# Patient Record
Sex: FEMALE | Race: WHITE | NOT HISPANIC OR LATINO
[De-identification: names, ages, dates, MRNs, and addresses within clinical notes are randomized per-mention and may not be internally consistent; named-entity substitution may affect disease eponyms.]

---

## 2020-04-21 PROBLEM — Z00.00 ENCOUNTER FOR PREVENTIVE HEALTH EXAMINATION: Status: ACTIVE | Noted: 2020-04-21

## 2020-09-02 ENCOUNTER — APPOINTMENT (OUTPATIENT)
Dept: GASTROENTEROLOGY | Facility: CLINIC | Age: 75
End: 2020-09-02
Payer: COMMERCIAL

## 2020-09-02 VITALS
WEIGHT: 197 LBS | HEART RATE: 79 BPM | HEIGHT: 62 IN | OXYGEN SATURATION: 97 % | BODY MASS INDEX: 36.25 KG/M2 | SYSTOLIC BLOOD PRESSURE: 136 MMHG | DIASTOLIC BLOOD PRESSURE: 80 MMHG

## 2020-09-02 DIAGNOSIS — Z86.69 PERSONAL HISTORY OF OTHER DISEASES OF THE NERVOUS SYSTEM AND SENSE ORGANS: ICD-10-CM

## 2020-09-02 DIAGNOSIS — Z80.0 FAMILY HISTORY OF MALIGNANT NEOPLASM OF DIGESTIVE ORGANS: ICD-10-CM

## 2020-09-02 DIAGNOSIS — Z12.11 ENCOUNTER FOR SCREENING FOR MALIGNANT NEOPLASM OF COLON: ICD-10-CM

## 2020-09-02 DIAGNOSIS — Z86.010 PERSONAL HISTORY OF COLONIC POLYPS: ICD-10-CM

## 2020-09-02 DIAGNOSIS — Z87.891 PERSONAL HISTORY OF NICOTINE DEPENDENCE: ICD-10-CM

## 2020-09-02 PROCEDURE — 99203 OFFICE O/P NEW LOW 30 MIN: CPT

## 2020-09-02 NOTE — REASON FOR VISIT
[Initial Evaluation] : an initial evaluation [FreeTextEntry1] : Family history of colon cancer and personal history of polyps

## 2020-09-02 NOTE — HISTORY OF PRESENT ILLNESS
[de-identified] : Sep 02, 2020 \par \par Dr. العراقي takes care of this pleasant 75-year-old female, colonoscopies by me in the past, who has family history, mother with colon cancer and personal history of colon polyps\par \par She is doing very well with no chronic medical illnesses\par \par She is enjoying her half-way with her twin 16-year-old granddaughters and her grandson age 13\par \par Ms. DESIRAE POLLOCK 75 year is referred for colon cancer screening.  The patient denies any change in bowel movements, blood per rectum, abdominal, pelvic or rectal discomfort.   \par \par There is no family history of colon  other gastrointestinal cancers.\par \par The patient denies any unexplained weight loss, fever chills or night sweats.\par \par There is no significant cardiac or pulmonary history.\par \par No complaints of chest pain, shortness of breath, palpitations, cough.\par \par The patient is feeling quite well.\par \par The patient is on no significant anticoagulant therapy or anti platelet therapy. \par \par No adverse reaction to anesthesia in the past.\par \par

## 2020-09-02 NOTE — PHYSICAL EXAM
[General Appearance - Alert] : alert [General Appearance - In No Acute Distress] : in no acute distress [General Appearance - Well Nourished] : well nourished [General Appearance - Well Developed] : well developed [General Appearance - Well-Appearing] : healthy appearing [Sclera] : the sclera and conjunctiva were normal [Neck Appearance] : the appearance of the neck was normal [Neck Cervical Mass (___cm)] : no neck mass was observed [Jugular Venous Distention Increased] : there was no jugular-venous distention [Auscultation Breath Sounds / Voice Sounds] : lungs were clear to auscultation bilaterally [Apical Impulse] : the apical impulse was normal [Full Pulse] : the pedal pulses are present [Edema] : there was no peripheral edema [Bowel Sounds] : normal bowel sounds [Abdomen Soft] : soft [Abdomen Tenderness] : non-tender [Abdomen Mass (___ Cm)] : no abdominal mass palpated [Cervical Lymph Nodes Enlarged Posterior Bilaterally] : posterior cervical [Cervical Lymph Nodes Enlarged Anterior Bilaterally] : anterior cervical [Supraclavicular Lymph Nodes Enlarged Bilaterally] : supraclavicular [Femoral Lymph Nodes Enlarged Bilaterally] : femoral [Axillary Lymph Nodes Enlarged Bilaterally] : axillary [Inguinal Lymph Nodes Enlarged Bilaterally] : inguinal [No CVA Tenderness] : no ~M costovertebral angle tenderness [No Spinal Tenderness] : no spinal tenderness [Abnormal Walk] : normal gait [Nail Clubbing] : no clubbing  or cyanosis of the fingernails [Musculoskeletal - Swelling] : no joint swelling seen [Motor Tone] : muscle strength and tone were normal [Skin Color & Pigmentation] : normal skin color and pigmentation [Skin Turgor] : normal skin turgor [] : no rash [No Focal Deficits] : no focal deficits [Oriented To Time, Place, And Person] : oriented to person, place, and time [Impaired Insight] : insight and judgment were intact [Affect] : the affect was normal

## 2020-09-02 NOTE — CONSULT LETTER
[Dear  ___] : Dear  [unfilled], [Consult Letter:] : I had the pleasure of evaluating your patient, [unfilled]. [Consult Closing:] : Thank you very much for allowing me to participate in the care of this patient.  If you have any questions, please do not hesitate to contact me. [Please see my note below.] : Please see my note below. [Sincerely,] : Sincerely, [FreeTextEntry2] : Dr. العراقي\par [address] [facility]\[place] [address]\par \par  [FreeTextEntry3] : Savage Rojas MD\par

## 2020-09-22 DIAGNOSIS — Z11.59 ENCOUNTER FOR SCREENING FOR OTHER VIRAL DISEASES: ICD-10-CM

## 2020-09-28 ENCOUNTER — APPOINTMENT (OUTPATIENT)
Dept: DISASTER EMERGENCY | Facility: CLINIC | Age: 75
End: 2020-09-28

## 2020-09-29 LAB — SARS-COV-2 N GENE NPH QL NAA+PROBE: NOT DETECTED

## 2020-10-01 ENCOUNTER — APPOINTMENT (OUTPATIENT)
Dept: GASTROENTEROLOGY | Facility: AMBULATORY MEDICAL SERVICES | Age: 75
End: 2020-10-01
Payer: COMMERCIAL

## 2020-10-01 PROCEDURE — 45380 COLONOSCOPY AND BIOPSY: CPT

## 2020-12-23 PROBLEM — Z12.11 ENCOUNTER FOR SCREENING COLONOSCOPY: Status: RESOLVED | Noted: 2020-09-02 | Resolved: 2020-12-23

## 2022-01-01 ENCOUNTER — TRANSCRIPTION ENCOUNTER (OUTPATIENT)
Age: 77
End: 2022-01-01

## 2022-01-01 ENCOUNTER — APPOINTMENT (OUTPATIENT)
Dept: INFUSION THERAPY | Facility: HOSPITAL | Age: 77
End: 2022-01-01

## 2022-01-01 ENCOUNTER — RESULT REVIEW (OUTPATIENT)
Age: 77
End: 2022-01-01

## 2022-01-01 ENCOUNTER — APPOINTMENT (OUTPATIENT)
Dept: HEMATOLOGY ONCOLOGY | Facility: CLINIC | Age: 77
End: 2022-01-01

## 2022-01-01 ENCOUNTER — APPOINTMENT (OUTPATIENT)
Dept: PULMONOLOGY | Facility: CLINIC | Age: 77
End: 2022-01-01
Payer: MEDICARE

## 2022-01-01 ENCOUNTER — NON-APPOINTMENT (OUTPATIENT)
Age: 77
End: 2022-01-01

## 2022-01-01 ENCOUNTER — OUTPATIENT (OUTPATIENT)
Dept: OUTPATIENT SERVICES | Facility: HOSPITAL | Age: 77
LOS: 1 days | Discharge: HOME | End: 2022-01-01

## 2022-01-01 ENCOUNTER — APPOINTMENT (OUTPATIENT)
Dept: HEMATOLOGY ONCOLOGY | Facility: CLINIC | Age: 77
End: 2022-01-01
Payer: COMMERCIAL

## 2022-01-01 ENCOUNTER — OUTPATIENT (OUTPATIENT)
Dept: OUTPATIENT SERVICES | Facility: HOSPITAL | Age: 77
LOS: 1 days | End: 2022-01-01
Payer: COMMERCIAL

## 2022-01-01 ENCOUNTER — APPOINTMENT (OUTPATIENT)
Dept: HEMATOLOGY ONCOLOGY | Facility: CLINIC | Age: 77
End: 2022-01-01
Payer: MEDICARE

## 2022-01-01 ENCOUNTER — OUTPATIENT (OUTPATIENT)
Dept: OUTPATIENT SERVICES | Facility: HOSPITAL | Age: 77
LOS: 1 days | Discharge: HOME | End: 2022-01-01
Payer: MEDICARE

## 2022-01-01 ENCOUNTER — APPOINTMENT (OUTPATIENT)
Dept: RADIATION ONCOLOGY | Facility: CLINIC | Age: 77
End: 2022-01-01
Payer: MEDICARE

## 2022-01-01 ENCOUNTER — APPOINTMENT (OUTPATIENT)
Dept: PULMONOLOGY | Facility: CLINIC | Age: 77
End: 2022-01-01
Payer: COMMERCIAL

## 2022-01-01 ENCOUNTER — INPATIENT (INPATIENT)
Facility: HOSPITAL | Age: 77
LOS: 10 days | Discharge: HOME HEALTH | End: 2022-02-08
Attending: STUDENT IN AN ORGANIZED HEALTH CARE EDUCATION/TRAINING PROGRAM | Admitting: STUDENT IN AN ORGANIZED HEALTH CARE EDUCATION/TRAINING PROGRAM
Payer: MEDICARE

## 2022-01-01 ENCOUNTER — APPOINTMENT (OUTPATIENT)
Dept: PULMONOLOGY | Facility: HOSPITAL | Age: 77
End: 2022-01-01

## 2022-01-01 ENCOUNTER — INPATIENT (INPATIENT)
Facility: HOSPITAL | Age: 77
LOS: 12 days | Discharge: HOME HEALTH | End: 2022-03-23
Attending: STUDENT IN AN ORGANIZED HEALTH CARE EDUCATION/TRAINING PROGRAM | Admitting: STUDENT IN AN ORGANIZED HEALTH CARE EDUCATION/TRAINING PROGRAM
Payer: MEDICARE

## 2022-01-01 ENCOUNTER — APPOINTMENT (OUTPATIENT)
Dept: ELECTROPHYSIOLOGY | Facility: CLINIC | Age: 77
End: 2022-01-01
Payer: MEDICARE

## 2022-01-01 ENCOUNTER — OUTPATIENT (OUTPATIENT)
Dept: OUTPATIENT SERVICES | Facility: HOSPITAL | Age: 77
LOS: 1 days | End: 2022-01-01

## 2022-01-01 ENCOUNTER — APPOINTMENT (OUTPATIENT)
Dept: RADIATION ONCOLOGY | Facility: CLINIC | Age: 77
End: 2022-01-01
Payer: COMMERCIAL

## 2022-01-01 ENCOUNTER — APPOINTMENT (OUTPATIENT)
Dept: NUCLEAR MEDICINE | Facility: IMAGING CENTER | Age: 77
End: 2022-01-01
Payer: COMMERCIAL

## 2022-01-01 ENCOUNTER — INPATIENT (INPATIENT)
Facility: HOSPITAL | Age: 77
LOS: 10 days | Discharge: EXPIRED | End: 2022-06-16
Attending: HOSPITALIST | Admitting: HOSPITALIST
Payer: MEDICARE

## 2022-01-01 ENCOUNTER — APPOINTMENT (OUTPATIENT)
Dept: CT IMAGING | Facility: IMAGING CENTER | Age: 77
End: 2022-01-01
Payer: MEDICARE

## 2022-01-01 ENCOUNTER — APPOINTMENT (OUTPATIENT)
Dept: GASTROENTEROLOGY | Facility: CLINIC | Age: 77
End: 2022-01-01

## 2022-01-01 VITALS
RESPIRATION RATE: 18 BRPM | TEMPERATURE: 98.2 F | WEIGHT: 154.32 LBS | DIASTOLIC BLOOD PRESSURE: 76 MMHG | OXYGEN SATURATION: 90 % | SYSTOLIC BLOOD PRESSURE: 122 MMHG | HEART RATE: 87 BPM | BODY MASS INDEX: 28.4 KG/M2

## 2022-01-01 VITALS
HEART RATE: 90 BPM | RESPIRATION RATE: 90 BRPM | HEIGHT: 61 IN | TEMPERATURE: 97.88 F | SYSTOLIC BLOOD PRESSURE: 132 MMHG | WEIGHT: 157 LBS | OXYGEN SATURATION: 95 % | DIASTOLIC BLOOD PRESSURE: 77 MMHG | BODY MASS INDEX: 29.64 KG/M2

## 2022-01-01 VITALS
RESPIRATION RATE: 20 BRPM | OXYGEN SATURATION: 98 % | HEIGHT: 61 IN | SYSTOLIC BLOOD PRESSURE: 127 MMHG | DIASTOLIC BLOOD PRESSURE: 68 MMHG | HEART RATE: 101 BPM | TEMPERATURE: 97 F

## 2022-01-01 VITALS
SYSTOLIC BLOOD PRESSURE: 141 MMHG | RESPIRATION RATE: 19 BRPM | WEIGHT: 145.95 LBS | DIASTOLIC BLOOD PRESSURE: 60 MMHG | HEART RATE: 99 BPM | TEMPERATURE: 98 F | OXYGEN SATURATION: 89 % | HEIGHT: 61 IN

## 2022-01-01 VITALS
HEIGHT: 61.81 IN | SYSTOLIC BLOOD PRESSURE: 122 MMHG | HEART RATE: 95 BPM | RESPIRATION RATE: 18 BRPM | OXYGEN SATURATION: 89 % | TEMPERATURE: 97.9 F | WEIGHT: 153 LBS | BODY MASS INDEX: 28.16 KG/M2 | DIASTOLIC BLOOD PRESSURE: 75 MMHG

## 2022-01-01 VITALS
OXYGEN SATURATION: 93 % | WEIGHT: 161 LBS | TEMPERATURE: 97.5 F | SYSTOLIC BLOOD PRESSURE: 144 MMHG | DIASTOLIC BLOOD PRESSURE: 84 MMHG | HEART RATE: 100 BPM | BODY MASS INDEX: 29.63 KG/M2 | RESPIRATION RATE: 17 BRPM | HEIGHT: 62 IN

## 2022-01-01 VITALS
HEART RATE: 97 BPM | OXYGEN SATURATION: 88 % | TEMPERATURE: 97.7 F | BODY MASS INDEX: 28.4 KG/M2 | DIASTOLIC BLOOD PRESSURE: 79 MMHG | SYSTOLIC BLOOD PRESSURE: 130 MMHG | WEIGHT: 154.32 LBS | RESPIRATION RATE: 20 BRPM

## 2022-01-01 VITALS
HEART RATE: 85 BPM | SYSTOLIC BLOOD PRESSURE: 109 MMHG | DIASTOLIC BLOOD PRESSURE: 54 MMHG | OXYGEN SATURATION: 98 % | TEMPERATURE: 98 F | RESPIRATION RATE: 18 BRPM

## 2022-01-01 VITALS
BODY MASS INDEX: 28.4 KG/M2 | WEIGHT: 154.32 LBS | OXYGEN SATURATION: 95 % | OXYGEN SATURATION: 97 % | DIASTOLIC BLOOD PRESSURE: 75 MMHG | HEART RATE: 99 BPM | TEMPERATURE: 97.4 F | RESPIRATION RATE: 18 BRPM | HEIGHT: 61.81 IN | SYSTOLIC BLOOD PRESSURE: 112 MMHG

## 2022-01-01 VITALS
OXYGEN SATURATION: 98 % | WEIGHT: 148.59 LBS | RESPIRATION RATE: 20 BRPM | TEMPERATURE: 98.1 F | BODY MASS INDEX: 28.05 KG/M2 | HEART RATE: 86 BPM | DIASTOLIC BLOOD PRESSURE: 71 MMHG | HEIGHT: 61.02 IN | SYSTOLIC BLOOD PRESSURE: 118 MMHG

## 2022-01-01 VITALS
BODY MASS INDEX: 28.71 KG/M2 | SYSTOLIC BLOOD PRESSURE: 113 MMHG | HEIGHT: 61.81 IN | WEIGHT: 156 LBS | DIASTOLIC BLOOD PRESSURE: 67 MMHG | HEART RATE: 92 BPM | TEMPERATURE: 97.5 F | OXYGEN SATURATION: 90 % | RESPIRATION RATE: 19 BRPM

## 2022-01-01 VITALS
WEIGHT: 152.12 LBS | TEMPERATURE: 98 F | DIASTOLIC BLOOD PRESSURE: 68 MMHG | HEIGHT: 61.02 IN | HEART RATE: 92 BPM | SYSTOLIC BLOOD PRESSURE: 131 MMHG | OXYGEN SATURATION: 89 % | BODY MASS INDEX: 28.72 KG/M2 | RESPIRATION RATE: 18 BRPM

## 2022-01-01 VITALS
HEIGHT: 62 IN | HEART RATE: 102 BPM | OXYGEN SATURATION: 94 % | WEIGHT: 172 LBS | SYSTOLIC BLOOD PRESSURE: 145 MMHG | BODY MASS INDEX: 31.65 KG/M2 | TEMPERATURE: 97.3 F | DIASTOLIC BLOOD PRESSURE: 84 MMHG

## 2022-01-01 VITALS
SYSTOLIC BLOOD PRESSURE: 120 MMHG | HEART RATE: 89 BPM | TEMPERATURE: 98 F | OXYGEN SATURATION: 98 % | HEIGHT: 61 IN | DIASTOLIC BLOOD PRESSURE: 70 MMHG | WEIGHT: 145.95 LBS | RESPIRATION RATE: 16 BRPM

## 2022-01-01 VITALS
TEMPERATURE: 97.5 F | HEART RATE: 111 BPM | HEIGHT: 61.81 IN | RESPIRATION RATE: 20 BRPM | OXYGEN SATURATION: 90 % | WEIGHT: 159.17 LBS | DIASTOLIC BLOOD PRESSURE: 82 MMHG | SYSTOLIC BLOOD PRESSURE: 142 MMHG | BODY MASS INDEX: 29.29 KG/M2

## 2022-01-01 VITALS
TEMPERATURE: 98 F | OXYGEN SATURATION: 95 % | HEART RATE: 90 BPM | SYSTOLIC BLOOD PRESSURE: 111 MMHG | WEIGHT: 154.98 LBS | HEIGHT: 62 IN | DIASTOLIC BLOOD PRESSURE: 63 MMHG

## 2022-01-01 VITALS — SYSTOLIC BLOOD PRESSURE: 143 MMHG | OXYGEN SATURATION: 91 % | HEART RATE: 93 BPM | DIASTOLIC BLOOD PRESSURE: 79 MMHG

## 2022-01-01 VITALS
OXYGEN SATURATION: 96 % | SYSTOLIC BLOOD PRESSURE: 105 MMHG | DIASTOLIC BLOOD PRESSURE: 62 MMHG | HEART RATE: 87 BPM | TEMPERATURE: 98 F | RESPIRATION RATE: 18 BRPM

## 2022-01-01 VITALS
BODY MASS INDEX: 28.71 KG/M2 | HEART RATE: 104 BPM | DIASTOLIC BLOOD PRESSURE: 78 MMHG | WEIGHT: 156 LBS | OXYGEN SATURATION: 95 % | TEMPERATURE: 98 F | SYSTOLIC BLOOD PRESSURE: 117 MMHG | RESPIRATION RATE: 14 BRPM

## 2022-01-01 VITALS
HEIGHT: 62 IN | OXYGEN SATURATION: 94 % | TEMPERATURE: 98 F | RESPIRATION RATE: 20 BRPM | SYSTOLIC BLOOD PRESSURE: 123 MMHG | DIASTOLIC BLOOD PRESSURE: 74 MMHG | HEART RATE: 107 BPM

## 2022-01-01 VITALS
DIASTOLIC BLOOD PRESSURE: 50 MMHG | SYSTOLIC BLOOD PRESSURE: 116 MMHG | HEART RATE: 84 BPM | RESPIRATION RATE: 18 BRPM | OXYGEN SATURATION: 96 %

## 2022-01-01 VITALS
RESPIRATION RATE: 18 BRPM | TEMPERATURE: 98 F | SYSTOLIC BLOOD PRESSURE: 151 MMHG | HEART RATE: 109 BPM | OXYGEN SATURATION: 99 % | DIASTOLIC BLOOD PRESSURE: 69 MMHG

## 2022-01-01 VITALS
TEMPERATURE: 97.8 F | HEART RATE: 93 BPM | SYSTOLIC BLOOD PRESSURE: 121 MMHG | WEIGHT: 156.53 LBS | OXYGEN SATURATION: 93 % | HEIGHT: 61.81 IN | DIASTOLIC BLOOD PRESSURE: 73 MMHG | RESPIRATION RATE: 18 BRPM | BODY MASS INDEX: 28.8 KG/M2

## 2022-01-01 VITALS
BODY MASS INDEX: 28.16 KG/M2 | HEIGHT: 61.81 IN | WEIGHT: 153 LBS | DIASTOLIC BLOOD PRESSURE: 70 MMHG | HEART RATE: 91 BPM | SYSTOLIC BLOOD PRESSURE: 128 MMHG | RESPIRATION RATE: 17 BRPM | TEMPERATURE: 97.5 F | OXYGEN SATURATION: 91 %

## 2022-01-01 VITALS
TEMPERATURE: 98 F | DIASTOLIC BLOOD PRESSURE: 28 MMHG | RESPIRATION RATE: 8 BRPM | HEART RATE: 65 BPM | SYSTOLIC BLOOD PRESSURE: 68 MMHG

## 2022-01-01 DIAGNOSIS — F43.20 ADJUSTMENT DISORDER, UNSPECIFIED: ICD-10-CM

## 2022-01-01 DIAGNOSIS — Z90.49 ACQUIRED ABSENCE OF OTHER SPECIFIED PARTS OF DIGESTIVE TRACT: Chronic | ICD-10-CM

## 2022-01-01 DIAGNOSIS — I48.91 UNSPECIFIED ATRIAL FIBRILLATION: ICD-10-CM

## 2022-01-01 DIAGNOSIS — Z29.9 ENCOUNTER FOR PROPHYLACTIC MEASURES, UNSPECIFIED: ICD-10-CM

## 2022-01-01 DIAGNOSIS — J98.09 OTHER DISEASES OF BRONCHUS, NOT ELSEWHERE CLASSIFIED: ICD-10-CM

## 2022-01-01 DIAGNOSIS — Z51.11 ENCOUNTER FOR ANTINEOPLASTIC CHEMOTHERAPY: ICD-10-CM

## 2022-01-01 DIAGNOSIS — I10 ESSENTIAL (PRIMARY) HYPERTENSION: ICD-10-CM

## 2022-01-01 DIAGNOSIS — R06.2 WHEEZING: ICD-10-CM

## 2022-01-01 DIAGNOSIS — R11.2 NAUSEA WITH VOMITING, UNSPECIFIED: ICD-10-CM

## 2022-01-01 DIAGNOSIS — C34.90 MALIGNANT NEOPLASM OF UNSPECIFIED PART OF UNSPECIFIED BRONCHUS OR LUNG: ICD-10-CM

## 2022-01-01 DIAGNOSIS — Z93.1 GASTROSTOMY STATUS: Chronic | ICD-10-CM

## 2022-01-01 DIAGNOSIS — R13.10 DYSPHAGIA, UNSPECIFIED: ICD-10-CM

## 2022-01-01 DIAGNOSIS — I48.19 OTHER PERSISTENT ATRIAL FIBRILLATION: ICD-10-CM

## 2022-01-01 DIAGNOSIS — Z79.899 OTHER LONG TERM (CURRENT) DRUG THERAPY: ICD-10-CM

## 2022-01-01 DIAGNOSIS — Z98.890 OTHER SPECIFIED POSTPROCEDURAL STATES: Chronic | ICD-10-CM

## 2022-01-01 DIAGNOSIS — Z45.2 ENCOUNTER FOR ADJUSTMENT AND MANAGEMENT OF VASCULAR ACCESS DEVICE: ICD-10-CM

## 2022-01-01 DIAGNOSIS — J18.9 PNEUMONIA, UNSPECIFIED ORGANISM: ICD-10-CM

## 2022-01-01 DIAGNOSIS — I48.0 PAROXYSMAL ATRIAL FIBRILLATION: ICD-10-CM

## 2022-01-01 DIAGNOSIS — Z11.52 ENCOUNTER FOR SCREENING FOR COVID-19: ICD-10-CM

## 2022-01-01 DIAGNOSIS — R06.02 SHORTNESS OF BREATH: ICD-10-CM

## 2022-01-01 DIAGNOSIS — R05.9 COUGH, UNSPECIFIED: ICD-10-CM

## 2022-01-01 DIAGNOSIS — Z95.828 PRESENCE OF OTHER VASCULAR IMPLANTS AND GRAFTS: Chronic | ICD-10-CM

## 2022-01-01 DIAGNOSIS — R06.00 DYSPNEA, UNSPECIFIED: ICD-10-CM

## 2022-01-01 DIAGNOSIS — Z00.8 ENCOUNTER FOR OTHER GENERAL EXAMINATION: ICD-10-CM

## 2022-01-01 DIAGNOSIS — Z43.1 ENCOUNTER FOR ATTENTION TO GASTROSTOMY: ICD-10-CM

## 2022-01-01 DIAGNOSIS — C34.91 MALIGNANT NEOPLASM OF UNSPECIFIED PART OF RIGHT BRONCHUS OR LUNG: ICD-10-CM

## 2022-01-01 DIAGNOSIS — Z01.818 ENCOUNTER FOR OTHER PREPROCEDURAL EXAMINATION: ICD-10-CM

## 2022-01-01 DIAGNOSIS — K59.00 CONSTIPATION, UNSPECIFIED: ICD-10-CM

## 2022-01-01 DIAGNOSIS — Z79.01 LONG TERM (CURRENT) USE OF ANTICOAGULANTS: ICD-10-CM

## 2022-01-01 DIAGNOSIS — L03.90 CELLULITIS, UNSPECIFIED: ICD-10-CM

## 2022-01-01 DIAGNOSIS — Z91.89 OTHER SPECIFIED PERSONAL RISK FACTORS, NOT ELSEWHERE CLASSIFIED: ICD-10-CM

## 2022-01-01 DIAGNOSIS — J39.8 OTHER SPECIFIED DISEASES OF UPPER RESPIRATORY TRACT: ICD-10-CM

## 2022-01-01 DIAGNOSIS — R91.8 OTHER NONSPECIFIC ABNORMAL FINDING OF LUNG FIELD: ICD-10-CM

## 2022-01-01 DIAGNOSIS — R19.7 DIARRHEA, UNSPECIFIED: ICD-10-CM

## 2022-01-01 LAB
-  AMIKACIN: SIGNIFICANT CHANGE UP
-  AMOXICILLIN/CLAVULANIC ACID: SIGNIFICANT CHANGE UP
-  AMPICILLIN/SULBACTAM: SIGNIFICANT CHANGE UP
-  AMPICILLIN: SIGNIFICANT CHANGE UP
-  AZTREONAM: SIGNIFICANT CHANGE UP
-  CEFAZOLIN: SIGNIFICANT CHANGE UP
-  CEFEPIME: SIGNIFICANT CHANGE UP
-  CEFOXITIN: SIGNIFICANT CHANGE UP
-  CEFTAZIDIME: SIGNIFICANT CHANGE UP
-  CEFTRIAXONE: SIGNIFICANT CHANGE UP
-  CIPROFLOXACIN: SIGNIFICANT CHANGE UP
-  ERTAPENEM: SIGNIFICANT CHANGE UP
-  GENTAMICIN: SIGNIFICANT CHANGE UP
-  IMIPENEM: SIGNIFICANT CHANGE UP
-  LEVOFLOXACIN: SIGNIFICANT CHANGE UP
-  MEROPENEM: SIGNIFICANT CHANGE UP
-  PIPERACILLIN/TAZOBACTAM: SIGNIFICANT CHANGE UP
-  TOBRAMYCIN: SIGNIFICANT CHANGE UP
-  TRIMETHOPRIM/SULFAMETHOXAZOLE: SIGNIFICANT CHANGE UP
A1C WITH ESTIMATED AVERAGE GLUCOSE RESULT: 5.7 % — HIGH (ref 4–5.6)
ALBUMIN SERPL ELPH-MCNC: 1.9 G/DL — LOW (ref 3.3–5)
ALBUMIN SERPL ELPH-MCNC: 2.2 G/DL — LOW (ref 3.3–5)
ALBUMIN SERPL ELPH-MCNC: 2.3 G/DL — LOW (ref 3.3–5)
ALBUMIN SERPL ELPH-MCNC: 2.3 G/DL — LOW (ref 3.3–5)
ALBUMIN SERPL ELPH-MCNC: 2.4 G/DL — LOW (ref 3.3–5)
ALBUMIN SERPL ELPH-MCNC: 2.5 G/DL — LOW (ref 3.3–5)
ALBUMIN SERPL ELPH-MCNC: 2.6 G/DL — LOW (ref 3.3–5)
ALBUMIN SERPL ELPH-MCNC: 2.7 G/DL — LOW (ref 3.3–5)
ALBUMIN SERPL ELPH-MCNC: 2.9 G/DL — LOW (ref 3.3–5)
ALBUMIN SERPL ELPH-MCNC: 3.1 G/DL — LOW (ref 3.3–5)
ALBUMIN SERPL ELPH-MCNC: 3.2 G/DL — LOW (ref 3.3–5)
ALBUMIN SERPL ELPH-MCNC: 3.3 G/DL — SIGNIFICANT CHANGE UP (ref 3.3–5)
ALBUMIN SERPL ELPH-MCNC: 3.4 G/DL — SIGNIFICANT CHANGE UP (ref 3.3–5)
ALBUMIN SERPL ELPH-MCNC: 3.6 G/DL
ALBUMIN SERPL ELPH-MCNC: 3.6 G/DL
ALBUMIN SERPL ELPH-MCNC: 3.8 G/DL
ALBUMIN SERPL ELPH-MCNC: 3.8 G/DL — SIGNIFICANT CHANGE UP (ref 3.3–5)
ALBUMIN SERPL ELPH-MCNC: 3.9 G/DL — SIGNIFICANT CHANGE UP (ref 3.3–5)
ALP BLD-CCNC: 63 U/L
ALP BLD-CCNC: 66 U/L
ALP BLD-CCNC: 86 U/L
ALP SERPL-CCNC: 37 U/L — LOW (ref 40–120)
ALP SERPL-CCNC: 54 U/L — SIGNIFICANT CHANGE UP (ref 40–120)
ALP SERPL-CCNC: 56 U/L — SIGNIFICANT CHANGE UP (ref 40–120)
ALP SERPL-CCNC: 56 U/L — SIGNIFICANT CHANGE UP (ref 40–120)
ALP SERPL-CCNC: 57 U/L — SIGNIFICANT CHANGE UP (ref 40–120)
ALP SERPL-CCNC: 57 U/L — SIGNIFICANT CHANGE UP (ref 40–120)
ALP SERPL-CCNC: 58 U/L — SIGNIFICANT CHANGE UP (ref 40–120)
ALP SERPL-CCNC: 59 U/L — SIGNIFICANT CHANGE UP (ref 40–120)
ALP SERPL-CCNC: 60 U/L — SIGNIFICANT CHANGE UP (ref 40–120)
ALP SERPL-CCNC: 63 U/L — SIGNIFICANT CHANGE UP (ref 40–120)
ALP SERPL-CCNC: 64 U/L — SIGNIFICANT CHANGE UP (ref 40–120)
ALP SERPL-CCNC: 64 U/L — SIGNIFICANT CHANGE UP (ref 40–120)
ALP SERPL-CCNC: 65 U/L — SIGNIFICANT CHANGE UP (ref 40–120)
ALP SERPL-CCNC: 65 U/L — SIGNIFICANT CHANGE UP (ref 40–120)
ALP SERPL-CCNC: 66 U/L — SIGNIFICANT CHANGE UP (ref 40–120)
ALP SERPL-CCNC: 68 U/L — SIGNIFICANT CHANGE UP (ref 40–120)
ALP SERPL-CCNC: 70 U/L — SIGNIFICANT CHANGE UP (ref 40–120)
ALP SERPL-CCNC: 71 U/L — SIGNIFICANT CHANGE UP (ref 40–120)
ALP SERPL-CCNC: 71 U/L — SIGNIFICANT CHANGE UP (ref 40–120)
ALP SERPL-CCNC: 72 U/L — SIGNIFICANT CHANGE UP (ref 40–120)
ALP SERPL-CCNC: 74 U/L — SIGNIFICANT CHANGE UP (ref 40–120)
ALP SERPL-CCNC: 76 U/L — SIGNIFICANT CHANGE UP (ref 40–120)
ALP SERPL-CCNC: 76 U/L — SIGNIFICANT CHANGE UP (ref 40–120)
ALP SERPL-CCNC: 80 U/L — SIGNIFICANT CHANGE UP (ref 40–120)
ALP SERPL-CCNC: 80 U/L — SIGNIFICANT CHANGE UP (ref 40–120)
ALT FLD-CCNC: 10 U/L — SIGNIFICANT CHANGE UP (ref 10–45)
ALT FLD-CCNC: 10 U/L — SIGNIFICANT CHANGE UP (ref 4–33)
ALT FLD-CCNC: 11 U/L — SIGNIFICANT CHANGE UP (ref 4–33)
ALT FLD-CCNC: 12 U/L — SIGNIFICANT CHANGE UP (ref 10–45)
ALT FLD-CCNC: 12 U/L — SIGNIFICANT CHANGE UP (ref 4–33)
ALT FLD-CCNC: 13 U/L — SIGNIFICANT CHANGE UP (ref 10–45)
ALT FLD-CCNC: 13 U/L — SIGNIFICANT CHANGE UP (ref 4–33)
ALT FLD-CCNC: 14 U/L — SIGNIFICANT CHANGE UP (ref 10–45)
ALT FLD-CCNC: 14 U/L — SIGNIFICANT CHANGE UP (ref 10–45)
ALT FLD-CCNC: 14 U/L — SIGNIFICANT CHANGE UP (ref 4–33)
ALT FLD-CCNC: 15 U/L — SIGNIFICANT CHANGE UP (ref 10–45)
ALT FLD-CCNC: 16 U/L — SIGNIFICANT CHANGE UP (ref 4–33)
ALT FLD-CCNC: 18 U/L — SIGNIFICANT CHANGE UP (ref 10–45)
ALT FLD-CCNC: 19 U/L — SIGNIFICANT CHANGE UP (ref 10–45)
ALT FLD-CCNC: 19 U/L — SIGNIFICANT CHANGE UP (ref 4–33)
ALT FLD-CCNC: 21 U/L — SIGNIFICANT CHANGE UP (ref 4–33)
ALT FLD-CCNC: 8 U/L — SIGNIFICANT CHANGE UP (ref 4–33)
ALT FLD-CCNC: 9 U/L — SIGNIFICANT CHANGE UP (ref 4–33)
ALT FLD-CCNC: 9 U/L — SIGNIFICANT CHANGE UP (ref 4–33)
ALT SERPL-CCNC: 10 U/L
ALT SERPL-CCNC: 12 U/L
ALT SERPL-CCNC: 16 U/L
ANION GAP SERPL CALC-SCNC: 10 MMOL/L
ANION GAP SERPL CALC-SCNC: 10 MMOL/L — SIGNIFICANT CHANGE UP (ref 7–14)
ANION GAP SERPL CALC-SCNC: 11 MMOL/L
ANION GAP SERPL CALC-SCNC: 11 MMOL/L — SIGNIFICANT CHANGE UP (ref 5–17)
ANION GAP SERPL CALC-SCNC: 11 MMOL/L — SIGNIFICANT CHANGE UP (ref 7–14)
ANION GAP SERPL CALC-SCNC: 12 MMOL/L — SIGNIFICANT CHANGE UP (ref 7–14)
ANION GAP SERPL CALC-SCNC: 13 MMOL/L — SIGNIFICANT CHANGE UP (ref 5–17)
ANION GAP SERPL CALC-SCNC: 13 MMOL/L — SIGNIFICANT CHANGE UP (ref 7–14)
ANION GAP SERPL CALC-SCNC: 14 MMOL/L
ANION GAP SERPL CALC-SCNC: 16 MMOL/L — HIGH (ref 7–14)
ANION GAP SERPL CALC-SCNC: 18 MMOL/L — HIGH (ref 5–17)
ANION GAP SERPL CALC-SCNC: 6 MMOL/L — LOW (ref 7–14)
ANION GAP SERPL CALC-SCNC: 7 MMOL/L — SIGNIFICANT CHANGE UP (ref 5–17)
ANION GAP SERPL CALC-SCNC: 7 MMOL/L — SIGNIFICANT CHANGE UP (ref 7–14)
ANION GAP SERPL CALC-SCNC: 8 MMOL/L — SIGNIFICANT CHANGE UP (ref 7–14)
ANION GAP SERPL CALC-SCNC: 9 MMOL/L — SIGNIFICANT CHANGE UP (ref 5–17)
ANION GAP SERPL CALC-SCNC: 9 MMOL/L — SIGNIFICANT CHANGE UP (ref 5–17)
ANION GAP SERPL CALC-SCNC: 9 MMOL/L — SIGNIFICANT CHANGE UP (ref 7–14)
ANION GAP SERPL CALC-SCNC: 9 MMOL/L — SIGNIFICANT CHANGE UP (ref 7–14)
ANISOCYTOSIS BLD QL: SLIGHT — SIGNIFICANT CHANGE UP
APPEARANCE UR: CLEAR — SIGNIFICANT CHANGE UP
APPEARANCE UR: CLEAR — SIGNIFICANT CHANGE UP
APTT BLD: 24.1 SEC — LOW (ref 27–36.3)
APTT BLD: 26.5 SEC — LOW (ref 27–36.3)
APTT BLD: 29.3 SEC — SIGNIFICANT CHANGE UP (ref 27–36.3)
APTT BLD: 29.5 SEC — SIGNIFICANT CHANGE UP (ref 27–36.3)
APTT BLD: 31.4 SEC — SIGNIFICANT CHANGE UP (ref 27.5–35.5)
APTT BLD: 32.2 SEC — SIGNIFICANT CHANGE UP (ref 27–36.3)
APTT BLD: 33 SEC — SIGNIFICANT CHANGE UP (ref 27–36.3)
AST SERPL-CCNC: 10 U/L — SIGNIFICANT CHANGE UP (ref 10–40)
AST SERPL-CCNC: 12 U/L — SIGNIFICANT CHANGE UP (ref 10–40)
AST SERPL-CCNC: 12 U/L — SIGNIFICANT CHANGE UP (ref 4–32)
AST SERPL-CCNC: 13 U/L — SIGNIFICANT CHANGE UP (ref 4–32)
AST SERPL-CCNC: 13 U/L — SIGNIFICANT CHANGE UP (ref 4–32)
AST SERPL-CCNC: 14 U/L — SIGNIFICANT CHANGE UP (ref 4–32)
AST SERPL-CCNC: 15 U/L — SIGNIFICANT CHANGE UP (ref 10–40)
AST SERPL-CCNC: 15 U/L — SIGNIFICANT CHANGE UP (ref 4–32)
AST SERPL-CCNC: 15 U/L — SIGNIFICANT CHANGE UP (ref 4–32)
AST SERPL-CCNC: 16 U/L — SIGNIFICANT CHANGE UP (ref 10–40)
AST SERPL-CCNC: 16 U/L — SIGNIFICANT CHANGE UP (ref 10–40)
AST SERPL-CCNC: 16 U/L — SIGNIFICANT CHANGE UP (ref 4–32)
AST SERPL-CCNC: 17 U/L — SIGNIFICANT CHANGE UP (ref 10–40)
AST SERPL-CCNC: 17 U/L — SIGNIFICANT CHANGE UP (ref 4–32)
AST SERPL-CCNC: 18 U/L
AST SERPL-CCNC: 18 U/L
AST SERPL-CCNC: 18 U/L — SIGNIFICANT CHANGE UP (ref 4–32)
AST SERPL-CCNC: 19 U/L
AST SERPL-CCNC: 19 U/L — SIGNIFICANT CHANGE UP (ref 4–32)
AST SERPL-CCNC: 21 U/L — SIGNIFICANT CHANGE UP (ref 4–32)
AST SERPL-CCNC: 22 U/L — SIGNIFICANT CHANGE UP (ref 4–32)
AST SERPL-CCNC: 22 U/L — SIGNIFICANT CHANGE UP (ref 4–32)
AST SERPL-CCNC: 24 U/L — SIGNIFICANT CHANGE UP (ref 4–32)
AST SERPL-CCNC: 25 U/L — SIGNIFICANT CHANGE UP (ref 4–32)
AST SERPL-CCNC: 38 U/L — HIGH (ref 4–32)
B PERT DNA SPEC QL NAA+PROBE: SIGNIFICANT CHANGE UP
B PERT+PARAPERT DNA PNL SPEC NAA+PROBE: SIGNIFICANT CHANGE UP
BACTERIA # UR AUTO: NEGATIVE — SIGNIFICANT CHANGE UP
BASE EXCESS BLDV CALC-SCNC: 5.8 MMOL/L — HIGH (ref -2–3)
BASE EXCESS BLDV CALC-SCNC: 7.6 MMOL/L — HIGH (ref -2–3)
BASE EXCESS BLDV CALC-SCNC: 8.7 MMOL/L — HIGH (ref -2–3)
BASOPHILS # BLD AUTO: 0 K/UL — SIGNIFICANT CHANGE UP (ref 0–0.2)
BASOPHILS # BLD AUTO: 0.01 K/UL — SIGNIFICANT CHANGE UP (ref 0–0.2)
BASOPHILS # BLD AUTO: 0.02 K/UL — SIGNIFICANT CHANGE UP (ref 0–0.2)
BASOPHILS # BLD AUTO: 0.03 K/UL — SIGNIFICANT CHANGE UP (ref 0–0.2)
BASOPHILS # BLD AUTO: 0.04 K/UL — SIGNIFICANT CHANGE UP (ref 0–0.2)
BASOPHILS # BLD AUTO: 0.05 K/UL — SIGNIFICANT CHANGE UP (ref 0–0.2)
BASOPHILS # BLD AUTO: 0.06 K/UL — SIGNIFICANT CHANGE UP (ref 0–0.2)
BASOPHILS NFR BLD AUTO: 0 % — SIGNIFICANT CHANGE UP (ref 0–2)
BASOPHILS NFR BLD AUTO: 0.1 % — SIGNIFICANT CHANGE UP (ref 0–2)
BASOPHILS NFR BLD AUTO: 0.2 % — SIGNIFICANT CHANGE UP (ref 0–2)
BASOPHILS NFR BLD AUTO: 0.3 % — SIGNIFICANT CHANGE UP (ref 0–2)
BASOPHILS NFR BLD AUTO: 0.4 % — SIGNIFICANT CHANGE UP (ref 0–2)
BASOPHILS NFR BLD AUTO: 0.4 % — SIGNIFICANT CHANGE UP (ref 0–2)
BASOPHILS NFR BLD AUTO: 0.5 % — SIGNIFICANT CHANGE UP (ref 0–2)
BASOPHILS NFR BLD AUTO: 0.5 % — SIGNIFICANT CHANGE UP (ref 0–2)
BASOPHILS NFR BLD AUTO: 0.6 % — SIGNIFICANT CHANGE UP (ref 0–2)
BASOPHILS NFR BLD AUTO: 0.7 % — SIGNIFICANT CHANGE UP (ref 0–2)
BASOPHILS NFR BLD AUTO: 0.7 % — SIGNIFICANT CHANGE UP (ref 0–2)
BASOPHILS NFR BLD AUTO: 0.8 % — SIGNIFICANT CHANGE UP (ref 0–2)
BASOPHILS NFR BLD AUTO: 0.9 % — SIGNIFICANT CHANGE UP (ref 0–2)
BASOPHILS NFR BLD AUTO: 0.9 % — SIGNIFICANT CHANGE UP (ref 0–2)
BASOPHILS NFR BLD AUTO: 1 % — SIGNIFICANT CHANGE UP (ref 0–2)
BASOPHILS NFR BLD AUTO: 1 % — SIGNIFICANT CHANGE UP (ref 0–2)
BASOPHILS NFR BLD AUTO: 1.1 % — SIGNIFICANT CHANGE UP (ref 0–2)
BASOPHILS NFR BLD AUTO: 1.1 % — SIGNIFICANT CHANGE UP (ref 0–2)
BILIRUB SERPL-MCNC: 0.2 MG/DL — SIGNIFICANT CHANGE UP (ref 0.2–1.2)
BILIRUB SERPL-MCNC: 0.3 MG/DL — SIGNIFICANT CHANGE UP (ref 0.2–1.2)
BILIRUB SERPL-MCNC: 0.3 MG/DL — SIGNIFICANT CHANGE UP (ref 0.2–1.2)
BILIRUB SERPL-MCNC: 0.4 MG/DL
BILIRUB SERPL-MCNC: 0.4 MG/DL — SIGNIFICANT CHANGE UP (ref 0.2–1.2)
BILIRUB SERPL-MCNC: 0.5 MG/DL
BILIRUB SERPL-MCNC: 0.5 MG/DL — SIGNIFICANT CHANGE UP (ref 0.2–1.2)
BILIRUB SERPL-MCNC: 0.6 MG/DL — SIGNIFICANT CHANGE UP (ref 0.2–1.2)
BILIRUB SERPL-MCNC: 0.7 MG/DL — SIGNIFICANT CHANGE UP (ref 0.2–1.2)
BILIRUB SERPL-MCNC: 0.7 MG/DL — SIGNIFICANT CHANGE UP (ref 0.2–1.2)
BILIRUB SERPL-MCNC: 0.8 MG/DL — SIGNIFICANT CHANGE UP (ref 0.2–1.2)
BILIRUB SERPL-MCNC: 0.8 MG/DL — SIGNIFICANT CHANGE UP (ref 0.2–1.2)
BILIRUB SERPL-MCNC: 1.1 MG/DL
BILIRUB SERPL-MCNC: <0.2 MG/DL — SIGNIFICANT CHANGE UP (ref 0.2–1.2)
BILIRUB SERPL-MCNC: <0.2 MG/DL — SIGNIFICANT CHANGE UP (ref 0.2–1.2)
BILIRUB UR-MCNC: NEGATIVE — SIGNIFICANT CHANGE UP
BILIRUB UR-MCNC: NEGATIVE — SIGNIFICANT CHANGE UP
BLD GP AB SCN SERPL QL: NEGATIVE — SIGNIFICANT CHANGE UP
BLOOD GAS ARTERIAL - LYTES,HGB,ICA,LACT RESULT: SIGNIFICANT CHANGE UP
BLOOD GAS VENOUS COMPREHENSIVE RESULT: SIGNIFICANT CHANGE UP
BORDETELLA PARAPERTUSSIS (RAPRVP): SIGNIFICANT CHANGE UP
BUN SERPL-MCNC: 10 MG/DL — SIGNIFICANT CHANGE UP (ref 7–23)
BUN SERPL-MCNC: 11 MG/DL — SIGNIFICANT CHANGE UP (ref 7–23)
BUN SERPL-MCNC: 11 MG/DL — SIGNIFICANT CHANGE UP (ref 7–23)
BUN SERPL-MCNC: 12 MG/DL — SIGNIFICANT CHANGE UP (ref 7–23)
BUN SERPL-MCNC: 13 MG/DL
BUN SERPL-MCNC: 13 MG/DL — SIGNIFICANT CHANGE UP (ref 7–23)
BUN SERPL-MCNC: 14 MG/DL
BUN SERPL-MCNC: 14 MG/DL — SIGNIFICANT CHANGE UP (ref 7–23)
BUN SERPL-MCNC: 15 MG/DL — SIGNIFICANT CHANGE UP (ref 7–23)
BUN SERPL-MCNC: 15 MG/DL — SIGNIFICANT CHANGE UP (ref 7–23)
BUN SERPL-MCNC: 17 MG/DL
BUN SERPL-MCNC: 17 MG/DL — SIGNIFICANT CHANGE UP (ref 7–23)
BUN SERPL-MCNC: 19 MG/DL — SIGNIFICANT CHANGE UP (ref 7–23)
BUN SERPL-MCNC: 20 MG/DL — SIGNIFICANT CHANGE UP (ref 7–23)
BUN SERPL-MCNC: 28 MG/DL — HIGH (ref 7–23)
BUN SERPL-MCNC: 29 MG/DL — HIGH (ref 7–23)
BUN SERPL-MCNC: 30 MG/DL — HIGH (ref 7–23)
BUN SERPL-MCNC: 30 MG/DL — HIGH (ref 7–23)
BUN SERPL-MCNC: 31 MG/DL — HIGH (ref 7–23)
BUN SERPL-MCNC: 33 MG/DL — HIGH (ref 7–23)
BUN SERPL-MCNC: 4 MG/DL — LOW (ref 7–23)
BUN SERPL-MCNC: 5 MG/DL — LOW (ref 7–23)
BUN SERPL-MCNC: 6 MG/DL — LOW (ref 7–23)
BUN SERPL-MCNC: 7 MG/DL — SIGNIFICANT CHANGE UP (ref 7–23)
BUN SERPL-MCNC: 7 MG/DL — SIGNIFICANT CHANGE UP (ref 7–23)
BUN SERPL-MCNC: 8 MG/DL — SIGNIFICANT CHANGE UP (ref 7–23)
BUN SERPL-MCNC: 8 MG/DL — SIGNIFICANT CHANGE UP (ref 7–23)
BUN SERPL-MCNC: 9 MG/DL — SIGNIFICANT CHANGE UP (ref 7–23)
C PNEUM DNA SPEC QL NAA+PROBE: SIGNIFICANT CHANGE UP
CALCIUM SERPL-MCNC: 10.2 MG/DL — SIGNIFICANT CHANGE UP (ref 8.4–10.5)
CALCIUM SERPL-MCNC: 10.2 MG/DL — SIGNIFICANT CHANGE UP (ref 8.4–10.5)
CALCIUM SERPL-MCNC: 10.6 MG/DL — HIGH (ref 8.4–10.5)
CALCIUM SERPL-MCNC: 10.8 MG/DL
CALCIUM SERPL-MCNC: 5.1 MG/DL — CRITICAL LOW (ref 8.4–10.5)
CALCIUM SERPL-MCNC: 8.3 MG/DL — LOW (ref 8.4–10.5)
CALCIUM SERPL-MCNC: 8.3 MG/DL — LOW (ref 8.4–10.5)
CALCIUM SERPL-MCNC: 8.5 MG/DL — SIGNIFICANT CHANGE UP (ref 8.4–10.5)
CALCIUM SERPL-MCNC: 8.6 MG/DL — SIGNIFICANT CHANGE UP (ref 8.4–10.5)
CALCIUM SERPL-MCNC: 8.7 MG/DL — SIGNIFICANT CHANGE UP (ref 8.4–10.5)
CALCIUM SERPL-MCNC: 8.8 MG/DL — SIGNIFICANT CHANGE UP (ref 8.4–10.5)
CALCIUM SERPL-MCNC: 8.9 MG/DL — SIGNIFICANT CHANGE UP (ref 8.4–10.5)
CALCIUM SERPL-MCNC: 8.9 MG/DL — SIGNIFICANT CHANGE UP (ref 8.4–10.5)
CALCIUM SERPL-MCNC: 9 MG/DL — SIGNIFICANT CHANGE UP (ref 8.4–10.5)
CALCIUM SERPL-MCNC: 9.1 MG/DL
CALCIUM SERPL-MCNC: 9.1 MG/DL
CALCIUM SERPL-MCNC: 9.1 MG/DL — SIGNIFICANT CHANGE UP (ref 8.4–10.5)
CALCIUM SERPL-MCNC: 9.2 MG/DL — SIGNIFICANT CHANGE UP (ref 8.4–10.5)
CALCIUM SERPL-MCNC: 9.4 MG/DL — SIGNIFICANT CHANGE UP (ref 8.4–10.5)
CALCIUM SERPL-MCNC: 9.4 MG/DL — SIGNIFICANT CHANGE UP (ref 8.4–10.5)
CALCIUM SERPL-MCNC: 9.5 MG/DL — SIGNIFICANT CHANGE UP (ref 8.4–10.5)
CALCIUM SERPL-MCNC: 9.5 MG/DL — SIGNIFICANT CHANGE UP (ref 8.4–10.5)
CALCIUM SERPL-MCNC: 9.6 MG/DL — SIGNIFICANT CHANGE UP (ref 8.4–10.5)
CALCIUM SERPL-MCNC: 9.6 MG/DL — SIGNIFICANT CHANGE UP (ref 8.4–10.5)
CALCIUM SERPL-MCNC: 9.8 MG/DL — SIGNIFICANT CHANGE UP (ref 8.4–10.5)
CALCIUM SERPL-MCNC: 9.9 MG/DL — SIGNIFICANT CHANGE UP (ref 8.4–10.5)
CALCIUM SERPL-MCNC: 9.9 MG/DL — SIGNIFICANT CHANGE UP (ref 8.4–10.5)
CHLORIDE BLDV-SCNC: 101 MMOL/L — SIGNIFICANT CHANGE UP (ref 96–108)
CHLORIDE BLDV-SCNC: 95 MMOL/L — LOW (ref 96–108)
CHLORIDE SERPL-SCNC: 100 MMOL/L — SIGNIFICANT CHANGE UP (ref 96–108)
CHLORIDE SERPL-SCNC: 100 MMOL/L — SIGNIFICANT CHANGE UP (ref 96–108)
CHLORIDE SERPL-SCNC: 100 MMOL/L — SIGNIFICANT CHANGE UP (ref 98–107)
CHLORIDE SERPL-SCNC: 101 MMOL/L — SIGNIFICANT CHANGE UP (ref 96–108)
CHLORIDE SERPL-SCNC: 101 MMOL/L — SIGNIFICANT CHANGE UP (ref 98–107)
CHLORIDE SERPL-SCNC: 102 MMOL/L — SIGNIFICANT CHANGE UP (ref 96–108)
CHLORIDE SERPL-SCNC: 103 MMOL/L — SIGNIFICANT CHANGE UP (ref 98–107)
CHLORIDE SERPL-SCNC: 104 MMOL/L — SIGNIFICANT CHANGE UP (ref 98–107)
CHLORIDE SERPL-SCNC: 106 MMOL/L — SIGNIFICANT CHANGE UP (ref 98–107)
CHLORIDE SERPL-SCNC: 118 MMOL/L — HIGH (ref 96–108)
CHLORIDE SERPL-SCNC: 93 MMOL/L
CHLORIDE SERPL-SCNC: 93 MMOL/L — LOW (ref 98–107)
CHLORIDE SERPL-SCNC: 94 MMOL/L — LOW (ref 98–107)
CHLORIDE SERPL-SCNC: 95 MMOL/L — LOW (ref 98–107)
CHLORIDE SERPL-SCNC: 96 MMOL/L — LOW (ref 98–107)
CHLORIDE SERPL-SCNC: 97 MMOL/L — LOW (ref 98–107)
CHLORIDE SERPL-SCNC: 97 MMOL/L — LOW (ref 98–107)
CHLORIDE SERPL-SCNC: 97 MMOL/L — SIGNIFICANT CHANGE UP (ref 96–108)
CHLORIDE SERPL-SCNC: 98 MMOL/L — SIGNIFICANT CHANGE UP (ref 96–108)
CHLORIDE SERPL-SCNC: 98 MMOL/L — SIGNIFICANT CHANGE UP (ref 98–107)
CHLORIDE SERPL-SCNC: 99 MMOL/L
CHLORIDE SERPL-SCNC: 99 MMOL/L
CHLORIDE SERPL-SCNC: 99 MMOL/L — SIGNIFICANT CHANGE UP (ref 96–108)
CHLORIDE SERPL-SCNC: 99 MMOL/L — SIGNIFICANT CHANGE UP (ref 98–107)
CHOLEST SERPL-MCNC: 170 MG/DL — SIGNIFICANT CHANGE UP
CK MB BLD-MCNC: <6.7 % — HIGH (ref 0–2.5)
CK MB CFR SERPL CALC: <1 NG/ML — SIGNIFICANT CHANGE UP
CK SERPL-CCNC: 15 U/L — LOW (ref 25–170)
CO2 BLDV-SCNC: 33.2 MMOL/L — HIGH (ref 22–26)
CO2 BLDV-SCNC: 35.3 MMOL/L — HIGH (ref 22–26)
CO2 BLDV-SCNC: 36.7 MMOL/L — HIGH (ref 22–26)
CO2 SERPL-SCNC: 14 MMOL/L — LOW (ref 22–31)
CO2 SERPL-SCNC: 20 MMOL/L — LOW (ref 22–31)
CO2 SERPL-SCNC: 24 MMOL/L — SIGNIFICANT CHANGE UP (ref 22–31)
CO2 SERPL-SCNC: 25 MMOL/L
CO2 SERPL-SCNC: 25 MMOL/L — SIGNIFICANT CHANGE UP (ref 22–31)
CO2 SERPL-SCNC: 26 MMOL/L — SIGNIFICANT CHANGE UP (ref 22–31)
CO2 SERPL-SCNC: 27 MMOL/L — SIGNIFICANT CHANGE UP (ref 22–31)
CO2 SERPL-SCNC: 28 MMOL/L
CO2 SERPL-SCNC: 28 MMOL/L
CO2 SERPL-SCNC: 28 MMOL/L — SIGNIFICANT CHANGE UP (ref 22–31)
CO2 SERPL-SCNC: 29 MMOL/L — SIGNIFICANT CHANGE UP (ref 22–31)
CO2 SERPL-SCNC: 30 MMOL/L — SIGNIFICANT CHANGE UP (ref 22–31)
CO2 SERPL-SCNC: 31 MMOL/L — SIGNIFICANT CHANGE UP (ref 22–31)
CO2 SERPL-SCNC: 32 MMOL/L — HIGH (ref 22–31)
COLOR SPEC: SIGNIFICANT CHANGE UP
COLOR SPEC: YELLOW — SIGNIFICANT CHANGE UP
CREAT SERPL-MCNC: 0.29 MG/DL — LOW (ref 0.5–1.3)
CREAT SERPL-MCNC: 0.32 MG/DL — LOW (ref 0.5–1.3)
CREAT SERPL-MCNC: 0.34 MG/DL — LOW (ref 0.5–1.3)
CREAT SERPL-MCNC: 0.36 MG/DL — LOW (ref 0.5–1.3)
CREAT SERPL-MCNC: 0.38 MG/DL — LOW (ref 0.5–1.3)
CREAT SERPL-MCNC: 0.41 MG/DL — LOW (ref 0.5–1.3)
CREAT SERPL-MCNC: 0.42 MG/DL — LOW (ref 0.5–1.3)
CREAT SERPL-MCNC: 0.43 MG/DL — LOW (ref 0.5–1.3)
CREAT SERPL-MCNC: 0.43 MG/DL — LOW (ref 0.5–1.3)
CREAT SERPL-MCNC: 0.46 MG/DL — LOW (ref 0.5–1.3)
CREAT SERPL-MCNC: 0.47 MG/DL — LOW (ref 0.5–1.3)
CREAT SERPL-MCNC: 0.48 MG/DL — LOW (ref 0.5–1.3)
CREAT SERPL-MCNC: 0.49 MG/DL — LOW (ref 0.5–1.3)
CREAT SERPL-MCNC: 0.5 MG/DL — SIGNIFICANT CHANGE UP (ref 0.5–1.3)
CREAT SERPL-MCNC: 0.51 MG/DL — SIGNIFICANT CHANGE UP (ref 0.5–1.3)
CREAT SERPL-MCNC: 0.51 MG/DL — SIGNIFICANT CHANGE UP (ref 0.5–1.3)
CREAT SERPL-MCNC: 0.52 MG/DL
CREAT SERPL-MCNC: 0.52 MG/DL — SIGNIFICANT CHANGE UP (ref 0.5–1.3)
CREAT SERPL-MCNC: 0.53 MG/DL
CREAT SERPL-MCNC: 0.55 MG/DL — SIGNIFICANT CHANGE UP (ref 0.5–1.3)
CREAT SERPL-MCNC: 0.56 MG/DL
CREAT SERPL-MCNC: 0.56 MG/DL — SIGNIFICANT CHANGE UP (ref 0.5–1.3)
CREAT SERPL-MCNC: 0.58 MG/DL — SIGNIFICANT CHANGE UP (ref 0.5–1.3)
CREAT SERPL-MCNC: 0.59 MG/DL — SIGNIFICANT CHANGE UP (ref 0.5–1.3)
CREAT SERPL-MCNC: 0.6 MG/DL — SIGNIFICANT CHANGE UP (ref 0.5–1.3)
CREAT SERPL-MCNC: 0.61 MG/DL — SIGNIFICANT CHANGE UP (ref 0.5–1.3)
CREAT SERPL-MCNC: 0.62 MG/DL — SIGNIFICANT CHANGE UP (ref 0.5–1.3)
CREAT SERPL-MCNC: 0.62 MG/DL — SIGNIFICANT CHANGE UP (ref 0.5–1.3)
CREAT SERPL-MCNC: 0.63 MG/DL — SIGNIFICANT CHANGE UP (ref 0.5–1.3)
CREAT SERPL-MCNC: 0.66 MG/DL — SIGNIFICANT CHANGE UP (ref 0.5–1.3)
CREAT SERPL-MCNC: 0.67 MG/DL — SIGNIFICANT CHANGE UP (ref 0.5–1.3)
CREAT SERPL-MCNC: 0.68 MG/DL — SIGNIFICANT CHANGE UP (ref 0.5–1.3)
CREAT SERPL-MCNC: 0.69 MG/DL — SIGNIFICANT CHANGE UP (ref 0.5–1.3)
CULTURE RESULTS: SIGNIFICANT CHANGE UP
DIFF PNL FLD: ABNORMAL
DIFF PNL FLD: NEGATIVE — SIGNIFICANT CHANGE UP
EGFR: 100 ML/MIN/1.73M2 — SIGNIFICANT CHANGE UP
EGFR: 100 ML/MIN/1.73M2 — SIGNIFICANT CHANGE UP
EGFR: 101 ML/MIN/1.73M2 — SIGNIFICANT CHANGE UP
EGFR: 103 ML/MIN/1.73M2 — SIGNIFICANT CHANGE UP
EGFR: 104 ML/MIN/1.73M2 — SIGNIFICANT CHANGE UP
EGFR: 106 ML/MIN/1.73M2 — SIGNIFICANT CHANGE UP
EGFR: 108 ML/MIN/1.73M2 — SIGNIFICANT CHANGE UP
EGFR: 110 ML/MIN/1.73M2 — SIGNIFICANT CHANGE UP
EGFR: 92 ML/MIN/1.73M2 — SIGNIFICANT CHANGE UP
EGFR: 93 ML/MIN/1.73M2 — SIGNIFICANT CHANGE UP
EGFR: 94 ML/MIN/1.73M2
EGFR: 94 ML/MIN/1.73M2 — SIGNIFICANT CHANGE UP
EGFR: 94 ML/MIN/1.73M2 — SIGNIFICANT CHANGE UP
EGFR: 95 ML/MIN/1.73M2
EGFR: 96 ML/MIN/1.73M2
EGFR: 96 ML/MIN/1.73M2 — SIGNIFICANT CHANGE UP
EGFR: 97 ML/MIN/1.73M2 — SIGNIFICANT CHANGE UP
EGFR: 98 ML/MIN/1.73M2 — SIGNIFICANT CHANGE UP
EOSINOPHIL # BLD AUTO: 0 K/UL — SIGNIFICANT CHANGE UP (ref 0–0.5)
EOSINOPHIL # BLD AUTO: 0.01 K/UL — SIGNIFICANT CHANGE UP (ref 0–0.5)
EOSINOPHIL # BLD AUTO: 0.02 K/UL — SIGNIFICANT CHANGE UP (ref 0–0.5)
EOSINOPHIL # BLD AUTO: 0.03 K/UL — SIGNIFICANT CHANGE UP (ref 0–0.5)
EOSINOPHIL # BLD AUTO: 0.04 K/UL — SIGNIFICANT CHANGE UP (ref 0–0.5)
EOSINOPHIL # BLD AUTO: 0.04 K/UL — SIGNIFICANT CHANGE UP (ref 0–0.5)
EOSINOPHIL # BLD AUTO: 0.05 K/UL — SIGNIFICANT CHANGE UP (ref 0–0.5)
EOSINOPHIL # BLD AUTO: 0.05 K/UL — SIGNIFICANT CHANGE UP (ref 0–0.5)
EOSINOPHIL # BLD AUTO: 0.06 K/UL — SIGNIFICANT CHANGE UP (ref 0–0.5)
EOSINOPHIL # BLD AUTO: 0.07 K/UL — SIGNIFICANT CHANGE UP (ref 0–0.5)
EOSINOPHIL # BLD AUTO: 0.08 K/UL — SIGNIFICANT CHANGE UP (ref 0–0.5)
EOSINOPHIL # BLD AUTO: 0.09 K/UL — SIGNIFICANT CHANGE UP (ref 0–0.5)
EOSINOPHIL # BLD AUTO: 0.14 K/UL — SIGNIFICANT CHANGE UP (ref 0–0.5)
EOSINOPHIL # BLD AUTO: 0.14 K/UL — SIGNIFICANT CHANGE UP (ref 0–0.5)
EOSINOPHIL # BLD AUTO: 0.2 K/UL — SIGNIFICANT CHANGE UP (ref 0–0.5)
EOSINOPHIL # BLD AUTO: 0.2 K/UL — SIGNIFICANT CHANGE UP (ref 0–0.5)
EOSINOPHIL # BLD AUTO: 0.23 K/UL — SIGNIFICANT CHANGE UP (ref 0–0.5)
EOSINOPHIL # BLD AUTO: 0.27 K/UL — SIGNIFICANT CHANGE UP (ref 0–0.5)
EOSINOPHIL # BLD AUTO: 0.29 K/UL — SIGNIFICANT CHANGE UP (ref 0–0.5)
EOSINOPHIL NFR BLD AUTO: 0 % — SIGNIFICANT CHANGE UP (ref 0–6)
EOSINOPHIL NFR BLD AUTO: 0.1 % — SIGNIFICANT CHANGE UP (ref 0–6)
EOSINOPHIL NFR BLD AUTO: 0.3 % — SIGNIFICANT CHANGE UP (ref 0–6)
EOSINOPHIL NFR BLD AUTO: 0.4 % — SIGNIFICANT CHANGE UP (ref 0–6)
EOSINOPHIL NFR BLD AUTO: 0.5 % — SIGNIFICANT CHANGE UP (ref 0–6)
EOSINOPHIL NFR BLD AUTO: 0.6 % — SIGNIFICANT CHANGE UP (ref 0–6)
EOSINOPHIL NFR BLD AUTO: 0.6 % — SIGNIFICANT CHANGE UP (ref 0–6)
EOSINOPHIL NFR BLD AUTO: 1 % — SIGNIFICANT CHANGE UP (ref 0–6)
EOSINOPHIL NFR BLD AUTO: 1.3 % — SIGNIFICANT CHANGE UP (ref 0–6)
EOSINOPHIL NFR BLD AUTO: 1.5 % — SIGNIFICANT CHANGE UP (ref 0–6)
EOSINOPHIL NFR BLD AUTO: 2.2 % — SIGNIFICANT CHANGE UP (ref 0–6)
EOSINOPHIL NFR BLD AUTO: 2.6 % — SIGNIFICANT CHANGE UP (ref 0–6)
EOSINOPHIL NFR BLD AUTO: 2.6 % — SIGNIFICANT CHANGE UP (ref 0–6)
EOSINOPHIL NFR BLD AUTO: 2.7 % — SIGNIFICANT CHANGE UP (ref 0–6)
EOSINOPHIL NFR BLD AUTO: 3.6 % — SIGNIFICANT CHANGE UP (ref 0–6)
EOSINOPHIL NFR BLD AUTO: 3.9 % — SIGNIFICANT CHANGE UP (ref 0–6)
EPI CELLS # UR: 1 /HPF — SIGNIFICANT CHANGE UP (ref 0–5)
ESTIMATED AVERAGE GLUCOSE: 117 — SIGNIFICANT CHANGE UP
FERRITIN SERPL-MCNC: 547 NG/ML — HIGH (ref 15–150)
FLUAV AG NPH QL: SIGNIFICANT CHANGE UP
FLUAV SUBTYP SPEC NAA+PROBE: SIGNIFICANT CHANGE UP
FLUBV AG NPH QL: SIGNIFICANT CHANGE UP
FLUBV RNA SPEC QL NAA+PROBE: SIGNIFICANT CHANGE UP
FOLATE SERPL-MCNC: 6.7 NG/ML — SIGNIFICANT CHANGE UP (ref 3.1–17.5)
FUNGITELL B-D-GLUCAN,  BRONCHIAL LAVAGE: SIGNIFICANT CHANGE UP
FUNGITELL: <31 PG/ML — SIGNIFICANT CHANGE UP
GALACTOMANNAN AG SERPL-ACNC: 0.11 INDEX — SIGNIFICANT CHANGE UP (ref 0–0.49)
GAS PNL BLDA: SIGNIFICANT CHANGE UP
GAS PNL BLDV: 133 MMOL/L — LOW (ref 136–145)
GAS PNL BLDV: 136 MMOL/L — SIGNIFICANT CHANGE UP (ref 136–145)
GLUCOSE BLDC GLUCOMTR-MCNC: 130 MG/DL — HIGH (ref 70–99)
GLUCOSE BLDC GLUCOMTR-MCNC: 132 MG/DL — HIGH (ref 70–99)
GLUCOSE BLDC GLUCOMTR-MCNC: 132 MG/DL — HIGH (ref 70–99)
GLUCOSE BLDC GLUCOMTR-MCNC: 144 MG/DL — HIGH (ref 70–99)
GLUCOSE BLDC GLUCOMTR-MCNC: 149 MG/DL — HIGH (ref 70–99)
GLUCOSE BLDC GLUCOMTR-MCNC: 151 MG/DL — HIGH (ref 70–99)
GLUCOSE BLDC GLUCOMTR-MCNC: 168 MG/DL — HIGH (ref 70–99)
GLUCOSE BLDC GLUCOMTR-MCNC: 169 MG/DL — HIGH (ref 70–99)
GLUCOSE BLDC GLUCOMTR-MCNC: 169 MG/DL — HIGH (ref 70–99)
GLUCOSE BLDC GLUCOMTR-MCNC: 171 MG/DL — HIGH (ref 70–99)
GLUCOSE BLDC GLUCOMTR-MCNC: 181 MG/DL — HIGH (ref 70–99)
GLUCOSE BLDC GLUCOMTR-MCNC: 183 MG/DL — HIGH (ref 70–99)
GLUCOSE BLDC GLUCOMTR-MCNC: 192 MG/DL — HIGH (ref 70–99)
GLUCOSE BLDC GLUCOMTR-MCNC: 198 MG/DL — HIGH (ref 70–99)
GLUCOSE BLDC GLUCOMTR-MCNC: 216 MG/DL — HIGH (ref 70–99)
GLUCOSE BLDC GLUCOMTR-MCNC: 229 MG/DL — HIGH (ref 70–99)
GLUCOSE BLDC GLUCOMTR-MCNC: 280 MG/DL — HIGH (ref 70–99)
GLUCOSE BLDC GLUCOMTR-MCNC: 305 MG/DL — HIGH (ref 70–99)
GLUCOSE BLDV-MCNC: 108 MG/DL — HIGH (ref 70–99)
GLUCOSE BLDV-MCNC: 115 MG/DL — HIGH (ref 70–99)
GLUCOSE SERPL-MCNC: 102 MG/DL — HIGH (ref 70–99)
GLUCOSE SERPL-MCNC: 103 MG/DL — HIGH (ref 70–99)
GLUCOSE SERPL-MCNC: 105 MG/DL — HIGH (ref 70–99)
GLUCOSE SERPL-MCNC: 106 MG/DL — HIGH (ref 70–99)
GLUCOSE SERPL-MCNC: 108 MG/DL — HIGH (ref 70–99)
GLUCOSE SERPL-MCNC: 109 MG/DL — HIGH (ref 70–99)
GLUCOSE SERPL-MCNC: 111 MG/DL — HIGH (ref 70–99)
GLUCOSE SERPL-MCNC: 112 MG/DL — HIGH (ref 70–99)
GLUCOSE SERPL-MCNC: 114 MG/DL — HIGH (ref 70–99)
GLUCOSE SERPL-MCNC: 115 MG/DL
GLUCOSE SERPL-MCNC: 115 MG/DL — HIGH (ref 70–99)
GLUCOSE SERPL-MCNC: 116 MG/DL — HIGH (ref 70–99)
GLUCOSE SERPL-MCNC: 119 MG/DL — HIGH (ref 70–99)
GLUCOSE SERPL-MCNC: 121 MG/DL — HIGH (ref 70–99)
GLUCOSE SERPL-MCNC: 122 MG/DL — HIGH (ref 70–99)
GLUCOSE SERPL-MCNC: 129 MG/DL — HIGH (ref 70–99)
GLUCOSE SERPL-MCNC: 132 MG/DL — HIGH (ref 70–99)
GLUCOSE SERPL-MCNC: 136 MG/DL — HIGH (ref 70–99)
GLUCOSE SERPL-MCNC: 139 MG/DL — HIGH (ref 70–99)
GLUCOSE SERPL-MCNC: 149 MG/DL — HIGH (ref 70–99)
GLUCOSE SERPL-MCNC: 153 MG/DL — HIGH (ref 70–99)
GLUCOSE SERPL-MCNC: 154 MG/DL — HIGH (ref 70–99)
GLUCOSE SERPL-MCNC: 154 MG/DL — HIGH (ref 70–99)
GLUCOSE SERPL-MCNC: 158 MG/DL — HIGH (ref 70–99)
GLUCOSE SERPL-MCNC: 169 MG/DL — HIGH (ref 70–99)
GLUCOSE SERPL-MCNC: 170 MG/DL
GLUCOSE SERPL-MCNC: 178 MG/DL — HIGH (ref 70–99)
GLUCOSE SERPL-MCNC: 181 MG/DL — HIGH (ref 70–99)
GLUCOSE SERPL-MCNC: 186 MG/DL — HIGH (ref 70–99)
GLUCOSE SERPL-MCNC: 188 MG/DL — HIGH (ref 70–99)
GLUCOSE SERPL-MCNC: 194 MG/DL — HIGH (ref 70–99)
GLUCOSE SERPL-MCNC: 195 MG/DL — HIGH (ref 70–99)
GLUCOSE SERPL-MCNC: 451 MG/DL — CRITICAL HIGH (ref 70–99)
GLUCOSE SERPL-MCNC: 77 MG/DL — SIGNIFICANT CHANGE UP (ref 70–99)
GLUCOSE SERPL-MCNC: 88 MG/DL — SIGNIFICANT CHANGE UP (ref 70–99)
GLUCOSE SERPL-MCNC: 90 MG/DL — SIGNIFICANT CHANGE UP (ref 70–99)
GLUCOSE SERPL-MCNC: 91 MG/DL — SIGNIFICANT CHANGE UP (ref 70–99)
GLUCOSE SERPL-MCNC: 93 MG/DL — SIGNIFICANT CHANGE UP (ref 70–99)
GLUCOSE SERPL-MCNC: 95 MG/DL
GLUCOSE SERPL-MCNC: 97 MG/DL — SIGNIFICANT CHANGE UP (ref 70–99)
GLUCOSE UR QL: NEGATIVE — SIGNIFICANT CHANGE UP
GLUCOSE UR QL: NEGATIVE — SIGNIFICANT CHANGE UP
GRAM STN FLD: SIGNIFICANT CHANGE UP
HADV DNA SPEC QL NAA+PROBE: SIGNIFICANT CHANGE UP
HCO3 BLDV-SCNC: 32 MMOL/L — HIGH (ref 22–29)
HCO3 BLDV-SCNC: 34 MMOL/L — HIGH (ref 22–29)
HCO3 BLDV-SCNC: 35 MMOL/L — HIGH (ref 22–29)
HCOV 229E RNA SPEC QL NAA+PROBE: SIGNIFICANT CHANGE UP
HCOV HKU1 RNA SPEC QL NAA+PROBE: SIGNIFICANT CHANGE UP
HCOV NL63 RNA SPEC QL NAA+PROBE: SIGNIFICANT CHANGE UP
HCOV OC43 RNA SPEC QL NAA+PROBE: SIGNIFICANT CHANGE UP
HCT VFR BLD CALC: 26.9 % — LOW (ref 34.5–45)
HCT VFR BLD CALC: 28.3 % — LOW (ref 34.5–45)
HCT VFR BLD CALC: 28.3 % — LOW (ref 34.5–45)
HCT VFR BLD CALC: 28.8 % — LOW (ref 34.5–45)
HCT VFR BLD CALC: 28.9 % — LOW (ref 34.5–45)
HCT VFR BLD CALC: 29.1 % — LOW (ref 34.5–45)
HCT VFR BLD CALC: 29.3 % — LOW (ref 34.5–45)
HCT VFR BLD CALC: 29.5 % — LOW (ref 34.5–45)
HCT VFR BLD CALC: 29.9 % — LOW (ref 34.5–45)
HCT VFR BLD CALC: 30.2 % — LOW (ref 34.5–45)
HCT VFR BLD CALC: 30.8 % — LOW (ref 34.5–45)
HCT VFR BLD CALC: 30.9 % — LOW (ref 34.5–45)
HCT VFR BLD CALC: 31.3 % — LOW (ref 34.5–45)
HCT VFR BLD CALC: 31.3 % — LOW (ref 34.5–45)
HCT VFR BLD CALC: 31.6 % — LOW (ref 34.5–45)
HCT VFR BLD CALC: 31.8 % — LOW (ref 34.5–45)
HCT VFR BLD CALC: 31.9 % — LOW (ref 34.5–45)
HCT VFR BLD CALC: 32.6 % — LOW (ref 34.5–45)
HCT VFR BLD CALC: 32.7 % — LOW (ref 34.5–45)
HCT VFR BLD CALC: 32.9 % — LOW (ref 34.5–45)
HCT VFR BLD CALC: 33 % — LOW (ref 34.5–45)
HCT VFR BLD CALC: 33 % — LOW (ref 34.5–45)
HCT VFR BLD CALC: 33.1 % — LOW (ref 34.5–45)
HCT VFR BLD CALC: 33.3 % — LOW (ref 34.5–45)
HCT VFR BLD CALC: 33.3 % — LOW (ref 34.5–45)
HCT VFR BLD CALC: 33.4 % — LOW (ref 34.5–45)
HCT VFR BLD CALC: 34.2 % — LOW (ref 34.5–45)
HCT VFR BLD CALC: 34.7 % — SIGNIFICANT CHANGE UP (ref 34.5–45)
HCT VFR BLD CALC: 34.9 % — SIGNIFICANT CHANGE UP (ref 34.5–45)
HCT VFR BLD CALC: 35.2 % — SIGNIFICANT CHANGE UP (ref 34.5–45)
HCT VFR BLD CALC: 35.2 % — SIGNIFICANT CHANGE UP (ref 34.5–45)
HCT VFR BLD CALC: 35.6 % — SIGNIFICANT CHANGE UP (ref 34.5–45)
HCT VFR BLD CALC: 35.8 % — SIGNIFICANT CHANGE UP (ref 34.5–45)
HCT VFR BLD CALC: 36.3 % — SIGNIFICANT CHANGE UP (ref 34.5–45)
HCT VFR BLD CALC: 36.4 % — SIGNIFICANT CHANGE UP (ref 34.5–45)
HCT VFR BLD CALC: 36.6 % — SIGNIFICANT CHANGE UP (ref 34.5–45)
HCT VFR BLD CALC: 37 % — SIGNIFICANT CHANGE UP (ref 34.5–45)
HCT VFR BLD CALC: 37.6 % — SIGNIFICANT CHANGE UP (ref 34.5–45)
HCT VFR BLD CALC: 38 % — SIGNIFICANT CHANGE UP (ref 34.5–45)
HCT VFR BLD CALC: 38.6 % — SIGNIFICANT CHANGE UP (ref 34.5–45)
HCT VFR BLD CALC: 39.2 % — SIGNIFICANT CHANGE UP (ref 34.5–45)
HCT VFR BLD CALC: 40.9 % — SIGNIFICANT CHANGE UP (ref 34.5–45)
HCT VFR BLD CALC: 41.5 % — SIGNIFICANT CHANGE UP (ref 34.5–45)
HCT VFR BLD CALC: 42.3 % — SIGNIFICANT CHANGE UP (ref 34.5–45)
HCT VFR BLD CALC: 42.3 % — SIGNIFICANT CHANGE UP (ref 34.5–45)
HCT VFR BLDA CALC: 33 % — LOW (ref 34.5–46.5)
HCT VFR BLDA CALC: 42 % — SIGNIFICANT CHANGE UP (ref 34.5–46.5)
HCV AB S/CO SERPL IA: 0.16 S/CO — SIGNIFICANT CHANGE UP (ref 0–0.99)
HCV AB SERPL-IMP: SIGNIFICANT CHANGE UP
HDLC SERPL-MCNC: 41 MG/DL — LOW
HGB BLD CALC-MCNC: 10.9 G/DL — LOW (ref 11.5–15.5)
HGB BLD CALC-MCNC: 14 G/DL — SIGNIFICANT CHANGE UP (ref 11.5–15.5)
HGB BLD-MCNC: 10 G/DL — LOW (ref 11.5–15.5)
HGB BLD-MCNC: 10 G/DL — LOW (ref 11.5–15.5)
HGB BLD-MCNC: 10.1 G/DL — LOW (ref 11.5–15.5)
HGB BLD-MCNC: 10.2 G/DL — LOW (ref 11.5–15.5)
HGB BLD-MCNC: 10.4 G/DL — LOW (ref 11.5–15.5)
HGB BLD-MCNC: 10.5 G/DL — LOW (ref 11.5–15.5)
HGB BLD-MCNC: 10.7 G/DL — LOW (ref 11.5–15.5)
HGB BLD-MCNC: 10.9 G/DL — LOW (ref 11.5–15.5)
HGB BLD-MCNC: 11 G/DL — LOW (ref 11.5–15.5)
HGB BLD-MCNC: 11 G/DL — LOW (ref 11.5–15.5)
HGB BLD-MCNC: 11.1 G/DL — LOW (ref 11.5–15.5)
HGB BLD-MCNC: 11.3 G/DL — LOW (ref 11.5–15.5)
HGB BLD-MCNC: 11.5 G/DL — SIGNIFICANT CHANGE UP (ref 11.5–15.5)
HGB BLD-MCNC: 11.7 G/DL — SIGNIFICANT CHANGE UP (ref 11.5–15.5)
HGB BLD-MCNC: 11.7 G/DL — SIGNIFICANT CHANGE UP (ref 11.5–15.5)
HGB BLD-MCNC: 11.9 G/DL — SIGNIFICANT CHANGE UP (ref 11.5–15.5)
HGB BLD-MCNC: 12.2 G/DL — SIGNIFICANT CHANGE UP (ref 11.5–15.5)
HGB BLD-MCNC: 12.2 G/DL — SIGNIFICANT CHANGE UP (ref 11.5–15.5)
HGB BLD-MCNC: 12.9 G/DL — SIGNIFICANT CHANGE UP (ref 11.5–15.5)
HGB BLD-MCNC: 13 G/DL — SIGNIFICANT CHANGE UP (ref 11.5–15.5)
HGB BLD-MCNC: 13.4 G/DL — SIGNIFICANT CHANGE UP (ref 11.5–15.5)
HGB BLD-MCNC: 13.5 G/DL — SIGNIFICANT CHANGE UP (ref 11.5–15.5)
HGB BLD-MCNC: 8.9 G/DL — LOW (ref 11.5–15.5)
HGB BLD-MCNC: 9 G/DL — LOW (ref 11.5–15.5)
HGB BLD-MCNC: 9.3 G/DL — LOW (ref 11.5–15.5)
HGB BLD-MCNC: 9.3 G/DL — LOW (ref 11.5–15.5)
HGB BLD-MCNC: 9.4 G/DL — LOW (ref 11.5–15.5)
HGB BLD-MCNC: 9.4 G/DL — LOW (ref 11.5–15.5)
HGB BLD-MCNC: 9.5 G/DL — LOW (ref 11.5–15.5)
HGB BLD-MCNC: 9.5 G/DL — LOW (ref 11.5–15.5)
HGB BLD-MCNC: 9.6 G/DL — LOW (ref 11.5–15.5)
HGB BLD-MCNC: 9.6 G/DL — LOW (ref 11.5–15.5)
HGB BLD-MCNC: 9.7 G/DL — LOW (ref 11.5–15.5)
HGB BLD-MCNC: 9.7 G/DL — LOW (ref 11.5–15.5)
HGB BLD-MCNC: 9.8 G/DL — LOW (ref 11.5–15.5)
HGB BLD-MCNC: 9.9 G/DL — LOW (ref 11.5–15.5)
HGB BLD-MCNC: 9.9 G/DL — LOW (ref 11.5–15.5)
HMPV RNA SPEC QL NAA+PROBE: SIGNIFICANT CHANGE UP
HPIV1 RNA SPEC QL NAA+PROBE: SIGNIFICANT CHANGE UP
HPIV2 RNA SPEC QL NAA+PROBE: SIGNIFICANT CHANGE UP
HPIV3 RNA SPEC QL NAA+PROBE: SIGNIFICANT CHANGE UP
HPIV4 RNA SPEC QL NAA+PROBE: SIGNIFICANT CHANGE UP
HYALINE CASTS # UR AUTO: 2 /LPF — SIGNIFICANT CHANGE UP (ref 0–7)
IANC: 10.22 K/UL — HIGH (ref 1.8–7.4)
IANC: 10.43 K/UL — HIGH (ref 1.8–7.4)
IANC: 11 K/UL — HIGH (ref 1.8–7.4)
IANC: 11.29 K/UL — HIGH (ref 1.8–7.4)
IANC: 11.43 K/UL — HIGH (ref 1.8–7.4)
IANC: 12.39 K/UL — HIGH (ref 1.8–7.4)
IANC: 4.52 K/UL — SIGNIFICANT CHANGE UP (ref 1.5–8.5)
IANC: 4.53 K/UL — SIGNIFICANT CHANGE UP (ref 1.5–8.5)
IANC: 4.61 K/UL — SIGNIFICANT CHANGE UP (ref 1.5–8.5)
IANC: 4.87 K/UL — SIGNIFICANT CHANGE UP (ref 1.8–7.4)
IANC: 5.65 K/UL — SIGNIFICANT CHANGE UP (ref 1.5–8.5)
IANC: 5.99 K/UL — SIGNIFICANT CHANGE UP (ref 1.8–7.4)
IANC: 6.06 K/UL — SIGNIFICANT CHANGE UP (ref 1.8–7.4)
IANC: 6.46 K/UL — SIGNIFICANT CHANGE UP (ref 1.5–8.5)
IANC: 6.62 K/UL — SIGNIFICANT CHANGE UP (ref 1.5–8.5)
IANC: 6.88 K/UL — SIGNIFICANT CHANGE UP (ref 1.5–8.5)
IANC: 6.95 K/UL — SIGNIFICANT CHANGE UP (ref 1.5–8.5)
IANC: 7.18 K/UL — SIGNIFICANT CHANGE UP (ref 1.5–8.5)
IANC: 8 K/UL — SIGNIFICANT CHANGE UP (ref 1.5–8.5)
IANC: 8.08 K/UL — SIGNIFICANT CHANGE UP (ref 1.5–8.5)
IANC: 9.74 K/UL — HIGH (ref 1.8–7.4)
IMM GRANULOCYTES NFR BLD AUTO: 0.2 % — SIGNIFICANT CHANGE UP (ref 0–1.5)
IMM GRANULOCYTES NFR BLD AUTO: 0.2 % — SIGNIFICANT CHANGE UP (ref 0–1.5)
IMM GRANULOCYTES NFR BLD AUTO: 0.3 % — SIGNIFICANT CHANGE UP (ref 0–1.5)
IMM GRANULOCYTES NFR BLD AUTO: 0.4 % — SIGNIFICANT CHANGE UP (ref 0–1.5)
IMM GRANULOCYTES NFR BLD AUTO: 0.5 % — SIGNIFICANT CHANGE UP (ref 0–1.5)
IMM GRANULOCYTES NFR BLD AUTO: 0.6 % — SIGNIFICANT CHANGE UP (ref 0–1.5)
IMM GRANULOCYTES NFR BLD AUTO: 0.7 % — SIGNIFICANT CHANGE UP (ref 0–1.5)
IMM GRANULOCYTES NFR BLD AUTO: 0.8 % — SIGNIFICANT CHANGE UP (ref 0–1.5)
IMM GRANULOCYTES NFR BLD AUTO: 1.1 % — SIGNIFICANT CHANGE UP (ref 0–1.5)
IMM GRANULOCYTES NFR BLD AUTO: 1.3 % — SIGNIFICANT CHANGE UP (ref 0–1.5)
IMM GRANULOCYTES NFR BLD AUTO: 2.4 % — HIGH (ref 0–1.5)
IMM GRANULOCYTES NFR BLD AUTO: 2.5 % — HIGH (ref 0–1.5)
IMM GRANULOCYTES NFR BLD AUTO: 2.8 % — HIGH (ref 0–1.5)
IMM GRANULOCYTES NFR BLD AUTO: 3.5 % — HIGH (ref 0–1.5)
INR BLD: 1.11 RATIO — SIGNIFICANT CHANGE UP (ref 0.88–1.16)
INR BLD: 1.18 RATIO — HIGH (ref 0.88–1.16)
INR BLD: 1.19 RATIO — HIGH (ref 0.88–1.16)
INR BLD: 1.2 RATIO — HIGH (ref 0.88–1.16)
INR BLD: 1.26 RATIO — HIGH (ref 0.88–1.16)
INR BLD: 1.32 RATIO — HIGH (ref 0.88–1.16)
INR BLD: 1.32 RATIO — HIGH (ref 0.88–1.16)
IRON SATN MFR SERPL: 47 % — SIGNIFICANT CHANGE UP (ref 14–50)
IRON SATN MFR SERPL: 73 UG/DL — SIGNIFICANT CHANGE UP (ref 30–160)
KETONES UR-MCNC: NEGATIVE — SIGNIFICANT CHANGE UP
KETONES UR-MCNC: NEGATIVE — SIGNIFICANT CHANGE UP
LACTATE BLDV-MCNC: 1.6 MMOL/L — SIGNIFICANT CHANGE UP (ref 0.5–2)
LACTATE BLDV-MCNC: 1.6 MMOL/L — SIGNIFICANT CHANGE UP (ref 0.5–2)
LDH SERPL L TO P-CCNC: 184 U/L — SIGNIFICANT CHANGE UP (ref 135–225)
LEGIONELLA AG UR QL: NEGATIVE — SIGNIFICANT CHANGE UP
LEUKOCYTE ESTERASE UR-ACNC: NEGATIVE — SIGNIFICANT CHANGE UP
LEUKOCYTE ESTERASE UR-ACNC: NEGATIVE — SIGNIFICANT CHANGE UP
LIDOCAIN IGE QN: 7 U/L — SIGNIFICANT CHANGE UP (ref 7–60)
LIDOCAIN IGE QN: 8 U/L — SIGNIFICANT CHANGE UP (ref 7–60)
LIPID PNL WITH DIRECT LDL SERPL: 111 MG/DL — HIGH
LYMPHOCYTES # BLD AUTO: 0.26 K/UL — LOW (ref 1–3.3)
LYMPHOCYTES # BLD AUTO: 0.41 K/UL — LOW (ref 1–3.3)
LYMPHOCYTES # BLD AUTO: 0.54 K/UL — LOW (ref 1–3.3)
LYMPHOCYTES # BLD AUTO: 0.66 K/UL — LOW (ref 1–3.3)
LYMPHOCYTES # BLD AUTO: 0.79 K/UL — LOW (ref 1–3.3)
LYMPHOCYTES # BLD AUTO: 0.81 K/UL — LOW (ref 1–3.3)
LYMPHOCYTES # BLD AUTO: 0.91 K/UL — LOW (ref 1–3.3)
LYMPHOCYTES # BLD AUTO: 1.01 K/UL — SIGNIFICANT CHANGE UP (ref 1–3.3)
LYMPHOCYTES # BLD AUTO: 1.01 K/UL — SIGNIFICANT CHANGE UP (ref 1–3.3)
LYMPHOCYTES # BLD AUTO: 1.03 K/UL — SIGNIFICANT CHANGE UP (ref 1–3.3)
LYMPHOCYTES # BLD AUTO: 1.04 K/UL — SIGNIFICANT CHANGE UP (ref 1–3.3)
LYMPHOCYTES # BLD AUTO: 1.09 K/UL — SIGNIFICANT CHANGE UP (ref 1–3.3)
LYMPHOCYTES # BLD AUTO: 1.11 K/UL — SIGNIFICANT CHANGE UP (ref 1–3.3)
LYMPHOCYTES # BLD AUTO: 1.14 K/UL — SIGNIFICANT CHANGE UP (ref 1–3.3)
LYMPHOCYTES # BLD AUTO: 1.14 K/UL — SIGNIFICANT CHANGE UP (ref 1–3.3)
LYMPHOCYTES # BLD AUTO: 1.21 K/UL — SIGNIFICANT CHANGE UP (ref 1–3.3)
LYMPHOCYTES # BLD AUTO: 1.23 K/UL — SIGNIFICANT CHANGE UP (ref 1–3.3)
LYMPHOCYTES # BLD AUTO: 1.24 K/UL — SIGNIFICANT CHANGE UP (ref 1–3.3)
LYMPHOCYTES # BLD AUTO: 1.27 K/UL — SIGNIFICANT CHANGE UP (ref 1–3.3)
LYMPHOCYTES # BLD AUTO: 1.34 K/UL — SIGNIFICANT CHANGE UP (ref 1–3.3)
LYMPHOCYTES # BLD AUTO: 1.38 K/UL — SIGNIFICANT CHANGE UP (ref 1–3.3)
LYMPHOCYTES # BLD AUTO: 1.4 K/UL — SIGNIFICANT CHANGE UP (ref 1–3.3)
LYMPHOCYTES # BLD AUTO: 1.43 K/UL — SIGNIFICANT CHANGE UP (ref 1–3.3)
LYMPHOCYTES # BLD AUTO: 1.47 K/UL — SIGNIFICANT CHANGE UP (ref 1–3.3)
LYMPHOCYTES # BLD AUTO: 1.49 K/UL — SIGNIFICANT CHANGE UP (ref 1–3.3)
LYMPHOCYTES # BLD AUTO: 1.49 K/UL — SIGNIFICANT CHANGE UP (ref 1–3.3)
LYMPHOCYTES # BLD AUTO: 1.53 K/UL — SIGNIFICANT CHANGE UP (ref 1–3.3)
LYMPHOCYTES # BLD AUTO: 1.59 K/UL — SIGNIFICANT CHANGE UP (ref 1–3.3)
LYMPHOCYTES # BLD AUTO: 1.76 K/UL — SIGNIFICANT CHANGE UP (ref 1–3.3)
LYMPHOCYTES # BLD AUTO: 1.88 K/UL — SIGNIFICANT CHANGE UP (ref 1–3.3)
LYMPHOCYTES # BLD AUTO: 10.3 % — LOW (ref 13–44)
LYMPHOCYTES # BLD AUTO: 10.3 % — LOW (ref 13–44)
LYMPHOCYTES # BLD AUTO: 10.5 % — LOW (ref 13–44)
LYMPHOCYTES # BLD AUTO: 10.7 % — LOW (ref 13–44)
LYMPHOCYTES # BLD AUTO: 11.2 % — LOW (ref 13–44)
LYMPHOCYTES # BLD AUTO: 11.5 % — LOW (ref 13–44)
LYMPHOCYTES # BLD AUTO: 12.9 % — LOW (ref 13–44)
LYMPHOCYTES # BLD AUTO: 14.9 % — SIGNIFICANT CHANGE UP (ref 13–44)
LYMPHOCYTES # BLD AUTO: 15.8 % — SIGNIFICANT CHANGE UP (ref 13–44)
LYMPHOCYTES # BLD AUTO: 16.6 % — SIGNIFICANT CHANGE UP (ref 13–44)
LYMPHOCYTES # BLD AUTO: 16.9 % — SIGNIFICANT CHANGE UP (ref 13–44)
LYMPHOCYTES # BLD AUTO: 17.8 % — SIGNIFICANT CHANGE UP (ref 13–44)
LYMPHOCYTES # BLD AUTO: 19.5 % — SIGNIFICANT CHANGE UP (ref 13–44)
LYMPHOCYTES # BLD AUTO: 2.11 K/UL — SIGNIFICANT CHANGE UP (ref 1–3.3)
LYMPHOCYTES # BLD AUTO: 2.19 K/UL — SIGNIFICANT CHANGE UP (ref 1–3.3)
LYMPHOCYTES # BLD AUTO: 2.2 K/UL — SIGNIFICANT CHANGE UP (ref 1–3.3)
LYMPHOCYTES # BLD AUTO: 2.23 K/UL — SIGNIFICANT CHANGE UP (ref 1–3.3)
LYMPHOCYTES # BLD AUTO: 20.1 % — SIGNIFICANT CHANGE UP (ref 13–44)
LYMPHOCYTES # BLD AUTO: 21.9 % — SIGNIFICANT CHANGE UP (ref 13–44)
LYMPHOCYTES # BLD AUTO: 22.3 % — SIGNIFICANT CHANGE UP (ref 13–44)
LYMPHOCYTES # BLD AUTO: 23.8 % — SIGNIFICANT CHANGE UP (ref 13–44)
LYMPHOCYTES # BLD AUTO: 24.1 % — SIGNIFICANT CHANGE UP (ref 13–44)
LYMPHOCYTES # BLD AUTO: 24.4 % — SIGNIFICANT CHANGE UP (ref 13–44)
LYMPHOCYTES # BLD AUTO: 28.2 % — SIGNIFICANT CHANGE UP (ref 13–44)
LYMPHOCYTES # BLD AUTO: 29 % — SIGNIFICANT CHANGE UP (ref 13–44)
LYMPHOCYTES # BLD AUTO: 29.4 % — SIGNIFICANT CHANGE UP (ref 13–44)
LYMPHOCYTES # BLD AUTO: 3.5 % — LOW (ref 13–44)
LYMPHOCYTES # BLD AUTO: 30.8 % — SIGNIFICANT CHANGE UP (ref 13–44)
LYMPHOCYTES # BLD AUTO: 37 % — SIGNIFICANT CHANGE UP (ref 13–44)
LYMPHOCYTES # BLD AUTO: 38.1 % — SIGNIFICANT CHANGE UP (ref 13–44)
LYMPHOCYTES # BLD AUTO: 4.3 % — LOW (ref 13–44)
LYMPHOCYTES # BLD AUTO: 43.8 % — SIGNIFICANT CHANGE UP (ref 13–44)
LYMPHOCYTES # BLD AUTO: 44 % — SIGNIFICANT CHANGE UP (ref 13–44)
LYMPHOCYTES # BLD AUTO: 5.1 % — LOW (ref 13–44)
LYMPHOCYTES # BLD AUTO: 6.3 % — LOW (ref 13–44)
LYMPHOCYTES # BLD AUTO: 6.6 % — LOW (ref 13–44)
LYMPHOCYTES # BLD AUTO: 9.4 % — LOW (ref 13–44)
LYMPHOCYTES # BLD AUTO: 9.5 % — LOW (ref 13–44)
M PNEUMO DNA SPEC QL NAA+PROBE: SIGNIFICANT CHANGE UP
MACROCYTES BLD QL: SLIGHT — SIGNIFICANT CHANGE UP
MAGNESIUM SERPL-MCNC: 1.1 MG/DL — LOW (ref 1.6–2.6)
MAGNESIUM SERPL-MCNC: 1.5 MG/DL — LOW (ref 1.6–2.6)
MAGNESIUM SERPL-MCNC: 1.6 MG/DL — SIGNIFICANT CHANGE UP (ref 1.6–2.6)
MAGNESIUM SERPL-MCNC: 1.7 MG/DL — SIGNIFICANT CHANGE UP (ref 1.6–2.6)
MAGNESIUM SERPL-MCNC: 1.7 MG/DL — SIGNIFICANT CHANGE UP (ref 1.6–2.6)
MAGNESIUM SERPL-MCNC: 1.8 MG/DL — SIGNIFICANT CHANGE UP (ref 1.6–2.6)
MAGNESIUM SERPL-MCNC: 1.8 MG/DL — SIGNIFICANT CHANGE UP (ref 1.6–2.6)
MAGNESIUM SERPL-MCNC: 1.9 MG/DL
MAGNESIUM SERPL-MCNC: 1.9 MG/DL — SIGNIFICANT CHANGE UP (ref 1.6–2.6)
MAGNESIUM SERPL-MCNC: 2 MG/DL — SIGNIFICANT CHANGE UP (ref 1.6–2.6)
MAGNESIUM SERPL-MCNC: 2.1 MG/DL — SIGNIFICANT CHANGE UP (ref 1.6–2.6)
MAGNESIUM SERPL-MCNC: 2.2 MG/DL — SIGNIFICANT CHANGE UP (ref 1.6–2.6)
MANUAL SMEAR VERIFICATION: SIGNIFICANT CHANGE UP
MCHC RBC-ENTMCNC: 28.7 PG — SIGNIFICANT CHANGE UP (ref 27–34)
MCHC RBC-ENTMCNC: 28.8 PG — SIGNIFICANT CHANGE UP (ref 27–34)
MCHC RBC-ENTMCNC: 28.9 PG — SIGNIFICANT CHANGE UP (ref 27–34)
MCHC RBC-ENTMCNC: 29 PG — SIGNIFICANT CHANGE UP (ref 27–34)
MCHC RBC-ENTMCNC: 29.1 PG — SIGNIFICANT CHANGE UP (ref 27–34)
MCHC RBC-ENTMCNC: 29.2 PG — SIGNIFICANT CHANGE UP (ref 27–34)
MCHC RBC-ENTMCNC: 29.3 PG — SIGNIFICANT CHANGE UP (ref 27–34)
MCHC RBC-ENTMCNC: 29.6 PG — SIGNIFICANT CHANGE UP (ref 27–34)
MCHC RBC-ENTMCNC: 29.6 PG — SIGNIFICANT CHANGE UP (ref 27–34)
MCHC RBC-ENTMCNC: 29.7 PG — SIGNIFICANT CHANGE UP (ref 27–34)
MCHC RBC-ENTMCNC: 29.7 PG — SIGNIFICANT CHANGE UP (ref 27–34)
MCHC RBC-ENTMCNC: 29.9 PG — SIGNIFICANT CHANGE UP (ref 27–34)
MCHC RBC-ENTMCNC: 29.9 PG — SIGNIFICANT CHANGE UP (ref 27–34)
MCHC RBC-ENTMCNC: 30 PG — SIGNIFICANT CHANGE UP (ref 27–34)
MCHC RBC-ENTMCNC: 30 PG — SIGNIFICANT CHANGE UP (ref 27–34)
MCHC RBC-ENTMCNC: 30.1 GM/DL — LOW (ref 32–36)
MCHC RBC-ENTMCNC: 30.1 PG — SIGNIFICANT CHANGE UP (ref 27–34)
MCHC RBC-ENTMCNC: 30.1 PG — SIGNIFICANT CHANGE UP (ref 27–34)
MCHC RBC-ENTMCNC: 30.2 GM/DL — LOW (ref 32–36)
MCHC RBC-ENTMCNC: 30.2 GM/DL — LOW (ref 32–36)
MCHC RBC-ENTMCNC: 30.2 PG — SIGNIFICANT CHANGE UP (ref 27–34)
MCHC RBC-ENTMCNC: 30.2 PG — SIGNIFICANT CHANGE UP (ref 27–34)
MCHC RBC-ENTMCNC: 30.3 GM/DL — LOW (ref 32–36)
MCHC RBC-ENTMCNC: 30.3 PG — SIGNIFICANT CHANGE UP (ref 27–34)
MCHC RBC-ENTMCNC: 30.3 PG — SIGNIFICANT CHANGE UP (ref 27–34)
MCHC RBC-ENTMCNC: 30.4 PG — SIGNIFICANT CHANGE UP (ref 27–34)
MCHC RBC-ENTMCNC: 30.5 PG — SIGNIFICANT CHANGE UP (ref 27–34)
MCHC RBC-ENTMCNC: 30.5 PG — SIGNIFICANT CHANGE UP (ref 27–34)
MCHC RBC-ENTMCNC: 30.6 PG — SIGNIFICANT CHANGE UP (ref 27–34)
MCHC RBC-ENTMCNC: 30.6 PG — SIGNIFICANT CHANGE UP (ref 27–34)
MCHC RBC-ENTMCNC: 30.7 PG — SIGNIFICANT CHANGE UP (ref 27–34)
MCHC RBC-ENTMCNC: 30.7 PG — SIGNIFICANT CHANGE UP (ref 27–34)
MCHC RBC-ENTMCNC: 30.9 GM/DL — LOW (ref 32–36)
MCHC RBC-ENTMCNC: 30.9 PG — SIGNIFICANT CHANGE UP (ref 27–34)
MCHC RBC-ENTMCNC: 31 GM/DL — LOW (ref 32–36)
MCHC RBC-ENTMCNC: 31 GM/DL — LOW (ref 32–36)
MCHC RBC-ENTMCNC: 31.1 GM/DL — LOW (ref 32–36)
MCHC RBC-ENTMCNC: 31.1 GM/DL — LOW (ref 32–36)
MCHC RBC-ENTMCNC: 31.2 GM/DL — LOW (ref 32–36)
MCHC RBC-ENTMCNC: 31.2 GM/DL — LOW (ref 32–36)
MCHC RBC-ENTMCNC: 31.3 GM/DL — LOW (ref 32–36)
MCHC RBC-ENTMCNC: 31.3 GM/DL — LOW (ref 32–36)
MCHC RBC-ENTMCNC: 31.4 G/DL — LOW (ref 32–36)
MCHC RBC-ENTMCNC: 31.4 GM/DL — LOW (ref 32–36)
MCHC RBC-ENTMCNC: 31.4 GM/DL — LOW (ref 32–36)
MCHC RBC-ENTMCNC: 31.5 G/DL — LOW (ref 32–36)
MCHC RBC-ENTMCNC: 31.6 GM/DL — LOW (ref 32–36)
MCHC RBC-ENTMCNC: 31.7 G/DL — LOW (ref 32–36)
MCHC RBC-ENTMCNC: 31.7 GM/DL — LOW (ref 32–36)
MCHC RBC-ENTMCNC: 31.9 GM/DL — LOW (ref 32–36)
MCHC RBC-ENTMCNC: 32 G/DL — SIGNIFICANT CHANGE UP (ref 32–36)
MCHC RBC-ENTMCNC: 32.1 G/DL — SIGNIFICANT CHANGE UP (ref 32–36)
MCHC RBC-ENTMCNC: 32.1 GM/DL — SIGNIFICANT CHANGE UP (ref 32–36)
MCHC RBC-ENTMCNC: 32.1 PG — SIGNIFICANT CHANGE UP (ref 27–34)
MCHC RBC-ENTMCNC: 32.2 GM/DL — SIGNIFICANT CHANGE UP (ref 32–36)
MCHC RBC-ENTMCNC: 32.3 G/DL — SIGNIFICANT CHANGE UP (ref 32–36)
MCHC RBC-ENTMCNC: 32.3 GM/DL — SIGNIFICANT CHANGE UP (ref 32–36)
MCHC RBC-ENTMCNC: 32.3 PG — SIGNIFICANT CHANGE UP (ref 27–34)
MCHC RBC-ENTMCNC: 32.4 G/DL — SIGNIFICANT CHANGE UP (ref 32–36)
MCHC RBC-ENTMCNC: 32.4 GM/DL — SIGNIFICANT CHANGE UP (ref 32–36)
MCHC RBC-ENTMCNC: 32.4 GM/DL — SIGNIFICANT CHANGE UP (ref 32–36)
MCHC RBC-ENTMCNC: 32.4 PG — SIGNIFICANT CHANGE UP (ref 27–34)
MCHC RBC-ENTMCNC: 32.5 GM/DL — SIGNIFICANT CHANGE UP (ref 32–36)
MCHC RBC-ENTMCNC: 32.5 PG — SIGNIFICANT CHANGE UP (ref 27–34)
MCHC RBC-ENTMCNC: 32.6 PG — SIGNIFICANT CHANGE UP (ref 27–34)
MCHC RBC-ENTMCNC: 32.7 PG — SIGNIFICANT CHANGE UP (ref 27–34)
MCHC RBC-ENTMCNC: 32.8 GM/DL — SIGNIFICANT CHANGE UP (ref 32–36)
MCHC RBC-ENTMCNC: 32.9 PG — SIGNIFICANT CHANGE UP (ref 27–34)
MCHC RBC-ENTMCNC: 33 G/DL — SIGNIFICANT CHANGE UP (ref 32–36)
MCHC RBC-ENTMCNC: 33 PG — SIGNIFICANT CHANGE UP (ref 27–34)
MCHC RBC-ENTMCNC: 33 PG — SIGNIFICANT CHANGE UP (ref 27–34)
MCHC RBC-ENTMCNC: 33.1 G/DL — SIGNIFICANT CHANGE UP (ref 32–36)
MCHC RBC-ENTMCNC: 33.1 PG — SIGNIFICANT CHANGE UP (ref 27–34)
MCHC RBC-ENTMCNC: 33.1 PG — SIGNIFICANT CHANGE UP (ref 27–34)
MCHC RBC-ENTMCNC: 33.2 G/DL — SIGNIFICANT CHANGE UP (ref 32–36)
MCHC RBC-ENTMCNC: 33.2 G/DL — SIGNIFICANT CHANGE UP (ref 32–36)
MCHC RBC-ENTMCNC: 33.2 GM/DL — SIGNIFICANT CHANGE UP (ref 32–36)
MCHC RBC-ENTMCNC: 33.2 GM/DL — SIGNIFICANT CHANGE UP (ref 32–36)
MCHC RBC-ENTMCNC: 33.2 PG — SIGNIFICANT CHANGE UP (ref 27–34)
MCHC RBC-ENTMCNC: 33.3 G/DL — SIGNIFICANT CHANGE UP (ref 32–36)
MCHC RBC-ENTMCNC: 33.3 PG — SIGNIFICANT CHANGE UP (ref 27–34)
MCHC RBC-ENTMCNC: 33.3 PG — SIGNIFICANT CHANGE UP (ref 27–34)
MCHC RBC-ENTMCNC: 33.4 GM/DL — SIGNIFICANT CHANGE UP (ref 32–36)
MCHC RBC-ENTMCNC: 33.6 PG — SIGNIFICANT CHANGE UP (ref 27–34)
MCHC RBC-ENTMCNC: 33.7 G/DL — SIGNIFICANT CHANGE UP (ref 32–36)
MCHC RBC-ENTMCNC: 33.7 PG — SIGNIFICANT CHANGE UP (ref 27–34)
MCHC RBC-ENTMCNC: 33.8 PG — SIGNIFICANT CHANGE UP (ref 27–34)
MCHC RBC-ENTMCNC: 34 PG — SIGNIFICANT CHANGE UP (ref 27–34)
MCV RBC AUTO: 100.3 FL — HIGH (ref 80–100)
MCV RBC AUTO: 101.1 FL — HIGH (ref 80–100)
MCV RBC AUTO: 101.4 FL — HIGH (ref 80–100)
MCV RBC AUTO: 102.7 FL — HIGH (ref 80–100)
MCV RBC AUTO: 103.4 FL — HIGH (ref 80–100)
MCV RBC AUTO: 104.6 FL — HIGH (ref 80–100)
MCV RBC AUTO: 104.7 FL — HIGH (ref 80–100)
MCV RBC AUTO: 105.4 FL — HIGH (ref 80–100)
MCV RBC AUTO: 105.4 FL — HIGH (ref 80–100)
MCV RBC AUTO: 105.7 FL — HIGH (ref 80–100)
MCV RBC AUTO: 107 FL — HIGH (ref 80–100)
MCV RBC AUTO: 107 FL — HIGH (ref 80–100)
MCV RBC AUTO: 107.1 FL — HIGH (ref 80–100)
MCV RBC AUTO: 107.1 FL — HIGH (ref 80–100)
MCV RBC AUTO: 107.2 FL — HIGH (ref 80–100)
MCV RBC AUTO: 107.2 FL — HIGH (ref 80–100)
MCV RBC AUTO: 107.9 FL — HIGH (ref 80–100)
MCV RBC AUTO: 109.3 FL — HIGH (ref 80–100)
MCV RBC AUTO: 91.9 FL — SIGNIFICANT CHANGE UP (ref 80–100)
MCV RBC AUTO: 92 FL — SIGNIFICANT CHANGE UP (ref 80–100)
MCV RBC AUTO: 92 FL — SIGNIFICANT CHANGE UP (ref 80–100)
MCV RBC AUTO: 92.3 FL — SIGNIFICANT CHANGE UP (ref 80–100)
MCV RBC AUTO: 92.3 FL — SIGNIFICANT CHANGE UP (ref 80–100)
MCV RBC AUTO: 92.8 FL — SIGNIFICANT CHANGE UP (ref 80–100)
MCV RBC AUTO: 92.8 FL — SIGNIFICANT CHANGE UP (ref 80–100)
MCV RBC AUTO: 93 FL — SIGNIFICANT CHANGE UP (ref 80–100)
MCV RBC AUTO: 93 FL — SIGNIFICANT CHANGE UP (ref 80–100)
MCV RBC AUTO: 93.1 FL — SIGNIFICANT CHANGE UP (ref 80–100)
MCV RBC AUTO: 93.2 FL — SIGNIFICANT CHANGE UP (ref 80–100)
MCV RBC AUTO: 93.3 FL — SIGNIFICANT CHANGE UP (ref 80–100)
MCV RBC AUTO: 93.4 FL — SIGNIFICANT CHANGE UP (ref 80–100)
MCV RBC AUTO: 93.4 FL — SIGNIFICANT CHANGE UP (ref 80–100)
MCV RBC AUTO: 93.7 FL — SIGNIFICANT CHANGE UP (ref 80–100)
MCV RBC AUTO: 93.8 FL — SIGNIFICANT CHANGE UP (ref 80–100)
MCV RBC AUTO: 94 FL — SIGNIFICANT CHANGE UP (ref 80–100)
MCV RBC AUTO: 94 FL — SIGNIFICANT CHANGE UP (ref 80–100)
MCV RBC AUTO: 94.2 FL — SIGNIFICANT CHANGE UP (ref 80–100)
MCV RBC AUTO: 94.3 FL — SIGNIFICANT CHANGE UP (ref 80–100)
MCV RBC AUTO: 94.5 FL — SIGNIFICANT CHANGE UP (ref 80–100)
MCV RBC AUTO: 94.8 FL — SIGNIFICANT CHANGE UP (ref 80–100)
MCV RBC AUTO: 95 FL — SIGNIFICANT CHANGE UP (ref 80–100)
MCV RBC AUTO: 95.4 FL — SIGNIFICANT CHANGE UP (ref 80–100)
MCV RBC AUTO: 96.2 FL — SIGNIFICANT CHANGE UP (ref 80–100)
MCV RBC AUTO: 97 FL — SIGNIFICANT CHANGE UP (ref 80–100)
MCV RBC AUTO: 97.6 FL — SIGNIFICANT CHANGE UP (ref 80–100)
METHOD TYPE: SIGNIFICANT CHANGE UP
MONOCYTES # BLD AUTO: 0.1 K/UL — SIGNIFICANT CHANGE UP (ref 0–0.9)
MONOCYTES # BLD AUTO: 0.11 K/UL — SIGNIFICANT CHANGE UP (ref 0–0.9)
MONOCYTES # BLD AUTO: 0.18 K/UL — SIGNIFICANT CHANGE UP (ref 0–0.9)
MONOCYTES # BLD AUTO: 0.19 K/UL — SIGNIFICANT CHANGE UP (ref 0–0.9)
MONOCYTES # BLD AUTO: 0.2 K/UL — SIGNIFICANT CHANGE UP (ref 0–0.9)
MONOCYTES # BLD AUTO: 0.22 K/UL — SIGNIFICANT CHANGE UP (ref 0–0.9)
MONOCYTES # BLD AUTO: 0.24 K/UL — SIGNIFICANT CHANGE UP (ref 0–0.9)
MONOCYTES # BLD AUTO: 0.24 K/UL — SIGNIFICANT CHANGE UP (ref 0–0.9)
MONOCYTES # BLD AUTO: 0.25 K/UL — SIGNIFICANT CHANGE UP (ref 0–0.9)
MONOCYTES # BLD AUTO: 0.25 K/UL — SIGNIFICANT CHANGE UP (ref 0–0.9)
MONOCYTES # BLD AUTO: 0.27 K/UL — SIGNIFICANT CHANGE UP (ref 0–0.9)
MONOCYTES # BLD AUTO: 0.32 K/UL — SIGNIFICANT CHANGE UP (ref 0–0.9)
MONOCYTES # BLD AUTO: 0.32 K/UL — SIGNIFICANT CHANGE UP (ref 0–0.9)
MONOCYTES # BLD AUTO: 0.36 K/UL — SIGNIFICANT CHANGE UP (ref 0–0.9)
MONOCYTES # BLD AUTO: 0.37 K/UL — SIGNIFICANT CHANGE UP (ref 0–0.9)
MONOCYTES # BLD AUTO: 0.4 K/UL — SIGNIFICANT CHANGE UP (ref 0–0.9)
MONOCYTES # BLD AUTO: 0.4 K/UL — SIGNIFICANT CHANGE UP (ref 0–0.9)
MONOCYTES # BLD AUTO: 0.42 K/UL — SIGNIFICANT CHANGE UP (ref 0–0.9)
MONOCYTES # BLD AUTO: 0.43 K/UL — SIGNIFICANT CHANGE UP (ref 0–0.9)
MONOCYTES # BLD AUTO: 0.44 K/UL — SIGNIFICANT CHANGE UP (ref 0–0.9)
MONOCYTES # BLD AUTO: 0.47 K/UL — SIGNIFICANT CHANGE UP (ref 0–0.9)
MONOCYTES # BLD AUTO: 0.55 K/UL — SIGNIFICANT CHANGE UP (ref 0–0.9)
MONOCYTES # BLD AUTO: 0.59 K/UL — SIGNIFICANT CHANGE UP (ref 0–0.9)
MONOCYTES # BLD AUTO: 0.62 K/UL — SIGNIFICANT CHANGE UP (ref 0–0.9)
MONOCYTES # BLD AUTO: 0.62 K/UL — SIGNIFICANT CHANGE UP (ref 0–0.9)
MONOCYTES # BLD AUTO: 0.65 K/UL — SIGNIFICANT CHANGE UP (ref 0–0.9)
MONOCYTES # BLD AUTO: 0.68 K/UL — SIGNIFICANT CHANGE UP (ref 0–0.9)
MONOCYTES # BLD AUTO: 0.68 K/UL — SIGNIFICANT CHANGE UP (ref 0–0.9)
MONOCYTES # BLD AUTO: 0.69 K/UL — SIGNIFICANT CHANGE UP (ref 0–0.9)
MONOCYTES # BLD AUTO: 0.69 K/UL — SIGNIFICANT CHANGE UP (ref 0–0.9)
MONOCYTES # BLD AUTO: 0.71 K/UL — SIGNIFICANT CHANGE UP (ref 0–0.9)
MONOCYTES # BLD AUTO: 0.78 K/UL — SIGNIFICANT CHANGE UP (ref 0–0.9)
MONOCYTES # BLD AUTO: 0.95 K/UL — HIGH (ref 0–0.9)
MONOCYTES # BLD AUTO: 0.97 K/UL — HIGH (ref 0–0.9)
MONOCYTES NFR BLD AUTO: 1.5 % — LOW (ref 2–14)
MONOCYTES NFR BLD AUTO: 1.7 % — LOW (ref 2–14)
MONOCYTES NFR BLD AUTO: 1.9 % — LOW (ref 2–14)
MONOCYTES NFR BLD AUTO: 1.9 % — LOW (ref 2–14)
MONOCYTES NFR BLD AUTO: 10.1 % — SIGNIFICANT CHANGE UP (ref 2–14)
MONOCYTES NFR BLD AUTO: 11 % — SIGNIFICANT CHANGE UP (ref 2–14)
MONOCYTES NFR BLD AUTO: 11.1 % — SIGNIFICANT CHANGE UP (ref 2–14)
MONOCYTES NFR BLD AUTO: 2.1 % — SIGNIFICANT CHANGE UP (ref 2–14)
MONOCYTES NFR BLD AUTO: 2.4 % — SIGNIFICANT CHANGE UP (ref 2–14)
MONOCYTES NFR BLD AUTO: 2.5 % — SIGNIFICANT CHANGE UP (ref 2–14)
MONOCYTES NFR BLD AUTO: 2.7 % — SIGNIFICANT CHANGE UP (ref 2–14)
MONOCYTES NFR BLD AUTO: 2.8 % — SIGNIFICANT CHANGE UP (ref 2–14)
MONOCYTES NFR BLD AUTO: 2.8 % — SIGNIFICANT CHANGE UP (ref 2–14)
MONOCYTES NFR BLD AUTO: 3 % — SIGNIFICANT CHANGE UP (ref 2–14)
MONOCYTES NFR BLD AUTO: 4.6 % — SIGNIFICANT CHANGE UP (ref 2–14)
MONOCYTES NFR BLD AUTO: 5.1 % — SIGNIFICANT CHANGE UP (ref 2–14)
MONOCYTES NFR BLD AUTO: 5.5 % — SIGNIFICANT CHANGE UP (ref 2–14)
MONOCYTES NFR BLD AUTO: 6.6 % — SIGNIFICANT CHANGE UP (ref 2–14)
MONOCYTES NFR BLD AUTO: 6.8 % — SIGNIFICANT CHANGE UP (ref 2–14)
MONOCYTES NFR BLD AUTO: 6.9 % — SIGNIFICANT CHANGE UP (ref 2–14)
MONOCYTES NFR BLD AUTO: 6.9 % — SIGNIFICANT CHANGE UP (ref 2–14)
MONOCYTES NFR BLD AUTO: 7 % — SIGNIFICANT CHANGE UP (ref 2–14)
MONOCYTES NFR BLD AUTO: 7.2 % — SIGNIFICANT CHANGE UP (ref 2–14)
MONOCYTES NFR BLD AUTO: 7.4 % — SIGNIFICANT CHANGE UP (ref 2–14)
MONOCYTES NFR BLD AUTO: 7.7 % — SIGNIFICANT CHANGE UP (ref 2–14)
MONOCYTES NFR BLD AUTO: 7.8 % — SIGNIFICANT CHANGE UP (ref 2–14)
MONOCYTES NFR BLD AUTO: 7.9 % — SIGNIFICANT CHANGE UP (ref 2–14)
MONOCYTES NFR BLD AUTO: 8.5 % — SIGNIFICANT CHANGE UP (ref 2–14)
MONOCYTES NFR BLD AUTO: 8.5 % — SIGNIFICANT CHANGE UP (ref 2–14)
MONOCYTES NFR BLD AUTO: 8.8 % — SIGNIFICANT CHANGE UP (ref 2–14)
MONOCYTES NFR BLD AUTO: 8.9 % — SIGNIFICANT CHANGE UP (ref 2–14)
MONOCYTES NFR BLD AUTO: 9.1 % — SIGNIFICANT CHANGE UP (ref 2–14)
MONOCYTES NFR BLD AUTO: 9.2 % — SIGNIFICANT CHANGE UP (ref 2–14)
MONOCYTES NFR BLD AUTO: 9.9 % — SIGNIFICANT CHANGE UP (ref 2–14)
MRSA PCR RESULT.: SIGNIFICANT CHANGE UP
MRSA PCR RESULT.: SIGNIFICANT CHANGE UP
NEUTROPHILS # BLD AUTO: 1.29 K/UL — LOW (ref 1.8–7.4)
NEUTROPHILS # BLD AUTO: 1.74 K/UL — LOW (ref 1.8–7.4)
NEUTROPHILS # BLD AUTO: 1.9 K/UL — SIGNIFICANT CHANGE UP (ref 1.8–7.4)
NEUTROPHILS # BLD AUTO: 10.22 K/UL — HIGH (ref 1.8–7.4)
NEUTROPHILS # BLD AUTO: 10.43 K/UL — HIGH (ref 1.8–7.4)
NEUTROPHILS # BLD AUTO: 11 K/UL — HIGH (ref 1.8–7.4)
NEUTROPHILS # BLD AUTO: 11.29 K/UL — HIGH (ref 1.8–7.4)
NEUTROPHILS # BLD AUTO: 11.43 K/UL — HIGH (ref 1.8–7.4)
NEUTROPHILS # BLD AUTO: 12.39 K/UL — HIGH (ref 1.8–7.4)
NEUTROPHILS # BLD AUTO: 2.17 K/UL — SIGNIFICANT CHANGE UP (ref 1.8–7.4)
NEUTROPHILS # BLD AUTO: 2.33 K/UL — SIGNIFICANT CHANGE UP (ref 1.8–7.4)
NEUTROPHILS # BLD AUTO: 2.59 K/UL — SIGNIFICANT CHANGE UP (ref 1.8–7.4)
NEUTROPHILS # BLD AUTO: 3.54 K/UL — SIGNIFICANT CHANGE UP (ref 1.8–7.4)
NEUTROPHILS # BLD AUTO: 3.8 K/UL — SIGNIFICANT CHANGE UP (ref 1.8–7.4)
NEUTROPHILS # BLD AUTO: 3.81 K/UL — SIGNIFICANT CHANGE UP (ref 1.8–7.4)
NEUTROPHILS # BLD AUTO: 3.99 K/UL — SIGNIFICANT CHANGE UP (ref 1.8–7.4)
NEUTROPHILS # BLD AUTO: 4.52 K/UL — SIGNIFICANT CHANGE UP (ref 1.8–7.4)
NEUTROPHILS # BLD AUTO: 4.53 K/UL — SIGNIFICANT CHANGE UP (ref 1.8–7.4)
NEUTROPHILS # BLD AUTO: 4.61 K/UL — SIGNIFICANT CHANGE UP (ref 1.8–7.4)
NEUTROPHILS # BLD AUTO: 4.76 K/UL — SIGNIFICANT CHANGE UP (ref 1.8–7.4)
NEUTROPHILS # BLD AUTO: 5.36 K/UL — SIGNIFICANT CHANGE UP (ref 1.8–7.4)
NEUTROPHILS # BLD AUTO: 5.65 K/UL — SIGNIFICANT CHANGE UP (ref 1.8–7.4)
NEUTROPHILS # BLD AUTO: 5.99 K/UL — SIGNIFICANT CHANGE UP (ref 1.8–7.4)
NEUTROPHILS # BLD AUTO: 6.06 K/UL — SIGNIFICANT CHANGE UP (ref 1.8–7.4)
NEUTROPHILS # BLD AUTO: 6.46 K/UL — SIGNIFICANT CHANGE UP (ref 1.8–7.4)
NEUTROPHILS # BLD AUTO: 6.62 K/UL — SIGNIFICANT CHANGE UP (ref 1.8–7.4)
NEUTROPHILS # BLD AUTO: 6.88 K/UL — SIGNIFICANT CHANGE UP (ref 1.8–7.4)
NEUTROPHILS # BLD AUTO: 6.95 K/UL — SIGNIFICANT CHANGE UP (ref 1.8–7.4)
NEUTROPHILS # BLD AUTO: 7.18 K/UL — SIGNIFICANT CHANGE UP (ref 1.8–7.4)
NEUTROPHILS # BLD AUTO: 7.57 K/UL — HIGH (ref 1.8–7.4)
NEUTROPHILS # BLD AUTO: 8 K/UL — HIGH (ref 1.8–7.4)
NEUTROPHILS # BLD AUTO: 8.08 K/UL — HIGH (ref 1.8–7.4)
NEUTROPHILS # BLD AUTO: 9.18 K/UL — HIGH (ref 1.8–7.4)
NEUTROPHILS # BLD AUTO: 9.74 K/UL — HIGH (ref 1.8–7.4)
NEUTROPHILS NFR BLD AUTO: 45.5 % — SIGNIFICANT CHANGE UP (ref 43–77)
NEUTROPHILS NFR BLD AUTO: 50.4 % — SIGNIFICANT CHANGE UP (ref 43–77)
NEUTROPHILS NFR BLD AUTO: 52 % — SIGNIFICANT CHANGE UP (ref 43–77)
NEUTROPHILS NFR BLD AUTO: 52.5 % — SIGNIFICANT CHANGE UP (ref 43–77)
NEUTROPHILS NFR BLD AUTO: 54.3 % — SIGNIFICANT CHANGE UP (ref 43–77)
NEUTROPHILS NFR BLD AUTO: 58.9 % — SIGNIFICANT CHANGE UP (ref 43–77)
NEUTROPHILS NFR BLD AUTO: 59.3 % — SIGNIFICANT CHANGE UP (ref 43–77)
NEUTROPHILS NFR BLD AUTO: 63.8 % — SIGNIFICANT CHANGE UP (ref 43–77)
NEUTROPHILS NFR BLD AUTO: 64.4 % — SIGNIFICANT CHANGE UP (ref 43–77)
NEUTROPHILS NFR BLD AUTO: 64.4 % — SIGNIFICANT CHANGE UP (ref 43–77)
NEUTROPHILS NFR BLD AUTO: 65.2 % — SIGNIFICANT CHANGE UP (ref 43–77)
NEUTROPHILS NFR BLD AUTO: 65.3 % — SIGNIFICANT CHANGE UP (ref 43–77)
NEUTROPHILS NFR BLD AUTO: 66.7 % — SIGNIFICANT CHANGE UP (ref 43–77)
NEUTROPHILS NFR BLD AUTO: 68.1 % — SIGNIFICANT CHANGE UP (ref 43–77)
NEUTROPHILS NFR BLD AUTO: 68.2 % — SIGNIFICANT CHANGE UP (ref 43–77)
NEUTROPHILS NFR BLD AUTO: 71.4 % — SIGNIFICANT CHANGE UP (ref 43–77)
NEUTROPHILS NFR BLD AUTO: 72.8 % — SIGNIFICANT CHANGE UP (ref 43–77)
NEUTROPHILS NFR BLD AUTO: 75.1 % — SIGNIFICANT CHANGE UP (ref 43–77)
NEUTROPHILS NFR BLD AUTO: 78.9 % — HIGH (ref 43–77)
NEUTROPHILS NFR BLD AUTO: 79 % — HIGH (ref 43–77)
NEUTROPHILS NFR BLD AUTO: 80 % — HIGH (ref 43–77)
NEUTROPHILS NFR BLD AUTO: 80.3 % — HIGH (ref 43–77)
NEUTROPHILS NFR BLD AUTO: 81 % — HIGH (ref 43–77)
NEUTROPHILS NFR BLD AUTO: 81.8 % — HIGH (ref 43–77)
NEUTROPHILS NFR BLD AUTO: 82.6 % — HIGH (ref 43–77)
NEUTROPHILS NFR BLD AUTO: 83.9 % — HIGH (ref 43–77)
NEUTROPHILS NFR BLD AUTO: 84.3 % — HIGH (ref 43–77)
NEUTROPHILS NFR BLD AUTO: 85.6 % — HIGH (ref 43–77)
NEUTROPHILS NFR BLD AUTO: 86.2 % — HIGH (ref 43–77)
NEUTROPHILS NFR BLD AUTO: 87.7 % — HIGH (ref 43–77)
NEUTROPHILS NFR BLD AUTO: 89.7 % — HIGH (ref 43–77)
NEUTROPHILS NFR BLD AUTO: 91.4 % — HIGH (ref 43–77)
NEUTROPHILS NFR BLD AUTO: 92.3 % — HIGH (ref 43–77)
NEUTROPHILS NFR BLD AUTO: 94.1 % — HIGH (ref 43–77)
NEUTS BAND # BLD: 5.2 % — SIGNIFICANT CHANGE UP (ref 0–6)
NIGHT BLUE STAIN TISS: SIGNIFICANT CHANGE UP
NIGHT BLUE STAIN TISS: SIGNIFICANT CHANGE UP
NITRITE UR-MCNC: NEGATIVE — SIGNIFICANT CHANGE UP
NITRITE UR-MCNC: NEGATIVE — SIGNIFICANT CHANGE UP
NON HDL CHOLESTEROL: 129 MG/DL — SIGNIFICANT CHANGE UP
NON-GYNECOLOGICAL CYTOLOGY STUDY: SIGNIFICANT CHANGE UP
NRBC # BLD: 0 /100 WBCS — SIGNIFICANT CHANGE UP
NRBC # BLD: 0 /100 WBCS — SIGNIFICANT CHANGE UP (ref 0–0)
NRBC # BLD: 0 /100 — SIGNIFICANT CHANGE UP (ref 0–0)
NRBC # BLD: SIGNIFICANT CHANGE UP /100 WBCS (ref 0–0)
NRBC # FLD: 0 K/UL — SIGNIFICANT CHANGE UP
NT-PROBNP SERPL-SCNC: 916 PG/ML — HIGH
ORGANISM # SPEC MICROSCOPIC CNT: SIGNIFICANT CHANGE UP
P JIROVECII DNA L RESP QL NAA+NON-PROBE: POSITIVE
PCO2 BLDV: 51 MMHG — HIGH (ref 39–42)
PCO2 BLDV: 52 MMHG — HIGH (ref 39–42)
PCO2 BLDV: 54 MMHG — HIGH (ref 39–42)
PH BLDV: 7.4 — SIGNIFICANT CHANGE UP (ref 7.32–7.43)
PH BLDV: 7.42 — SIGNIFICANT CHANGE UP (ref 7.32–7.43)
PH BLDV: 7.42 — SIGNIFICANT CHANGE UP (ref 7.32–7.43)
PH UR: 6 — SIGNIFICANT CHANGE UP (ref 5–8)
PH UR: 7 — SIGNIFICANT CHANGE UP (ref 5–8)
PHOSPHATE SERPL-MCNC: 2.1 MG/DL — LOW (ref 2.5–4.5)
PHOSPHATE SERPL-MCNC: 2.2 MG/DL — LOW (ref 2.5–4.5)
PHOSPHATE SERPL-MCNC: 2.2 MG/DL — LOW (ref 2.5–4.5)
PHOSPHATE SERPL-MCNC: 2.3 MG/DL — LOW (ref 2.5–4.5)
PHOSPHATE SERPL-MCNC: 2.3 MG/DL — LOW (ref 2.5–4.5)
PHOSPHATE SERPL-MCNC: 2.4 MG/DL — LOW (ref 2.5–4.5)
PHOSPHATE SERPL-MCNC: 2.4 MG/DL — LOW (ref 2.5–4.5)
PHOSPHATE SERPL-MCNC: 2.6 MG/DL — SIGNIFICANT CHANGE UP (ref 2.5–4.5)
PHOSPHATE SERPL-MCNC: 2.7 MG/DL — SIGNIFICANT CHANGE UP (ref 2.5–4.5)
PHOSPHATE SERPL-MCNC: 2.8 MG/DL — SIGNIFICANT CHANGE UP (ref 2.5–4.5)
PHOSPHATE SERPL-MCNC: 2.9 MG/DL — SIGNIFICANT CHANGE UP (ref 2.5–4.5)
PHOSPHATE SERPL-MCNC: 3 MG/DL — SIGNIFICANT CHANGE UP (ref 2.5–4.5)
PHOSPHATE SERPL-MCNC: 3.1 MG/DL — SIGNIFICANT CHANGE UP (ref 2.5–4.5)
PHOSPHATE SERPL-MCNC: 3.2 MG/DL — SIGNIFICANT CHANGE UP (ref 2.5–4.5)
PHOSPHATE SERPL-MCNC: 3.2 MG/DL — SIGNIFICANT CHANGE UP (ref 2.5–4.5)
PHOSPHATE SERPL-MCNC: 3.4 MG/DL — SIGNIFICANT CHANGE UP (ref 2.5–4.5)
PHOSPHATE SERPL-MCNC: 3.5 MG/DL — SIGNIFICANT CHANGE UP (ref 2.5–4.5)
PHOSPHATE SERPL-MCNC: 3.7 MG/DL — SIGNIFICANT CHANGE UP (ref 2.5–4.5)
PHOSPHATE SERPL-MCNC: 3.8 MG/DL — SIGNIFICANT CHANGE UP (ref 2.5–4.5)
PHOSPHATE SERPL-MCNC: 3.9 MG/DL — SIGNIFICANT CHANGE UP (ref 2.5–4.5)
PHOSPHATE SERPL-MCNC: 4.3 MG/DL — SIGNIFICANT CHANGE UP (ref 2.5–4.5)
PLAT MORPH BLD: NORMAL — SIGNIFICANT CHANGE UP
PLAT MORPH BLD: NORMAL — SIGNIFICANT CHANGE UP
PLATELET # BLD AUTO: 144 K/UL — LOW (ref 150–400)
PLATELET # BLD AUTO: 148 K/UL — LOW (ref 150–400)
PLATELET # BLD AUTO: 158 K/UL — SIGNIFICANT CHANGE UP (ref 150–400)
PLATELET # BLD AUTO: 164 K/UL — SIGNIFICANT CHANGE UP (ref 150–400)
PLATELET # BLD AUTO: 166 K/UL — SIGNIFICANT CHANGE UP (ref 150–400)
PLATELET # BLD AUTO: 177 K/UL — SIGNIFICANT CHANGE UP (ref 150–400)
PLATELET # BLD AUTO: 177 K/UL — SIGNIFICANT CHANGE UP (ref 150–400)
PLATELET # BLD AUTO: 178 K/UL — SIGNIFICANT CHANGE UP (ref 150–400)
PLATELET # BLD AUTO: 183 K/UL — SIGNIFICANT CHANGE UP (ref 150–400)
PLATELET # BLD AUTO: 185 K/UL — SIGNIFICANT CHANGE UP (ref 150–400)
PLATELET # BLD AUTO: 191 K/UL — SIGNIFICANT CHANGE UP (ref 150–400)
PLATELET # BLD AUTO: 196 K/UL — SIGNIFICANT CHANGE UP (ref 150–400)
PLATELET # BLD AUTO: 203 K/UL — SIGNIFICANT CHANGE UP (ref 150–400)
PLATELET # BLD AUTO: 204 K/UL — SIGNIFICANT CHANGE UP (ref 150–400)
PLATELET # BLD AUTO: 209 K/UL — SIGNIFICANT CHANGE UP (ref 150–400)
PLATELET # BLD AUTO: 209 K/UL — SIGNIFICANT CHANGE UP (ref 150–400)
PLATELET # BLD AUTO: 212 K/UL — SIGNIFICANT CHANGE UP (ref 150–400)
PLATELET # BLD AUTO: 219 K/UL — SIGNIFICANT CHANGE UP (ref 150–400)
PLATELET # BLD AUTO: 223 K/UL — SIGNIFICANT CHANGE UP (ref 150–400)
PLATELET # BLD AUTO: 226 K/UL — SIGNIFICANT CHANGE UP (ref 150–400)
PLATELET # BLD AUTO: 229 K/UL — SIGNIFICANT CHANGE UP (ref 150–400)
PLATELET # BLD AUTO: 233 K/UL — SIGNIFICANT CHANGE UP (ref 150–400)
PLATELET # BLD AUTO: 238 K/UL — SIGNIFICANT CHANGE UP (ref 150–400)
PLATELET # BLD AUTO: 238 K/UL — SIGNIFICANT CHANGE UP (ref 150–400)
PLATELET # BLD AUTO: 240 K/UL — SIGNIFICANT CHANGE UP (ref 150–400)
PLATELET # BLD AUTO: 242 K/UL — SIGNIFICANT CHANGE UP (ref 150–400)
PLATELET # BLD AUTO: 243 K/UL — SIGNIFICANT CHANGE UP (ref 150–400)
PLATELET # BLD AUTO: 243 K/UL — SIGNIFICANT CHANGE UP (ref 150–400)
PLATELET # BLD AUTO: 244 K/UL — SIGNIFICANT CHANGE UP (ref 150–400)
PLATELET # BLD AUTO: 255 K/UL — SIGNIFICANT CHANGE UP (ref 150–400)
PLATELET # BLD AUTO: 262 K/UL — SIGNIFICANT CHANGE UP (ref 150–400)
PLATELET # BLD AUTO: 263 K/UL — SIGNIFICANT CHANGE UP (ref 150–400)
PLATELET # BLD AUTO: 290 K/UL — SIGNIFICANT CHANGE UP (ref 150–400)
PLATELET # BLD AUTO: 306 K/UL — SIGNIFICANT CHANGE UP (ref 150–400)
PLATELET # BLD AUTO: 314 K/UL — SIGNIFICANT CHANGE UP (ref 150–400)
PLATELET # BLD AUTO: 321 K/UL — SIGNIFICANT CHANGE UP (ref 150–400)
PLATELET # BLD AUTO: 325 K/UL — SIGNIFICANT CHANGE UP (ref 150–400)
PLATELET # BLD AUTO: 326 K/UL — SIGNIFICANT CHANGE UP (ref 150–400)
PLATELET # BLD AUTO: 329 K/UL — SIGNIFICANT CHANGE UP (ref 150–400)
PLATELET # BLD AUTO: 332 K/UL — SIGNIFICANT CHANGE UP (ref 150–400)
PLATELET # BLD AUTO: 344 K/UL — SIGNIFICANT CHANGE UP (ref 150–400)
PLATELET # BLD AUTO: 354 K/UL — SIGNIFICANT CHANGE UP (ref 150–400)
PLATELET # BLD AUTO: 357 K/UL — SIGNIFICANT CHANGE UP (ref 150–400)
PLATELET # BLD AUTO: 381 K/UL — SIGNIFICANT CHANGE UP (ref 150–400)
PLATELET # BLD AUTO: 382 K/UL — SIGNIFICANT CHANGE UP (ref 150–400)
PLATELET # BLD AUTO: 398 K/UL — SIGNIFICANT CHANGE UP (ref 150–400)
PLATELET # BLD AUTO: 414 K/UL — HIGH (ref 150–400)
PLATELET COUNT - ESTIMATE: NORMAL — SIGNIFICANT CHANGE UP
PO2 BLDV: 20 MMHG — SIGNIFICANT CHANGE UP
PO2 BLDV: 43 MMHG — SIGNIFICANT CHANGE UP
PO2 BLDV: 44 MMHG — SIGNIFICANT CHANGE UP
POLYCHROMASIA BLD QL SMEAR: SLIGHT — SIGNIFICANT CHANGE UP
POTASSIUM BLDV-SCNC: 3.6 MMOL/L — SIGNIFICANT CHANGE UP (ref 3.5–5.1)
POTASSIUM BLDV-SCNC: 4.2 MMOL/L — SIGNIFICANT CHANGE UP (ref 3.5–5.1)
POTASSIUM SERPL-MCNC: 2.8 MMOL/L — CRITICAL LOW (ref 3.5–5.3)
POTASSIUM SERPL-MCNC: 3.1 MMOL/L — LOW (ref 3.5–5.3)
POTASSIUM SERPL-MCNC: 3.1 MMOL/L — LOW (ref 3.5–5.3)
POTASSIUM SERPL-MCNC: 3.3 MMOL/L — LOW (ref 3.5–5.3)
POTASSIUM SERPL-MCNC: 3.4 MMOL/L — LOW (ref 3.5–5.3)
POTASSIUM SERPL-MCNC: 3.5 MMOL/L — SIGNIFICANT CHANGE UP (ref 3.5–5.3)
POTASSIUM SERPL-MCNC: 3.5 MMOL/L — SIGNIFICANT CHANGE UP (ref 3.5–5.3)
POTASSIUM SERPL-MCNC: 3.6 MMOL/L — SIGNIFICANT CHANGE UP (ref 3.5–5.3)
POTASSIUM SERPL-MCNC: 3.6 MMOL/L — SIGNIFICANT CHANGE UP (ref 3.5–5.3)
POTASSIUM SERPL-MCNC: 3.7 MMOL/L — SIGNIFICANT CHANGE UP (ref 3.5–5.3)
POTASSIUM SERPL-MCNC: 3.8 MMOL/L — SIGNIFICANT CHANGE UP (ref 3.5–5.3)
POTASSIUM SERPL-MCNC: 3.9 MMOL/L — SIGNIFICANT CHANGE UP (ref 3.5–5.3)
POTASSIUM SERPL-MCNC: 3.9 MMOL/L — SIGNIFICANT CHANGE UP (ref 3.5–5.3)
POTASSIUM SERPL-MCNC: 4 MMOL/L — SIGNIFICANT CHANGE UP (ref 3.5–5.3)
POTASSIUM SERPL-MCNC: 4.1 MMOL/L — SIGNIFICANT CHANGE UP (ref 3.5–5.3)
POTASSIUM SERPL-MCNC: 4.2 MMOL/L — SIGNIFICANT CHANGE UP (ref 3.5–5.3)
POTASSIUM SERPL-MCNC: 4.3 MMOL/L — SIGNIFICANT CHANGE UP (ref 3.5–5.3)
POTASSIUM SERPL-MCNC: 4.4 MMOL/L — SIGNIFICANT CHANGE UP (ref 3.5–5.3)
POTASSIUM SERPL-MCNC: 4.5 MMOL/L — SIGNIFICANT CHANGE UP (ref 3.5–5.3)
POTASSIUM SERPL-MCNC: 4.6 MMOL/L — SIGNIFICANT CHANGE UP (ref 3.5–5.3)
POTASSIUM SERPL-MCNC: 4.7 MMOL/L — SIGNIFICANT CHANGE UP (ref 3.5–5.3)
POTASSIUM SERPL-MCNC: 5 MMOL/L — SIGNIFICANT CHANGE UP (ref 3.5–5.3)
POTASSIUM SERPL-MCNC: 5.4 MMOL/L — HIGH (ref 3.5–5.3)
POTASSIUM SERPL-SCNC: 2.8 MMOL/L — CRITICAL LOW (ref 3.5–5.3)
POTASSIUM SERPL-SCNC: 3.1 MMOL/L — LOW (ref 3.5–5.3)
POTASSIUM SERPL-SCNC: 3.1 MMOL/L — LOW (ref 3.5–5.3)
POTASSIUM SERPL-SCNC: 3.3 MMOL/L — LOW (ref 3.5–5.3)
POTASSIUM SERPL-SCNC: 3.4 MMOL/L — LOW (ref 3.5–5.3)
POTASSIUM SERPL-SCNC: 3.5 MMOL/L — SIGNIFICANT CHANGE UP (ref 3.5–5.3)
POTASSIUM SERPL-SCNC: 3.5 MMOL/L — SIGNIFICANT CHANGE UP (ref 3.5–5.3)
POTASSIUM SERPL-SCNC: 3.6 MMOL/L — SIGNIFICANT CHANGE UP (ref 3.5–5.3)
POTASSIUM SERPL-SCNC: 3.6 MMOL/L — SIGNIFICANT CHANGE UP (ref 3.5–5.3)
POTASSIUM SERPL-SCNC: 3.7 MMOL/L — SIGNIFICANT CHANGE UP (ref 3.5–5.3)
POTASSIUM SERPL-SCNC: 3.8 MMOL/L — SIGNIFICANT CHANGE UP (ref 3.5–5.3)
POTASSIUM SERPL-SCNC: 3.9 MMOL/L — SIGNIFICANT CHANGE UP (ref 3.5–5.3)
POTASSIUM SERPL-SCNC: 3.9 MMOL/L — SIGNIFICANT CHANGE UP (ref 3.5–5.3)
POTASSIUM SERPL-SCNC: 4 MMOL/L — SIGNIFICANT CHANGE UP (ref 3.5–5.3)
POTASSIUM SERPL-SCNC: 4.1 MMOL/L — SIGNIFICANT CHANGE UP (ref 3.5–5.3)
POTASSIUM SERPL-SCNC: 4.2 MMOL/L — SIGNIFICANT CHANGE UP (ref 3.5–5.3)
POTASSIUM SERPL-SCNC: 4.3 MMOL/L — SIGNIFICANT CHANGE UP (ref 3.5–5.3)
POTASSIUM SERPL-SCNC: 4.4 MMOL/L — SIGNIFICANT CHANGE UP (ref 3.5–5.3)
POTASSIUM SERPL-SCNC: 4.5 MMOL/L
POTASSIUM SERPL-SCNC: 4.5 MMOL/L — SIGNIFICANT CHANGE UP (ref 3.5–5.3)
POTASSIUM SERPL-SCNC: 4.6 MMOL/L
POTASSIUM SERPL-SCNC: 4.6 MMOL/L
POTASSIUM SERPL-SCNC: 4.6 MMOL/L — SIGNIFICANT CHANGE UP (ref 3.5–5.3)
POTASSIUM SERPL-SCNC: 4.7 MMOL/L — SIGNIFICANT CHANGE UP (ref 3.5–5.3)
POTASSIUM SERPL-SCNC: 5 MMOL/L — SIGNIFICANT CHANGE UP (ref 3.5–5.3)
POTASSIUM SERPL-SCNC: 5.4 MMOL/L — HIGH (ref 3.5–5.3)
PROCALCITONIN SERPL-MCNC: 0.16 NG/ML — HIGH (ref 0.02–0.1)
PROT SERPL-MCNC: 3.1 G/DL — LOW (ref 6–8.3)
PROT SERPL-MCNC: 5.2 G/DL — LOW (ref 6–8.3)
PROT SERPL-MCNC: 5.3 G/DL — LOW (ref 6–8.3)
PROT SERPL-MCNC: 5.3 G/DL — LOW (ref 6–8.3)
PROT SERPL-MCNC: 5.4 G/DL — LOW (ref 6–8.3)
PROT SERPL-MCNC: 5.4 G/DL — LOW (ref 6–8.3)
PROT SERPL-MCNC: 5.5 G/DL — LOW (ref 6–8.3)
PROT SERPL-MCNC: 5.5 G/DL — LOW (ref 6–8.3)
PROT SERPL-MCNC: 5.6 G/DL — LOW (ref 6–8.3)
PROT SERPL-MCNC: 5.7 G/DL — LOW (ref 6–8.3)
PROT SERPL-MCNC: 5.8 G/DL — LOW (ref 6–8.3)
PROT SERPL-MCNC: 5.9 G/DL
PROT SERPL-MCNC: 5.9 G/DL — LOW (ref 6–8.3)
PROT SERPL-MCNC: 5.9 G/DL — LOW (ref 6–8.3)
PROT SERPL-MCNC: 6 G/DL — SIGNIFICANT CHANGE UP (ref 6–8.3)
PROT SERPL-MCNC: 6.1 G/DL — SIGNIFICANT CHANGE UP (ref 6–8.3)
PROT SERPL-MCNC: 6.2 G/DL — SIGNIFICANT CHANGE UP (ref 6–8.3)
PROT SERPL-MCNC: 6.2 G/DL — SIGNIFICANT CHANGE UP (ref 6–8.3)
PROT SERPL-MCNC: 6.3 G/DL — SIGNIFICANT CHANGE UP (ref 6–8.3)
PROT SERPL-MCNC: 6.3 G/DL — SIGNIFICANT CHANGE UP (ref 6–8.3)
PROT SERPL-MCNC: 6.5 G/DL — SIGNIFICANT CHANGE UP (ref 6–8.3)
PROT SERPL-MCNC: 6.5 G/DL — SIGNIFICANT CHANGE UP (ref 6–8.3)
PROT SERPL-MCNC: 6.6 G/DL — SIGNIFICANT CHANGE UP (ref 6–8.3)
PROT SERPL-MCNC: 6.7 G/DL — SIGNIFICANT CHANGE UP (ref 6–8.3)
PROT SERPL-MCNC: 6.8 G/DL
PROT SERPL-MCNC: 7 G/DL — SIGNIFICANT CHANGE UP (ref 6–8.3)
PROT SERPL-MCNC: 7.2 G/DL
PROT SERPL-MCNC: 7.2 G/DL — SIGNIFICANT CHANGE UP (ref 6–8.3)
PROT SERPL-MCNC: 7.6 G/DL — SIGNIFICANT CHANGE UP (ref 6–8.3)
PROT SERPL-MCNC: 7.7 G/DL — SIGNIFICANT CHANGE UP (ref 6–8.3)
PROT UR-MCNC: ABNORMAL
PROT UR-MCNC: NEGATIVE — SIGNIFICANT CHANGE UP
PROTHROM AB SERPL-ACNC: 13.1 SEC — SIGNIFICANT CHANGE UP (ref 10.5–13.4)
PROTHROM AB SERPL-ACNC: 13.4 SEC — SIGNIFICANT CHANGE UP (ref 10.6–13.6)
PROTHROM AB SERPL-ACNC: 13.6 SEC — SIGNIFICANT CHANGE UP (ref 10.6–13.6)
PROTHROM AB SERPL-ACNC: 13.8 SEC — HIGH (ref 10.5–13.4)
PROTHROM AB SERPL-ACNC: 14.6 SEC — HIGH (ref 10.5–13.4)
PROTHROM AB SERPL-ACNC: 15.3 SEC — HIGH (ref 10.5–13.4)
PROTHROM AB SERPL-ACNC: 15.3 SEC — HIGH (ref 10.5–13.4)
RAPID RVP RESULT: SIGNIFICANT CHANGE UP
RBC # BLD: 2.62 M/UL — LOW (ref 3.8–5.2)
RBC # BLD: 2.7 M/UL — LOW (ref 3.8–5.2)
RBC # BLD: 2.8 M/UL — LOW (ref 3.8–5.2)
RBC # BLD: 2.82 M/UL — LOW (ref 3.8–5.2)
RBC # BLD: 2.84 M/UL — LOW (ref 3.8–5.2)
RBC # BLD: 2.89 M/UL — LOW (ref 3.8–5.2)
RBC # BLD: 2.9 M/UL — LOW (ref 3.8–5.2)
RBC # BLD: 2.92 M/UL — LOW (ref 3.8–5.2)
RBC # BLD: 2.99 M/UL — LOW (ref 3.8–5.2)
RBC # BLD: 3 M/UL — LOW (ref 3.8–5.2)
RBC # BLD: 3.02 M/UL — LOW (ref 3.8–5.2)
RBC # BLD: 3.05 M/UL — LOW (ref 3.8–5.2)
RBC # BLD: 3.08 M/UL — LOW (ref 3.8–5.2)
RBC # BLD: 3.1 M/UL — LOW (ref 3.8–5.2)
RBC # BLD: 3.12 M/UL — LOW (ref 3.8–5.2)
RBC # BLD: 3.13 M/UL — LOW (ref 3.8–5.2)
RBC # BLD: 3.14 M/UL — LOW (ref 3.8–5.2)
RBC # BLD: 3.18 M/UL — LOW (ref 3.8–5.2)
RBC # BLD: 3.19 M/UL — LOW (ref 3.8–5.2)
RBC # BLD: 3.22 M/UL — LOW (ref 3.8–5.2)
RBC # BLD: 3.23 M/UL — LOW (ref 3.8–5.2)
RBC # BLD: 3.29 M/UL — LOW (ref 3.8–5.2)
RBC # BLD: 3.3 M/UL — LOW (ref 3.8–5.2)
RBC # BLD: 3.31 M/UL — LOW (ref 3.8–5.2)
RBC # BLD: 3.33 M/UL — LOW (ref 3.8–5.2)
RBC # BLD: 3.33 M/UL — LOW (ref 3.8–5.2)
RBC # BLD: 3.41 M/UL — LOW (ref 3.8–5.2)
RBC # BLD: 3.49 M/UL — LOW (ref 3.8–5.2)
RBC # BLD: 3.5 M/UL — LOW (ref 3.8–5.2)
RBC # BLD: 3.51 M/UL — LOW (ref 3.8–5.2)
RBC # BLD: 3.53 M/UL — LOW (ref 3.8–5.2)
RBC # BLD: 3.53 M/UL — LOW (ref 3.8–5.2)
RBC # BLD: 3.58 M/UL — LOW (ref 3.8–5.2)
RBC # BLD: 3.63 M/UL — LOW (ref 3.8–5.2)
RBC # BLD: 3.74 M/UL — LOW (ref 3.8–5.2)
RBC # BLD: 3.74 M/UL — LOW (ref 3.8–5.2)
RBC # BLD: 3.8 M/UL — SIGNIFICANT CHANGE UP (ref 3.8–5.2)
RBC # BLD: 3.8 M/UL — SIGNIFICANT CHANGE UP (ref 3.8–5.2)
RBC # BLD: 3.83 M/UL — SIGNIFICANT CHANGE UP (ref 3.8–5.2)
RBC # BLD: 3.86 M/UL — SIGNIFICANT CHANGE UP (ref 3.8–5.2)
RBC # BLD: 3.88 M/UL — SIGNIFICANT CHANGE UP (ref 3.8–5.2)
RBC # BLD: 3.88 M/UL — SIGNIFICANT CHANGE UP (ref 3.8–5.2)
RBC # BLD: 4 M/UL — SIGNIFICANT CHANGE UP (ref 3.8–5.2)
RBC # BLD: 4.01 M/UL — SIGNIFICANT CHANGE UP (ref 3.8–5.2)
RBC # BLD: 4.2 M/UL — SIGNIFICANT CHANGE UP (ref 3.8–5.2)
RBC # BLD: 4.26 M/UL — SIGNIFICANT CHANGE UP (ref 3.8–5.2)
RBC # BLD: 4.43 M/UL — SIGNIFICANT CHANGE UP (ref 3.8–5.2)
RBC # BLD: 4.46 M/UL — SIGNIFICANT CHANGE UP (ref 3.8–5.2)
RBC # BLD: 4.53 M/UL — SIGNIFICANT CHANGE UP (ref 3.8–5.2)
RBC # BLD: 4.6 M/UL — SIGNIFICANT CHANGE UP (ref 3.8–5.2)
RBC # FLD: 13 % — SIGNIFICANT CHANGE UP (ref 10.3–14.5)
RBC # FLD: 13.2 % — SIGNIFICANT CHANGE UP (ref 10.3–14.5)
RBC # FLD: 13.3 % — SIGNIFICANT CHANGE UP (ref 10.3–14.5)
RBC # FLD: 13.4 % — SIGNIFICANT CHANGE UP (ref 10.3–14.5)
RBC # FLD: 13.4 % — SIGNIFICANT CHANGE UP (ref 10.3–14.5)
RBC # FLD: 13.7 % — SIGNIFICANT CHANGE UP (ref 10.3–14.5)
RBC # FLD: 13.8 % — SIGNIFICANT CHANGE UP (ref 10.3–14.5)
RBC # FLD: 13.9 % — SIGNIFICANT CHANGE UP (ref 10.3–14.5)
RBC # FLD: 13.9 % — SIGNIFICANT CHANGE UP (ref 10.3–14.5)
RBC # FLD: 14 % — SIGNIFICANT CHANGE UP (ref 10.3–14.5)
RBC # FLD: 14.1 % — SIGNIFICANT CHANGE UP (ref 10.3–14.5)
RBC # FLD: 14.1 % — SIGNIFICANT CHANGE UP (ref 10.3–14.5)
RBC # FLD: 14.2 % — SIGNIFICANT CHANGE UP (ref 10.3–14.5)
RBC # FLD: 15.5 % — HIGH (ref 10.3–14.5)
RBC # FLD: 17.5 % — HIGH (ref 10.3–14.5)
RBC # FLD: 17.7 % — HIGH (ref 10.3–14.5)
RBC # FLD: 17.9 % — HIGH (ref 10.3–14.5)
RBC # FLD: 18 % — HIGH (ref 10.3–14.5)
RBC # FLD: 18 % — HIGH (ref 10.3–14.5)
RBC # FLD: 18.1 % — HIGH (ref 10.3–14.5)
RBC # FLD: 18.2 % — HIGH (ref 10.3–14.5)
RBC # FLD: 18.3 % — HIGH (ref 10.3–14.5)
RBC # FLD: 18.3 % — HIGH (ref 10.3–14.5)
RBC # FLD: 18.5 % — HIGH (ref 10.3–14.5)
RBC # FLD: 18.7 % — HIGH (ref 10.3–14.5)
RBC # FLD: 19.5 % — HIGH (ref 10.3–14.5)
RBC # FLD: 19.7 % — HIGH (ref 10.3–14.5)
RBC # FLD: 19.9 % — HIGH (ref 10.3–14.5)
RBC # FLD: 20.3 % — HIGH (ref 10.3–14.5)
RBC # FLD: 20.4 % — HIGH (ref 10.3–14.5)
RBC # FLD: 20.8 % — HIGH (ref 10.3–14.5)
RBC # FLD: 20.8 % — HIGH (ref 10.3–14.5)
RBC # FLD: 21.2 % — HIGH (ref 10.3–14.5)
RBC # FLD: 21.7 % — HIGH (ref 10.3–14.5)
RBC # FLD: 22 % — HIGH (ref 10.3–14.5)
RBC BLD AUTO: ABNORMAL
RBC BLD AUTO: SIGNIFICANT CHANGE UP
RBC CASTS # UR COMP ASSIST: 19 /HPF — HIGH (ref 0–4)
RETICS #: 57.8 K/UL — SIGNIFICANT CHANGE UP (ref 25–125)
RETICS/RBC NFR: 1.5 % — SIGNIFICANT CHANGE UP (ref 0.5–2.5)
RH IG SCN BLD-IMP: POSITIVE — SIGNIFICANT CHANGE UP
RSV RNA NPH QL NAA+NON-PROBE: SIGNIFICANT CHANGE UP
RSV RNA SPEC QL NAA+PROBE: SIGNIFICANT CHANGE UP
RV+EV RNA SPEC QL NAA+PROBE: SIGNIFICANT CHANGE UP
S AUREUS DNA NOSE QL NAA+PROBE: SIGNIFICANT CHANGE UP
S AUREUS DNA NOSE QL NAA+PROBE: SIGNIFICANT CHANGE UP
SAO2 % BLDV: 31.3 % — SIGNIFICANT CHANGE UP
SAO2 % BLDV: 70.4 % — SIGNIFICANT CHANGE UP
SAO2 % BLDV: 71.3 % — SIGNIFICANT CHANGE UP
SARS-COV-2 RNA SPEC QL NAA+PROBE: SIGNIFICANT CHANGE UP
SODIUM SERPL-SCNC: 132 MMOL/L — LOW (ref 135–145)
SODIUM SERPL-SCNC: 134 MMOL/L — LOW (ref 135–145)
SODIUM SERPL-SCNC: 135 MMOL/L
SODIUM SERPL-SCNC: 135 MMOL/L
SODIUM SERPL-SCNC: 135 MMOL/L — SIGNIFICANT CHANGE UP (ref 135–145)
SODIUM SERPL-SCNC: 136 MMOL/L — SIGNIFICANT CHANGE UP (ref 135–145)
SODIUM SERPL-SCNC: 137 MMOL/L
SODIUM SERPL-SCNC: 137 MMOL/L — SIGNIFICANT CHANGE UP (ref 135–145)
SODIUM SERPL-SCNC: 138 MMOL/L — SIGNIFICANT CHANGE UP (ref 135–145)
SODIUM SERPL-SCNC: 139 MMOL/L — SIGNIFICANT CHANGE UP (ref 135–145)
SODIUM SERPL-SCNC: 140 MMOL/L — SIGNIFICANT CHANGE UP (ref 135–145)
SODIUM SERPL-SCNC: 141 MMOL/L — SIGNIFICANT CHANGE UP (ref 135–145)
SODIUM SERPL-SCNC: 142 MMOL/L — SIGNIFICANT CHANGE UP (ref 135–145)
SODIUM SERPL-SCNC: 144 MMOL/L — SIGNIFICANT CHANGE UP (ref 135–145)
SODIUM SERPL-SCNC: 145 MMOL/L — SIGNIFICANT CHANGE UP (ref 135–145)
SODIUM SERPL-SCNC: 147 MMOL/L — HIGH (ref 135–145)
SP GR SPEC: 1.02 — SIGNIFICANT CHANGE UP (ref 1–1.05)
SP GR SPEC: 1.03 — SIGNIFICANT CHANGE UP (ref 1–1.05)
SPECIMEN SOURCE: SIGNIFICANT CHANGE UP
TARGETS BLD QL SMEAR: SLIGHT — SIGNIFICANT CHANGE UP
TIBC SERPL-MCNC: 156 UG/DL — LOW (ref 220–430)
TRIGL SERPL-MCNC: 92 MG/DL — SIGNIFICANT CHANGE UP
TROPONIN T, HIGH SENSITIVITY RESULT: 10 NG/L — SIGNIFICANT CHANGE UP
TROPONIN T, HIGH SENSITIVITY RESULT: 11 NG/L — SIGNIFICANT CHANGE UP
TROPONIN T, HIGH SENSITIVITY RESULT: 8 NG/L — SIGNIFICANT CHANGE UP
TROPONIN T, HIGH SENSITIVITY RESULT: 9 NG/L — SIGNIFICANT CHANGE UP
TSH SERPL-ACNC: 2.74 UIU/ML
TSH SERPL-MCNC: 2.4 UIU/ML — SIGNIFICANT CHANGE UP (ref 0.27–4.2)
UIBC SERPL-MCNC: 83 UG/DL — LOW (ref 110–370)
UROBILINOGEN FLD QL: SIGNIFICANT CHANGE UP
UROBILINOGEN FLD QL: SIGNIFICANT CHANGE UP
VANCOMYCIN TROUGH SERPL-MCNC: 11.4 UG/ML — SIGNIFICANT CHANGE UP (ref 10–20)
VIT B12 SERPL-MCNC: 1538 PG/ML — HIGH (ref 200–900)
WBC # BLD: 10.55 K/UL — HIGH (ref 3.8–10.5)
WBC # BLD: 10.58 K/UL — HIGH (ref 3.8–10.5)
WBC # BLD: 11.19 K/UL — HIGH (ref 3.8–10.5)
WBC # BLD: 11.32 K/UL — HIGH (ref 3.8–10.5)
WBC # BLD: 11.66 K/UL — HIGH (ref 3.8–10.5)
WBC # BLD: 11.69 K/UL — HIGH (ref 3.8–10.5)
WBC # BLD: 12.5 K/UL — HIGH (ref 3.8–10.5)
WBC # BLD: 13.04 K/UL — HIGH (ref 3.8–10.5)
WBC # BLD: 13.1 K/UL — HIGH (ref 3.8–10.5)
WBC # BLD: 13.81 K/UL — HIGH (ref 3.8–10.5)
WBC # BLD: 13.99 K/UL — HIGH (ref 3.8–10.5)
WBC # BLD: 2.83 K/UL — LOW (ref 3.8–10.5)
WBC # BLD: 3.35 K/UL — LOW (ref 3.8–10.5)
WBC # BLD: 3.62 K/UL — LOW (ref 3.8–10.5)
WBC # BLD: 3.88 K/UL — SIGNIFICANT CHANGE UP (ref 3.8–10.5)
WBC # BLD: 3.99 K/UL — SIGNIFICANT CHANGE UP (ref 3.8–10.5)
WBC # BLD: 4.62 K/UL — SIGNIFICANT CHANGE UP (ref 3.8–10.5)
WBC # BLD: 5.23 K/UL — SIGNIFICANT CHANGE UP (ref 3.8–10.5)
WBC # BLD: 5.47 K/UL — SIGNIFICANT CHANGE UP (ref 3.8–10.5)
WBC # BLD: 5.58 K/UL — SIGNIFICANT CHANGE UP (ref 3.8–10.5)
WBC # BLD: 5.93 K/UL — SIGNIFICANT CHANGE UP (ref 3.8–10.5)
WBC # BLD: 6.1 K/UL — SIGNIFICANT CHANGE UP (ref 3.8–10.5)
WBC # BLD: 6.26 K/UL — SIGNIFICANT CHANGE UP (ref 3.8–10.5)
WBC # BLD: 6.7 K/UL — SIGNIFICANT CHANGE UP (ref 3.8–10.5)
WBC # BLD: 6.87 K/UL — SIGNIFICANT CHANGE UP (ref 3.8–10.5)
WBC # BLD: 6.98 K/UL — SIGNIFICANT CHANGE UP (ref 3.8–10.5)
WBC # BLD: 7 K/UL — SIGNIFICANT CHANGE UP (ref 3.8–10.5)
WBC # BLD: 7 K/UL — SIGNIFICANT CHANGE UP (ref 3.8–10.5)
WBC # BLD: 7.67 K/UL — SIGNIFICANT CHANGE UP (ref 3.8–10.5)
WBC # BLD: 7.68 K/UL — SIGNIFICANT CHANGE UP (ref 3.8–10.5)
WBC # BLD: 7.77 K/UL — SIGNIFICANT CHANGE UP (ref 3.8–10.5)
WBC # BLD: 7.89 K/UL — SIGNIFICANT CHANGE UP (ref 3.8–10.5)
WBC # BLD: 8.05 K/UL — SIGNIFICANT CHANGE UP (ref 3.8–10.5)
WBC # BLD: 8.09 K/UL — SIGNIFICANT CHANGE UP (ref 3.8–10.5)
WBC # BLD: 8.13 K/UL — SIGNIFICANT CHANGE UP (ref 3.8–10.5)
WBC # BLD: 8.56 K/UL — SIGNIFICANT CHANGE UP (ref 3.8–10.5)
WBC # BLD: 8.65 K/UL — SIGNIFICANT CHANGE UP (ref 3.8–10.5)
WBC # BLD: 8.66 K/UL — SIGNIFICANT CHANGE UP (ref 3.8–10.5)
WBC # BLD: 8.67 K/UL — SIGNIFICANT CHANGE UP (ref 3.8–10.5)
WBC # BLD: 8.8 K/UL — SIGNIFICANT CHANGE UP (ref 3.8–10.5)
WBC # BLD: 8.96 K/UL — SIGNIFICANT CHANGE UP (ref 3.8–10.5)
WBC # BLD: 9 K/UL — SIGNIFICANT CHANGE UP (ref 3.8–10.5)
WBC # BLD: 9.45 K/UL — SIGNIFICANT CHANGE UP (ref 3.8–10.5)
WBC # BLD: 9.48 K/UL — SIGNIFICANT CHANGE UP (ref 3.8–10.5)
WBC # BLD: 9.54 K/UL — SIGNIFICANT CHANGE UP (ref 3.8–10.5)
WBC # BLD: 9.57 K/UL — SIGNIFICANT CHANGE UP (ref 3.8–10.5)
WBC # BLD: 9.59 K/UL — SIGNIFICANT CHANGE UP (ref 3.8–10.5)
WBC # BLD: 9.79 K/UL — SIGNIFICANT CHANGE UP (ref 3.8–10.5)
WBC # BLD: 9.92 K/UL — SIGNIFICANT CHANGE UP (ref 3.8–10.5)
WBC # FLD AUTO: 10.55 K/UL — HIGH (ref 3.8–10.5)
WBC # FLD AUTO: 10.58 K/UL — HIGH (ref 3.8–10.5)
WBC # FLD AUTO: 11.19 K/UL — HIGH (ref 3.8–10.5)
WBC # FLD AUTO: 11.32 K/UL — HIGH (ref 3.8–10.5)
WBC # FLD AUTO: 11.66 K/UL — HIGH (ref 3.8–10.5)
WBC # FLD AUTO: 11.69 K/UL — HIGH (ref 3.8–10.5)
WBC # FLD AUTO: 12.5 K/UL — HIGH (ref 3.8–10.5)
WBC # FLD AUTO: 13.04 K/UL — HIGH (ref 3.8–10.5)
WBC # FLD AUTO: 13.1 K/UL — HIGH (ref 3.8–10.5)
WBC # FLD AUTO: 13.81 K/UL — HIGH (ref 3.8–10.5)
WBC # FLD AUTO: 13.99 K/UL — HIGH (ref 3.8–10.5)
WBC # FLD AUTO: 2.83 K/UL — LOW (ref 3.8–10.5)
WBC # FLD AUTO: 3.35 K/UL — LOW (ref 3.8–10.5)
WBC # FLD AUTO: 3.62 K/UL — LOW (ref 3.8–10.5)
WBC # FLD AUTO: 3.88 K/UL — SIGNIFICANT CHANGE UP (ref 3.8–10.5)
WBC # FLD AUTO: 3.99 K/UL — SIGNIFICANT CHANGE UP (ref 3.8–10.5)
WBC # FLD AUTO: 4.62 K/UL — SIGNIFICANT CHANGE UP (ref 3.8–10.5)
WBC # FLD AUTO: 5.23 K/UL — SIGNIFICANT CHANGE UP (ref 3.8–10.5)
WBC # FLD AUTO: 5.47 K/UL — SIGNIFICANT CHANGE UP (ref 3.8–10.5)
WBC # FLD AUTO: 5.58 K/UL — SIGNIFICANT CHANGE UP (ref 3.8–10.5)
WBC # FLD AUTO: 5.93 K/UL — SIGNIFICANT CHANGE UP (ref 3.8–10.5)
WBC # FLD AUTO: 6.1 K/UL — SIGNIFICANT CHANGE UP (ref 3.8–10.5)
WBC # FLD AUTO: 6.26 K/UL — SIGNIFICANT CHANGE UP (ref 3.8–10.5)
WBC # FLD AUTO: 6.7 K/UL — SIGNIFICANT CHANGE UP (ref 3.8–10.5)
WBC # FLD AUTO: 6.87 K/UL — SIGNIFICANT CHANGE UP (ref 3.8–10.5)
WBC # FLD AUTO: 6.98 K/UL — SIGNIFICANT CHANGE UP (ref 3.8–10.5)
WBC # FLD AUTO: 7 K/UL — SIGNIFICANT CHANGE UP (ref 3.8–10.5)
WBC # FLD AUTO: 7 K/UL — SIGNIFICANT CHANGE UP (ref 3.8–10.5)
WBC # FLD AUTO: 7.67 K/UL — SIGNIFICANT CHANGE UP (ref 3.8–10.5)
WBC # FLD AUTO: 7.68 K/UL — SIGNIFICANT CHANGE UP (ref 3.8–10.5)
WBC # FLD AUTO: 7.77 K/UL — SIGNIFICANT CHANGE UP (ref 3.8–10.5)
WBC # FLD AUTO: 7.89 K/UL — SIGNIFICANT CHANGE UP (ref 3.8–10.5)
WBC # FLD AUTO: 8.05 K/UL — SIGNIFICANT CHANGE UP (ref 3.8–10.5)
WBC # FLD AUTO: 8.09 K/UL — SIGNIFICANT CHANGE UP (ref 3.8–10.5)
WBC # FLD AUTO: 8.13 K/UL — SIGNIFICANT CHANGE UP (ref 3.8–10.5)
WBC # FLD AUTO: 8.56 K/UL — SIGNIFICANT CHANGE UP (ref 3.8–10.5)
WBC # FLD AUTO: 8.65 K/UL — SIGNIFICANT CHANGE UP (ref 3.8–10.5)
WBC # FLD AUTO: 8.66 K/UL — SIGNIFICANT CHANGE UP (ref 3.8–10.5)
WBC # FLD AUTO: 8.67 K/UL — SIGNIFICANT CHANGE UP (ref 3.8–10.5)
WBC # FLD AUTO: 8.8 K/UL — SIGNIFICANT CHANGE UP (ref 3.8–10.5)
WBC # FLD AUTO: 8.96 K/UL — SIGNIFICANT CHANGE UP (ref 3.8–10.5)
WBC # FLD AUTO: 9 K/UL — SIGNIFICANT CHANGE UP (ref 3.8–10.5)
WBC # FLD AUTO: 9.45 K/UL — SIGNIFICANT CHANGE UP (ref 3.8–10.5)
WBC # FLD AUTO: 9.48 K/UL — SIGNIFICANT CHANGE UP (ref 3.8–10.5)
WBC # FLD AUTO: 9.54 K/UL — SIGNIFICANT CHANGE UP (ref 3.8–10.5)
WBC # FLD AUTO: 9.57 K/UL — SIGNIFICANT CHANGE UP (ref 3.8–10.5)
WBC # FLD AUTO: 9.59 K/UL — SIGNIFICANT CHANGE UP (ref 3.8–10.5)
WBC # FLD AUTO: 9.79 K/UL — SIGNIFICANT CHANGE UP (ref 3.8–10.5)
WBC # FLD AUTO: 9.92 K/UL — SIGNIFICANT CHANGE UP (ref 3.8–10.5)
WBC UR QL: 3 /HPF — SIGNIFICANT CHANGE UP (ref 0–5)

## 2022-01-01 PROCEDURE — 49440 PLACE GASTROSTOMY TUBE PERC: CPT

## 2022-01-01 PROCEDURE — 99232 SBSQ HOSP IP/OBS MODERATE 35: CPT

## 2022-01-01 PROCEDURE — 99238 HOSP IP/OBS DSCHRG MGMT 30/<: CPT | Mod: GC

## 2022-01-01 PROCEDURE — 99233 SBSQ HOSP IP/OBS HIGH 50: CPT | Mod: GC,25

## 2022-01-01 PROCEDURE — 74177 CT ABD & PELVIS W/CONTRAST: CPT | Mod: 26

## 2022-01-01 PROCEDURE — 99215 OFFICE O/P EST HI 40 MIN: CPT

## 2022-01-01 PROCEDURE — C1894: CPT

## 2022-01-01 PROCEDURE — 99233 SBSQ HOSP IP/OBS HIGH 50: CPT

## 2022-01-01 PROCEDURE — 78815 PET IMAGE W/CT SKULL-THIGH: CPT | Mod: 26,PI

## 2022-01-01 PROCEDURE — 71250 CT THORAX DX C-: CPT | Mod: 26

## 2022-01-01 PROCEDURE — 31630 BRONCHOSCOPY DILATE/FX REPR: CPT | Mod: 59,GC

## 2022-01-01 PROCEDURE — 31645 BRNCHSC W/THER ASPIR 1ST: CPT | Mod: GC

## 2022-01-01 PROCEDURE — 74220 X-RAY XM ESOPHAGUS 1CNTRST: CPT | Mod: 26

## 2022-01-01 PROCEDURE — 99223 1ST HOSP IP/OBS HIGH 75: CPT | Mod: GC

## 2022-01-01 PROCEDURE — 77293 RESPIRATOR MOTION MGMT SIMUL: CPT | Mod: 26

## 2022-01-01 PROCEDURE — A9552: CPT

## 2022-01-01 PROCEDURE — 74177 CT ABD & PELVIS W/CONTRAST: CPT | Mod: 26,MA

## 2022-01-01 PROCEDURE — 99239 HOSP IP/OBS DSCHRG MGMT >30: CPT

## 2022-01-01 PROCEDURE — 99233 SBSQ HOSP IP/OBS HIGH 50: CPT | Mod: GC

## 2022-01-01 PROCEDURE — 43762 RPLC GTUBE NO REVJ TRC: CPT

## 2022-01-01 PROCEDURE — 31641 BRONCHOSCOPY TREAT BLOCKAGE: CPT | Mod: GC

## 2022-01-01 PROCEDURE — 99291 CRITICAL CARE FIRST HOUR: CPT | Mod: GC

## 2022-01-01 PROCEDURE — 88341 IMHCHEM/IMCYTCHM EA ADD ANTB: CPT | Mod: 26

## 2022-01-01 PROCEDURE — 93010 ELECTROCARDIOGRAM REPORT: CPT

## 2022-01-01 PROCEDURE — 71045 X-RAY EXAM CHEST 1 VIEW: CPT | Mod: 26

## 2022-01-01 PROCEDURE — 71260 CT THORAX DX C+: CPT | Mod: 26

## 2022-01-01 PROCEDURE — 36561 INSERT TUNNELED CV CATH: CPT | Mod: RT

## 2022-01-01 PROCEDURE — 99232 SBSQ HOSP IP/OBS MODERATE 35: CPT | Mod: GC

## 2022-01-01 PROCEDURE — 31640 BRONCHOSCOPY W/TUMOR EXCISE: CPT | Mod: GC

## 2022-01-01 PROCEDURE — 71275 CT ANGIOGRAPHY CHEST: CPT | Mod: 26,MA

## 2022-01-01 PROCEDURE — 99497 ADVNCD CARE PLAN 30 MIN: CPT | Mod: GC,25

## 2022-01-01 PROCEDURE — 31500 INSERT EMERGENCY AIRWAY: CPT | Mod: GC

## 2022-01-01 PROCEDURE — 88305 TISSUE EXAM BY PATHOLOGIST: CPT | Mod: 26

## 2022-01-01 PROCEDURE — C9803: CPT

## 2022-01-01 PROCEDURE — 99291 CRITICAL CARE FIRST HOUR: CPT | Mod: GC,25

## 2022-01-01 PROCEDURE — 77334 RADIATION TREATMENT AID(S): CPT | Mod: 26

## 2022-01-01 PROCEDURE — 93971 EXTREMITY STUDY: CPT | Mod: 26

## 2022-01-01 PROCEDURE — 31638 BRONCHOSCOPY REVISE STENT: CPT | Mod: GC

## 2022-01-01 PROCEDURE — 31636 BRONCHOSCOPY BRONCH STENTS: CPT | Mod: GC

## 2022-01-01 PROCEDURE — 99284 EMERGENCY DEPT VISIT MOD MDM: CPT

## 2022-01-01 PROCEDURE — 77263 THER RADIOLOGY TX PLNG CPLX: CPT

## 2022-01-01 PROCEDURE — 76604 US EXAM CHEST: CPT | Mod: 26,GC

## 2022-01-01 PROCEDURE — 99205 OFFICE O/P NEW HI 60 MIN: CPT | Mod: 25

## 2022-01-01 PROCEDURE — 99223 1ST HOSP IP/OBS HIGH 75: CPT

## 2022-01-01 PROCEDURE — 49465 FLUORO EXAM OF G/COLON TUBE: CPT

## 2022-01-01 PROCEDURE — 76937 US GUIDE VASCULAR ACCESS: CPT | Mod: 26

## 2022-01-01 PROCEDURE — 74177 CT ABD & PELVIS W/CONTRAST: CPT

## 2022-01-01 PROCEDURE — 77001 FLUOROGUIDE FOR VEIN DEVICE: CPT

## 2022-01-01 PROCEDURE — 76937 US GUIDE VASCULAR ACCESS: CPT

## 2022-01-01 PROCEDURE — 31652 BRONCH EBUS SAMPLNG 1/2 NODE: CPT | Mod: GC

## 2022-01-01 PROCEDURE — 99205 OFFICE O/P NEW HI 60 MIN: CPT

## 2022-01-01 PROCEDURE — 77301 RADIOTHERAPY DOSE PLAN IMRT: CPT | Mod: 26

## 2022-01-01 PROCEDURE — 88342 IMHCHEM/IMCYTCHM 1ST ANTB: CPT | Mod: 26

## 2022-01-01 PROCEDURE — U0003: CPT

## 2022-01-01 PROCEDURE — U0005: CPT

## 2022-01-01 PROCEDURE — 99285 EMERGENCY DEPT VISIT HI MDM: CPT

## 2022-01-01 PROCEDURE — C1788: CPT

## 2022-01-01 PROCEDURE — 77338 DESIGN MLC DEVICE FOR IMRT: CPT | Mod: 26

## 2022-01-01 PROCEDURE — 77300 RADIATION THERAPY DOSE PLAN: CPT | Mod: 26

## 2022-01-01 PROCEDURE — 77001 FLUOROGUIDE FOR VEIN DEVICE: CPT | Mod: 26

## 2022-01-01 PROCEDURE — 70552 MRI BRAIN STEM W/DYE: CPT | Mod: 26

## 2022-01-01 PROCEDURE — 78815 PET IMAGE W/CT SKULL-THIGH: CPT

## 2022-01-01 PROCEDURE — 99231 SBSQ HOSP IP/OBS SF/LOW 25: CPT

## 2022-01-01 PROCEDURE — 99214 OFFICE O/P EST MOD 30 MIN: CPT | Mod: GC,25

## 2022-01-01 PROCEDURE — 93308 TTE F-UP OR LMTD: CPT | Mod: 26,GC

## 2022-01-01 PROCEDURE — 93306 TTE W/DOPPLER COMPLETE: CPT | Mod: 26

## 2022-01-01 PROCEDURE — 94618 PULMONARY STRESS TESTING: CPT

## 2022-01-01 PROCEDURE — 31630 BRONCHOSCOPY DILATE/FX REPR: CPT | Mod: GC

## 2022-01-01 PROCEDURE — C1769: CPT

## 2022-01-01 PROCEDURE — 99223 1ST HOSP IP/OBS HIGH 75: CPT | Mod: 57

## 2022-01-01 PROCEDURE — 36561 INSERT TUNNELED CV CATH: CPT

## 2022-01-01 PROCEDURE — 31624 DX BRONCHOSCOPE/LAVAGE: CPT | Mod: GC

## 2022-01-01 PROCEDURE — 71260 CT THORAX DX C+: CPT

## 2022-01-01 PROCEDURE — 71045 X-RAY EXAM CHEST 1 VIEW: CPT | Mod: 26,76

## 2022-01-01 PROCEDURE — 99285 EMERGENCY DEPT VISIT HI MDM: CPT | Mod: 25

## 2022-01-01 PROCEDURE — 93000 ELECTROCARDIOGRAM COMPLETE: CPT

## 2022-01-01 PROCEDURE — 88173 CYTOPATH EVAL FNA REPORT: CPT | Mod: 26

## 2022-01-01 DEVICE — IMPLANTABLE DEVICE: Type: IMPLANTABLE DEVICE | Status: FUNCTIONAL

## 2022-01-01 DEVICE — DILATION BALLOON: Type: IMPLANTABLE DEVICE | Status: FUNCTIONAL

## 2022-01-01 RX ORDER — DIPHENHYDRAMINE HCL 50 MG
25 CAPSULE ORAL ONCE
Refills: 0 | Status: DISCONTINUED | OUTPATIENT
Start: 2022-01-01 | End: 2022-01-01

## 2022-01-01 RX ORDER — VANCOMYCIN HCL 1 G
1000 VIAL (EA) INTRAVENOUS EVERY 12 HOURS
Refills: 0 | Status: DISCONTINUED | OUTPATIENT
Start: 2022-01-01 | End: 2022-01-01

## 2022-01-01 RX ORDER — POTASSIUM CHLORIDE 20 MEQ
40 PACKET (EA) ORAL ONCE
Refills: 0 | Status: COMPLETED | OUTPATIENT
Start: 2022-01-01 | End: 2022-01-01

## 2022-01-01 RX ORDER — HYDROMORPHONE HYDROCHLORIDE 2 MG/ML
0.5 INJECTION INTRAMUSCULAR; INTRAVENOUS; SUBCUTANEOUS
Qty: 10 | Refills: 0 | Status: DISCONTINUED | OUTPATIENT
Start: 2022-01-01 | End: 2022-01-01

## 2022-01-01 RX ORDER — DEXAMETHASONE 0.5 MG/5ML
8 ELIXIR ORAL ONCE
Refills: 0 | Status: COMPLETED | OUTPATIENT
Start: 2022-01-01 | End: 2022-01-01

## 2022-01-01 RX ORDER — FENTANYL CITRATE 50 UG/ML
50 INJECTION INTRAVENOUS ONCE
Refills: 0 | Status: DISCONTINUED | OUTPATIENT
Start: 2022-01-01 | End: 2022-01-01

## 2022-01-01 RX ORDER — AMIODARONE HYDROCHLORIDE 200 MG/1
200 TABLET ORAL DAILY
Qty: 90 | Refills: 3 | Status: DISCONTINUED | COMMUNITY
Start: 2022-01-01 | End: 2022-01-01

## 2022-01-01 RX ORDER — INFLUENZA VIRUS VACCINE 15; 15; 15; 15 UG/.5ML; UG/.5ML; UG/.5ML; UG/.5ML
0.7 SUSPENSION INTRAMUSCULAR ONCE
Refills: 0 | Status: DISCONTINUED | OUTPATIENT
Start: 2022-01-01 | End: 2022-01-01

## 2022-01-01 RX ORDER — METOPROLOL TARTRATE 50 MG
5 TABLET ORAL ONCE
Refills: 0 | Status: COMPLETED | OUTPATIENT
Start: 2022-01-01 | End: 2022-01-01

## 2022-01-01 RX ORDER — PSYLLIUM SEED (WITH DEXTROSE)
1 POWDER (GRAM) ORAL
Refills: 0 | Status: DISCONTINUED | OUTPATIENT
Start: 2022-01-01 | End: 2022-01-01

## 2022-01-01 RX ORDER — CHLORHEXIDINE GLUCONATE 213 G/1000ML
1 SOLUTION TOPICAL DAILY
Refills: 0 | Status: DISCONTINUED | OUTPATIENT
Start: 2022-01-01 | End: 2022-01-01

## 2022-01-01 RX ORDER — TOBRAMYCIN 0.3 %
1 DROPS OPHTHALMIC (EYE) EVERY 4 HOURS
Refills: 0 | Status: DISCONTINUED | OUTPATIENT
Start: 2022-01-01 | End: 2022-01-01

## 2022-01-01 RX ORDER — KETOROLAC TROMETHAMINE 30 MG/ML
15 SYRINGE (ML) INJECTION ONCE
Refills: 0 | Status: DISCONTINUED | OUTPATIENT
Start: 2022-01-01 | End: 2022-01-01

## 2022-01-01 RX ORDER — BUDESONIDE AND FORMOTEROL FUMARATE DIHYDRATE 160; 4.5 UG/1; UG/1
1 AEROSOL RESPIRATORY (INHALATION)
Qty: 0 | Refills: 0 | DISCHARGE

## 2022-01-01 RX ORDER — POTASSIUM CHLORIDE 20 MEQ
20 PACKET (EA) ORAL ONCE
Refills: 0 | Status: COMPLETED | OUTPATIENT
Start: 2022-01-01 | End: 2022-01-01

## 2022-01-01 RX ORDER — ACETAMINOPHEN 500 MG
20.3 TABLET ORAL
Qty: 0 | Refills: 0 | DISCHARGE

## 2022-01-01 RX ORDER — ACETAMINOPHEN 500 MG
650 TABLET ORAL EVERY 6 HOURS
Refills: 0 | Status: DISCONTINUED | OUTPATIENT
Start: 2022-01-01 | End: 2022-01-01

## 2022-01-01 RX ORDER — FAMOTIDINE 10 MG/ML
20 INJECTION INTRAVENOUS ONCE
Refills: 0 | Status: COMPLETED | OUTPATIENT
Start: 2022-01-01 | End: 2022-01-01

## 2022-01-01 RX ORDER — IPRATROPIUM/ALBUTEROL SULFATE 18-103MCG
3 AEROSOL WITH ADAPTER (GRAM) INHALATION
Qty: 360 | Refills: 0
Start: 2022-01-01 | End: 2022-01-01

## 2022-01-01 RX ORDER — SODIUM CHLORIDE 9 MG/ML
3 INJECTION INTRAMUSCULAR; INTRAVENOUS; SUBCUTANEOUS EVERY 8 HOURS
Refills: 0 | Status: DISCONTINUED | OUTPATIENT
Start: 2022-01-01 | End: 2022-01-01

## 2022-01-01 RX ORDER — DIPHENHYDRAMINE HCL 50 MG
25 CAPSULE ORAL ONCE
Refills: 0 | Status: COMPLETED | OUTPATIENT
Start: 2022-01-01 | End: 2022-01-01

## 2022-01-01 RX ORDER — ACETAMINOPHEN 500 MG
20.3 TABLET ORAL
Qty: 300 | Refills: 0
Start: 2022-01-01

## 2022-01-01 RX ORDER — LANOLIN ALCOHOL/MO/W.PET/CERES
1 CREAM (GRAM) TOPICAL
Qty: 0 | Refills: 0 | DISCHARGE
Start: 2022-01-01

## 2022-01-01 RX ORDER — ACETAMINOPHEN 500 MG
1000 TABLET ORAL ONCE
Refills: 0 | Status: COMPLETED | OUTPATIENT
Start: 2022-01-01 | End: 2022-01-01

## 2022-01-01 RX ORDER — IPRATROPIUM/ALBUTEROL SULFATE 18-103MCG
3 AEROSOL WITH ADAPTER (GRAM) INHALATION
Qty: 0 | Refills: 0 | DISCHARGE

## 2022-01-01 RX ORDER — PSYLLIUM SEED (WITH DEXTROSE)
1 POWDER (GRAM) ORAL
Qty: 0 | Refills: 0 | DISCHARGE
Start: 2022-01-01

## 2022-01-01 RX ORDER — BUDESONIDE AND FORMOTEROL FUMARATE DIHYDRATE 160; 4.5 UG/1; UG/1
2 AEROSOL RESPIRATORY (INHALATION)
Refills: 0 | Status: DISCONTINUED | OUTPATIENT
Start: 2022-01-01 | End: 2022-01-01

## 2022-01-01 RX ORDER — ACETAMINOPHEN 500 MG
2 TABLET ORAL
Qty: 0 | Refills: 0 | DISCHARGE
Start: 2022-01-01

## 2022-01-01 RX ORDER — SODIUM CHLORIDE 9 MG/ML
4 INJECTION INTRAMUSCULAR; INTRAVENOUS; SUBCUTANEOUS EVERY 12 HOURS
Refills: 0 | Status: DISCONTINUED | OUTPATIENT
Start: 2022-01-01 | End: 2022-01-01

## 2022-01-01 RX ORDER — HEPARIN SODIUM 5000 [USP'U]/ML
5000 INJECTION INTRAVENOUS; SUBCUTANEOUS EVERY 8 HOURS
Refills: 0 | Status: DISCONTINUED | OUTPATIENT
Start: 2022-01-01 | End: 2022-01-01

## 2022-01-01 RX ORDER — LACTOBACILLUS ACIDOPHILUS 100MM CELL
0 CAPSULE ORAL
Qty: 0 | Refills: 0 | DISCHARGE

## 2022-01-01 RX ORDER — POTASSIUM PHOSPHATE, MONOBASIC POTASSIUM PHOSPHATE, DIBASIC 236; 224 MG/ML; MG/ML
15 INJECTION, SOLUTION INTRAVENOUS ONCE
Refills: 0 | Status: COMPLETED | OUTPATIENT
Start: 2022-01-01 | End: 2022-01-01

## 2022-01-01 RX ORDER — AMIODARONE HYDROCHLORIDE 400 MG/1
200 TABLET ORAL DAILY
Refills: 0 | Status: DISCONTINUED | OUTPATIENT
Start: 2022-01-01 | End: 2022-01-01

## 2022-01-01 RX ORDER — SODIUM,POTASSIUM PHOSPHATES 278-250MG
1 POWDER IN PACKET (EA) ORAL
Refills: 0 | Status: COMPLETED | OUTPATIENT
Start: 2022-01-01 | End: 2022-01-01

## 2022-01-01 RX ORDER — MAGNESIUM SULFATE 500 MG/ML
1 VIAL (ML) INJECTION ONCE
Refills: 0 | Status: COMPLETED | OUTPATIENT
Start: 2022-01-01 | End: 2022-01-01

## 2022-01-01 RX ORDER — SODIUM CHLORIDE 9 MG/ML
1000 INJECTION INTRAMUSCULAR; INTRAVENOUS; SUBCUTANEOUS ONCE
Refills: 0 | Status: COMPLETED | OUTPATIENT
Start: 2022-01-01 | End: 2022-01-01

## 2022-01-01 RX ORDER — MEROPENEM 1 G/30ML
1000 INJECTION INTRAVENOUS EVERY 8 HOURS
Refills: 0 | Status: DISCONTINUED | OUTPATIENT
Start: 2022-01-01 | End: 2022-01-01

## 2022-01-01 RX ORDER — APIXABAN 2.5 MG/1
1 TABLET, FILM COATED ORAL
Qty: 0 | Refills: 0 | DISCHARGE

## 2022-01-01 RX ORDER — ALBUTEROL 90 UG/1
2 AEROSOL, METERED ORAL EVERY 6 HOURS
Refills: 0 | Status: DISCONTINUED | OUTPATIENT
Start: 2022-01-01 | End: 2022-01-01

## 2022-01-01 RX ORDER — SODIUM CHLORIDE 9 MG/ML
5 INJECTION INTRAMUSCULAR; INTRAVENOUS; SUBCUTANEOUS
Qty: 1 | Refills: 0
Start: 2022-01-01

## 2022-01-01 RX ORDER — AMIODARONE HYDROCHLORIDE 400 MG/1
1 TABLET ORAL
Qty: 450 | Refills: 0 | Status: COMPLETED | OUTPATIENT
Start: 2022-01-01 | End: 2022-01-01

## 2022-01-01 RX ORDER — SODIUM CHLORIDE 9 MG/ML
1000 INJECTION, SOLUTION INTRAVENOUS
Refills: 0 | Status: DISCONTINUED | OUTPATIENT
Start: 2022-01-01 | End: 2022-01-01

## 2022-01-01 RX ORDER — SODIUM CHLORIDE 9 MG/ML
250 INJECTION INTRAMUSCULAR; INTRAVENOUS; SUBCUTANEOUS ONCE
Refills: 0 | Status: COMPLETED | OUTPATIENT
Start: 2022-01-01 | End: 2022-01-01

## 2022-01-01 RX ORDER — SODIUM CHLORIDE FOR INHALATION 0.9 %
0.9 VIAL, NEBULIZER (ML) INHALATION
Qty: 90 | Refills: 3 | Status: ACTIVE | COMMUNITY
Start: 2022-01-01 | End: 1900-01-01

## 2022-01-01 RX ORDER — IPRATROPIUM/ALBUTEROL SULFATE 18-103MCG
3 AEROSOL WITH ADAPTER (GRAM) INHALATION ONCE
Refills: 0 | Status: COMPLETED | OUTPATIENT
Start: 2022-01-01 | End: 2022-01-01

## 2022-01-01 RX ORDER — FENTANYL CITRATE 50 UG/ML
0.5 INJECTION INTRAVENOUS
Qty: 2500 | Refills: 0 | Status: DISCONTINUED | OUTPATIENT
Start: 2022-01-01 | End: 2022-01-01

## 2022-01-01 RX ORDER — LACTOBACILLUS ACIDOPHILUS 100MM CELL
1 CAPSULE ORAL
Refills: 0 | Status: DISCONTINUED | OUTPATIENT
Start: 2022-01-01 | End: 2022-01-01

## 2022-01-01 RX ORDER — PIPERACILLIN AND TAZOBACTAM 4; .5 G/20ML; G/20ML
4.5 INJECTION, POWDER, LYOPHILIZED, FOR SOLUTION INTRAVENOUS EVERY 8 HOURS
Refills: 0 | Status: DISCONTINUED | OUTPATIENT
Start: 2022-01-01 | End: 2022-01-01

## 2022-01-01 RX ORDER — FENTANYL CITRATE 50 UG/ML
100 INJECTION INTRAVENOUS ONCE
Refills: 0 | Status: DISCONTINUED | OUTPATIENT
Start: 2022-01-01 | End: 2022-01-01

## 2022-01-01 RX ORDER — CISATRACURIUM BESYLATE 2 MG/ML
20 INJECTION INTRAVENOUS ONCE
Refills: 0 | Status: COMPLETED | OUTPATIENT
Start: 2022-01-01 | End: 2022-01-01

## 2022-01-01 RX ORDER — DEXTROSE 50 % IN WATER 50 %
12.5 SYRINGE (ML) INTRAVENOUS ONCE
Refills: 0 | Status: DISCONTINUED | OUTPATIENT
Start: 2022-01-01 | End: 2022-01-01

## 2022-01-01 RX ORDER — CISATRACURIUM BESYLATE 2 MG/ML
20 INJECTION INTRAVENOUS ONCE
Refills: 0 | Status: DISCONTINUED | OUTPATIENT
Start: 2022-01-01 | End: 2022-01-01

## 2022-01-01 RX ORDER — AMIODARONE HYDROCHLORIDE 400 MG/1
TABLET ORAL
Refills: 0 | Status: DISCONTINUED | OUTPATIENT
Start: 2022-01-01 | End: 2022-01-01

## 2022-01-01 RX ORDER — GUAIFENESIN 100 MG/5ML
100 LIQUID ORAL EVERY 4 HOURS
Qty: 2 | Refills: 0 | Status: ACTIVE | COMMUNITY
Start: 2022-01-01 | End: 1900-01-01

## 2022-01-01 RX ORDER — DEXTROSE 50 % IN WATER 50 %
25 SYRINGE (ML) INTRAVENOUS ONCE
Refills: 0 | Status: DISCONTINUED | OUTPATIENT
Start: 2022-01-01 | End: 2022-01-01

## 2022-01-01 RX ORDER — SODIUM CHLORIDE FOR INHALATION 0.9 %
0.9 VIAL, NEBULIZER (ML) INHALATION TWICE DAILY
Qty: 2 | Refills: 5 | Status: ACTIVE | COMMUNITY
Start: 2022-01-01 | End: 1900-01-01

## 2022-01-01 RX ORDER — PACLITAXEL 6 MG/ML
68.4 INJECTION, SOLUTION, CONCENTRATE INTRAVENOUS ONCE
Refills: 0 | Status: COMPLETED | OUTPATIENT
Start: 2022-01-01 | End: 2022-01-01

## 2022-01-01 RX ORDER — AMIODARONE HYDROCHLORIDE 400 MG/1
1 TABLET ORAL
Qty: 450 | Refills: 0 | Status: DISCONTINUED | OUTPATIENT
Start: 2022-01-01 | End: 2022-01-01

## 2022-01-01 RX ORDER — ENOXAPARIN SODIUM 100 MG/ML
80 INJECTION SUBCUTANEOUS EVERY 12 HOURS
Refills: 0 | Status: DISCONTINUED | OUTPATIENT
Start: 2022-01-01 | End: 2022-01-01

## 2022-01-01 RX ORDER — INSULIN LISPRO 100/ML
VIAL (ML) SUBCUTANEOUS EVERY 6 HOURS
Refills: 0 | Status: DISCONTINUED | OUTPATIENT
Start: 2022-01-01 | End: 2022-01-01

## 2022-01-01 RX ORDER — LEVALBUTEROL 1.25 MG/.5ML
0.63 SOLUTION, CONCENTRATE RESPIRATORY (INHALATION) EVERY 6 HOURS
Refills: 0 | Status: DISCONTINUED | OUTPATIENT
Start: 2022-01-01 | End: 2022-01-01

## 2022-01-01 RX ORDER — SODIUM CHLORIDE 0.65 %
1 AEROSOL, SPRAY (ML) NASAL DAILY
Refills: 0 | Status: DISCONTINUED | OUTPATIENT
Start: 2022-01-01 | End: 2022-01-01

## 2022-01-01 RX ORDER — MORPHINE SULFATE 50 MG/1
2 CAPSULE, EXTENDED RELEASE ORAL
Refills: 0 | Status: DISCONTINUED | OUTPATIENT
Start: 2022-01-01 | End: 2022-01-01

## 2022-01-01 RX ORDER — AMIODARONE HYDROCHLORIDE 400 MG/1
1 TABLET ORAL
Qty: 30 | Refills: 0
Start: 2022-01-01 | End: 2022-01-01

## 2022-01-01 RX ORDER — ALBUTEROL 90 UG/1
1 AEROSOL, METERED ORAL EVERY 4 HOURS
Refills: 0 | Status: DISCONTINUED | OUTPATIENT
Start: 2022-01-01 | End: 2022-01-01

## 2022-01-01 RX ORDER — PSYLLIUM SEED (WITH DEXTROSE)
0 POWDER (GRAM) ORAL
Qty: 0 | Refills: 0 | DISCHARGE

## 2022-01-01 RX ORDER — CEFAZOLIN SODIUM 1 G
1000 VIAL (EA) INJECTION ONCE
Refills: 0 | Status: COMPLETED | OUTPATIENT
Start: 2022-01-01 | End: 2022-01-01

## 2022-01-01 RX ORDER — IPRATROPIUM/ALBUTEROL SULFATE 18-103MCG
3 AEROSOL WITH ADAPTER (GRAM) INHALATION EVERY 8 HOURS
Refills: 0 | Status: DISCONTINUED | OUTPATIENT
Start: 2022-01-01 | End: 2022-01-01

## 2022-01-01 RX ORDER — ENOXAPARIN SODIUM 100 MG/ML
40 INJECTION SUBCUTANEOUS EVERY 24 HOURS
Refills: 0 | Status: DISCONTINUED | OUTPATIENT
Start: 2022-01-01 | End: 2022-01-01

## 2022-01-01 RX ORDER — ONDANSETRON 8 MG/1
4 TABLET, FILM COATED ORAL ONCE
Refills: 0 | Status: DISCONTINUED | OUTPATIENT
Start: 2022-01-01 | End: 2022-01-01

## 2022-01-01 RX ORDER — GLUCAGON INJECTION, SOLUTION 0.5 MG/.1ML
1 INJECTION, SOLUTION SUBCUTANEOUS ONCE
Refills: 0 | Status: DISCONTINUED | OUTPATIENT
Start: 2022-01-01 | End: 2022-01-01

## 2022-01-01 RX ORDER — ENOXAPARIN SODIUM 100 MG/ML
40 INJECTION SUBCUTANEOUS DAILY
Refills: 0 | Status: DISCONTINUED | OUTPATIENT
Start: 2022-01-01 | End: 2022-01-01

## 2022-01-01 RX ORDER — APIXABAN 2.5 MG/1
1 TABLET, FILM COATED ORAL
Qty: 60 | Refills: 0
Start: 2022-01-01 | End: 2022-01-01

## 2022-01-01 RX ORDER — AMIODARONE HYDROCHLORIDE 400 MG/1
150 TABLET ORAL ONCE
Refills: 0 | Status: COMPLETED | OUTPATIENT
Start: 2022-01-01 | End: 2022-01-01

## 2022-01-01 RX ORDER — MORPHINE SULFATE 50 MG/1
2 CAPSULE, EXTENDED RELEASE ORAL ONCE
Refills: 0 | Status: DISCONTINUED | OUTPATIENT
Start: 2022-01-01 | End: 2022-01-01

## 2022-01-01 RX ORDER — AMIODARONE HYDROCHLORIDE 400 MG/1
0.5 TABLET ORAL
Qty: 900 | Refills: 0 | Status: DISCONTINUED | OUTPATIENT
Start: 2022-01-01 | End: 2022-01-01

## 2022-01-01 RX ORDER — BUDESONIDE AND FORMOTEROL FUMARATE DIHYDRATE 160; 4.5 UG/1; UG/1
1 AEROSOL RESPIRATORY (INHALATION)
Qty: 1 | Refills: 0
Start: 2022-01-01

## 2022-01-01 RX ORDER — METOCLOPRAMIDE 10 MG/1
10 TABLET ORAL
Qty: 60 | Refills: 2 | Status: ACTIVE | COMMUNITY
Start: 2022-01-01 | End: 1900-01-01

## 2022-01-01 RX ORDER — PALONOSETRON HYDROCHLORIDE 0.25 MG/5ML
0.25 INJECTION, SOLUTION INTRAVENOUS ONCE
Refills: 0 | Status: COMPLETED | OUTPATIENT
Start: 2022-01-01 | End: 2022-01-01

## 2022-01-01 RX ORDER — SODIUM SULFATE, POTASSIUM SULFATE, MAGNESIUM SULFATE 17.5; 3.13; 1.6 G/ML; G/ML; G/ML
17.5-3.13-1.6 SOLUTION, CONCENTRATE ORAL
Qty: 1 | Refills: 0 | Status: DISCONTINUED | COMMUNITY
Start: 2020-09-02 | End: 2022-01-01

## 2022-01-01 RX ORDER — AMIODARONE HYDROCHLORIDE 400 MG/1
0.5 TABLET ORAL
Qty: 450 | Refills: 0 | Status: DISCONTINUED | OUTPATIENT
Start: 2022-01-01 | End: 2022-01-01

## 2022-01-01 RX ORDER — PROPOFOL 10 MG/ML
10 INJECTION, EMULSION INTRAVENOUS
Qty: 1000 | Refills: 0 | Status: DISCONTINUED | OUTPATIENT
Start: 2022-01-01 | End: 2022-01-01

## 2022-01-01 RX ORDER — MEROPENEM 1 G/30ML
INJECTION INTRAVENOUS
Refills: 0 | Status: DISCONTINUED | OUTPATIENT
Start: 2022-01-01 | End: 2022-01-01

## 2022-01-01 RX ORDER — ENOXAPARIN SODIUM 100 MG/ML
70 INJECTION SUBCUTANEOUS EVERY 12 HOURS
Refills: 0 | Status: DISCONTINUED | OUTPATIENT
Start: 2022-01-01 | End: 2022-01-01

## 2022-01-01 RX ORDER — FENTANYL CITRATE 50 UG/ML
200 INJECTION INTRAVENOUS ONCE
Refills: 0 | Status: DISCONTINUED | OUTPATIENT
Start: 2022-01-01 | End: 2022-01-01

## 2022-01-01 RX ORDER — VANCOMYCIN HCL 1 G
1000 VIAL (EA) INTRAVENOUS ONCE
Refills: 0 | Status: COMPLETED | OUTPATIENT
Start: 2022-01-01 | End: 2022-01-01

## 2022-01-01 RX ORDER — GUAIFENESIN 600 MG/1
TABLET, EXTENDED RELEASE ORAL
Refills: 0 | Status: ACTIVE | COMMUNITY

## 2022-01-01 RX ORDER — DEXMEDETOMIDINE HYDROCHLORIDE IN 0.9% SODIUM CHLORIDE 4 UG/ML
0.2 INJECTION INTRAVENOUS
Qty: 400 | Refills: 0 | Status: DISCONTINUED | OUTPATIENT
Start: 2022-01-01 | End: 2022-01-01

## 2022-01-01 RX ORDER — IPRATROPIUM BROMIDE 0.2 MG/ML
1 SOLUTION, NON-ORAL INHALATION EVERY 6 HOURS
Refills: 0 | Status: DISCONTINUED | OUTPATIENT
Start: 2022-01-01 | End: 2022-01-01

## 2022-01-01 RX ORDER — IPRATROPIUM BROMIDE AND ALBUTEROL SULFATE 2.5; .5 MG/3ML; MG/3ML
0.5-2.5 (3) SOLUTION RESPIRATORY (INHALATION)
Qty: 2 | Refills: 11 | Status: ACTIVE | COMMUNITY
Start: 2022-01-01 | End: 1900-01-01

## 2022-01-01 RX ORDER — CHLORHEXIDINE GLUCONATE 213 G/1000ML
1 SOLUTION TOPICAL
Refills: 0 | Status: DISCONTINUED | OUTPATIENT
Start: 2022-01-01 | End: 2022-01-01

## 2022-01-01 RX ORDER — FENTANYL CITRATE 50 UG/ML
25 INJECTION INTRAVENOUS
Refills: 0 | Status: DISCONTINUED | OUTPATIENT
Start: 2022-01-01 | End: 2022-01-01

## 2022-01-01 RX ORDER — IPRATROPIUM/ALBUTEROL SULFATE 18-103MCG
3 AEROSOL WITH ADAPTER (GRAM) INHALATION EVERY 6 HOURS
Refills: 0 | Status: DISCONTINUED | OUTPATIENT
Start: 2022-01-01 | End: 2022-01-01

## 2022-01-01 RX ORDER — PSYLLIUM SEED (WITH DEXTROSE)
1 POWDER (GRAM) ORAL
Qty: 1 | Refills: 0
Start: 2022-01-01 | End: 2022-01-01

## 2022-01-01 RX ORDER — PIPERACILLIN AND TAZOBACTAM 4; .5 G/20ML; G/20ML
3.38 INJECTION, POWDER, LYOPHILIZED, FOR SOLUTION INTRAVENOUS ONCE
Refills: 0 | Status: COMPLETED | OUTPATIENT
Start: 2022-01-01 | End: 2022-01-01

## 2022-01-01 RX ORDER — IPRATROPIUM/ALBUTEROL SULFATE 18-103MCG
3 AEROSOL WITH ADAPTER (GRAM) INHALATION
Qty: 1 | Refills: 0
Start: 2022-01-01 | End: 2022-01-01

## 2022-01-01 RX ORDER — BUDESONIDE, MICRONIZED 100 %
0.25 POWDER (GRAM) MISCELLANEOUS
Refills: 0 | Status: DISCONTINUED | OUTPATIENT
Start: 2022-01-01 | End: 2022-01-01

## 2022-01-01 RX ORDER — ZOLPIDEM TARTRATE 10 MG/1
5 TABLET ORAL AT BEDTIME
Refills: 0 | Status: DISCONTINUED | OUTPATIENT
Start: 2022-01-01 | End: 2022-01-01

## 2022-01-01 RX ORDER — ALBUTEROL SULFATE 2.5 MG/.5ML
SOLUTION RESPIRATORY (INHALATION)
Refills: 0 | Status: ACTIVE | COMMUNITY

## 2022-01-01 RX ORDER — LIDOCAINE 4 G/100G
1 CREAM TOPICAL ONCE
Refills: 0 | Status: COMPLETED | OUTPATIENT
Start: 2022-01-01 | End: 2022-01-01

## 2022-01-01 RX ORDER — IPRATROPIUM BROMIDE 0.2 MG/ML
500 SOLUTION, NON-ORAL INHALATION EVERY 6 HOURS
Refills: 0 | Status: DISCONTINUED | OUTPATIENT
Start: 2022-01-01 | End: 2022-01-01

## 2022-01-01 RX ORDER — MIDAZOLAM HYDROCHLORIDE 1 MG/ML
4 INJECTION, SOLUTION INTRAMUSCULAR; INTRAVENOUS ONCE
Refills: 0 | Status: DISCONTINUED | OUTPATIENT
Start: 2022-01-01 | End: 2022-01-01

## 2022-01-01 RX ORDER — BUDESONIDE AND FORMOTEROL FUMARATE DIHYDRATE 160; 4.5 UG/1; UG/1
1 AEROSOL RESPIRATORY (INHALATION)
Qty: 0 | Refills: 0 | DISCHARGE
Start: 2022-01-01

## 2022-01-01 RX ORDER — MULTIVITAMIN
TABLET ORAL
Refills: 0 | Status: DISCONTINUED | COMMUNITY
End: 2022-01-01

## 2022-01-01 RX ORDER — HYDROMORPHONE HYDROCHLORIDE 2 MG/ML
0.5 INJECTION INTRAMUSCULAR; INTRAVENOUS; SUBCUTANEOUS
Qty: 100 | Refills: 0 | Status: DISCONTINUED | OUTPATIENT
Start: 2022-01-01 | End: 2022-01-01

## 2022-01-01 RX ORDER — CALCIUM CHLORIDE, MAGNESIUM CHLORIDE, POTASSIUM CHLORIDE, SODIUM ACETATE, SODIUM CHLORIDE, SODIUM CITRATE .48; .3; .75; 3.9; 6.4; 1.7 MG/ML; MG/ML; MG/ML; MG/ML; MG/ML; MG/ML
1 SOLUTION OPHTHALMIC ONCE
Refills: 0 | Status: DISCONTINUED | OUTPATIENT
Start: 2022-01-01 | End: 2022-01-01

## 2022-01-01 RX ORDER — DEXTROSE 50 % IN WATER 50 %
15 SYRINGE (ML) INTRAVENOUS ONCE
Refills: 0 | Status: DISCONTINUED | OUTPATIENT
Start: 2022-01-01 | End: 2022-01-01

## 2022-01-01 RX ORDER — MAGNESIUM SULFATE 500 MG/ML
2 VIAL (ML) INJECTION ONCE
Refills: 0 | Status: COMPLETED | OUTPATIENT
Start: 2022-01-01 | End: 2022-01-01

## 2022-01-01 RX ORDER — GINGER ROOT/GINGER ROOT EXT 262.5 MG
CAPSULE ORAL
Refills: 0 | Status: DISCONTINUED | COMMUNITY
End: 2022-01-01

## 2022-01-01 RX ORDER — LORATADINE 10 MG
17 TABLET,DISINTEGRATING ORAL DAILY
Qty: 4 | Refills: 3 | Status: ACTIVE | COMMUNITY
Start: 2022-01-01 | End: 1900-01-01

## 2022-01-01 RX ORDER — PROPOFOL 10 MG/ML
40 INJECTION, EMULSION INTRAVENOUS ONCE
Refills: 0 | Status: COMPLETED | OUTPATIENT
Start: 2022-01-01 | End: 2022-01-01

## 2022-01-01 RX ORDER — APIXABAN 2.5 MG/1
5 TABLET, FILM COATED ORAL EVERY 12 HOURS
Refills: 0 | Status: DISCONTINUED | OUTPATIENT
Start: 2022-01-01 | End: 2022-01-01

## 2022-01-01 RX ORDER — METOPROLOL TARTRATE 50 MG
50 TABLET ORAL
Refills: 0 | Status: DISCONTINUED | OUTPATIENT
Start: 2022-01-01 | End: 2022-01-01

## 2022-01-01 RX ORDER — CEFAZOLIN SODIUM 1 G
1000 VIAL (EA) INJECTION EVERY 8 HOURS
Refills: 0 | Status: DISCONTINUED | OUTPATIENT
Start: 2022-01-01 | End: 2022-01-01

## 2022-01-01 RX ORDER — CHLORHEXIDINE GLUCONATE 213 G/1000ML
15 SOLUTION TOPICAL EVERY 12 HOURS
Refills: 0 | Status: DISCONTINUED | OUTPATIENT
Start: 2022-01-01 | End: 2022-01-01

## 2022-01-01 RX ORDER — POTASSIUM CHLORIDE 20 MEQ
10 PACKET (EA) ORAL
Refills: 0 | Status: COMPLETED | OUTPATIENT
Start: 2022-01-01 | End: 2022-01-01

## 2022-01-01 RX ORDER — ALBUTEROL SULFATE 2.5 MG/3ML
(2.5 MG/3ML) SOLUTION RESPIRATORY (INHALATION)
Qty: 1 | Refills: 0 | Status: ACTIVE | COMMUNITY
Start: 2022-01-01 | End: 1900-01-01

## 2022-01-01 RX ORDER — APIXABAN 5 MG/1
5 TABLET, FILM COATED ORAL
Refills: 0 | Status: ACTIVE | COMMUNITY
Start: 2022-01-01

## 2022-01-01 RX ORDER — NOREPINEPHRINE BITARTRATE/D5W 8 MG/250ML
0.05 PLASTIC BAG, INJECTION (ML) INTRAVENOUS
Qty: 8 | Refills: 0 | Status: DISCONTINUED | OUTPATIENT
Start: 2022-01-01 | End: 2022-01-01

## 2022-01-01 RX ORDER — CEFAZOLIN SODIUM 1 G
VIAL (EA) INJECTION
Refills: 0 | Status: DISCONTINUED | OUTPATIENT
Start: 2022-01-01 | End: 2022-01-01

## 2022-01-01 RX ORDER — LACTOBACILLUS ACIDOPHILUS 100MM CELL
1 CAPSULE ORAL
Qty: 60 | Refills: 0
Start: 2022-01-01 | End: 2022-01-01

## 2022-01-01 RX ORDER — MIDODRINE HYDROCHLORIDE 2.5 MG/1
20 TABLET ORAL EVERY 8 HOURS
Refills: 0 | Status: DISCONTINUED | OUTPATIENT
Start: 2022-01-01 | End: 2022-01-01

## 2022-01-01 RX ORDER — ROBINUL 0.2 MG/ML
0.4 INJECTION INTRAMUSCULAR; INTRAVENOUS EVERY 6 HOURS
Refills: 0 | Status: DISCONTINUED | OUTPATIENT
Start: 2022-01-01 | End: 2022-01-01

## 2022-01-01 RX ORDER — PIPERACILLIN AND TAZOBACTAM 4; .5 G/20ML; G/20ML
3.38 INJECTION, POWDER, LYOPHILIZED, FOR SOLUTION INTRAVENOUS EVERY 8 HOURS
Refills: 0 | Status: DISCONTINUED | OUTPATIENT
Start: 2022-01-01 | End: 2022-01-01

## 2022-01-01 RX ORDER — DIPHENHYDRAMINE HCL 50 MG
50 CAPSULE ORAL ONCE
Refills: 0 | Status: DISCONTINUED | OUTPATIENT
Start: 2022-01-01 | End: 2022-01-01

## 2022-01-01 RX ORDER — ROBINUL 0.2 MG/ML
0.2 INJECTION INTRAMUSCULAR; INTRAVENOUS ONCE
Refills: 0 | Status: COMPLETED | OUTPATIENT
Start: 2022-01-01 | End: 2022-01-01

## 2022-01-01 RX ORDER — PSYLLIUM SEED (WITH DEXTROSE)
1 POWDER (GRAM) ORAL THREE TIMES A DAY
Refills: 0 | Status: DISCONTINUED | OUTPATIENT
Start: 2022-01-01 | End: 2022-01-01

## 2022-01-01 RX ORDER — SODIUM CHLORIDE 9 MG/ML
0 INJECTION INTRAMUSCULAR; INTRAVENOUS; SUBCUTANEOUS
Qty: 0 | Refills: 0 | DISCHARGE

## 2022-01-01 RX ORDER — SODIUM CHLORIDE FOR INHALATION 3.5 %
3.5 VIAL, NEBULIZER (ML) INHALATION
Qty: 1 | Refills: 3 | Status: ACTIVE | COMMUNITY
Start: 2022-01-01 | End: 1900-01-01

## 2022-01-01 RX ORDER — CARBOPLATIN 50 MG
224 VIAL (EA) INTRAVENOUS ONCE
Refills: 0 | Status: COMPLETED | OUTPATIENT
Start: 2022-01-01 | End: 2022-01-01

## 2022-01-01 RX ORDER — ALBUTEROL 90 UG/1
2.5 AEROSOL, METERED ORAL EVERY 6 HOURS
Refills: 0 | Status: DISCONTINUED | OUTPATIENT
Start: 2022-01-01 | End: 2022-01-01

## 2022-01-01 RX ORDER — MEROPENEM 1 G/30ML
1000 INJECTION INTRAVENOUS ONCE
Refills: 0 | Status: COMPLETED | OUTPATIENT
Start: 2022-01-01 | End: 2022-01-01

## 2022-01-01 RX ORDER — AMIODARONE HYDROCHLORIDE 400 MG/1
400 TABLET ORAL EVERY 12 HOURS
Refills: 0 | Status: DISCONTINUED | OUTPATIENT
Start: 2022-01-01 | End: 2022-01-01

## 2022-01-01 RX ORDER — HEPARIN SODIUM 5000 [USP'U]/ML
5000 INJECTION INTRAVENOUS; SUBCUTANEOUS ONCE
Refills: 0 | Status: COMPLETED | OUTPATIENT
Start: 2022-01-01 | End: 2022-01-01

## 2022-01-01 RX ORDER — BUDESONIDE AND FORMOTEROL FUMARATE DIHYDRATE 160; 4.5 UG/1; UG/1
160-4.5 AEROSOL RESPIRATORY (INHALATION) TWICE DAILY
Qty: 1 | Refills: 5 | Status: ACTIVE | COMMUNITY
Start: 2022-01-01 | End: 1900-01-01

## 2022-01-01 RX ORDER — DIPHENHYDRAMINE HCL 50 MG
50 CAPSULE ORAL ONCE
Refills: 0 | Status: COMPLETED | OUTPATIENT
Start: 2022-01-01 | End: 2022-01-01

## 2022-01-01 RX ORDER — BENZOCAINE AND MENTHOL 5; 1 G/100ML; G/100ML
1 LIQUID ORAL EVERY 4 HOURS
Refills: 0 | Status: DISCONTINUED | OUTPATIENT
Start: 2022-01-01 | End: 2022-01-01

## 2022-01-01 RX ORDER — LANOLIN ALCOHOL/MO/W.PET/CERES
3 CREAM (GRAM) TOPICAL AT BEDTIME
Refills: 0 | Status: DISCONTINUED | OUTPATIENT
Start: 2022-01-01 | End: 2022-01-01

## 2022-01-01 RX ORDER — SODIUM CHLORIDE 9 MG/ML
5 INJECTION INTRAMUSCULAR; INTRAVENOUS; SUBCUTANEOUS EVERY 8 HOURS
Refills: 0 | Status: DISCONTINUED | OUTPATIENT
Start: 2022-01-01 | End: 2022-01-01

## 2022-01-01 RX ORDER — ACETAMINOPHEN 500 MG
975 TABLET ORAL ONCE
Refills: 0 | Status: COMPLETED | OUTPATIENT
Start: 2022-01-01 | End: 2022-01-01

## 2022-01-01 RX ORDER — HYDROMORPHONE HYDROCHLORIDE 2 MG/ML
0.25 INJECTION INTRAMUSCULAR; INTRAVENOUS; SUBCUTANEOUS
Refills: 0 | Status: DISCONTINUED | OUTPATIENT
Start: 2022-01-01 | End: 2022-01-01

## 2022-01-01 RX ORDER — MIDAZOLAM HYDROCHLORIDE 1 MG/ML
8 INJECTION, SOLUTION INTRAMUSCULAR; INTRAVENOUS ONCE
Refills: 0 | Status: DISCONTINUED | OUTPATIENT
Start: 2022-01-01 | End: 2022-01-01

## 2022-01-01 RX ADMIN — Medication 3 MILLILITER(S): at 14:39

## 2022-01-01 RX ADMIN — APIXABAN 5 MILLIGRAM(S): 2.5 TABLET, FILM COATED ORAL at 17:28

## 2022-01-01 RX ADMIN — CHLORHEXIDINE GLUCONATE 15 MILLILITER(S): 213 SOLUTION TOPICAL at 17:03

## 2022-01-01 RX ADMIN — MIDAZOLAM HYDROCHLORIDE 4 MILLIGRAM(S): 1 INJECTION, SOLUTION INTRAMUSCULAR; INTRAVENOUS at 14:13

## 2022-01-01 RX ADMIN — SODIUM CHLORIDE 5 MILLILITER(S): 9 INJECTION INTRAMUSCULAR; INTRAVENOUS; SUBCUTANEOUS at 13:30

## 2022-01-01 RX ADMIN — PIPERACILLIN AND TAZOBACTAM 25 GRAM(S): 4; .5 INJECTION, POWDER, LYOPHILIZED, FOR SOLUTION INTRAVENOUS at 15:14

## 2022-01-01 RX ADMIN — Medication 3 MILLILITER(S): at 00:40

## 2022-01-01 RX ADMIN — Medication 1 TABLET(S): at 05:12

## 2022-01-01 RX ADMIN — Medication 3 MILLILITER(S): at 15:53

## 2022-01-01 RX ADMIN — Medication 500 MICROGRAM(S): at 17:58

## 2022-01-01 RX ADMIN — Medication 650 MILLIGRAM(S): at 12:00

## 2022-01-01 RX ADMIN — PIPERACILLIN AND TAZOBACTAM 25 GRAM(S): 4; .5 INJECTION, POWDER, LYOPHILIZED, FOR SOLUTION INTRAVENOUS at 13:23

## 2022-01-01 RX ADMIN — Medication 250 MILLIGRAM(S): at 06:21

## 2022-01-01 RX ADMIN — BUDESONIDE AND FORMOTEROL FUMARATE DIHYDRATE 2 PUFF(S): 160; 4.5 AEROSOL RESPIRATORY (INHALATION) at 10:27

## 2022-01-01 RX ADMIN — BUDESONIDE AND FORMOTEROL FUMARATE DIHYDRATE 2 PUFF(S): 160; 4.5 AEROSOL RESPIRATORY (INHALATION) at 23:07

## 2022-01-01 RX ADMIN — BUDESONIDE AND FORMOTEROL FUMARATE DIHYDRATE 2 PUFF(S): 160; 4.5 AEROSOL RESPIRATORY (INHALATION) at 21:38

## 2022-01-01 RX ADMIN — BUDESONIDE AND FORMOTEROL FUMARATE DIHYDRATE 2 PUFF(S): 160; 4.5 AEROSOL RESPIRATORY (INHALATION) at 09:21

## 2022-01-01 RX ADMIN — AMIODARONE HYDROCHLORIDE 400 MILLIGRAM(S): 400 TABLET ORAL at 17:28

## 2022-01-01 RX ADMIN — Medication 600 MILLIGRAM(S): at 06:02

## 2022-01-01 RX ADMIN — CHLORHEXIDINE GLUCONATE 1 APPLICATION(S): 213 SOLUTION TOPICAL at 12:07

## 2022-01-01 RX ADMIN — POTASSIUM PHOSPHATE, MONOBASIC POTASSIUM PHOSPHATE, DIBASIC 62.5 MILLIMOLE(S): 236; 224 INJECTION, SOLUTION INTRAVENOUS at 11:30

## 2022-01-01 RX ADMIN — SODIUM CHLORIDE 3 MILLILITER(S): 9 INJECTION INTRAMUSCULAR; INTRAVENOUS; SUBCUTANEOUS at 15:07

## 2022-01-01 RX ADMIN — HYDROMORPHONE HYDROCHLORIDE 0.5 MG/HR: 2 INJECTION INTRAMUSCULAR; INTRAVENOUS; SUBCUTANEOUS at 09:31

## 2022-01-01 RX ADMIN — Medication 1 PACKET(S): at 21:22

## 2022-01-01 RX ADMIN — HEPARIN SODIUM 5000 UNIT(S): 5000 INJECTION INTRAVENOUS; SUBCUTANEOUS at 13:13

## 2022-01-01 RX ADMIN — Medication 3 MILLILITER(S): at 11:15

## 2022-01-01 RX ADMIN — Medication 25 GRAM(S): at 10:15

## 2022-01-01 RX ADMIN — HEPARIN SODIUM 5000 UNIT(S): 5000 INJECTION INTRAVENOUS; SUBCUTANEOUS at 05:01

## 2022-01-01 RX ADMIN — Medication 100 MILLIEQUIVALENT(S): at 13:18

## 2022-01-01 RX ADMIN — Medication 2 TABLET(S): at 21:21

## 2022-01-01 RX ADMIN — Medication 400 MILLIGRAM(S): at 05:37

## 2022-01-01 RX ADMIN — PIPERACILLIN AND TAZOBACTAM 25 GRAM(S): 4; .5 INJECTION, POWDER, LYOPHILIZED, FOR SOLUTION INTRAVENOUS at 07:30

## 2022-01-01 RX ADMIN — ALBUTEROL 2 PUFF(S): 90 AEROSOL, METERED ORAL at 11:04

## 2022-01-01 RX ADMIN — SODIUM CHLORIDE 5 MILLILITER(S): 9 INJECTION INTRAMUSCULAR; INTRAVENOUS; SUBCUTANEOUS at 22:12

## 2022-01-01 RX ADMIN — Medication 2 TABLET(S): at 13:23

## 2022-01-01 RX ADMIN — PIPERACILLIN AND TAZOBACTAM 25 GRAM(S): 4; .5 INJECTION, POWDER, LYOPHILIZED, FOR SOLUTION INTRAVENOUS at 14:38

## 2022-01-01 RX ADMIN — MEROPENEM 100 MILLIGRAM(S): 1 INJECTION INTRAVENOUS at 21:12

## 2022-01-01 RX ADMIN — Medication 30 MILLIGRAM(S): at 17:05

## 2022-01-01 RX ADMIN — Medication 100 MILLIEQUIVALENT(S): at 14:17

## 2022-01-01 RX ADMIN — ALBUTEROL 2 PUFF(S): 90 AEROSOL, METERED ORAL at 22:50

## 2022-01-01 RX ADMIN — BUDESONIDE AND FORMOTEROL FUMARATE DIHYDRATE 2 PUFF(S): 160; 4.5 AEROSOL RESPIRATORY (INHALATION) at 21:47

## 2022-01-01 RX ADMIN — PIPERACILLIN AND TAZOBACTAM 25 GRAM(S): 4; .5 INJECTION, POWDER, LYOPHILIZED, FOR SOLUTION INTRAVENOUS at 21:28

## 2022-01-01 RX ADMIN — Medication 100 GRAM(S): at 10:10

## 2022-01-01 RX ADMIN — FENTANYL CITRATE 100 MICROGRAM(S): 50 INJECTION INTRAVENOUS at 12:56

## 2022-01-01 RX ADMIN — SODIUM CHLORIDE 3 MILLILITER(S): 9 INJECTION INTRAMUSCULAR; INTRAVENOUS; SUBCUTANEOUS at 00:42

## 2022-01-01 RX ADMIN — Medication 0.25 MILLIGRAM(S): at 21:43

## 2022-01-01 RX ADMIN — Medication 3 MILLILITER(S): at 10:28

## 2022-01-01 RX ADMIN — Medication 3 MILLILITER(S): at 23:07

## 2022-01-01 RX ADMIN — POTASSIUM PHOSPHATE, MONOBASIC POTASSIUM PHOSPHATE, DIBASIC 62.5 MILLIMOLE(S): 236; 224 INJECTION, SOLUTION INTRAVENOUS at 12:23

## 2022-01-01 RX ADMIN — Medication 1 PACKET(S): at 17:28

## 2022-01-01 RX ADMIN — Medication 3 MILLILITER(S): at 00:09

## 2022-01-01 RX ADMIN — Medication 1 APPLICATION(S): at 17:46

## 2022-01-01 RX ADMIN — Medication 101.6 MILLIGRAM(S): at 12:01

## 2022-01-01 RX ADMIN — Medication 400 MILLIGRAM(S): at 10:56

## 2022-01-01 RX ADMIN — Medication 1 PUFF(S): at 15:05

## 2022-01-01 RX ADMIN — Medication 3 MILLILITER(S): at 10:49

## 2022-01-01 RX ADMIN — Medication 2 TABLET(S): at 15:14

## 2022-01-01 RX ADMIN — ALBUTEROL 2 PUFF(S): 90 AEROSOL, METERED ORAL at 15:13

## 2022-01-01 RX ADMIN — Medication 3 MILLILITER(S): at 19:57

## 2022-01-01 RX ADMIN — BUDESONIDE AND FORMOTEROL FUMARATE DIHYDRATE 2 PUFF(S): 160; 4.5 AEROSOL RESPIRATORY (INHALATION) at 09:48

## 2022-01-01 RX ADMIN — Medication 3 MILLILITER(S): at 04:48

## 2022-01-01 RX ADMIN — CHLORHEXIDINE GLUCONATE 1 APPLICATION(S): 213 SOLUTION TOPICAL at 05:22

## 2022-01-01 RX ADMIN — SODIUM CHLORIDE 5 MILLILITER(S): 9 INJECTION INTRAMUSCULAR; INTRAVENOUS; SUBCUTANEOUS at 15:20

## 2022-01-01 RX ADMIN — BUDESONIDE AND FORMOTEROL FUMARATE DIHYDRATE 2 PUFF(S): 160; 4.5 AEROSOL RESPIRATORY (INHALATION) at 08:40

## 2022-01-01 RX ADMIN — Medication 3 MILLILITER(S): at 11:55

## 2022-01-01 RX ADMIN — FENTANYL CITRATE 0.5 MICROGRAM(S)/KG/HR: 50 INJECTION INTRAVENOUS at 13:21

## 2022-01-01 RX ADMIN — AMIODARONE HYDROCHLORIDE 33.3 MG/MIN: 400 TABLET ORAL at 14:06

## 2022-01-01 RX ADMIN — Medication 30 MILLIGRAM(S): at 05:22

## 2022-01-01 RX ADMIN — Medication 100 MILLIGRAM(S): at 21:19

## 2022-01-01 RX ADMIN — Medication 2 TABLET(S): at 23:52

## 2022-01-01 RX ADMIN — Medication 3 MILLILITER(S): at 03:33

## 2022-01-01 RX ADMIN — ALBUTEROL 2 PUFF(S): 90 AEROSOL, METERED ORAL at 22:15

## 2022-01-01 RX ADMIN — SODIUM CHLORIDE 75 MILLILITER(S): 9 INJECTION, SOLUTION INTRAVENOUS at 07:12

## 2022-01-01 RX ADMIN — Medication 2 TABLET(S): at 06:38

## 2022-01-01 RX ADMIN — Medication 100 MILLIEQUIVALENT(S): at 09:58

## 2022-01-01 RX ADMIN — FENTANYL CITRATE 50 MICROGRAM(S): 50 INJECTION INTRAVENOUS at 21:28

## 2022-01-01 RX ADMIN — Medication 0.25 MILLIGRAM(S): at 20:05

## 2022-01-01 RX ADMIN — Medication 1 APPLICATION(S): at 17:57

## 2022-01-01 RX ADMIN — PROPOFOL 4.61 MICROGRAM(S)/KG/MIN: 10 INJECTION, EMULSION INTRAVENOUS at 19:26

## 2022-01-01 RX ADMIN — HEPARIN SODIUM 5000 UNIT(S): 5000 INJECTION INTRAVENOUS; SUBCUTANEOUS at 13:37

## 2022-01-01 RX ADMIN — BUDESONIDE AND FORMOTEROL FUMARATE DIHYDRATE 2 PUFF(S): 160; 4.5 AEROSOL RESPIRATORY (INHALATION) at 10:15

## 2022-01-01 RX ADMIN — Medication 650 MILLIGRAM(S): at 00:08

## 2022-01-01 RX ADMIN — Medication 3 MILLILITER(S): at 08:16

## 2022-01-01 RX ADMIN — Medication 100 MILLIEQUIVALENT(S): at 11:18

## 2022-01-01 RX ADMIN — Medication 3 MILLILITER(S): at 17:14

## 2022-01-01 RX ADMIN — SODIUM CHLORIDE 4 MILLILITER(S): 9 INJECTION INTRAMUSCULAR; INTRAVENOUS; SUBCUTANEOUS at 09:13

## 2022-01-01 RX ADMIN — SODIUM CHLORIDE 500 MILLILITER(S): 9 INJECTION INTRAMUSCULAR; INTRAVENOUS; SUBCUTANEOUS at 07:30

## 2022-01-01 RX ADMIN — Medication 0.25 MILLIGRAM(S): at 08:10

## 2022-01-01 RX ADMIN — Medication 3 MILLILITER(S): at 14:18

## 2022-01-01 RX ADMIN — Medication 650 MILLIGRAM(S): at 22:20

## 2022-01-01 RX ADMIN — CHLORHEXIDINE GLUCONATE 15 MILLILITER(S): 213 SOLUTION TOPICAL at 05:16

## 2022-01-01 RX ADMIN — Medication 40 MILLIEQUIVALENT(S): at 13:41

## 2022-01-01 RX ADMIN — CHLORHEXIDINE GLUCONATE 15 MILLILITER(S): 213 SOLUTION TOPICAL at 18:18

## 2022-01-01 RX ADMIN — Medication 4: at 12:01

## 2022-01-01 RX ADMIN — ALBUTEROL 2 PUFF(S): 90 AEROSOL, METERED ORAL at 21:31

## 2022-01-01 RX ADMIN — MEROPENEM 100 MILLIGRAM(S): 1 INJECTION INTRAVENOUS at 14:48

## 2022-01-01 RX ADMIN — Medication 0.25 MILLIGRAM(S): at 12:00

## 2022-01-01 RX ADMIN — Medication 3 MILLILITER(S): at 14:06

## 2022-01-01 RX ADMIN — Medication 400 MILLIGRAM(S): at 03:52

## 2022-01-01 RX ADMIN — BUDESONIDE AND FORMOTEROL FUMARATE DIHYDRATE 2 PUFF(S): 160; 4.5 AEROSOL RESPIRATORY (INHALATION) at 20:22

## 2022-01-01 RX ADMIN — APIXABAN 5 MILLIGRAM(S): 2.5 TABLET, FILM COATED ORAL at 17:05

## 2022-01-01 RX ADMIN — Medication 3 MILLILITER(S): at 15:37

## 2022-01-01 RX ADMIN — BUDESONIDE AND FORMOTEROL FUMARATE DIHYDRATE 2 PUFF(S): 160; 4.5 AEROSOL RESPIRATORY (INHALATION) at 21:35

## 2022-01-01 RX ADMIN — Medication 1: at 00:22

## 2022-01-01 RX ADMIN — DEXMEDETOMIDINE HYDROCHLORIDE IN 0.9% SODIUM CHLORIDE 3.84 MICROGRAM(S)/KG/HR: 4 INJECTION INTRAVENOUS at 22:13

## 2022-01-01 RX ADMIN — Medication 40 MILLIGRAM(S): at 17:46

## 2022-01-01 RX ADMIN — AMIODARONE HYDROCHLORIDE 200 MILLIGRAM(S): 400 TABLET ORAL at 05:17

## 2022-01-01 RX ADMIN — Medication 1 PUFF(S): at 04:08

## 2022-01-01 RX ADMIN — FENTANYL CITRATE 100 MICROGRAM(S): 50 INJECTION INTRAVENOUS at 13:00

## 2022-01-01 RX ADMIN — Medication 25 MILLIGRAM(S): at 14:50

## 2022-01-01 RX ADMIN — Medication 3 MILLILITER(S): at 07:01

## 2022-01-01 RX ADMIN — Medication 7.2 MICROGRAM(S)/KG/MIN: at 22:13

## 2022-01-01 RX ADMIN — Medication 100 MILLIEQUIVALENT(S): at 10:10

## 2022-01-01 RX ADMIN — Medication 3 MILLILITER(S): at 21:52

## 2022-01-01 RX ADMIN — ENOXAPARIN SODIUM 70 MILLIGRAM(S): 100 INJECTION SUBCUTANEOUS at 10:33

## 2022-01-01 RX ADMIN — DEXMEDETOMIDINE HYDROCHLORIDE IN 0.9% SODIUM CHLORIDE 3.84 MICROGRAM(S)/KG/HR: 4 INJECTION INTRAVENOUS at 06:48

## 2022-01-01 RX ADMIN — PIPERACILLIN AND TAZOBACTAM 25 GRAM(S): 4; .5 INJECTION, POWDER, LYOPHILIZED, FOR SOLUTION INTRAVENOUS at 21:53

## 2022-01-01 RX ADMIN — Medication 30 MILLIGRAM(S): at 05:14

## 2022-01-01 RX ADMIN — ALBUTEROL 2 PUFF(S): 90 AEROSOL, METERED ORAL at 08:41

## 2022-01-01 RX ADMIN — Medication 1 TABLET(S): at 17:28

## 2022-01-01 RX ADMIN — Medication 25 GRAM(S): at 09:43

## 2022-01-01 RX ADMIN — Medication 5 MILLIGRAM(S): at 21:16

## 2022-01-01 RX ADMIN — FENTANYL CITRATE 3.84 MICROGRAM(S)/KG/HR: 50 INJECTION INTRAVENOUS at 19:21

## 2022-01-01 RX ADMIN — ENOXAPARIN SODIUM 70 MILLIGRAM(S): 100 INJECTION SUBCUTANEOUS at 11:17

## 2022-01-01 RX ADMIN — AMIODARONE HYDROCHLORIDE 400 MILLIGRAM(S): 400 TABLET ORAL at 17:05

## 2022-01-01 RX ADMIN — Medication 600 MILLIGRAM(S): at 18:02

## 2022-01-01 RX ADMIN — PIPERACILLIN AND TAZOBACTAM 200 GRAM(S): 4; .5 INJECTION, POWDER, LYOPHILIZED, FOR SOLUTION INTRAVENOUS at 15:54

## 2022-01-01 RX ADMIN — Medication 0.25 MILLIGRAM(S): at 00:42

## 2022-01-01 RX ADMIN — ENOXAPARIN SODIUM 70 MILLIGRAM(S): 100 INJECTION SUBCUTANEOUS at 10:28

## 2022-01-01 RX ADMIN — SODIUM CHLORIDE 500 MILLILITER(S): 9 INJECTION INTRAMUSCULAR; INTRAVENOUS; SUBCUTANEOUS at 08:25

## 2022-01-01 RX ADMIN — BUDESONIDE AND FORMOTEROL FUMARATE DIHYDRATE 2 PUFF(S): 160; 4.5 AEROSOL RESPIRATORY (INHALATION) at 23:31

## 2022-01-01 RX ADMIN — Medication 650 MILLIGRAM(S): at 20:07

## 2022-01-01 RX ADMIN — PIPERACILLIN AND TAZOBACTAM 25 GRAM(S): 4; .5 INJECTION, POWDER, LYOPHILIZED, FOR SOLUTION INTRAVENOUS at 06:22

## 2022-01-01 RX ADMIN — LEVALBUTEROL 0.63 MILLIGRAM(S): 1.25 SOLUTION, CONCENTRATE RESPIRATORY (INHALATION) at 20:25

## 2022-01-01 RX ADMIN — Medication 400 MILLIGRAM(S): at 08:31

## 2022-01-01 RX ADMIN — PIPERACILLIN AND TAZOBACTAM 25 GRAM(S): 4; .5 INJECTION, POWDER, LYOPHILIZED, FOR SOLUTION INTRAVENOUS at 12:18

## 2022-01-01 RX ADMIN — Medication 600 MILLIGRAM(S): at 17:48

## 2022-01-01 RX ADMIN — Medication 3 MILLILITER(S): at 22:21

## 2022-01-01 RX ADMIN — Medication 650 MILLIGRAM(S): at 18:50

## 2022-01-01 RX ADMIN — DEXMEDETOMIDINE HYDROCHLORIDE IN 0.9% SODIUM CHLORIDE 3.84 MICROGRAM(S)/KG/HR: 4 INJECTION INTRAVENOUS at 19:21

## 2022-01-01 RX ADMIN — Medication 400 MILLIGRAM(S): at 22:35

## 2022-01-01 RX ADMIN — CHLORHEXIDINE GLUCONATE 15 MILLILITER(S): 213 SOLUTION TOPICAL at 17:57

## 2022-01-01 RX ADMIN — FENTANYL CITRATE 100 MICROGRAM(S): 50 INJECTION INTRAVENOUS at 17:25

## 2022-01-01 RX ADMIN — Medication 500 MICROGRAM(S): at 20:25

## 2022-01-01 RX ADMIN — AMIODARONE HYDROCHLORIDE 200 MILLIGRAM(S): 400 TABLET ORAL at 06:37

## 2022-01-01 RX ADMIN — Medication 1 APPLICATION(S): at 06:37

## 2022-01-01 RX ADMIN — ALBUTEROL 2 PUFF(S): 90 AEROSOL, METERED ORAL at 22:03

## 2022-01-01 RX ADMIN — Medication 650 MILLIGRAM(S): at 22:10

## 2022-01-01 RX ADMIN — DEXMEDETOMIDINE HYDROCHLORIDE IN 0.9% SODIUM CHLORIDE 3.84 MICROGRAM(S)/KG/HR: 4 INJECTION INTRAVENOUS at 07:37

## 2022-01-01 RX ADMIN — Medication 1000 MILLIGRAM(S): at 05:02

## 2022-01-01 RX ADMIN — AMIODARONE HYDROCHLORIDE 16.7 MG/MIN: 400 TABLET ORAL at 20:01

## 2022-01-01 RX ADMIN — MIDAZOLAM HYDROCHLORIDE 4 MILLIGRAM(S): 1 INJECTION, SOLUTION INTRAMUSCULAR; INTRAVENOUS at 14:11

## 2022-01-01 RX ADMIN — BUDESONIDE AND FORMOTEROL FUMARATE DIHYDRATE 2 PUFF(S): 160; 4.5 AEROSOL RESPIRATORY (INHALATION) at 23:14

## 2022-01-01 RX ADMIN — SODIUM CHLORIDE 5 MILLILITER(S): 9 INJECTION INTRAMUSCULAR; INTRAVENOUS; SUBCUTANEOUS at 09:18

## 2022-01-01 RX ADMIN — BUDESONIDE AND FORMOTEROL FUMARATE DIHYDRATE 2 PUFF(S): 160; 4.5 AEROSOL RESPIRATORY (INHALATION) at 09:59

## 2022-01-01 RX ADMIN — AMIODARONE HYDROCHLORIDE 400 MILLIGRAM(S): 400 TABLET ORAL at 05:32

## 2022-01-01 RX ADMIN — BUDESONIDE AND FORMOTEROL FUMARATE DIHYDRATE 2 PUFF(S): 160; 4.5 AEROSOL RESPIRATORY (INHALATION) at 21:30

## 2022-01-01 RX ADMIN — Medication 30 MILLIGRAM(S): at 05:16

## 2022-01-01 RX ADMIN — ALBUTEROL 2 PUFF(S): 90 AEROSOL, METERED ORAL at 15:56

## 2022-01-01 RX ADMIN — Medication 1: at 12:16

## 2022-01-01 RX ADMIN — Medication 7.2 MICROGRAM(S)/KG/MIN: at 07:38

## 2022-01-01 RX ADMIN — CHLORHEXIDINE GLUCONATE 1 APPLICATION(S): 213 SOLUTION TOPICAL at 11:12

## 2022-01-01 RX ADMIN — ALBUTEROL 2 PUFF(S): 90 AEROSOL, METERED ORAL at 15:32

## 2022-01-01 RX ADMIN — Medication 1 PACKET(S): at 05:32

## 2022-01-01 RX ADMIN — Medication 40 MILLIGRAM(S): at 05:17

## 2022-01-01 RX ADMIN — Medication 650 MILLIGRAM(S): at 22:44

## 2022-01-01 RX ADMIN — CISATRACURIUM BESYLATE 20 MILLIGRAM(S): 2 INJECTION INTRAVENOUS at 14:22

## 2022-01-01 RX ADMIN — Medication 1 PUFF(S): at 10:51

## 2022-01-01 RX ADMIN — SODIUM CHLORIDE 4 MILLILITER(S): 9 INJECTION INTRAMUSCULAR; INTRAVENOUS; SUBCUTANEOUS at 06:44

## 2022-01-01 RX ADMIN — Medication 650 MILLIGRAM(S): at 17:52

## 2022-01-01 RX ADMIN — SODIUM CHLORIDE 4 MILLILITER(S): 9 INJECTION INTRAMUSCULAR; INTRAVENOUS; SUBCUTANEOUS at 22:01

## 2022-01-01 RX ADMIN — LIDOCAINE 1 PATCH: 4 CREAM TOPICAL at 00:49

## 2022-01-01 RX ADMIN — FENTANYL CITRATE 3.84 MICROGRAM(S)/KG/HR: 50 INJECTION INTRAVENOUS at 19:05

## 2022-01-01 RX ADMIN — Medication 1 TABLET(S): at 18:19

## 2022-01-01 RX ADMIN — Medication 600 MILLIGRAM(S): at 18:16

## 2022-01-01 RX ADMIN — MEROPENEM 100 MILLIGRAM(S): 1 INJECTION INTRAVENOUS at 05:16

## 2022-01-01 RX ADMIN — CHLORHEXIDINE GLUCONATE 15 MILLILITER(S): 213 SOLUTION TOPICAL at 06:31

## 2022-01-01 RX ADMIN — ENOXAPARIN SODIUM 40 MILLIGRAM(S): 100 INJECTION SUBCUTANEOUS at 09:33

## 2022-01-01 RX ADMIN — Medication 5 MILLIGRAM(S): at 14:05

## 2022-01-01 RX ADMIN — Medication 1000 MILLIGRAM(S): at 23:45

## 2022-01-01 RX ADMIN — Medication 250 MILLIGRAM(S): at 16:00

## 2022-01-01 RX ADMIN — Medication 2 TABLET(S): at 22:00

## 2022-01-01 RX ADMIN — Medication 1 APPLICATION(S): at 17:36

## 2022-01-01 RX ADMIN — DEXMEDETOMIDINE HYDROCHLORIDE IN 0.9% SODIUM CHLORIDE 3.84 MICROGRAM(S)/KG/HR: 4 INJECTION INTRAVENOUS at 19:32

## 2022-01-01 RX ADMIN — Medication 650 MILLIGRAM(S): at 22:15

## 2022-01-01 RX ADMIN — PALONOSETRON HYDROCHLORIDE 0.25 MILLIGRAM(S): 0.25 INJECTION, SOLUTION INTRAVENOUS at 14:18

## 2022-01-01 RX ADMIN — ENOXAPARIN SODIUM 80 MILLIGRAM(S): 100 INJECTION SUBCUTANEOUS at 21:15

## 2022-01-01 RX ADMIN — Medication 1 PUFF(S): at 04:31

## 2022-01-01 RX ADMIN — Medication 3 MILLILITER(S): at 23:31

## 2022-01-01 RX ADMIN — AMIODARONE HYDROCHLORIDE 618 MILLIGRAM(S): 400 TABLET ORAL at 11:42

## 2022-01-01 RX ADMIN — Medication 3 MILLILITER(S): at 09:33

## 2022-01-01 RX ADMIN — MIDODRINE HYDROCHLORIDE 20 MILLIGRAM(S): 2.5 TABLET ORAL at 13:23

## 2022-01-01 RX ADMIN — APIXABAN 5 MILLIGRAM(S): 2.5 TABLET, FILM COATED ORAL at 05:31

## 2022-01-01 RX ADMIN — CHLORHEXIDINE GLUCONATE 1 APPLICATION(S): 213 SOLUTION TOPICAL at 06:38

## 2022-01-01 RX ADMIN — Medication 1 PACKET(S): at 05:14

## 2022-01-01 RX ADMIN — ENOXAPARIN SODIUM 40 MILLIGRAM(S): 100 INJECTION SUBCUTANEOUS at 11:47

## 2022-01-01 RX ADMIN — APIXABAN 5 MILLIGRAM(S): 2.5 TABLET, FILM COATED ORAL at 05:32

## 2022-01-01 RX ADMIN — AMIODARONE HYDROCHLORIDE 400 MILLIGRAM(S): 400 TABLET ORAL at 06:24

## 2022-01-01 RX ADMIN — Medication 1 PUFF(S): at 15:12

## 2022-01-01 RX ADMIN — BUDESONIDE AND FORMOTEROL FUMARATE DIHYDRATE 2 PUFF(S): 160; 4.5 AEROSOL RESPIRATORY (INHALATION) at 11:17

## 2022-01-01 RX ADMIN — Medication 1: at 23:44

## 2022-01-01 RX ADMIN — ALBUTEROL 2 PUFF(S): 90 AEROSOL, METERED ORAL at 15:53

## 2022-01-01 RX ADMIN — SODIUM CHLORIDE 4 MILLILITER(S): 9 INJECTION INTRAMUSCULAR; INTRAVENOUS; SUBCUTANEOUS at 09:48

## 2022-01-01 RX ADMIN — Medication 40 MILLIGRAM(S): at 17:57

## 2022-01-01 RX ADMIN — Medication 40 MILLIEQUIVALENT(S): at 13:13

## 2022-01-01 RX ADMIN — Medication 7.2 MICROGRAM(S)/KG/MIN: at 19:20

## 2022-01-01 RX ADMIN — Medication 3 MILLILITER(S): at 22:46

## 2022-01-01 RX ADMIN — BUDESONIDE AND FORMOTEROL FUMARATE DIHYDRATE 2 PUFF(S): 160; 4.5 AEROSOL RESPIRATORY (INHALATION) at 22:50

## 2022-01-01 RX ADMIN — FENTANYL CITRATE 100 MICROGRAM(S): 50 INJECTION INTRAVENOUS at 12:05

## 2022-01-01 RX ADMIN — CHLORHEXIDINE GLUCONATE 15 MILLILITER(S): 213 SOLUTION TOPICAL at 05:22

## 2022-01-01 RX ADMIN — Medication 3 MILLILITER(S): at 16:06

## 2022-01-01 RX ADMIN — Medication 2 TABLET(S): at 05:21

## 2022-01-01 RX ADMIN — Medication 600 MILLIGRAM(S): at 05:35

## 2022-01-01 RX ADMIN — Medication 101.6 MILLIGRAM(S): at 14:50

## 2022-01-01 RX ADMIN — LIDOCAINE 1 PATCH: 4 CREAM TOPICAL at 01:00

## 2022-01-01 RX ADMIN — BUDESONIDE AND FORMOTEROL FUMARATE DIHYDRATE 2 PUFF(S): 160; 4.5 AEROSOL RESPIRATORY (INHALATION) at 21:19

## 2022-01-01 RX ADMIN — AMIODARONE HYDROCHLORIDE 16.7 MG/MIN: 400 TABLET ORAL at 17:38

## 2022-01-01 RX ADMIN — Medication 3 MILLILITER(S): at 15:26

## 2022-01-01 RX ADMIN — Medication 50 MILLIGRAM(S): at 14:03

## 2022-01-01 RX ADMIN — BUDESONIDE AND FORMOTEROL FUMARATE DIHYDRATE 2 PUFF(S): 160; 4.5 AEROSOL RESPIRATORY (INHALATION) at 10:51

## 2022-01-01 RX ADMIN — Medication 1000 MILLIGRAM(S): at 12:05

## 2022-01-01 RX ADMIN — LEVALBUTEROL 0.63 MILLIGRAM(S): 1.25 SOLUTION, CONCENTRATE RESPIRATORY (INHALATION) at 17:58

## 2022-01-01 RX ADMIN — Medication 1 TABLET(S): at 18:54

## 2022-01-01 RX ADMIN — Medication 1 PUFF(S): at 15:32

## 2022-01-01 RX ADMIN — AMIODARONE HYDROCHLORIDE 400 MILLIGRAM(S): 400 TABLET ORAL at 18:53

## 2022-01-01 RX ADMIN — Medication 650 MILLIGRAM(S): at 15:52

## 2022-01-01 RX ADMIN — SODIUM CHLORIDE 4 MILLILITER(S): 9 INJECTION INTRAMUSCULAR; INTRAVENOUS; SUBCUTANEOUS at 21:36

## 2022-01-01 RX ADMIN — Medication 7.2 MICROGRAM(S)/KG/MIN: at 15:17

## 2022-01-01 RX ADMIN — SODIUM CHLORIDE 4 MILLILITER(S): 9 INJECTION INTRAMUSCULAR; INTRAVENOUS; SUBCUTANEOUS at 10:08

## 2022-01-01 RX ADMIN — AMIODARONE HYDROCHLORIDE 400 MILLIGRAM(S): 400 TABLET ORAL at 12:13

## 2022-01-01 RX ADMIN — ALBUTEROL 2 PUFF(S): 90 AEROSOL, METERED ORAL at 04:06

## 2022-01-01 RX ADMIN — Medication 2 TABLET(S): at 22:21

## 2022-01-01 RX ADMIN — SODIUM CHLORIDE 5 MILLILITER(S): 9 INJECTION INTRAMUSCULAR; INTRAVENOUS; SUBCUTANEOUS at 14:00

## 2022-01-01 RX ADMIN — Medication 1000 MILLIGRAM(S): at 16:00

## 2022-01-01 RX ADMIN — Medication 3 MILLILITER(S): at 17:09

## 2022-01-01 RX ADMIN — PIPERACILLIN AND TAZOBACTAM 25 GRAM(S): 4; .5 INJECTION, POWDER, LYOPHILIZED, FOR SOLUTION INTRAVENOUS at 05:14

## 2022-01-01 RX ADMIN — Medication 3 MILLILITER(S): at 15:54

## 2022-01-01 RX ADMIN — PIPERACILLIN AND TAZOBACTAM 25 GRAM(S): 4; .5 INJECTION, POWDER, LYOPHILIZED, FOR SOLUTION INTRAVENOUS at 14:30

## 2022-01-01 RX ADMIN — Medication 650 MILLIGRAM(S): at 00:27

## 2022-01-01 RX ADMIN — ENOXAPARIN SODIUM 80 MILLIGRAM(S): 100 INJECTION SUBCUTANEOUS at 09:37

## 2022-01-01 RX ADMIN — Medication 250 MILLIGRAM(S): at 17:05

## 2022-01-01 RX ADMIN — DEXMEDETOMIDINE HYDROCHLORIDE IN 0.9% SODIUM CHLORIDE 3.84 MICROGRAM(S)/KG/HR: 4 INJECTION INTRAVENOUS at 19:27

## 2022-01-01 RX ADMIN — SODIUM CHLORIDE 1000 MILLILITER(S): 9 INJECTION INTRAMUSCULAR; INTRAVENOUS; SUBCUTANEOUS at 14:13

## 2022-01-01 RX ADMIN — Medication 1 TABLET(S): at 05:32

## 2022-01-01 RX ADMIN — Medication 25 MILLIGRAM(S): at 14:04

## 2022-01-01 RX ADMIN — Medication 2 TABLET(S): at 14:00

## 2022-01-01 RX ADMIN — Medication 600 MILLIGRAM(S): at 05:08

## 2022-01-01 RX ADMIN — HEPARIN SODIUM 5000 UNIT(S): 5000 INJECTION INTRAVENOUS; SUBCUTANEOUS at 05:46

## 2022-01-01 RX ADMIN — CHLORHEXIDINE GLUCONATE 1 APPLICATION(S): 213 SOLUTION TOPICAL at 10:15

## 2022-01-01 RX ADMIN — Medication 2 TABLET(S): at 05:15

## 2022-01-01 RX ADMIN — LIDOCAINE 1 PATCH: 4 CREAM TOPICAL at 14:18

## 2022-01-01 RX ADMIN — Medication 650 MILLIGRAM(S): at 18:05

## 2022-01-01 RX ADMIN — PIPERACILLIN AND TAZOBACTAM 25 GRAM(S): 4; .5 INJECTION, POWDER, LYOPHILIZED, FOR SOLUTION INTRAVENOUS at 22:35

## 2022-01-01 RX ADMIN — AMIODARONE HYDROCHLORIDE 400 MILLIGRAM(S): 400 TABLET ORAL at 05:13

## 2022-01-01 RX ADMIN — BUDESONIDE AND FORMOTEROL FUMARATE DIHYDRATE 2 PUFF(S): 160; 4.5 AEROSOL RESPIRATORY (INHALATION) at 22:08

## 2022-01-01 RX ADMIN — ALBUTEROL 2 PUFF(S): 90 AEROSOL, METERED ORAL at 03:34

## 2022-01-01 RX ADMIN — ALBUTEROL 2 PUFF(S): 90 AEROSOL, METERED ORAL at 03:54

## 2022-01-01 RX ADMIN — ENOXAPARIN SODIUM 40 MILLIGRAM(S): 100 INJECTION SUBCUTANEOUS at 11:11

## 2022-01-01 RX ADMIN — ENOXAPARIN SODIUM 70 MILLIGRAM(S): 100 INJECTION SUBCUTANEOUS at 21:19

## 2022-01-01 RX ADMIN — PIPERACILLIN AND TAZOBACTAM 25 GRAM(S): 4; .5 INJECTION, POWDER, LYOPHILIZED, FOR SOLUTION INTRAVENOUS at 06:12

## 2022-01-01 RX ADMIN — MIDODRINE HYDROCHLORIDE 20 MILLIGRAM(S): 2.5 TABLET ORAL at 11:17

## 2022-01-01 RX ADMIN — Medication 1 PUFF(S): at 21:38

## 2022-01-01 RX ADMIN — FENTANYL CITRATE 3.84 MICROGRAM(S)/KG/HR: 50 INJECTION INTRAVENOUS at 09:13

## 2022-01-01 RX ADMIN — DEXMEDETOMIDINE HYDROCHLORIDE IN 0.9% SODIUM CHLORIDE 3.84 MICROGRAM(S)/KG/HR: 4 INJECTION INTRAVENOUS at 19:30

## 2022-01-01 RX ADMIN — Medication 600 MILLIGRAM(S): at 05:31

## 2022-01-01 RX ADMIN — Medication 600 MILLIGRAM(S): at 06:41

## 2022-01-01 RX ADMIN — CHLORHEXIDINE GLUCONATE 1 APPLICATION(S): 213 SOLUTION TOPICAL at 11:29

## 2022-01-01 RX ADMIN — ENOXAPARIN SODIUM 70 MILLIGRAM(S): 100 INJECTION SUBCUTANEOUS at 22:00

## 2022-01-01 RX ADMIN — Medication 1 APPLICATION(S): at 17:47

## 2022-01-01 RX ADMIN — Medication 3 MILLILITER(S): at 21:39

## 2022-01-01 RX ADMIN — Medication 1: at 06:33

## 2022-01-01 RX ADMIN — Medication 1 PACKET(S): at 16:01

## 2022-01-01 RX ADMIN — Medication 3 MILLILITER(S): at 22:07

## 2022-01-01 RX ADMIN — FENTANYL CITRATE 3.84 MICROGRAM(S)/KG/HR: 50 INJECTION INTRAVENOUS at 19:27

## 2022-01-01 RX ADMIN — BUDESONIDE AND FORMOTEROL FUMARATE DIHYDRATE 2 PUFF(S): 160; 4.5 AEROSOL RESPIRATORY (INHALATION) at 09:39

## 2022-01-01 RX ADMIN — Medication 1000 MILLIGRAM(S): at 05:45

## 2022-01-01 RX ADMIN — Medication 30 MILLIGRAM(S): at 06:06

## 2022-01-01 RX ADMIN — LEVALBUTEROL 0.63 MILLIGRAM(S): 1.25 SOLUTION, CONCENTRATE RESPIRATORY (INHALATION) at 21:18

## 2022-01-01 RX ADMIN — SODIUM CHLORIDE 75 MILLILITER(S): 9 INJECTION, SOLUTION INTRAVENOUS at 21:36

## 2022-01-01 RX ADMIN — ENOXAPARIN SODIUM 40 MILLIGRAM(S): 100 INJECTION SUBCUTANEOUS at 09:48

## 2022-01-01 RX ADMIN — Medication 1 PACKET(S): at 05:30

## 2022-01-01 RX ADMIN — Medication 600 MILLIGRAM(S): at 17:56

## 2022-01-01 RX ADMIN — Medication 1 TABLET(S): at 09:42

## 2022-01-01 RX ADMIN — Medication 5 MILLIGRAM(S): at 08:24

## 2022-01-01 RX ADMIN — PIPERACILLIN AND TAZOBACTAM 25 GRAM(S): 4; .5 INJECTION, POWDER, LYOPHILIZED, FOR SOLUTION INTRAVENOUS at 05:11

## 2022-01-01 RX ADMIN — ALBUTEROL 2 PUFF(S): 90 AEROSOL, METERED ORAL at 10:51

## 2022-01-01 RX ADMIN — Medication 3 MILLILITER(S): at 23:13

## 2022-01-01 RX ADMIN — CHLORHEXIDINE GLUCONATE 15 MILLILITER(S): 213 SOLUTION TOPICAL at 06:36

## 2022-01-01 RX ADMIN — ALBUTEROL 2 PUFF(S): 90 AEROSOL, METERED ORAL at 15:05

## 2022-01-01 RX ADMIN — SODIUM CHLORIDE 4 MILLILITER(S): 9 INJECTION INTRAMUSCULAR; INTRAVENOUS; SUBCUTANEOUS at 21:49

## 2022-01-01 RX ADMIN — Medication 2 MILLIGRAM(S): at 13:30

## 2022-01-01 RX ADMIN — ENOXAPARIN SODIUM 70 MILLIGRAM(S): 100 INJECTION SUBCUTANEOUS at 22:14

## 2022-01-01 RX ADMIN — Medication 40 MILLIGRAM(S): at 06:37

## 2022-01-01 RX ADMIN — Medication 1 PUFF(S): at 22:03

## 2022-01-01 RX ADMIN — Medication 650 MILLIGRAM(S): at 21:47

## 2022-01-01 RX ADMIN — CHLORHEXIDINE GLUCONATE 1 APPLICATION(S): 213 SOLUTION TOPICAL at 06:45

## 2022-01-01 RX ADMIN — PROPOFOL 4.61 MICROGRAM(S)/KG/MIN: 10 INJECTION, EMULSION INTRAVENOUS at 09:12

## 2022-01-01 RX ADMIN — PIPERACILLIN AND TAZOBACTAM 25 GRAM(S): 4; .5 INJECTION, POWDER, LYOPHILIZED, FOR SOLUTION INTRAVENOUS at 22:00

## 2022-01-01 RX ADMIN — PIPERACILLIN AND TAZOBACTAM 25 GRAM(S): 4; .5 INJECTION, POWDER, LYOPHILIZED, FOR SOLUTION INTRAVENOUS at 06:00

## 2022-01-01 RX ADMIN — ALBUTEROL 2 PUFF(S): 90 AEROSOL, METERED ORAL at 09:59

## 2022-01-01 RX ADMIN — Medication 1 PUFF(S): at 15:56

## 2022-01-01 RX ADMIN — BUDESONIDE AND FORMOTEROL FUMARATE DIHYDRATE 2 PUFF(S): 160; 4.5 AEROSOL RESPIRATORY (INHALATION) at 11:09

## 2022-01-01 RX ADMIN — AMIODARONE HYDROCHLORIDE 16.7 MG/MIN: 400 TABLET ORAL at 18:06

## 2022-01-01 RX ADMIN — FENTANYL CITRATE 3.84 MICROGRAM(S)/KG/HR: 50 INJECTION INTRAVENOUS at 07:38

## 2022-01-01 RX ADMIN — ENOXAPARIN SODIUM 40 MILLIGRAM(S): 100 INJECTION SUBCUTANEOUS at 10:31

## 2022-01-01 RX ADMIN — Medication 1 PUFF(S): at 10:37

## 2022-01-01 RX ADMIN — Medication 1 PACKET(S): at 12:17

## 2022-01-01 RX ADMIN — Medication 1 PUFF(S): at 09:38

## 2022-01-01 RX ADMIN — Medication 250 MILLIGRAM(S): at 16:10

## 2022-01-01 RX ADMIN — APIXABAN 5 MILLIGRAM(S): 2.5 TABLET, FILM COATED ORAL at 17:19

## 2022-01-01 RX ADMIN — Medication 3 MILLILITER(S): at 04:23

## 2022-01-01 RX ADMIN — SODIUM CHLORIDE 4 MILLILITER(S): 9 INJECTION INTRAMUSCULAR; INTRAVENOUS; SUBCUTANEOUS at 22:35

## 2022-01-01 RX ADMIN — ENOXAPARIN SODIUM 40 MILLIGRAM(S): 100 INJECTION SUBCUTANEOUS at 21:08

## 2022-01-01 RX ADMIN — BUDESONIDE AND FORMOTEROL FUMARATE DIHYDRATE 2 PUFF(S): 160; 4.5 AEROSOL RESPIRATORY (INHALATION) at 09:37

## 2022-01-01 RX ADMIN — HEPARIN SODIUM 5000 UNIT(S): 5000 INJECTION INTRAVENOUS; SUBCUTANEOUS at 05:47

## 2022-01-01 RX ADMIN — Medication 7.2 MICROGRAM(S)/KG/MIN: at 09:12

## 2022-01-01 RX ADMIN — AMIODARONE HYDROCHLORIDE 200 MILLIGRAM(S): 400 TABLET ORAL at 06:31

## 2022-01-01 RX ADMIN — AMIODARONE HYDROCHLORIDE 200 MILLIGRAM(S): 400 TABLET ORAL at 18:19

## 2022-01-01 RX ADMIN — SODIUM CHLORIDE 5 MILLILITER(S): 9 INJECTION INTRAMUSCULAR; INTRAVENOUS; SUBCUTANEOUS at 09:17

## 2022-01-01 RX ADMIN — Medication 3 MILLILITER(S): at 16:48

## 2022-01-01 RX ADMIN — FENTANYL CITRATE 3.84 MICROGRAM(S)/KG/HR: 50 INJECTION INTRAVENOUS at 22:14

## 2022-01-01 RX ADMIN — Medication 1 APPLICATION(S): at 05:11

## 2022-01-01 RX ADMIN — Medication 1 PUFF(S): at 04:06

## 2022-01-01 RX ADMIN — Medication 600 MILLIGRAM(S): at 17:09

## 2022-01-01 RX ADMIN — Medication 3 MILLILITER(S): at 22:30

## 2022-01-01 RX ADMIN — Medication 2 TABLET(S): at 14:39

## 2022-01-01 RX ADMIN — ENOXAPARIN SODIUM 70 MILLIGRAM(S): 100 INJECTION SUBCUTANEOUS at 21:28

## 2022-01-01 RX ADMIN — Medication 2 MILLIGRAM(S): at 12:56

## 2022-01-01 RX ADMIN — CHLORHEXIDINE GLUCONATE 1 APPLICATION(S): 213 SOLUTION TOPICAL at 09:11

## 2022-01-01 RX ADMIN — Medication 2 MILLIGRAM(S): at 13:10

## 2022-01-01 RX ADMIN — Medication 100 MILLIEQUIVALENT(S): at 12:18

## 2022-01-01 RX ADMIN — Medication 600 MILLIGRAM(S): at 06:16

## 2022-01-01 RX ADMIN — BUDESONIDE AND FORMOTEROL FUMARATE DIHYDRATE 2 PUFF(S): 160; 4.5 AEROSOL RESPIRATORY (INHALATION) at 08:46

## 2022-01-01 RX ADMIN — Medication 3 MILLILITER(S): at 09:18

## 2022-01-01 RX ADMIN — Medication 1 PUFF(S): at 11:04

## 2022-01-01 RX ADMIN — HEPARIN SODIUM 5000 UNIT(S): 5000 INJECTION INTRAVENOUS; SUBCUTANEOUS at 22:04

## 2022-01-01 RX ADMIN — Medication 1 APPLICATION(S): at 05:25

## 2022-01-01 RX ADMIN — Medication 5 MILLIGRAM(S): at 07:30

## 2022-01-01 RX ADMIN — Medication 400 MILLIGRAM(S): at 22:12

## 2022-01-01 RX ADMIN — ALBUTEROL 2 PUFF(S): 90 AEROSOL, METERED ORAL at 09:38

## 2022-01-01 RX ADMIN — Medication 3 MILLILITER(S): at 15:50

## 2022-01-01 RX ADMIN — Medication 3 MILLILITER(S): at 04:47

## 2022-01-01 RX ADMIN — SODIUM CHLORIDE 5 MILLILITER(S): 9 INJECTION INTRAMUSCULAR; INTRAVENOUS; SUBCUTANEOUS at 22:03

## 2022-01-01 RX ADMIN — HEPARIN SODIUM 5000 UNIT(S): 5000 INJECTION INTRAVENOUS; SUBCUTANEOUS at 12:06

## 2022-01-01 RX ADMIN — Medication 1 APPLICATION(S): at 17:03

## 2022-01-01 RX ADMIN — AMIODARONE HYDROCHLORIDE 16.7 MG/MIN: 400 TABLET ORAL at 07:22

## 2022-01-01 RX ADMIN — CHLORHEXIDINE GLUCONATE 15 MILLILITER(S): 213 SOLUTION TOPICAL at 17:46

## 2022-01-01 RX ADMIN — Medication 1: at 00:52

## 2022-01-01 RX ADMIN — ALBUTEROL 2 PUFF(S): 90 AEROSOL, METERED ORAL at 23:07

## 2022-01-01 RX ADMIN — CHLORHEXIDINE GLUCONATE 1 APPLICATION(S): 213 SOLUTION TOPICAL at 11:48

## 2022-01-01 RX ADMIN — BUDESONIDE AND FORMOTEROL FUMARATE DIHYDRATE 2 PUFF(S): 160; 4.5 AEROSOL RESPIRATORY (INHALATION) at 09:13

## 2022-01-01 RX ADMIN — Medication 20 MILLIEQUIVALENT(S): at 13:41

## 2022-01-01 RX ADMIN — Medication 1 APPLICATION(S): at 11:18

## 2022-01-01 RX ADMIN — ENOXAPARIN SODIUM 70 MILLIGRAM(S): 100 INJECTION SUBCUTANEOUS at 10:59

## 2022-01-01 RX ADMIN — Medication 250 MILLIGRAM(S): at 16:46

## 2022-01-01 RX ADMIN — Medication 400 MILLIGRAM(S): at 01:37

## 2022-01-01 RX ADMIN — Medication 650 MILLIGRAM(S): at 01:03

## 2022-01-01 RX ADMIN — PACLITAXEL 250 MILLIGRAM(S): 6 INJECTION, SOLUTION, CONCENTRATE INTRAVENOUS at 14:48

## 2022-01-01 RX ADMIN — BUDESONIDE AND FORMOTEROL FUMARATE DIHYDRATE 2 PUFF(S): 160; 4.5 AEROSOL RESPIRATORY (INHALATION) at 09:41

## 2022-01-01 RX ADMIN — MORPHINE SULFATE 2 MILLIGRAM(S): 50 CAPSULE, EXTENDED RELEASE ORAL at 14:00

## 2022-01-01 RX ADMIN — BUDESONIDE AND FORMOTEROL FUMARATE DIHYDRATE 2 PUFF(S): 160; 4.5 AEROSOL RESPIRATORY (INHALATION) at 21:22

## 2022-01-01 RX ADMIN — Medication 3 MILLILITER(S): at 17:57

## 2022-01-01 RX ADMIN — HEPARIN SODIUM 5000 UNIT(S): 5000 INJECTION INTRAVENOUS; SUBCUTANEOUS at 13:40

## 2022-01-01 RX ADMIN — SODIUM CHLORIDE 1000 MILLILITER(S): 9 INJECTION INTRAMUSCULAR; INTRAVENOUS; SUBCUTANEOUS at 15:13

## 2022-01-01 RX ADMIN — Medication 3 MILLILITER(S): at 17:05

## 2022-01-01 RX ADMIN — AMIODARONE HYDROCHLORIDE 33.3 MG/MIN: 400 TABLET ORAL at 11:57

## 2022-01-01 RX ADMIN — SODIUM CHLORIDE 5 MILLILITER(S): 9 INJECTION INTRAMUSCULAR; INTRAVENOUS; SUBCUTANEOUS at 23:32

## 2022-01-01 RX ADMIN — Medication 3 MILLILITER(S): at 09:03

## 2022-01-01 RX ADMIN — Medication 7.2 MICROGRAM(S)/KG/MIN: at 19:27

## 2022-01-01 RX ADMIN — HEPARIN SODIUM 5000 UNIT(S): 5000 INJECTION INTRAVENOUS; SUBCUTANEOUS at 05:30

## 2022-01-01 RX ADMIN — Medication 40 MILLIGRAM(S): at 21:21

## 2022-01-01 RX ADMIN — ENOXAPARIN SODIUM 40 MILLIGRAM(S): 100 INJECTION SUBCUTANEOUS at 11:01

## 2022-01-01 RX ADMIN — Medication 3 MILLILITER(S): at 22:26

## 2022-01-01 RX ADMIN — Medication 3 MILLILITER(S): at 14:50

## 2022-01-01 RX ADMIN — Medication 1 PUFF(S): at 08:41

## 2022-01-01 RX ADMIN — Medication 1 PUFF(S): at 03:52

## 2022-01-01 RX ADMIN — Medication 1 PACKET(S): at 05:40

## 2022-01-01 RX ADMIN — Medication 2: at 11:47

## 2022-01-01 RX ADMIN — Medication 250 MILLIGRAM(S): at 13:48

## 2022-01-01 RX ADMIN — MEROPENEM 100 MILLIGRAM(S): 1 INJECTION INTRAVENOUS at 05:17

## 2022-01-01 RX ADMIN — Medication 2 MILLIGRAM(S): at 13:25

## 2022-01-01 RX ADMIN — Medication 1 PUFF(S): at 09:59

## 2022-01-01 RX ADMIN — BUDESONIDE AND FORMOTEROL FUMARATE DIHYDRATE 2 PUFF(S): 160; 4.5 AEROSOL RESPIRATORY (INHALATION) at 09:47

## 2022-01-01 RX ADMIN — CHLORHEXIDINE GLUCONATE 15 MILLILITER(S): 213 SOLUTION TOPICAL at 17:47

## 2022-01-01 RX ADMIN — AMIODARONE HYDROCHLORIDE 400 MILLIGRAM(S): 400 TABLET ORAL at 18:18

## 2022-01-01 RX ADMIN — PACLITAXEL 250 MILLIGRAM(S): 6 INJECTION, SOLUTION, CONCENTRATE INTRAVENOUS at 15:25

## 2022-01-01 RX ADMIN — Medication 3 MILLILITER(S): at 09:17

## 2022-01-01 RX ADMIN — CHLORHEXIDINE GLUCONATE 1 APPLICATION(S): 213 SOLUTION TOPICAL at 11:49

## 2022-01-01 RX ADMIN — Medication 3 MILLILITER(S): at 21:57

## 2022-01-01 RX ADMIN — Medication 600 MILLIGRAM(S): at 17:04

## 2022-01-01 RX ADMIN — Medication 30 MILLIGRAM(S): at 06:00

## 2022-01-01 RX ADMIN — Medication 1 PACKET(S): at 16:19

## 2022-01-01 RX ADMIN — CHLORHEXIDINE GLUCONATE 1 APPLICATION(S): 213 SOLUTION TOPICAL at 15:16

## 2022-01-01 RX ADMIN — Medication 40 MILLIEQUIVALENT(S): at 09:42

## 2022-01-01 RX ADMIN — APIXABAN 5 MILLIGRAM(S): 2.5 TABLET, FILM COATED ORAL at 18:53

## 2022-01-01 RX ADMIN — Medication 1 PACKET(S): at 09:13

## 2022-01-01 RX ADMIN — Medication 650 MILLIGRAM(S): at 19:50

## 2022-01-01 RX ADMIN — Medication 3 MILLILITER(S): at 15:21

## 2022-01-01 RX ADMIN — BUDESONIDE AND FORMOTEROL FUMARATE DIHYDRATE 2 PUFF(S): 160; 4.5 AEROSOL RESPIRATORY (INHALATION) at 10:37

## 2022-01-01 RX ADMIN — ENOXAPARIN SODIUM 80 MILLIGRAM(S): 100 INJECTION SUBCUTANEOUS at 21:11

## 2022-01-01 RX ADMIN — Medication 650 MILLIGRAM(S): at 20:38

## 2022-01-01 RX ADMIN — SODIUM CHLORIDE 4 MILLILITER(S): 9 INJECTION INTRAMUSCULAR; INTRAVENOUS; SUBCUTANEOUS at 21:39

## 2022-01-01 RX ADMIN — ALBUTEROL 2 PUFF(S): 90 AEROSOL, METERED ORAL at 21:38

## 2022-01-01 RX ADMIN — Medication 1: at 17:36

## 2022-01-01 RX ADMIN — Medication 1 PACKET(S): at 23:04

## 2022-01-01 RX ADMIN — Medication 250 MILLIGRAM(S): at 01:46

## 2022-01-01 RX ADMIN — Medication 650 MILLIGRAM(S): at 05:14

## 2022-01-01 RX ADMIN — FENTANYL CITRATE 3.84 MICROGRAM(S)/KG/HR: 50 INJECTION INTRAVENOUS at 12:55

## 2022-01-01 RX ADMIN — Medication 1 PUFF(S): at 22:51

## 2022-01-01 RX ADMIN — Medication 63.75 MILLIMOLE(S): at 10:26

## 2022-01-01 RX ADMIN — Medication 1 MILLIGRAM(S): at 13:03

## 2022-01-01 RX ADMIN — LIDOCAINE 1 PATCH: 4 CREAM TOPICAL at 18:01

## 2022-01-01 RX ADMIN — PROPOFOL 4.61 MICROGRAM(S)/KG/MIN: 10 INJECTION, EMULSION INTRAVENOUS at 15:16

## 2022-01-01 RX ADMIN — ALBUTEROL 2 PUFF(S): 90 AEROSOL, METERED ORAL at 05:51

## 2022-01-01 RX ADMIN — BUDESONIDE AND FORMOTEROL FUMARATE DIHYDRATE 2 PUFF(S): 160; 4.5 AEROSOL RESPIRATORY (INHALATION) at 22:15

## 2022-01-01 RX ADMIN — LEVALBUTEROL 0.63 MILLIGRAM(S): 1.25 SOLUTION, CONCENTRATE RESPIRATORY (INHALATION) at 10:17

## 2022-01-01 RX ADMIN — MEROPENEM 100 MILLIGRAM(S): 1 INJECTION INTRAVENOUS at 21:16

## 2022-01-01 RX ADMIN — Medication 100 MILLIEQUIVALENT(S): at 11:22

## 2022-01-01 RX ADMIN — LIDOCAINE 1 PATCH: 4 CREAM TOPICAL at 12:19

## 2022-01-01 RX ADMIN — ENOXAPARIN SODIUM 40 MILLIGRAM(S): 100 INJECTION SUBCUTANEOUS at 11:28

## 2022-01-01 RX ADMIN — Medication 1 PACKET(S): at 22:03

## 2022-01-01 RX ADMIN — Medication 3 MILLILITER(S): at 08:10

## 2022-01-01 RX ADMIN — CHLORHEXIDINE GLUCONATE 15 MILLILITER(S): 213 SOLUTION TOPICAL at 17:36

## 2022-01-01 RX ADMIN — Medication 400 MILLIGRAM(S): at 09:12

## 2022-01-01 RX ADMIN — SODIUM CHLORIDE 75 MILLILITER(S): 9 INJECTION, SOLUTION INTRAVENOUS at 21:18

## 2022-01-01 RX ADMIN — SODIUM CHLORIDE 4 MILLILITER(S): 9 INJECTION INTRAMUSCULAR; INTRAVENOUS; SUBCUTANEOUS at 22:10

## 2022-01-01 RX ADMIN — Medication 650 MILLIGRAM(S): at 16:19

## 2022-01-01 RX ADMIN — Medication 250 MILLIGRAM(S): at 16:57

## 2022-01-01 RX ADMIN — BUDESONIDE AND FORMOTEROL FUMARATE DIHYDRATE 2 PUFF(S): 160; 4.5 AEROSOL RESPIRATORY (INHALATION) at 11:04

## 2022-01-01 RX ADMIN — Medication 650 MILLIGRAM(S): at 01:59

## 2022-01-01 RX ADMIN — Medication 100 MILLIGRAM(S): at 05:12

## 2022-01-01 RX ADMIN — AMIODARONE HYDROCHLORIDE 400 MILLIGRAM(S): 400 TABLET ORAL at 17:19

## 2022-01-01 RX ADMIN — FENTANYL CITRATE 3.84 MICROGRAM(S)/KG/HR: 50 INJECTION INTRAVENOUS at 06:47

## 2022-01-01 RX ADMIN — BUDESONIDE AND FORMOTEROL FUMARATE DIHYDRATE 2 PUFF(S): 160; 4.5 AEROSOL RESPIRATORY (INHALATION) at 23:04

## 2022-01-01 RX ADMIN — Medication 100 MILLIEQUIVALENT(S): at 12:15

## 2022-01-01 RX ADMIN — FENTANYL CITRATE 100 MICROGRAM(S): 50 INJECTION INTRAVENOUS at 13:19

## 2022-01-01 RX ADMIN — DEXMEDETOMIDINE HYDROCHLORIDE IN 0.9% SODIUM CHLORIDE 3.84 MICROGRAM(S)/KG/HR: 4 INJECTION INTRAVENOUS at 07:39

## 2022-01-01 RX ADMIN — Medication 1000 MILLIGRAM(S): at 09:00

## 2022-01-01 RX ADMIN — Medication 7.2 MICROGRAM(S)/KG/MIN: at 19:26

## 2022-01-01 RX ADMIN — ALBUTEROL 2 PUFF(S): 90 AEROSOL, METERED ORAL at 03:52

## 2022-01-01 RX ADMIN — BUDESONIDE AND FORMOTEROL FUMARATE DIHYDRATE 2 PUFF(S): 160; 4.5 AEROSOL RESPIRATORY (INHALATION) at 21:36

## 2022-01-01 RX ADMIN — DEXMEDETOMIDINE HYDROCHLORIDE IN 0.9% SODIUM CHLORIDE 3.84 MICROGRAM(S)/KG/HR: 4 INJECTION INTRAVENOUS at 05:25

## 2022-01-01 RX ADMIN — Medication 3 MILLILITER(S): at 00:00

## 2022-01-01 RX ADMIN — PIPERACILLIN AND TAZOBACTAM 25 GRAM(S): 4; .5 INJECTION, POWDER, LYOPHILIZED, FOR SOLUTION INTRAVENOUS at 21:16

## 2022-01-01 RX ADMIN — Medication 1: at 13:06

## 2022-01-01 RX ADMIN — AMIODARONE HYDROCHLORIDE 400 MILLIGRAM(S): 400 TABLET ORAL at 05:31

## 2022-01-01 RX ADMIN — Medication 650 MILLIGRAM(S): at 14:04

## 2022-01-01 RX ADMIN — Medication 650 MILLIGRAM(S): at 13:04

## 2022-01-01 RX ADMIN — SODIUM CHLORIDE 4 MILLILITER(S): 9 INJECTION INTRAMUSCULAR; INTRAVENOUS; SUBCUTANEOUS at 09:46

## 2022-01-01 RX ADMIN — SODIUM CHLORIDE 3 MILLILITER(S): 9 INJECTION INTRAMUSCULAR; INTRAVENOUS; SUBCUTANEOUS at 19:59

## 2022-01-01 RX ADMIN — HEPARIN SODIUM 5000 UNIT(S): 5000 INJECTION INTRAVENOUS; SUBCUTANEOUS at 22:56

## 2022-01-01 RX ADMIN — SODIUM CHLORIDE 75 MILLILITER(S): 9 INJECTION, SOLUTION INTRAVENOUS at 00:17

## 2022-01-01 RX ADMIN — Medication 1 PUFF(S): at 22:14

## 2022-01-01 RX ADMIN — FENTANYL CITRATE 3.84 MICROGRAM(S)/KG/HR: 50 INJECTION INTRAVENOUS at 19:09

## 2022-01-01 RX ADMIN — BUDESONIDE AND FORMOTEROL FUMARATE DIHYDRATE 2 PUFF(S): 160; 4.5 AEROSOL RESPIRATORY (INHALATION) at 22:47

## 2022-01-01 RX ADMIN — Medication 7.2 MICROGRAM(S)/KG/MIN: at 07:37

## 2022-01-01 RX ADMIN — AMIODARONE HYDROCHLORIDE 400 MILLIGRAM(S): 400 TABLET ORAL at 22:34

## 2022-01-01 RX ADMIN — CHLORHEXIDINE GLUCONATE 1 APPLICATION(S): 213 SOLUTION TOPICAL at 12:27

## 2022-01-01 RX ADMIN — POTASSIUM PHOSPHATE, MONOBASIC POTASSIUM PHOSPHATE, DIBASIC 62.5 MILLIMOLE(S): 236; 224 INJECTION, SOLUTION INTRAVENOUS at 17:00

## 2022-01-01 RX ADMIN — Medication 650 MILLIGRAM(S): at 05:33

## 2022-01-01 RX ADMIN — FENTANYL CITRATE 3.84 MICROGRAM(S)/KG/HR: 50 INJECTION INTRAVENOUS at 07:39

## 2022-01-01 RX ADMIN — Medication 400 MILLIGRAM(S): at 14:00

## 2022-01-01 RX ADMIN — HEPARIN SODIUM 5000 UNIT(S): 5000 INJECTION INTRAVENOUS; SUBCUTANEOUS at 21:23

## 2022-01-01 RX ADMIN — APIXABAN 5 MILLIGRAM(S): 2.5 TABLET, FILM COATED ORAL at 05:13

## 2022-01-01 RX ADMIN — HEPARIN SODIUM 5000 UNIT(S): 5000 INJECTION INTRAVENOUS; SUBCUTANEOUS at 22:07

## 2022-01-01 RX ADMIN — Medication 1 PUFF(S): at 21:31

## 2022-01-01 RX ADMIN — Medication 1000 MILLIGRAM(S): at 10:00

## 2022-01-01 RX ADMIN — APIXABAN 5 MILLIGRAM(S): 2.5 TABLET, FILM COATED ORAL at 06:06

## 2022-01-01 RX ADMIN — Medication 40 MILLIGRAM(S): at 10:56

## 2022-01-01 RX ADMIN — MORPHINE SULFATE 2 MILLIGRAM(S): 50 CAPSULE, EXTENDED RELEASE ORAL at 13:41

## 2022-01-01 RX ADMIN — PIPERACILLIN AND TAZOBACTAM 200 GRAM(S): 4; .5 INJECTION, POWDER, LYOPHILIZED, FOR SOLUTION INTRAVENOUS at 03:00

## 2022-01-01 RX ADMIN — Medication 7.2 MICROGRAM(S)/KG/MIN: at 07:21

## 2022-01-01 RX ADMIN — Medication 650 MILLIGRAM(S): at 11:00

## 2022-01-01 RX ADMIN — APIXABAN 5 MILLIGRAM(S): 2.5 TABLET, FILM COATED ORAL at 18:18

## 2022-01-01 RX ADMIN — Medication 650 MILLIGRAM(S): at 04:53

## 2022-01-01 RX ADMIN — PALONOSETRON HYDROCHLORIDE 0.25 MILLIGRAM(S): 0.25 INJECTION, SOLUTION INTRAVENOUS at 14:01

## 2022-01-01 RX ADMIN — Medication 7.2 MICROGRAM(S)/KG/MIN: at 19:09

## 2022-01-01 RX ADMIN — ALBUTEROL 2 PUFF(S): 90 AEROSOL, METERED ORAL at 15:52

## 2022-01-01 RX ADMIN — Medication 40 MILLIGRAM(S): at 06:32

## 2022-01-01 RX ADMIN — DEXMEDETOMIDINE HYDROCHLORIDE IN 0.9% SODIUM CHLORIDE 3.84 MICROGRAM(S)/KG/HR: 4 INJECTION INTRAVENOUS at 07:21

## 2022-01-01 RX ADMIN — BUDESONIDE AND FORMOTEROL FUMARATE DIHYDRATE 2 PUFF(S): 160; 4.5 AEROSOL RESPIRATORY (INHALATION) at 21:31

## 2022-01-01 RX ADMIN — LEVALBUTEROL 0.63 MILLIGRAM(S): 1.25 SOLUTION, CONCENTRATE RESPIRATORY (INHALATION) at 21:23

## 2022-01-01 RX ADMIN — PIPERACILLIN AND TAZOBACTAM 25 GRAM(S): 4; .5 INJECTION, POWDER, LYOPHILIZED, FOR SOLUTION INTRAVENOUS at 23:59

## 2022-01-01 RX ADMIN — ALBUTEROL 2 PUFF(S): 90 AEROSOL, METERED ORAL at 03:28

## 2022-01-01 RX ADMIN — DEXMEDETOMIDINE HYDROCHLORIDE IN 0.9% SODIUM CHLORIDE 3.84 MICROGRAM(S)/KG/HR: 4 INJECTION INTRAVENOUS at 19:09

## 2022-01-01 RX ADMIN — Medication 1: at 23:56

## 2022-01-01 RX ADMIN — CHLORHEXIDINE GLUCONATE 1 APPLICATION(S): 213 SOLUTION TOPICAL at 11:02

## 2022-01-01 RX ADMIN — APIXABAN 5 MILLIGRAM(S): 2.5 TABLET, FILM COATED ORAL at 05:41

## 2022-01-01 RX ADMIN — Medication 1 PUFF(S): at 03:34

## 2022-01-01 RX ADMIN — Medication 100 GRAM(S): at 11:01

## 2022-01-01 RX ADMIN — Medication 400 MILLIGRAM(S): at 09:37

## 2022-01-01 RX ADMIN — SODIUM CHLORIDE 4 MILLILITER(S): 9 INJECTION INTRAMUSCULAR; INTRAVENOUS; SUBCUTANEOUS at 21:20

## 2022-01-01 RX ADMIN — ENOXAPARIN SODIUM 80 MILLIGRAM(S): 100 INJECTION SUBCUTANEOUS at 10:14

## 2022-01-01 RX ADMIN — PIPERACILLIN AND TAZOBACTAM 25 GRAM(S): 4; .5 INJECTION, POWDER, LYOPHILIZED, FOR SOLUTION INTRAVENOUS at 06:30

## 2022-01-01 RX ADMIN — CHLORHEXIDINE GLUCONATE 15 MILLILITER(S): 213 SOLUTION TOPICAL at 05:13

## 2022-01-01 RX ADMIN — Medication 1 PACKET(S): at 22:07

## 2022-01-01 RX ADMIN — PROPOFOL 4.61 MICROGRAM(S)/KG/MIN: 10 INJECTION, EMULSION INTRAVENOUS at 19:05

## 2022-01-01 RX ADMIN — Medication 650 MILLIGRAM(S): at 19:41

## 2022-01-01 RX ADMIN — Medication 1 PUFF(S): at 22:50

## 2022-01-01 RX ADMIN — FAMOTIDINE 20 MILLIGRAM(S): 10 INJECTION INTRAVENOUS at 14:18

## 2022-01-01 RX ADMIN — AMIODARONE HYDROCHLORIDE 16.7 MG/MIN: 400 TABLET ORAL at 06:35

## 2022-01-01 RX ADMIN — FAMOTIDINE 20 MILLIGRAM(S): 10 INJECTION INTRAVENOUS at 14:02

## 2022-01-01 RX ADMIN — PIPERACILLIN AND TAZOBACTAM 25 GRAM(S): 4; .5 INJECTION, POWDER, LYOPHILIZED, FOR SOLUTION INTRAVENOUS at 21:03

## 2022-01-01 RX ADMIN — PROPOFOL 40 MILLIGRAM(S): 10 INJECTION, EMULSION INTRAVENOUS at 14:14

## 2022-01-01 RX ADMIN — Medication 1 PACKET(S): at 21:31

## 2022-01-01 RX ADMIN — CHLORHEXIDINE GLUCONATE 15 MILLILITER(S): 213 SOLUTION TOPICAL at 18:16

## 2022-01-01 RX ADMIN — BUDESONIDE AND FORMOTEROL FUMARATE DIHYDRATE 2 PUFF(S): 160; 4.5 AEROSOL RESPIRATORY (INHALATION) at 21:58

## 2022-01-01 RX ADMIN — Medication 100 MILLIGRAM(S): at 16:18

## 2022-01-01 RX ADMIN — FENTANYL CITRATE 100 MICROGRAM(S): 50 INJECTION INTRAVENOUS at 17:55

## 2022-01-01 RX ADMIN — BUDESONIDE AND FORMOTEROL FUMARATE DIHYDRATE 2 PUFF(S): 160; 4.5 AEROSOL RESPIRATORY (INHALATION) at 21:34

## 2022-01-01 RX ADMIN — Medication 1 APPLICATION(S): at 18:16

## 2022-01-01 RX ADMIN — Medication 1 TABLET(S): at 17:15

## 2022-01-01 RX ADMIN — PIPERACILLIN AND TAZOBACTAM 25 GRAM(S): 4; .5 INJECTION, POWDER, LYOPHILIZED, FOR SOLUTION INTRAVENOUS at 08:28

## 2022-01-01 RX ADMIN — Medication 650 MILLIGRAM(S): at 00:59

## 2022-01-01 RX ADMIN — Medication 650 MILLIGRAM(S): at 15:22

## 2022-01-01 RX ADMIN — Medication 250 MILLIGRAM(S): at 01:22

## 2022-01-01 RX ADMIN — Medication 650 MILLIGRAM(S): at 21:21

## 2022-01-01 RX ADMIN — MEROPENEM 100 MILLIGRAM(S): 1 INJECTION INTRAVENOUS at 15:56

## 2022-01-01 RX ADMIN — Medication 1 PUFF(S): at 15:52

## 2022-01-01 RX ADMIN — ENOXAPARIN SODIUM 40 MILLIGRAM(S): 100 INJECTION SUBCUTANEOUS at 11:42

## 2022-01-01 RX ADMIN — Medication 2 TABLET(S): at 06:31

## 2022-01-01 RX ADMIN — SODIUM CHLORIDE 4 MILLILITER(S): 9 INJECTION INTRAMUSCULAR; INTRAVENOUS; SUBCUTANEOUS at 06:38

## 2022-01-01 RX ADMIN — Medication 1 PACKET(S): at 17:19

## 2022-01-01 RX ADMIN — CHLORHEXIDINE GLUCONATE 15 MILLILITER(S): 213 SOLUTION TOPICAL at 05:11

## 2022-01-01 RX ADMIN — HYDROMORPHONE HYDROCHLORIDE 0.5 MG/HR: 2 INJECTION INTRAMUSCULAR; INTRAVENOUS; SUBCUTANEOUS at 09:06

## 2022-01-01 RX ADMIN — ALBUTEROL 2 PUFF(S): 90 AEROSOL, METERED ORAL at 10:37

## 2022-01-01 RX ADMIN — BUDESONIDE AND FORMOTEROL FUMARATE DIHYDRATE 2 PUFF(S): 160; 4.5 AEROSOL RESPIRATORY (INHALATION) at 23:03

## 2022-01-01 RX ADMIN — BUDESONIDE AND FORMOTEROL FUMARATE DIHYDRATE 2 PUFF(S): 160; 4.5 AEROSOL RESPIRATORY (INHALATION) at 08:39

## 2022-01-01 RX ADMIN — Medication 1 PUFF(S): at 15:53

## 2022-01-01 RX ADMIN — Medication 1000 MILLIGRAM(S): at 11:00

## 2022-01-01 RX ADMIN — FENTANYL CITRATE 50 MICROGRAM(S): 50 INJECTION INTRAVENOUS at 21:24

## 2022-01-01 RX ADMIN — ALBUTEROL 2 PUFF(S): 90 AEROSOL, METERED ORAL at 09:41

## 2022-01-01 RX ADMIN — BUDESONIDE AND FORMOTEROL FUMARATE DIHYDRATE 2 PUFF(S): 160; 4.5 AEROSOL RESPIRATORY (INHALATION) at 22:03

## 2022-01-01 RX ADMIN — SODIUM CHLORIDE 75 MILLILITER(S): 9 INJECTION, SOLUTION INTRAVENOUS at 22:20

## 2022-01-01 RX ADMIN — Medication 3 MILLILITER(S): at 16:31

## 2022-01-01 RX ADMIN — Medication 650 MILLIGRAM(S): at 21:00

## 2022-01-01 RX ADMIN — AMIODARONE HYDROCHLORIDE 618 MILLIGRAM(S): 400 TABLET ORAL at 11:40

## 2022-01-01 RX ADMIN — PIPERACILLIN AND TAZOBACTAM 25 GRAM(S): 4; .5 INJECTION, POWDER, LYOPHILIZED, FOR SOLUTION INTRAVENOUS at 22:21

## 2022-01-01 RX ADMIN — ROBINUL 0.2 MILLIGRAM(S): 0.2 INJECTION INTRAMUSCULAR; INTRAVENOUS at 13:00

## 2022-01-01 RX ADMIN — Medication 650 MILLIGRAM(S): at 05:35

## 2022-01-01 RX ADMIN — Medication 1 PUFF(S): at 03:29

## 2022-01-01 RX ADMIN — ALBUTEROL 2 PUFF(S): 90 AEROSOL, METERED ORAL at 04:31

## 2022-01-01 RX ADMIN — SODIUM CHLORIDE 125 MILLILITER(S): 9 INJECTION, SOLUTION INTRAVENOUS at 17:39

## 2022-01-01 RX ADMIN — BUDESONIDE AND FORMOTEROL FUMARATE DIHYDRATE 2 PUFF(S): 160; 4.5 AEROSOL RESPIRATORY (INHALATION) at 10:48

## 2022-01-01 RX ADMIN — AMIODARONE HYDROCHLORIDE 200 MILLIGRAM(S): 400 TABLET ORAL at 05:21

## 2022-01-01 RX ADMIN — Medication 1: at 05:12

## 2022-01-01 RX ADMIN — FENTANYL CITRATE 3.84 MICROGRAM(S)/KG/HR: 50 INJECTION INTRAVENOUS at 19:28

## 2022-01-01 RX ADMIN — Medication 600 MILLIGRAM(S): at 17:16

## 2022-01-01 RX ADMIN — Medication 2: at 17:47

## 2022-01-01 RX ADMIN — AMIODARONE HYDROCHLORIDE 400 MILLIGRAM(S): 400 TABLET ORAL at 06:03

## 2022-01-01 RX ADMIN — ALBUTEROL 2 PUFF(S): 90 AEROSOL, METERED ORAL at 04:08

## 2022-01-01 RX ADMIN — FENTANYL CITRATE 3.84 MICROGRAM(S)/KG/HR: 50 INJECTION INTRAVENOUS at 07:21

## 2022-01-01 RX ADMIN — PIPERACILLIN AND TAZOBACTAM 25 GRAM(S): 4; .5 INJECTION, POWDER, LYOPHILIZED, FOR SOLUTION INTRAVENOUS at 14:20

## 2022-01-01 RX ADMIN — ENOXAPARIN SODIUM 40 MILLIGRAM(S): 100 INJECTION SUBCUTANEOUS at 12:27

## 2022-01-01 RX ADMIN — CHLORHEXIDINE GLUCONATE 1 APPLICATION(S): 213 SOLUTION TOPICAL at 07:39

## 2022-01-01 RX ADMIN — Medication 30 MILLIGRAM(S): at 11:29

## 2022-01-01 RX ADMIN — Medication 1 APPLICATION(S): at 05:13

## 2022-01-01 RX ADMIN — BUDESONIDE AND FORMOTEROL FUMARATE DIHYDRATE 2 PUFF(S): 160; 4.5 AEROSOL RESPIRATORY (INHALATION) at 23:28

## 2022-01-01 RX ADMIN — Medication 1 PUFF(S): at 09:41

## 2022-01-01 RX ADMIN — Medication 650 MILLIGRAM(S): at 05:05

## 2022-01-01 RX ADMIN — Medication 2 TABLET(S): at 22:14

## 2022-01-01 RX ADMIN — POTASSIUM PHOSPHATE, MONOBASIC POTASSIUM PHOSPHATE, DIBASIC 62.5 MILLIMOLE(S): 236; 224 INJECTION, SOLUTION INTRAVENOUS at 11:35

## 2022-01-01 RX ADMIN — Medication 3 MILLILITER(S): at 23:30

## 2022-01-01 RX ADMIN — AMIODARONE HYDROCHLORIDE 400 MILLIGRAM(S): 400 TABLET ORAL at 05:41

## 2022-01-01 RX ADMIN — Medication 3 MILLILITER(S): at 14:46

## 2022-01-01 RX ADMIN — BUDESONIDE AND FORMOTEROL FUMARATE DIHYDRATE 2 PUFF(S): 160; 4.5 AEROSOL RESPIRATORY (INHALATION) at 09:38

## 2022-01-01 RX ADMIN — Medication 250 MILLIGRAM(S): at 03:50

## 2022-01-01 RX ADMIN — BUDESONIDE AND FORMOTEROL FUMARATE DIHYDRATE 2 PUFF(S): 160; 4.5 AEROSOL RESPIRATORY (INHALATION) at 22:27

## 2022-01-01 RX ADMIN — Medication 3 MILLILITER(S): at 09:55

## 2022-01-01 RX ADMIN — Medication 3 MILLILITER(S): at 04:32

## 2022-01-01 RX ADMIN — AMIODARONE HYDROCHLORIDE 33.3 MG/MIN: 400 TABLET ORAL at 19:10

## 2022-01-01 RX ADMIN — Medication 7.2 MICROGRAM(S)/KG/MIN: at 19:04

## 2022-01-01 RX ADMIN — Medication 1 APPLICATION(S): at 05:16

## 2022-01-28 NOTE — H&P ADULT - HISTORY OF PRESENT ILLNESS
The patient is a 77 Y.O. female with no significant PMH who presents from outpatient clinic for wheezing, sob and right hilar mass.     The patient does not have significant past medical history. She was feeling well until earlier this month, no ACKERMAN, no SOB or wheezing with only occasional bronchitis. No personal or family history of lung disease, or cancer. FH of GI ca. Due to her wheezing and sob the patient has seen multiple outpatient doctors for her sob and wheezing. She has since had several course of oral steroids but no bronchodilators.     She was sent in by her pulmonologist as outpatient who noted a large right sided hilar mass and was sent to the ED. She also endorses decreased PO intake and also 25lb weight loss in the last several weeks. 40-60 pack year smoker quit 8 years ago. She also states that in the last several days she has felt sensation of something being stuck in the middle of her chest after eating. Currently does not have that pain or sensation and tolerating PO. Currently she is feeling well, on RA at 100 percent and feeling some improvement with albuterol. Denies any change in voice, chocking sensation or sore throat.

## 2022-01-28 NOTE — ED PROVIDER NOTE - CLINICAL SUMMARY MEDICAL DECISION MAKING FREE TEXT BOX
78yo female with no significant pmh presenting with wheezing, shortness of breath.  Vitals stable, not hypoxic on RA.  Diffuse wheezing and former smoker, never diagnosed with copd but will start treatment for exacerbation.  CXR with RUL infiltrate vs mass, may be etiology of sob/wheeze.  Plan for CT.  Labs, trop, ecg, discuss with pulm, admit.

## 2022-01-28 NOTE — PATIENT PROFILE ADULT - FALL HARM RISK - UNIVERSAL INTERVENTIONS
Bed in lowest position, wheels locked, appropriate side rails in place/Call bell, personal items and telephone in reach/Instruct patient to call for assistance before getting out of bed or chair/Non-slip footwear when patient is out of bed/Brandon to call system/Physically safe environment - no spills, clutter or unnecessary equipment/Purposeful Proactive Rounding/Room/bathroom lighting operational, light cord in reach

## 2022-01-28 NOTE — ED ADULT NURSE NOTE - OBJECTIVE STATEMENT
jimenez RN: pt received to room #3 with c/o SOB x 3 weeks progressing to ACKERAMN. pt with b/l wheezes. had x-ray at PMDs office and sent to the ED for further testing. pt aox4, speaking in clear sentences, on room air. pt seen by resident. pending attending eval. report give to break coverage RN.

## 2022-01-28 NOTE — ED PROVIDER NOTE - OBJECTIVE STATEMENT
76yo female with no significant pmh presenting with wheezing, shortness of breath.  Started 3 weeks ago and progressively worsening.  Started having dyspnea on exertion 1 week ago.  Went to pmd when this first started and was referred to pulm but was unable to get an appointment until today.  Was referred to ED with abnormal cxr and further evaluation.  No cp, fever, cough, n/v.  Former smoker.

## 2022-01-28 NOTE — H&P ADULT - NSHPREVIEWOFSYSTEMS_GEN_ALL_CORE
REVIEW OF SYSTEMS  General: no sweating; fever; chills; + weight loss, decreased PO intake  Skin/Breast: no rash; no itching; no dryness	  Ophthalmologic: no diplopia; no photophobia; no lacrimation L; no lacrimation R; No eye pain, no visual changes	  ENMT: no hearing difficulty; no ear pain; n  Respiratory and Thorax: See HPI  Cardiovascular: no chest pain; no palpitations; no orthopnea, no pedal edema, ACKERMAN  Gastrointestinal: no nausea; no vomiting; no melena; no hematochezia; + dysphagia   Genitourinary: no hematuria; no renal colic; no flank pain L; no flank pain R; no urine discoloration; no dysuria  Musculoskeletal: no arthralgia; no arthritis; no joint swelling; no myalgia  Neurological: no weakness; no headache; no loss of consciousness; no confusion  Psychiatric: no suicidal ideation; no depression; no anxiety; no insomnia  Hematology/Lymphatics: no gum bleeding; no nose bleeding; no skin lumps  Endocrine:	No heat/cold intolerence, no polydipsia, or polyuria.

## 2022-01-28 NOTE — ED PROVIDER NOTE - ATTENDING CONTRIBUTION TO CARE
Dr. Walters: 78 yo female with no sig PMH, referred to ED by outpatient pulm Dr. Woodard due to wheezing, SOB and outpatient CXR showing right upper lobe pneumonia vs. mass.  Pt denies CP, fever, N/V/D or abdominal pain.  On exam pt well appearing, in NAD, heart RRR, lungs CTAB, abd NTND, extremities without swelling, strength 5/5 in all extremities and skin without rash.

## 2022-01-28 NOTE — ED ADULT NURSE REASSESSMENT NOTE - NS ED NURSE REASSESS COMMENT FT1
Break RN: pt resting comfortably in bed, denies complaints at this time. 20g iv placed in LAC, labs drawn and sent. covid sent. awaiting results and transport to XR/CT. will continue to monitor.
Pt A&Ox4. Pt resting in stretcher,  at bedside. VSS and noted. Audible wheezing noted. Pt able to complete full sentences. Denies CP. RR even, unlabored. Duoneb administered.

## 2022-01-28 NOTE — ED ADULT TRIAGE NOTE - CHIEF COMPLAINT QUOTE
Patient had an xray done for shortness of breath and was sent to ER for CT Scan because she believes there was something of concern on her lungs.

## 2022-01-28 NOTE — CONSULT LETTER
[Dear  ___] : Dear  [unfilled], [Consult Letter:] : I had the pleasure of evaluating your patient, [unfilled]. [Please see my note below.] : Please see my note below. [Consult Closing:] : Thank you very much for allowing me to participate in the care of this patient.  If you have any questions, please do not hesitate to contact me. [FreeTextEntry3] : Sincerely,\par \par María Elena Woodard MD\par [facility]\par Pulmonary Medicine\par tel: [phone]\par fax: [phone]\par

## 2022-01-28 NOTE — H&P ADULT - NSHPPHYSICALEXAM_GEN_ALL_CORE
PHYSICAL EXAM:  Constitutional: well-developed; well-groomed; well-nourished; no distress,  Eyes: PERRL; EOMI  ENMT: Normal oropharnxy, no oral lesions, no erythema, no exudates  Neck:  Supple; no JVD; normal thyroid gland  MSK/Back: normal shape; ROM intact; strength intact. Normal strength in all ext, No joint swelling, no joint tenderenss, no joint erythema, no effusions  Respiratory: airway patent; breath sounds equal; good air movement, no wheezing, no crackles, no rhonchi. no increase in WOB  Cardiovascular: regular rate and rhythm  no rub , no murmur, no gallops.   Gastrointestinal: soft; no distention, normal BS, no TTP, no organomegaly, no ascites.  Genitourinary:  Extremities: no clubbing; no cyanosis; no pedal edema, non-tender to palpation, DP and Radial pulses intact.  Neurological: alert and oriented x 3; responds to pain; responds to verbal commands; sensation intact: CN nerves grossly intact.   Skin: warm and dry; color normal: no rash: no ulcers  Psychiatric: Calm, no SI/HI PHYSICAL EXAM:  Constitutional: well-developed; well-groomed; well-nourished; no distress,  Eyes: PERRL; EOMI  ENMT: Normal oropharynx, no oral lesions, no erythema, no exudates  Neck:  Supple; no JVD; normal thyroid gland  MSK/Back: normal shape; ROM intact; strength intact. Normal strength in all ext, No joint swelling, no joint tenderness, no joint erythema, no effusions  Respiratory: airway patent; breath sounds equal; good air movement, (+) wheezing, no crackles, no rhonchi. no increase in WOB  Cardiovascular: regular rate and rhythm  no rub , no murmur, no gallops.   Gastrointestinal: soft; no distention, normal BS, no TTP, no organomegaly, no ascites.  Genitourinary:  Extremities: no clubbing; no cyanosis; no pedal edema, non-tender to palpation, DP and Radial pulses intact.  Neurological: alert and oriented x 3; responds to pain; responds to verbal commands; sensation intact: CN nerves grossly intact.   Skin: warm and dry; color normal: no rash: no ulcers  Psychiatric: Calm, no SI/HI

## 2022-01-28 NOTE — PHYSICAL EXAM
[No Acute Distress] : no acute distress [Normal Oropharynx] : normal oropharynx [Normal Appearance] : normal appearance [No Neck Mass] : no neck mass [Normal Rate/Rhythm] : normal rate/rhythm [Normal S1, S2] : normal s1, s2 [No Murmurs] : no murmurs [No Abnormalities] : no abnormalities [Benign] : benign [Normal Gait] : normal gait [No Clubbing] : no clubbing [No Cyanosis] : no cyanosis [No Edema] : no edema [FROM] : FROM [Normal Color/ Pigmentation] : normal color/ pigmentation [No Focal Deficits] : no focal deficits [Oriented x3] : oriented x3 [Normal Affect] : normal affect [TextBox_68] : SOB with ambulation, audible wheeze, focal wheeze and bronchial breath sounds over right mid lung

## 2022-01-28 NOTE — ED CLERICAL - NS ED CLERK NOTE PRE-ARRIVAL INFORMATION; ADDITIONAL PRE-ARRIVAL INFORMATION
SOB, wheezing, O2 sat 94% at rest. Sent from PMD office fro CT chest, pulmonary consult.  Hx former smoker.

## 2022-01-28 NOTE — H&P ADULT - NSHPLABSRESULTS_GEN_ALL_CORE
13.4   9.57  )-----------( 326      ( 28 Jan 2022 14:43 )             40.9       01-28    134<L>  |  97<L>  |  12  ----------------------------<  116<H>  3.7   |  26  |  0.69    Ca    9.6      28 Jan 2022 14:43    TPro  7.7  /  Alb  3.9  /  TBili  0.7  /  DBili  x   /  AST  21  /  ALT  14  /  AlkPhos  80  01-28          Lactate Trend            CAPILLARY BLOOD GLUCOSE                RADIOLOGY, EKG & ADDITIONAL TESTS: Reviewed.

## 2022-01-28 NOTE — H&P ADULT - ATTENDING COMMENTS
Pt is a 77F with PMHx former smoker (40 pack-year hx) presenting to [facility] from O/P pulmonology office for weight loss and dyspnea found to have a new right hilar mass concerning for new malignancy. Pt admitted to the RCU for further medical management.     Pt p/w progressive worsening work of breathing and wheezing from pulmonary office, found to be in hypoxemic respiratory distress likely 2/2 right hilar mass from new malignancy with concern for bronchial obstruction. Pt currently hemodynamically stable and afebrile. No evidence of distal airway bronchospasm, will hold off on steroid therapy for now (pt just completed course of steroids, PO antibiotics, and bronchodilators by PMD without improvement). Bronchodilators prn, will start Symbicort. CT Chest/A/P performed, significant for RLL perihilar mass with right hilar and mediastinal extension as well as narrowing of the RUL/BI and RLL collapse without evidence of distant metastases. Mass is amenable to bronchoscopic biopsy and EBUS-guided evaluation of lymph nodes for staging. Will discuss with IP.

## 2022-01-28 NOTE — HISTORY OF PRESENT ILLNESS
[Former] : former [>= 30 pack years] : >= 30 pack years [Never] : never [TextBox_4] : Ms. DESIRAE POLLOCK is a 77 year old woman former smoker (40 pack year hx) here for evaluaiton of SOB/wheezing. \par Here with  Buddy. \par \par Reports somewhat acute onset of sx in beginning of Jan, started not feeling well with SOB, wheezing. Was seen by PMD Jan 6th 2022 - COVID test was negative. Was treated with Zpack, Prednisone, Albuterol with no improvement. She continued feeling SOB, wheezing, worse with exertion, and at night (with frequent night time awakenings). She has trouble swallowing, lost >25lbs in 3 months. \par Has some cough, denies fevers, chills, hemoptysis.\par \par Hx of chronic bronchitis when was smoking but has never been on any inhalers in the past. CXR from 1/6/2022 (per report) with right hilar opacity. \par \par PMH: none\par Meds: none\par All: nkda\par SH: quit smoking 2015; smoked for 40 years, x1ppd.\par FH: mom - colon ca in her 80s; father - MI; \par PMD: [facility]\par Immunizations: covid vaccinated; pneumovax 2020; did not get flu shot\par  [YearQuit] : 2015

## 2022-01-28 NOTE — H&P ADULT - TIME BILLING
Patient evaluation and assessment. Patient history, laboratory data, and imaging review. Coordination of care.

## 2022-01-28 NOTE — ED PROVIDER NOTE - PHYSICAL EXAMINATION
General appearance: NAD, conversant, afebrile    Neck: Trachea midline; Full range of motion, supple   Pulm: Diffuse wheezing, normal respiratory effort and no intercostal retractions, normal work of breathing   CV: Tachycardic, regular, No murmurs, rubs, or gallops   Extremities: No peripheral edema    Skin: Dry, normal temperature, turgor and texture; no rash   Psych: Appropriate affect, cooperative; alert and oriented to person, place and time

## 2022-01-28 NOTE — ASSESSMENT
[FreeTextEntry1] : Ms. DESIRAE POLLOCK is a 77 year old woman former smoker (40 pack year hx) here for evaluaiton of SOB/wheezing.  Here with  Buddy. \par She is visibly SOB even at rest with talking, (O2 stats 94% P 102 at rest; down to 90%P 120 with exertion), she has significant wheeze which can be heard without stethoscope. Her CXR per report shows right hilar opacity. I am concerned about bronchial obstruction leading to her wheeze and SOB. \par Patient advised to go to ER for expedited workup and management. \par \par All questions answered. Patient in agreement with plan. \par \par \par

## 2022-01-29 NOTE — PROGRESS NOTE ADULT - SUBJECTIVE AND OBJECTIVE BOX
CHIEF COMPLAINT:Patient is a 77y old  Female who presents with a chief complaint of Wheezing, SOB, right hilar mass. (28 Jan 2022 15:03)      INTERVAL EVENTS:     ROS: Seen by bedside during AM rounds     OBJECTIVE:  ICU Vital Signs Last 24 Hrs  T(C): 36.4 (29 Jan 2022 05:40), Max: 37.2 (28 Jan 2022 19:34)  T(F): 97.6 (29 Jan 2022 05:40), Max: 98.9 (28 Jan 2022 19:34)  HR: 71 (29 Jan 2022 05:40) (71 - 114)  BP: 123/56 (29 Jan 2022 05:40) (123/56 - 151/69)  BP(mean): --  ABP: --  ABP(mean): --  RR: 17 (29 Jan 2022 05:40) (16 - 22)  SpO2: 96% (29 Jan 2022 05:40) (94% - 100%)        CAPILLARY BLOOD GLUCOSE          PHYSICAL EXAM:  General:   HEENT:   Lymph Nodes:  Neck:   Respiratory:   Cardiovascular:   Abdomen:   Extremities:   Skin:   Neurological:  Psychiatry:        HOSPITAL MEDICATIONS:  MEDICATIONS  (STANDING):  budesonide  80 MICROgram(s)/formoterol 4.5 MICROgram(s) Inhaler 2 Puff(s) Inhalation two times a day  heparin   Injectable 5000 Unit(s) SubCutaneous every 8 hours  influenza  Vaccine (HIGH DOSE) 0.7 milliLiter(s) IntraMuscular once    MEDICATIONS  (PRN):  acetaminophen     Tablet .. 650 milliGRAM(s) Oral every 6 hours PRN Temp greater or equal to 38C (100.4F), Mild Pain (1 - 3), Moderate Pain (4 - 6)      LABS:                        13.0   7.68  )-----------( 321      ( 29 Jan 2022 06:55 )             39.2     01-28    134<L>  |  97<L>  |  12  ----------------------------<  116<H>  3.7   |  26  |  0.69    Ca    9.6      28 Jan 2022 14:43    TPro  7.7  /  Alb  3.9  /  TBili  0.7  /  DBili  x   /  AST  21  /  ALT  14  /  AlkPhos  80  01-28          Venous Blood Gas:  01-28 @ 14:43  7.38/50/26/30/44.3  VBG Lactate: 1.5   CHIEF COMPLAINT:Patient is a 77y old  Female who presents with a chief complaint of Wheezing, SOB, right hilar mass. (28 Jan 2022 15:03)      INTERVAL EVENTS: Overnight placed on 2L NC for reported hypoxic event.     ROS: Seen by bedside during AM rounds     OBJECTIVE:  ICU Vital Signs Last 24 Hrs  T(C): 36.4 (29 Jan 2022 05:40), Max: 37.2 (28 Jan 2022 19:34)  T(F): 97.6 (29 Jan 2022 05:40), Max: 98.9 (28 Jan 2022 19:34)  HR: 71 (29 Jan 2022 05:40) (71 - 114)  BP: 123/56 (29 Jan 2022 05:40) (123/56 - 151/69)  BP(mean): --  ABP: --  ABP(mean): --  RR: 17 (29 Jan 2022 05:40) (16 - 22)  SpO2: 96% (29 Jan 2022 05:40) (94% - 100%)        CAPILLARY BLOOD GLUCOSE          PHYSICAL EXAM:  General: NAD. Sitting on side of bed.  Neck: Supple. Trachea midline.   Cards: S1/S2, no murmurs   Pulm: Mild intermittent wheeze on R-side. Speaking full sentences.  Abdomen: Soft, NTND.   Extremities: No pedal edema. Extremities warm to touch.  Neurology: AOx3 with no focal neurological deficits   Skin: refer to RN assessment.       HOSPITAL MEDICATIONS:  MEDICATIONS  (STANDING):  budesonide  80 MICROgram(s)/formoterol 4.5 MICROgram(s) Inhaler 2 Puff(s) Inhalation two times a day  heparin   Injectable 5000 Unit(s) SubCutaneous every 8 hours  influenza  Vaccine (HIGH DOSE) 0.7 milliLiter(s) IntraMuscular once    MEDICATIONS  (PRN):  acetaminophen     Tablet .. 650 milliGRAM(s) Oral every 6 hours PRN Temp greater or equal to 38C (100.4F), Mild Pain (1 - 3), Moderate Pain (4 - 6)      LABS:                        13.0   7.68  )-----------( 321      ( 29 Jan 2022 06:55 )             39.2     01-28    134<L>  |  97<L>  |  12  ----------------------------<  116<H>  3.7   |  26  |  0.69    Ca    9.6      28 Jan 2022 14:43    TPro  7.7  /  Alb  3.9  /  TBili  0.7  /  DBili  x   /  AST  21  /  ALT  14  /  AlkPhos  80  01-28          Venous Blood Gas:  01-28 @ 14:43  7.38/50/26/30/44.3  VBG Lactate: 1.5

## 2022-01-29 NOTE — PROGRESS NOTE ADULT - ATTENDING COMMENTS
Agree with above. Seen with team on rounds. Perihilar mass of unclear origin. Will need work up. On 2 L NC. Supportive care.

## 2022-01-29 NOTE — PROGRESS NOTE ADULT - ASSESSMENT
77 Y.O. female with no significant PMH who presents from outpatient clinic for wheezing, sob and right hilar mass.    # Right hilar mass  - Out patient report of R hilar mass, no imaging on record but was on a CXR  - Former smoker with heavy smoking history.  Weight loss, sob and cough  - Will check CT chest A/P with contrast to assess for lung mass and possible extrathoracic staging.    # Wheezing/SOB  - Maybe mass effect vs COPD given smoking history. No outpatient diagnosis in the past.   - Will start Duoneb ATC. Symbicort BID. Hold off on parental steroids.   - Continuous Pulse OX    #Dysphagia  - History of recent dysphagia, sensation of food stuck in esophagus, no coughing after eating.   - Has been tolerating PO recently.   - WIll follow up with the CT scan. May need MBS if now clear etiology seen on CT.     # DVT PPX  - SQH TID         77 Y.O. female with no significant PMH who presents from outpatient clinic for wheezing, sob and right hilar mass.    # Right hilar mass  - Out patient report of R hilar mass, no imaging on record but was on a CXR  - Former smoker with heavy smoking history.  Weight loss, sob and cough  - CT chest A/P 1/28- superior segment right lower lobe posterior perihilar mass with direct extension to the right hilum, subcarinal space, and posterior mediastinum. Encasement and narrowing of the right lung airways. No evidence of distant metastatic disease. The primary mass is amenable to bronchoscopic biopsy.    # Wheezing/SOB  - Maybe mass effect vs COPD given smoking history. No outpatient diagnosis in the past.   - Will start Duoneb ATC. Symbicort BID. Hold off on parental steroids.   - Continuous Pulse OX  - On 2L NC now will attempt to wean off    #Dysphagia  - History of recent dysphagia, sensation of food stuck in esophagus, no coughing after eating.   - Has been tolerating PO recently.   - May need MBS if no clear etiology seen on CT.     # DVT PPX  - SQH TID

## 2022-01-30 NOTE — PROGRESS NOTE ADULT - SUBJECTIVE AND OBJECTIVE BOX
CHIEF COMPLAINT: Patient is a 77y old  Female who presents with a chief complaint of Wheezing, SOB, right hilar mass. (29 Jan 2022 08:09)      Interval Events:    REVIEW OF SYSTEMS:  [ ] All other systems negative  [ ] Unable to assess ROS because ________          OBJECTIVE:  ICU Vital Signs Last 24 Hrs  T(C): 36.4 (30 Jan 2022 04:47), Max: 37 (29 Jan 2022 22:59)  T(F): 97.5 (30 Jan 2022 04:47), Max: 98.6 (29 Jan 2022 22:59)  HR: 98 (30 Jan 2022 10:28) (79 - 99)  BP: 114/50 (30 Jan 2022 04:47) (114/50 - 135/62)  BP(mean): --  ABP: --  ABP(mean): --  RR: 18 (30 Jan 2022 04:47) (17 - 18)  SpO2: 98% (30 Jan 2022 10:28) (95% - 99%)        CAPILLARY BLOOD GLUCOSE          HOSPITAL MEDICATIONS:  MEDICATIONS  (STANDING):  albuterol/ipratropium for Nebulization 3 milliLiter(s) Nebulizer every 6 hours  budesonide  80 MICROgram(s)/formoterol 4.5 MICROgram(s) Inhaler 2 Puff(s) Inhalation two times a day  heparin   Injectable 5000 Unit(s) SubCutaneous every 8 hours  influenza  Vaccine (HIGH DOSE) 0.7 milliLiter(s) IntraMuscular once    MEDICATIONS  (PRN):  acetaminophen     Tablet .. 650 milliGRAM(s) Oral every 6 hours PRN Temp greater or equal to 38C (100.4F), Mild Pain (1 - 3), Moderate Pain (4 - 6)      LABS:                        11.9   7.77  )-----------( 306      ( 30 Jan 2022 06:52 )             35.8     01-30    137  |  100  |  12  ----------------------------<  90  3.6   |  28  |  0.66    Ca    9.1      30 Jan 2022 06:52  Phos  3.2     01-30  Mg     2.10     01-30    TPro  7.2  /  Alb  3.8  /  TBili  0.8  /  DBili  x   /  AST  22  /  ALT  14  /  AlkPhos  76  01-29          Venous Blood Gas:  01-28 @ 14:43  7.38/50/26/30/44.3  VBG Lactate: 1.5      PAST MEDICAL & SURGICAL HISTORY:  No pertinent past medical history    No significant past surgical history        FAMILY HISTORY:      Social History:  Former smoker, 40 + pack years, no ETOH,   .  Lives with  (28 Jan 2022 15:03)      RADIOLOGY:  [ ] Reviewed and interpreted by me    PULMONARY FUNCTION TESTS:    EKG: CHIEF COMPLAINT: Patient is a 77y old  Female who presents with a chief complaint of Wheezing, SOB, right hilar mass. (29 Jan 2022 08:09)      Interval Events: Pt seen at bedside NAD Sitting on edge of bed No events overnight       REVIEW OF SYSTEMS:   + wheezing   + difficulty swallowing food at times   [x ] All other systems negative            OBJECTIVE:  ICU Vital Signs Last 24 Hrs  T(C): 36.4 (30 Jan 2022 04:47), Max: 37 (29 Jan 2022 22:59)  T(F): 97.5 (30 Jan 2022 04:47), Max: 98.6 (29 Jan 2022 22:59)  HR: 98 (30 Jan 2022 10:28) (79 - 99)  BP: 114/50 (30 Jan 2022 04:47) (114/50 - 135/62)  BP(mean): --  ABP: --  ABP(mean): --  RR: 18 (30 Jan 2022 04:47) (17 - 18)  SpO2: 98% (30 Jan 2022 10:28) (95% - 99%)        CAPILLARY BLOOD GLUCOSE          HOSPITAL MEDICATIONS:  MEDICATIONS  (STANDING):  albuterol/ipratropium for Nebulization 3 milliLiter(s) Nebulizer every 6 hours  budesonide  80 MICROgram(s)/formoterol 4.5 MICROgram(s) Inhaler 2 Puff(s) Inhalation two times a day  heparin   Injectable 5000 Unit(s) SubCutaneous every 8 hours  influenza  Vaccine (HIGH DOSE) 0.7 milliLiter(s) IntraMuscular once    MEDICATIONS  (PRN):  acetaminophen     Tablet .. 650 milliGRAM(s) Oral every 6 hours PRN Temp greater or equal to 38C (100.4F), Mild Pain (1 - 3), Moderate Pain (4 - 6)      LABS:                        11.9   7.77  )-----------( 306      ( 30 Jan 2022 06:52 )             35.8     01-30    137  |  100  |  12  ----------------------------<  90  3.6   |  28  |  0.66    Ca    9.1      30 Jan 2022 06:52  Phos  3.2     01-30  Mg     2.10     01-30    TPro  7.2  /  Alb  3.8  /  TBili  0.8  /  DBili  x   /  AST  22  /  ALT  14  /  AlkPhos  76  01-29          Venous Blood Gas:  01-28 @ 14:43  7.38/50/26/30/44.3  VBG Lactate: 1.5      PAST MEDICAL & SURGICAL HISTORY:  No pertinent past medical history    No significant past surgical history        FAMILY HISTORY:      Social History:  Former smoker, 40 + pack years, no ETOH,   .  Lives with  (28 Jan 2022 15:03)      RADIOLOGY:  [ ] Reviewed and interpreted by me    PULMONARY FUNCTION TESTS:    EKG:

## 2022-01-30 NOTE — PROGRESS NOTE ADULT - ASSESSMENT
77 Y.O. female with no significant PMH who presents from outpatient clinic for wheezing, sob and right hilar mass.    # Right hilar mass  - Out patient report of R hilar mass, no imaging on record but was on a CXR  - Former smoker with heavy smoking history.  Weight loss, sob and cough  - CT chest A/P 1/28- superior segment right lower lobe posterior perihilar mass with direct extension to the right hilum, subcarinal space, and posterior mediastinum. Encasement and narrowing of the right lung airways. No evidence of distant metastatic disease. The primary mass is amenable to bronchoscopic biopsy.    # Wheezing/SOB  - Maybe mass effect vs COPD given smoking history. No outpatient diagnosis in the past.   - Will start Duoneb ATC. Symbicort BID. Hold off on parental steroids.   - Continuous Pulse OX  - On 2L NC now will attempt to wean off    #Dysphagia  - History of recent dysphagia, sensation of food stuck in esophagus, no coughing after eating.   - Has been tolerating PO recently.   - May need MBS if no clear etiology seen on CT.     # DVT PPX  - SQH TID         77 Y.O. female with no significant PMH who presents from outpatient clinic for wheezing, sob and right hilar mass.    # Right hilar mass  - Out patient report of R hilar mass, no imaging on record but was on a CXR  - Former smoker with heavy smoking history.  Weight loss, sob and cough  - CT chest A/P 1/28- superior segment right lower lobe posterior perihilar mass with direct extension to the right hilum, subcarinal space, and posterior mediastinum. Encasement and narrowing of the right lung airways. No evidence of distant metastatic disease. The primary mass is amenable to bronchoscopic biopsy.  - FU with interventional pulmonology regarding biopsy on Monday     # Wheezing/SOB  - Maybe mass effect vs COPD given smoking history. No outpatient diagnosis in the past.   - Will start Duoneb ATC. Symbicort BID. Hold off on parental steroids.   - Continuous Pulse OX  - On 2L NC now will attempt to wean off  - Tolerating room air     #Dysphagia  - History of recent dysphagia, sensation of food stuck in esophagus, no coughing after eating.   - Has been tolerating PO recently.   - May need MBS if no clear etiology seen on CT.     # DVT PPX  - SQH TID

## 2022-01-31 NOTE — DIETITIAN INITIAL EVALUATION ADULT. - PERTINENT LABORATORY DATA
01-31 Na134 mmol/L<L> Glu 102 mg/dL<H> K+ 3.3 mmol/L<L> Cr  0.61 mg/dL BUN 10 mg/dL 01-31 Phos 3.2 mg/dL 01-29 Alb 3.8 g/dL

## 2022-01-31 NOTE — PROGRESS NOTE ADULT - SUBJECTIVE AND OBJECTIVE BOX
CHIEF COMPLAINT:    INTERVAL EVENTS:     ROS: Seen by bedside during AM rounds     OBJECTIVE:  ICU Vital Signs Last 24 Hrs  T(C): 36.7 (31 Jan 2022 05:35), Max: 36.9 (30 Jan 2022 14:00)  T(F): 98.1 (31 Jan 2022 05:35), Max: 98.5 (30 Jan 2022 14:00)  HR: 93 (31 Jan 2022 08:16) (79 - 102)  BP: 103/50 (31 Jan 2022 05:35) (103/50 - 134/67)  BP(mean): --  ABP: --  ABP(mean): --  RR: 18 (31 Jan 2022 05:35) (18 - 18)  SpO2: 97% (31 Jan 2022 08:16) (95% - 99%)        CAPILLARY BLOOD GLUCOSE          PHYSICAL EXAM:  General:   HEENT:   Lymph Nodes:  Neck:   Respiratory:   Cardiovascular:   Abdomen:   Extremities:   Skin:   Neurological:  Psychiatry:        HOSPITAL MEDICATIONS:  MEDICATIONS  (STANDING):  albuterol/ipratropium for Nebulization 3 milliLiter(s) Nebulizer every 6 hours  budesonide  80 MICROgram(s)/formoterol 4.5 MICROgram(s) Inhaler 2 Puff(s) Inhalation two times a day  heparin   Injectable 5000 Unit(s) SubCutaneous every 8 hours  influenza  Vaccine (HIGH DOSE) 0.7 milliLiter(s) IntraMuscular once    MEDICATIONS  (PRN):  acetaminophen     Tablet .. 650 milliGRAM(s) Oral every 6 hours PRN Temp greater or equal to 38C (100.4F), Mild Pain (1 - 3), Moderate Pain (4 - 6)      LABS:                        11.3   7.00  )-----------( 321      ( 31 Jan 2022 07:22 )             34.9     01-31    134<L>  |  99  |  10  ----------------------------<  102<H>  3.3<L>   |  27  |  0.61    Ca    9.1      31 Jan 2022 07:22  Phos  3.2     01-31  Mg     2.10     01-31      PT/INR - ( 31 Jan 2022 08:03 )   PT: 13.4 sec;   INR: 1.18 ratio         PTT - ( 31 Jan 2022 08:03 )  PTT:26.5 sec       CHIEF COMPLAINT: Patient is a 77y old  Female who presents with a chief complaint of Wheezing, SOB, right hilar mass. (31 Jan 2022 10:44)    INTERVAL EVENTS:   - No interval events overnight.     ROS: Seen by bedside during AM rounds and notes dysphagia. Denies SOB or CP.     OBJECTIVE:  ICU Vital Signs Last 24 Hrs  T(C): 36.7 (31 Jan 2022 05:35), Max: 36.9 (30 Jan 2022 14:00)  T(F): 98.1 (31 Jan 2022 05:35), Max: 98.5 (30 Jan 2022 14:00)  HR: 93 (31 Jan 2022 08:16) (79 - 102)  BP: 103/50 (31 Jan 2022 05:35) (103/50 - 134/67)  BP(mean): --  ABP: --  ABP(mean): --  RR: 18 (31 Jan 2022 05:35) (18 - 18)  SpO2: 97% (31 Jan 2022 08:16) (95% - 99%)    CAPILLARY BLOOD GLUCOSE    PHYSICAL EXAM:  General: NAD   Cards: S1/S2, no murmurs   Pulm: CTA bilaterally. No wheezes.   Abdomen: Soft, NTND. BS (+)   Extremities: No pedal edema. EFRA of BL upper and lower extremities.  Neurology: AOx3 with no focal neurological deficits     HOSPITAL MEDICATIONS:  MEDICATIONS  (STANDING):  albuterol/ipratropium for Nebulization 3 milliLiter(s) Nebulizer every 6 hours  budesonide  80 MICROgram(s)/formoterol 4.5 MICROgram(s) Inhaler 2 Puff(s) Inhalation two times a day  heparin   Injectable 5000 Unit(s) SubCutaneous every 8 hours  influenza  Vaccine (HIGH DOSE) 0.7 milliLiter(s) IntraMuscular once    MEDICATIONS  (PRN):  acetaminophen     Tablet .. 650 milliGRAM(s) Oral every 6 hours PRN Temp greater or equal to 38C (100.4F), Mild Pain (1 - 3), Moderate Pain (4 - 6)    LABS:                        11.3   7.00  )-----------( 321      ( 31 Jan 2022 07:22 )             34.9     01-31    134<L>  |  99  |  10  ----------------------------<  102<H>  3.3<L>   |  27  |  0.61    Ca    9.1      31 Jan 2022 07:22  Phos  3.2     01-31  Mg     2.10     01-31    PT/INR - ( 31 Jan 2022 08:03 )   PT: 13.4 sec;   INR: 1.18 ratio      PTT - ( 31 Jan 2022 08:03 )  PTT:26.5 sec

## 2022-01-31 NOTE — PROGRESS NOTE ADULT - ASSESSMENT
78 YO Vaccinated x 2 Female with PMHx of former smoking (40-60 pack year, quit 8 yrs ago) and recent dysphagia who presented from outpatient clinic for decreased PO intake, 25lb weight loss, wheezing, SOB and right hilar mass. Now admitted to RCU for further monitoring.     # NEUROLOGY  - AOx3 at baseline with no acute issues.     # CARDIOVASCULAR  - No acute issues.   - Monitor HR/ BP     # RESPIRATORY  - Former smoking (40-60 pack year, quit 8 yrs ago) and now noted with decreased PO intake, 25lb weight loss, wheezing and SOB. Found with right hilar mass with encasement of the right lung airways.   - IR PULM Bronch with Bx and possible stent planned for 2/2  - Continue on Duonebs PRN and Symbicort PRN.   - Continue on NC and wean as tolerated.    # GI  - Dysphagic likely second to right hilar mass and pending SS.   - Continue on PO diet for now with regular diet.     # RENAL  - No acute issues   - Monitor renal function and UOP.     # INFECTIOUS DISEASE  - Vaccinated x 2 with Pfizer (last in 3/2021)   - COVID PCR 1/28  - No acute issues.     # HEME  - DVT PPX with Lovenox while in house.     # ENDOCRINE  - No acute issues.   - A1C 5.7   - Lipid panel with slightly elevated LDL.     # ETHICS/ GOC  - FULL CODE     # DISPO - TBD, likely back home.

## 2022-01-31 NOTE — DIETITIAN INITIAL EVALUATION ADULT. - PERTINENT MEDS FT
ALBUTerol    90 MICROgram(s) HFA Inhaler  budesonide  80 MICROgram(s)/formoterol 4.5 MICROgram(s) Inhaler  potassium chloride    Tablet ER  potassium chloride    Tablet ER

## 2022-01-31 NOTE — DIETITIAN INITIAL EVALUATION ADULT. - ORAL INTAKE PTA/DIET HISTORY
Patient reports decreased po intake x 3 weeks with c/o dysphagia and sensation of food stuck postprandial. Difficulty eating foods such as home fries but tolerates mashed potato better. Patient also notes weight loss associates with overall illness. Weight trending down per HIE as confirmed. 9/2020-89.4kg/196.7lbs, 9/2021-87.1kg/191.6lbs, 1/2022-77.5kg/170.5lbs. Indicative of 21lbs/11% weight loss over 4 months.

## 2022-01-31 NOTE — DIETITIAN INITIAL EVALUATION ADULT. - OTHER INFO
Met with patient at bedside. Patient currently on regular diet, c/o swallowing difficulty. Patient w/ Hilar mass found on CT per chart review. Denies any nausea/vomiting/diarrhea/constipation or difficulty chewing at this time. NKFA. Discussed strategies to increase energy intake, chewing foods well, choosing nutrient dense meals and snacks. All questions and concerns were addressed to patient satisfaction. RD spoke to ACP covering, suggested to consider swallow evaluation to determine appropriate food texture patient may tolerate better.

## 2022-01-31 NOTE — DIETITIAN INITIAL EVALUATION ADULT. - ORAL NUTRITION SUPPLEMENTS
Recommend add PO supplement Ensure Enlive thickened to appropriate fluid consistency if needed per SLP recommendation.

## 2022-02-01 NOTE — PROGRESS NOTE ADULT - NUTRITIONAL ASSESSMENT
This patient has been assessed with a concern for Malnutrition and has been determined to have a diagnosis/diagnoses of Severe protein-calorie malnutrition.    This patient is being managed with:   Diet NPO-  NPO for Procedure/Test     NPO Start Date: 01-Feb-2022   NPO Start Time: 23:59  Except Medications  Entered: Jan 31 2022  4:19PM    Diet Regular-  Entered: Jan 28 2022  4:07PM     This patient has been assessed with a concern for Malnutrition and has been determined to have a diagnosis/diagnoses of Severe protein-calorie malnutrition.    This patient is being managed with:   Diet NPO-  NPO for Procedure/Test     NPO Start Date: 01-Feb-2022   NPO Start Time: 23:59  Except Medications  Entered: Jan 31 2022  4:19PM    Diet Regular- SOFT  Entered: Jan 28 2022  4:07PM

## 2022-02-01 NOTE — PROGRESS NOTE ADULT - ASSESSMENT
76 YO Vaccinated x 2 Female with PMHx of former smoking (40-60 pack year, quit 8 yrs ago) and recent dysphagia who presented from outpatient clinic for decreased PO intake, 25lb weight loss, wheezing, SOB and right hilar mass. Now admitted to RCU for further monitoring.     # NEUROLOGY  - AOx3 at baseline with no acute issues.     # CARDIOVASCULAR  - No acute issues.   - Monitor HR/ BP     # RESPIRATORY  - Former smoking (40-60 pack year, quit 8 yrs ago) and now noted with decreased PO intake, 25lb weight loss, wheezing and SOB. Found with right hilar mass with encasement of the right lung airways.   - IR PULM Bronch with Bx and possible stent planned for 2/2  - Continue on Duonebs PRN and Symbicort PRN.   - Continue on NC and wean as tolerated.    # GI  - Dysphagic likely second to right hilar mass and pending SS.   - Continue on PO diet for now with regular diet.     # RENAL  - No acute issues   - Monitor renal function and UOP.     # INFECTIOUS DISEASE  - Vaccinated x 2 with Pfizer (last in 3/2021)   - COVID PCR 1/28  - No acute issues.     # HEME  - DVT PPX with Lovenox while in house.     # ENDOCRINE  - No acute issues.   - A1C 5.7   - Lipid panel with slightly elevated LDL.     # ETHICS/ GOC  - FULL CODE     # DISPO - TBD, likely back home.    76 YO Vaccinated x 2 Female with PMHx of former smoking (40-60 pack year, quit 8 yrs ago) and recent dysphagia who presented from outpatient clinic for decreased PO intake, 25lb weight loss, wheezing, SOB and right hilar mass. Now admitted to RCU for further monitoring.     # NEUROLOGY  - AOx3 at baseline with no acute issues. [place]    # CARDIOVASCULAR  - No acute issues.   - Monitor HR/ BP     # RESPIRATORY  - Former smoking (40-60 pack year, quit 8 yrs ago) and now noted with decreased PO intake, 25lb weight loss, wheezing and SOB. Found with right hilar mass with encasement of the right lung airways.   - IR PULM Bronch with Bx and possible stent planned for 2/2 tomorrow  - Continue on Duonebs PRN and Symbicort PRN.   - Continue on NC and wean as tolerated, at time of assessment patient noted sitting comfortably on room air, saturation 94%    # GI  - Dysphagic likely second to right hilar mass  - esophogram completed today noted with Narrowing of the esophagus at the level of the patient's known mediastinal mass, with mild delay of passage of the liquid barium and the barium tablet - possible need for esophogeal stent  - Continue on PO diet for now with regular diet - changed to soft diet    # RENAL  - No acute issues   - Monitor renal function and UOP.     # INFECTIOUS DISEASE  - Vaccinated x 2 with Pfizer (last in 3/2021)   - COVID PCR 1/28, Covid PRCR ordered for today for pre-op  - No acute issues.     # HEME  - DVT PPX with Heparin - on hold     # ENDOCRINE  - No acute issues.   - A1C 5.7   - Lipid panel with slightly elevated LDL.     # ETHICS/ GOC  - FULL CODE     # DISPO - TBD, likely back home.

## 2022-02-01 NOTE — PROGRESS NOTE ADULT - SUBJECTIVE AND OBJECTIVE BOX
CHIEF COMPLAINT: Patient is a 77y old  Female who presents with a chief complaint of Wheezing, SOB, right hilar mass. (31 Jan 2022 10:44)    INTERVAL EVENTS:   - No interval events overnight.     ROS: Seen by bedside during AM rounds and notes dysphagia. Denies SOB or CP.     OBJECTIVE:  ICU Vital Signs Last 24 Hrs  T(C): 36.7 (31 Jan 2022 05:35), Max: 36.9 (30 Jan 2022 14:00)  T(F): 98.1 (31 Jan 2022 05:35), Max: 98.5 (30 Jan 2022 14:00)  HR: 93 (31 Jan 2022 08:16) (79 - 102)  BP: 103/50 (31 Jan 2022 05:35) (103/50 - 134/67)  BP(mean): --  ABP: --  ABP(mean): --  RR: 18 (31 Jan 2022 05:35) (18 - 18)  SpO2: 97% (31 Jan 2022 08:16) (95% - 99%)    CAPILLARY BLOOD GLUCOSE    PHYSICAL EXAM:  General: NAD   Cards: S1/S2, no murmurs   Pulm: CTA bilaterally. No wheezes.   Abdomen: Soft, NTND. BS (+)   Extremities: No pedal edema. EFRA of BL upper and lower extremities.  Neurology: AOx3 with no focal neurological deficits     HOSPITAL MEDICATIONS:  MEDICATIONS  (STANDING):  albuterol/ipratropium for Nebulization 3 milliLiter(s) Nebulizer every 6 hours  budesonide  80 MICROgram(s)/formoterol 4.5 MICROgram(s) Inhaler 2 Puff(s) Inhalation two times a day  heparin   Injectable 5000 Unit(s) SubCutaneous every 8 hours  influenza  Vaccine (HIGH DOSE) 0.7 milliLiter(s) IntraMuscular once    MEDICATIONS  (PRN):  acetaminophen     Tablet .. 650 milliGRAM(s) Oral every 6 hours PRN Temp greater or equal to 38C (100.4F), Mild Pain (1 - 3), Moderate Pain (4 - 6)    LABS:                        11.3   7.00  )-----------( 321      ( 31 Jan 2022 07:22 )             34.9     01-31    134<L>  |  99  |  10  ----------------------------<  102<H>  3.3<L>   |  27  |  0.61    Ca    9.1      31 Jan 2022 07:22  Phos  3.2     01-31  Mg     2.10     01-31    PT/INR - ( 31 Jan 2022 08:03 )   PT: 13.4 sec;   INR: 1.18 ratio      PTT - ( 31 Jan 2022 08:03 )  PTT:26.5 sec CHIEF COMPLAINT: Patient is a 77y old  Female who presents with a chief complaint of Wheezing, SOB, right hilar mass. (31 Jan 2022 10:44)    INTERVAL EVENTS:   - No interval events overnight.     ROS: Denies SOB or CP and reports good sleep and rest overnight     OBJECTIVE:  ICU Vital Signs Last 24 Hrs  T(C): 36.7 (31 Jan 2022 05:35), Max: 36.9 (30 Jan 2022 14:00)  T(F): 98.1 (31 Jan 2022 05:35), Max: 98.5 (30 Jan 2022 14:00)  HR: 93 (31 Jan 2022 08:16) (79 - 102)  BP: 103/50 (31 Jan 2022 05:35) (103/50 - 134/67)  RR: 18 (31 Jan 2022 05:35) (18 - 18)  SpO2: 97% (31 Jan 2022 08:16) (95% - 99%)    CAPILLARY BLOOD GLUCOSE    PHYSICAL EXAM:  General: NAD   Cards: S1/S2, no murmurs   Pulm: + expiratory wheeze right upper lobe, Left CTA  Abdomen: Soft, NTND. BS (+)   Extremities: No pedal edema. EFRA of BL upper and lower extremities.  Neurology: AOx3 with no focal neurological deficits   HEENT: + [facility] MEDICATIONS:  MEDICATIONS  (STANDING):  albuterol/ipratropium for Nebulization 3 milliLiter(s) Nebulizer every 6 hours  budesonide  80 MICROgram(s)/formoterol 4.5 MICROgram(s) Inhaler 2 Puff(s) Inhalation two times a day  heparin   Injectable 5000 Unit(s) SubCutaneous every 8 hours  influenza  Vaccine (HIGH DOSE) 0.7 milliLiter(s) IntraMuscular once    MEDICATIONS  (PRN):  acetaminophen     Tablet .. 650 milliGRAM(s) Oral every 6 hours PRN Temp greater or equal to 38C (100.4F), Mild Pain (1 - 3), Moderate Pain (4 - 6)    LABS:                        11.3   7.00  )-----------( 321      ( 31 Jan 2022 07:22 )             34.9     01-31    134<L>  |  99  |  10  ----------------------------<  102<H>  3.3<L>   |  27  |  0.61    Ca    9.1      31 Jan 2022 07:22  Phos  3.2     01-31  Mg     2.10     01-31    PT/INR - ( 31 Jan 2022 08:03 )   PT: 13.4 sec;   INR: 1.18 ratio      PTT - ( 31 Jan 2022 08:03 )  PTT:26.5 sec

## 2022-02-02 NOTE — PROGRESS NOTE ADULT - ATTENDING COMMENTS
agree with above  pleursocopy, pleurx and poss ebus today agree with above  pt for bronchoscopy today  stable on room air

## 2022-02-02 NOTE — CHART NOTE - NSCHARTNOTEFT_GEN_A_CORE
Patient has a right BI stent in her airway. Continue with Bronchodilators, NS nebulizer as ordered and Mucinex. If patient becomes sob overnight please check a CXR Stat. If patient needs to be intubated would perfromed with fiberoptic guidance given new stent placement.    Dae Hyeon Kim [id number]  Pulmonary Critical Care Fellow  Pager [phone]

## 2022-02-02 NOTE — BRIEF OPERATIVE NOTE - OPERATION/FINDINGS
Pt underwent successful rigid bronchoscopy with balloon dilatation of 90% bronchial stenosis of bronchus intermedius and bronchial stent placement in the bronchus intermedius. Pt also underwent flexible bronchoscopy with endobronchial ultrasound-guided biopsy of hilar mass performed. Pt underwent successful rigid bronchoscopy with balloon dilatation of 90% bronchial stenosis of bronchus intermedius, tumor debulking, bronchial stent placement in the bronchus intermedius. Pt also underwent flexible bronchoscopy with endobronchial ultrasound-guided biopsy of hilar mass performed. Pt underwent successful rigid bronchoscopy with balloon dilatation of 90% bronchial stenosis of bronchus intermedius, tumor debulking, bronchial stent placement in the bronchus intermedius. Pt also underwent flexible bronchoscopy with endobronchial ultrasound-guided biopsy of subcarinal mass performed.    #[id number]

## 2022-02-02 NOTE — BRIEF OPERATIVE NOTE - NSICDXBRIEFPROCEDURE_GEN_ALL_CORE_FT
PROCEDURES:  Bronchoscopy, with EBUS and 1 or 2 lymph node sampling 02-Feb-2022 14:42:25  Arnav Smith  Rigid bronchoscopy 02-Feb-2022 14:42:41  Arnav Smith  Bronchoscopy, with balloon dilation 02-Feb-2022 14:43:06  Arnav Smith  Insertion, bronchial stent, right 02-Feb-2022 14:43:24  Arnav Smith   PT DAILY TREATMENT NOTE    Patient Name: Luis Miguel Montgomery  Date:3/5/2020  : 1970  [x]  Patient  Verified  Payor:  / Plan: Lex Ordoñez 74 / Product Type:  /    In time:11:55  Out time:1:03  Total Treatment Time (min): 68  Total Timed Codes (min): 68  1:1 Treatment Time ( [address] only): 68   Visit #: 7 of 12    Treatment Area: Right shoulder pain [M25.511]  Pain in right arm [M79.601]    SUBJECTIVE  Pain Level (0-10 scale): 0  Any medication changes, allergies to medications, adverse drug reactions, diagnosis change, or new procedure performed?: [x] No    [] Yes (see summary sheet for update)  Subjective functional status/changes:   [] No changes reported  \"No pain just really tight in the front. \"    OBJECTIVE        56 min Therapeutic Exercise:  [x] See flow sheet :   Rationale: increase ROM and increase strength to improve the patients ability to perform daily activities with decreased pain and symptom levels      12 min Manual Therapy:  Ribs mobs with breaths, right apical expansion, LORA infraclavicular rib pump with active breath, STM to subclavious and pecs   Rationale: decrease pain, increase ROM and increase tissue extensibility to improve the patients ability to perform daily activities with decreased pain and symptom levels    With   [] TE   [] TA   [] neuro   [] other: Patient Education: [x] Review HEP    [] Progressed/Changed HEP based on:   [] positioning   [] body mechanics   [] transfers   [] heat/ice application    [] other:      Other Objective/Functional Measures:   LORA Special Tests (pre/post)  HGIR:                                                 Right: 45*/60*             Left: 60*  Shoulder Flexion   Right: 125*/155*         Left: 155*  Shoulder Horizontal Abduction          Right: + 35* /-           Left: + 35* /-       Pain Level (0-10 scale) post treatment: 0    ASSESSMENT/Changes in Function: Pt presented to therapy with c/c right anterior/lateral shoulder pain ongoing for months. Pt is making progress towards goals with overall ROM improving however continued decreased serratus and scapular strength noted with exercises with challenge decreasing pec and UT compensation. Pt continues to have pain with push ups and pull ups in shoulder due to poor mechanics and form with increased thoracic and lumbar extension. Pt would benefit from skilled PT services to address remaining unmet goals. Patient will continue to benefit from skilled PT services to modify and progress therapeutic interventions, address functional mobility deficits, address ROM deficits, address strength deficits, analyze and address soft tissue restrictions, analyze and cue movement patterns, analyze and modify body mechanics/ergonomics, assess and modify postural abnormalities and instruct in home and community integration to attain remaining goals. []  See Plan of Care  []  See progress note/recertification  []  See Discharge Summary         Progress towards goals / Updated goals:  Short Term Goals: STG- To be accomplished in 2 week(s):  1.  Pt will be independent with HEP to encourage prophylaxis. Eval:HEP dispensed   Current: compliance, updated today goal MET     Long Term Goals: LTG- To be accomplished in 6 week(s):  1.  Pt will demonstrate ability to complete pull ups without right shoulder pain to demonstrate improved scapular strength. Eval:pain with 1 pullup, increased thoracic extension and rib flare, challenged with PPT  Current: challenged with maintaining PPT with exercises, unable to complete pull ups without pain however able to engage lats with lat pull down now progressing      2.  Pt trunk rotation will improve to at least 15in to demonstrate improvef functional thoracic mobility  Eval: 17in right, 15.5in left   Current: 15.5n right, 15in left almost MET     3.  Pt right shoulder flexion and IR AROM  will improve to [facility] allow pt to complete ADLs with increased ease.   XFSW: 504* flexion ,25* IR  Current: 60* IR, 150* flexion progressing      4.  Pt FOTO score will increase by >/ = 8 points to show improvement in subjective function. Eval:72  Current: 78 progressing      PLAN  [x]  Upgrade activities as tolerated     [x]  Continue plan of care  []  Update interventions per flow sheet       []  Discharge due to:_  []  Other:_      Jaime Shipman 3/5/2020  12:04 PM    No future appointments. PROCEDURES:  Bronchoscopy, with EBUS and 1 or 2 lymph node sampling 02-Feb-2022 14:42:25  Arnav Smith  Rigid bronchoscopy 02-Feb-2022 14:42:41  Arnav Smith  Bronchoscopy, with balloon dilation 02-Feb-2022 14:43:06  Arnav Smith  Insertion, bronchial stent, right 02-Feb-2022 14:43:24  Arnav Smith  Flexible bronchoscopy 02-Feb-2022 14:51:18  Arnav Smith

## 2022-02-02 NOTE — CHART NOTE - NSCHARTNOTEFT_GEN_A_CORE
Pt seen for follow up s/p rigid bronch - tumor debulking and now with BI stent placed. States that she's feeling well, denies any SOB. Satting well on RA. Will continue to monitor closely.     Vital Signs Last 24 Hrs  T(C): 36.8 (02 Feb 2022 21:02), Max: 37.1 (01 Feb 2022 22:15)  T(F): 98.3 (02 Feb 2022 21:02), Max: 98.7 (01 Feb 2022 22:15)  HR: 102 (02 Feb 2022 21:02) (94 - 117)  BP: 129/60 (02 Feb 2022 21:02) (91/67 - 131/64)  BP(mean): 64 (02 Feb 2022 16:30) (62 - 73)  RR: 16 (02 Feb 2022 21:02) (16 - 25)  SpO2: 100% (02 Feb 2022 21:02) (94% - 100%)    RAMAN Irving PA-C  [id number]

## 2022-02-02 NOTE — PROGRESS NOTE ADULT - ASSESSMENT
76 YO Vaccinated x 2 Female with PMHx of former smoking (40-60 pack year, quit 8 yrs ago) and recent dysphagia who presented from outpatient clinic for decreased PO intake, 25lb weight loss, wheezing, SOB and right hilar mass. Now admitted to RCU for further monitoring.     # NEUROLOGY  - AOx3 at baseline with no acute issues. [place]    # CARDIOVASCULAR  - No acute issues.   - Monitor HR/ BP     # RESPIRATORY  - Former smoking (40-60 pack year, quit 8 yrs ago) and now noted with decreased PO intake, 25lb weight loss, wheezing and SOB. Found with right hilar mass with encasement of the right lung airways.   - IR PULM Bronch with Bx and possible stent planned for today 2/2  - Continue on Duonebs PRN and Symbicort PRN.   - Continue on NC and wean as tolerated, at time of assessment patient noted sitting comfortably on room air, saturation 94%    # GI  - Dysphagic likely second to right hilar mass  - esophogram completed today noted with Narrowing of the esophagus at the level of the patient's known mediastinal mass, with mild delay of passage of the liquid barium and the barium tablet - possible need for esophogeal stent  - Continue on PO diet for now with regular diet - changed to soft diet    # RENAL  - No acute issues   - Monitor renal function and UOP.     # INFECTIOUS DISEASE  - Vaccinated x 2 with Pfizer (last in 3/2021)   - COVID PCR 1/28, Covid PRCR ordered for today for pre-op  - No acute issues.     # HEME  - DVT PPX with Heparin - on hold     # ENDOCRINE  - No acute issues.   - A1C 5.7   - Lipid panel with slightly elevated LDL.     # ETHICS/ GOC  - FULL CODE     # DISPO - TBD, likely back home.    76 YO Vaccinated x 2 Female with PMHx of former smoking (40-60 pack year, quit 8 yrs ago) and recent dysphagia who presented from outpatient clinic for decreased PO intake, 25lb weight loss, wheezing, SOB and right hilar mass. Now admitted to RCU for further monitoring.     # NEUROLOGY  - AOx3 at baseline with no acute issues. [place]    # CARDIOVASCULAR  - No acute issues.   - Monitor HR/ BP     # RESPIRATORY  - Former smoking (40-60 pack year, quit 8 yrs ago) and now noted with decreased PO intake, 25lb weight loss, wheezing and SOB.   Found with right hilar mass with encasement of the right lung airways.   - s/p IR PULM rigid Bronch with Bx and bronchial intermedius stent placement  - Continue on Duonebs PRN and Symbicort PRN.   - c/w Bronchodilator, NS nebulizer, and Mucinex    # GI  - Dysphagic likely second to right hilar mass  - esophogram completed on 2/1 noted with Narrowing of the esophagus at the level of the patient's known mediastinal mass,   with mild delay of passage of the liquid barium and the barium tablet   - GI following - recommends clear liquid only, advance as tolerated - no role for advance GI and stent placement at this time    # RENAL  - No acute issues   - Monitor renal function and UOP.     # INFECTIOUS DISEASE  - Vaccinated x 2 with Pfizer (last in 3/2021)   - COVID PCR 1/28, Covid PRCR ordered for today for pre-op  - No acute issues.     # HEME  - DVT PPX with Lovenox sc daily     # ENDOCRINE  - No acute issues.   - A1C 5.7   - Lipid panel with slightly elevated LDL.     # ETHICS/ GOC  - FULL CODE     # DISPO - TBD, likely back home.

## 2022-02-02 NOTE — PROGRESS NOTE ADULT - NUTRITIONAL ASSESSMENT
This patient has been assessed with a concern for Malnutrition and has been determined to have a diagnosis/diagnoses of Severe protein-calorie malnutrition.    This patient is being managed with:   Diet Regular-  Pureed (PUREED)  Entered: Feb 1 2022 11:45AM    Diet NPO-  NPO for Procedure/Test     NPO Start Date: 01-Feb-2022   NPO Start Time: 23:59  Except Medications  Entered: Jan 31 2022  4:19PM

## 2022-02-02 NOTE — PROGRESS NOTE ADULT - SUBJECTIVE AND OBJECTIVE BOX
CHIEF COMPLAINT: Patient is a 77y old  Female who presents with a chief complaint of Wheezing, SOB, right hilar mass. (01 Feb 2022 08:16)    Interval Events:    REVIEW OF SYSTEMS:  [ ] All other systems negative  [ ] Unable to assess ROS because ________      OBJECTIVE:  ICU Vital Signs Last 24 Hrs  T(C): 36.9 (02 Feb 2022 05:58), Max: 37.1 (01 Feb 2022 22:15)  T(F): 98.5 (02 Feb 2022 05:58), Max: 98.7 (01 Feb 2022 22:15)  HR: 95 (02 Feb 2022 05:58) (94 - 98)  BP: 118/53 (02 Feb 2022 05:58) (118/53 - 131/64)  BP(mean): 69 (02 Feb 2022 05:58) (69 - 69)  ABP: --  ABP(mean): --  RR: 16 (02 Feb 2022 05:58) (16 - 18)  SpO2: 95% (02 Feb 2022 05:58) (94% - 99%)      CAPILLARY BLOOD GLUCOSE    HOSPITAL MEDICATIONS:  MEDICATIONS  (STANDING):  ALBUTerol    90 MICROgram(s) HFA Inhaler 1 Puff(s) Inhalation every 4 hours  budesonide  80 MICROgram(s)/formoterol 4.5 MICROgram(s) Inhaler 2 Puff(s) Inhalation two times a day  chlorhexidine 4% Liquid 1 Application(s) Topical daily  influenza  Vaccine (HIGH DOSE) 0.7 milliLiter(s) IntraMuscular once    MEDICATIONS  (PRN):  acetaminophen     Tablet .. 650 milliGRAM(s) Oral every 6 hours PRN Temp greater or equal to 38C (100.4F), Mild Pain (1 - 3), Moderate Pain (4 - 6)  ALBUTerol    0.083% 2.5 milliGRAM(s) Nebulizer every 6 hours PRN Shortness of Breath and/or Wheezing      LABS:                        12.2   8.66  )-----------( 344      ( 02 Feb 2022 04:12 )             37.6     02-02    135  |  100  |  9   ----------------------------<  77  4.0   |  24  |  0.67    Ca    9.4      02 Feb 2022 04:12  Phos  3.4     02-02  Mg     2.00     02-02      PT/INR - ( 02 Feb 2022 04:12 )   PT: 13.6 sec;   INR: 1.20 ratio       PTT - ( 02 Feb 2022 04:12 )  PTT:33.0 sec    PAST MEDICAL & SURGICAL HISTORY:  No pertinent past medical history    No significant past surgical history      FAMILY HISTORY:    Social History:  Former smoker, 40 + pack years, no ETOH,   .  Lives with  (28 Jan 2022 15:03)      RADIOLOGY:  [ ] Reviewed and interpreted by me    PULMONARY FUNCTION TESTS:    EKG: CHIEF COMPLAINT: Patient is a 77y old  Female who presents with a chief complaint of Wheezing, SOB, right hilar mass. (01 Feb 2022 08:16)    Interval Events: None reported overnight. Pt is s/p rigid bronch today, s/p tumor debulking, and bronchial Intermedius stent placement.      REVIEW OF SYSTEMS:  See above  [x] All other systems negative      OBJECTIVE:  ICU Vital Signs Last 24 Hrs  T(C): 36.9 (02 Feb 2022 05:58), Max: 37.1 (01 Feb 2022 22:15)  T(F): 98.5 (02 Feb 2022 05:58), Max: 98.7 (01 Feb 2022 22:15)  HR: 95 (02 Feb 2022 05:58) (94 - 98)  BP: 118/53 (02 Feb 2022 05:58) (118/53 - 131/64)  BP(mean): 69 (02 Feb 2022 05:58) (69 - 69)  ABP: --  ABP(mean): --  RR: 16 (02 Feb 2022 05:58) (16 - 18)  SpO2: 95% (02 Feb 2022 05:58) (94% - 99%)    PHYSICAL EXAM  General: Laying in bed, NAD   Cards: +s1, s2  Pulm: Normal respiratory effort, + expiratory wheezing right upper lobe, otherwise clear to auscultation   Abdomen: Soft, non tender/non distended, no peritoneal signs noted   Extremities: No pedal edema, no cyanosis  Neurology: Anon focal     CAPILLARY BLOOD GLUCOSE    HOSPITAL MEDICATIONS:  MEDICATIONS  (STANDING):  ALBUTerol    90 MICROgram(s) HFA Inhaler 1 Puff(s) Inhalation every 4 hours  budesonide  80 MICROgram(s)/formoterol 4.5 MICROgram(s) Inhaler 2 Puff(s) Inhalation two times a day  chlorhexidine 4% Liquid 1 Application(s) Topical daily  influenza  Vaccine (HIGH DOSE) 0.7 milliLiter(s) IntraMuscular once    MEDICATIONS  (PRN):  acetaminophen     Tablet .. 650 milliGRAM(s) Oral every 6 hours PRN Temp greater or equal to 38C (100.4F), Mild Pain (1 - 3), Moderate Pain (4 - 6)  ALBUTerol    0.083% 2.5 milliGRAM(s) Nebulizer every 6 hours PRN Shortness of Breath and/or Wheezing      LABS:                        12.2   8.66  )-----------( 344      ( 02 Feb 2022 04:12 )             37.6     02-02    135  |  100  |  9   ----------------------------<  77  4.0   |  24  |  0.67    Ca    9.4      02 Feb 2022 04:12  Phos  3.4     02-02  Mg     2.00     02-02      PT/INR - ( 02 Feb 2022 04:12 )   PT: 13.6 sec;   INR: 1.20 ratio       PTT - ( 02 Feb 2022 04:12 )  PTT:33.0 sec    PAST MEDICAL & SURGICAL HISTORY:  No pertinent past medical history    No significant past surgical history      FAMILY HISTORY:    Social History:  Former smoker, 40 + pack years, no ETOH,   .  Lives with  (28 Jan 2022 15:03)      RADIOLOGY:  [ ] Reviewed and interpreted by me    PULMONARY FUNCTION TESTS:    EKG:

## 2022-02-03 NOTE — DISCHARGE NOTE PROVIDER - NSDCFUADDAPPT_GEN_ALL_CORE_FT
- Please follow up with [facility] [address] [place] [address] Brayan HANSEN, [place] [address]. Please call [phone] to make appointment.   - Please follow up with Dr. Goode outpatient on 2/18/22 @ 1030AM   - Please follow up with your PCP outpatient

## 2022-02-03 NOTE — DISCHARGE NOTE PROVIDER - CARE PROVIDERS DIRECT ADDRESSES
,DirectAddress_Unknown,DirectAddress_Unknown ,DirectAddress_Unknown,DirectAddress_Unknown,[email or web address]

## 2022-02-03 NOTE — DISCHARGE NOTE PROVIDER - NSDCMRMEDTOKEN_GEN_ALL_CORE_FT
acetaminophen 325 mg oral tablet: 2 tab(s) orally every 6 hours, As needed, Temp greater or equal to 38C (100.4F), Mild Pain (1 - 3), Moderate Pain (4 - 6)  budesonide-formoterol 80 mcg-4.5 mcg/inh inhalation aerosol: 1 puff(s) inhaled 2 times a day   guaiFENesin 600 mg oral tablet, extended release: 1 tab(s) orally every 12 hours  ipratropium-albuterol 0.5 mg-2.5 mg/3 mL inhalation solution: 3 milliliter(s) inhaled every 8 hours  ipratropium-albuterol 0.5 mg-2.5 mg/3 mL inhalation solution: 3 milliliter(s) by nebulizer every 6 hours PRN for shortness of breath or wheezing.   sodium chloride 0.9% inhalation solution: 5 milliliter(s) inhaled every 8 hours   acetaminophen 325 mg oral tablet: 2 tab(s) orally every 6 hours, As needed, Temp greater or equal to 38C (100.4F), Mild Pain (1 - 3), Moderate Pain (4 - 6)  guaiFENesin 600 mg oral tablet, extended release: 1 tab(s) orally every 12 hours  ipratropium-albuterol 0.5 mg-2.5 mg/3 mL inhalation solution: 3 milliliter(s) inhaled every 8 hours  ipratropium-albuterol 0.5 mg-2.5 mg/3 mL inhalation solution: 3 milliliter(s) by nebulizer every 6 hours PRN for shortness of breath or wheezing.   sodium chloride 0.9% inhalation solution: 5 milliliter(s) inhaled every 8 hours   acetaminophen 325 mg oral tablet: 2 tab(s) orally every 6 hours, As needed, Temp greater or equal to 38C (100.4F), Mild Pain (1 - 3), Moderate Pain (4 - 6)  budesonide-formoterol 80 mcg-4.5 mcg/inh inhalation aerosol: 1 puff(s) inhaled 2 times a day  guaiFENesin 600 mg oral tablet, extended release: 1 tab(s) orally every 12 hours  ipratropium-albuterol 0.5 mg-2.5 mg/3 mL inhalation solution: 3 milliliter(s) inhaled every 8 hours  ipratropium-albuterol 0.5 mg-2.5 mg/3 mL inhalation solution: 3 milliliter(s) by nebulizer every 6 hours PRN for shortness of breath or wheezing.   sodium chloride 0.9% inhalation solution: 5 milliliter(s) inhaled every 8 hours

## 2022-02-03 NOTE — PROGRESS NOTE ADULT - SUBJECTIVE AND OBJECTIVE BOX
Anesthesia Post Operative Note    s/p day 1 of procedure: Rigid bronchoscopy and stent    77y Female POSTOP DAY 1 S/P     Vital Signs Last 24 Hrs  T(C): 37 (03 Feb 2022 05:49), Max: 37 (02 Feb 2022 15:05)  T(F): 98.6 (03 Feb 2022 05:49), Max: 98.6 (02 Feb 2022 15:05)  HR: 93 (03 Feb 2022 05:49) (89 - 117)  BP: 126/59 (03 Feb 2022 05:49) (91/67 - 129/60)  BP(mean): 75 (03 Feb 2022 05:49) (62 - 75)  RR: 18 (03 Feb 2022 05:49) (16 - 25)  SpO2: 100% (03 Feb 2022 05:49) (95% - 100%)    Patient seen at: 11:14 am    Doing well, no anesthetic complications or complaints noted or reported.  Pain is controlled.

## 2022-02-03 NOTE — DISCHARGE NOTE PROVIDER - HOSPITAL COURSE
76 YO Vaccinated x 2 (last dose 3/2021) [place] Female with PMHx of former smoking (40-60 pack year, quit 8 yrs ago) and recent dysphagia who presented from outpatient clinic for decreased PO intake, 25lb weight loss, wheezing, SOB and found with right hilar mass on outpatient CT. Presented to [facility] for evaluation and management. IP called and s/p Bronchoscopy with balloon dilatation of 90% bronchial stenosis of bronchus intermedius, tumor debulking, bronchial stent placement in the bronchus intermedius and endobronchial bx. 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 Post bronchoscopy, continued on Duonebs and NS 0.9% TID for stent patency. Course complicated by SOB and rhonchus breath sounds. CXR 2/5 concerning for RML plug. CT CHEST performed on 2/5 with subcarinal mass (2.6x4.0cm) with mass effect on BI, stent patent, RML with partial atelectasis and tree-in-bud nodules/ mucous impaction. Duonebs Q6H PRN continued in addition to stent therapy above. Aerobika given and SOB improved. Lung sounds persisted however will likely persist given mass effect. MRI BRAIN with no lesions. HemeONC consulted and [place] [facility]. RadONC and PET scan to be decided upon and performed outpatient.     Patient also noted with dysphagia and seen by SS and GI. Dysphagia unlikely to be mechanical issue, however more likely obstructive given mass affect. No stent needed. Continued on Puree diet, however preferred Puree and Full Liquid diet. Patient to continue on state diet with Ensure at home.     On 2/8, case discussed with Dr. Lisker and patient is medically cleared and stable for discharge home and she will follow up with Dr. Goode outpatient in office on 2/18/2022 @ 1030AM.

## 2022-02-03 NOTE — PROGRESS NOTE ADULT - SUBJECTIVE AND OBJECTIVE BOX
CHIEF COMPLAINT:    Interval Events:    REVIEW OF SYSTEMS:  Constitutional:   Eyes:  ENT:  CV:  Resp:  GI:  :  MSK:  Integumentary:  Neurological:  Psychiatric:  Endocrine:  Hematologic/Lymphatic:  Allergic/Immunologic:  [ ] All other systems negative  [ ] Unable to assess ROS because ________    OBJECTIVE:  ICU Vital Signs Last 24 Hrs  T(C): 37 (03 Feb 2022 05:49), Max: 37.1 (02 Feb 2022 11:45)  T(F): 98.6 (03 Feb 2022 05:49), Max: 98.7 (02 Feb 2022 11:45)  HR: 93 (03 Feb 2022 05:49) (89 - 117)  BP: 126/59 (03 Feb 2022 05:49) (91/67 - 129/60)  BP(mean): 75 (03 Feb 2022 05:49) (62 - 75)  ABP: --  ABP(mean): --  RR: 18 (03 Feb 2022 05:49) (16 - 25)  SpO2: 100% (03 Feb 2022 05:49) (94% - 100%)        02-02 @ 07:01  -  02-03 @ 07:00  --------------------------------------------------------  IN: 120 mL / OUT: 0 mL / NET: 120 mL      CAPILLARY BLOOD GLUCOSE          PHYSICAL EXAM:  General:   HEENT:   Lymph Nodes:  Neck:   Respiratory:   Cardiovascular:   Abdomen:   Extremities:   Skin:   Neurological:  Psychiatry:    HOSPITAL MEDICATIONS:  MEDICATIONS  (STANDING):  albuterol/ipratropium for Nebulization 3 milliLiter(s) Nebulizer every 8 hours  budesonide  80 MICROgram(s)/formoterol 4.5 MICROgram(s) Inhaler 2 Puff(s) Inhalation two times a day  chlorhexidine 4% Liquid 1 Application(s) Topical daily  enoxaparin Injectable 40 milliGRAM(s) SubCutaneous daily  guaiFENesin  milliGRAM(s) Oral every 12 hours  influenza  Vaccine (HIGH DOSE) 0.7 milliLiter(s) IntraMuscular once  sodium chloride 0.9% for Nebulization 5 milliLiter(s) Nebulizer every 8 hours    MEDICATIONS  (PRN):  acetaminophen     Tablet .. 650 milliGRAM(s) Oral every 6 hours PRN Temp greater or equal to 38C (100.4F), Mild Pain (1 - 3), Moderate Pain (4 - 6)  albuterol/ipratropium for Nebulization 3 milliLiter(s) Nebulizer every 6 hours PRN Shortness of Breath and/or Wheezing  benzocaine 15 mG/menthol 3.6 mG Lozenge 1 Lozenge Oral every 4 hours PRN Sore Throat      LABS:                        12.9   x     )-----------( 414      ( 03 Feb 2022 06:59 )             38.6     02-02    135  |  100  |  9   ----------------------------<  77  4.0   |  24  |  0.67    Ca    9.4      02 Feb 2022 04:12  Phos  3.4     02-02  Mg     2.00     02-02      PT/INR - ( 02 Feb 2022 04:12 )   PT: 13.6 sec;   INR: 1.20 ratio         PTT - ( 02 Feb 2022 04:12 )  PTT:33.0 sec          MICROBIOLOGY:         RADIOLOGY:  [ ] Reviewed and interpreted by me    PULMONARY FUNCTION TESTS:    EKG: CHIEF COMPLAINT: Patient is a 77y old  Female who presents with a chief complaint of Wheezing, SOB, right hilar mass. (03 Feb 2022 07:25)    Interval Events: None reported overnight. Clinically stable. VSS    REVIEW OF SYSTEMS:  See above  [x] All other systems negative      OBJECTIVE:  ICU Vital Signs Last 24 Hrs  T(C): 37 (03 Feb 2022 05:49), Max: 37.1 (02 Feb 2022 11:45)  T(F): 98.6 (03 Feb 2022 05:49), Max: 98.7 (02 Feb 2022 11:45)  HR: 93 (03 Feb 2022 05:49) (89 - 117)  BP: 126/59 (03 Feb 2022 05:49) (91/67 - 129/60)  BP(mean): 75 (03 Feb 2022 05:49) (62 - 75)  ABP: --  ABP(mean): --  RR: 18 (03 Feb 2022 05:49) (16 - 25)  SpO2: 100% (03 Feb 2022 05:49) (94% - 100%)    02-02 @ 07:01  -  02-03 @ 07:00  --------------------------------------------------------  IN: 120 mL / OUT: 0 mL / NET: 120 mL    CAPILLARY BLOOD GLUCOSE    HOSPITAL MEDICATIONS:  MEDICATIONS  (STANDING):  albuterol/ipratropium for Nebulization 3 milliLiter(s) Nebulizer every 8 hours  budesonide  80 MICROgram(s)/formoterol 4.5 MICROgram(s) Inhaler 2 Puff(s) Inhalation two times a day  chlorhexidine 4% Liquid 1 Application(s) Topical daily  enoxaparin Injectable 40 milliGRAM(s) SubCutaneous daily  guaiFENesin  milliGRAM(s) Oral every 12 hours  influenza  Vaccine (HIGH DOSE) 0.7 milliLiter(s) IntraMuscular once  sodium chloride 0.9% for Nebulization 5 milliLiter(s) Nebulizer every 8 hours    MEDICATIONS  (PRN):  acetaminophen     Tablet .. 650 milliGRAM(s) Oral every 6 hours PRN Temp greater or equal to 38C (100.4F), Mild Pain (1 - 3), Moderate Pain (4 - 6)  albuterol/ipratropium for Nebulization 3 milliLiter(s) Nebulizer every 6 hours PRN Shortness of Breath and/or Wheezing  benzocaine 15 mG/menthol 3.6 mG Lozenge 1 Lozenge Oral every 4 hours PRN Sore Throat      PHYSICAL EXAM  General: Laying in bed, NAD   Cards: +s1, s2  Pulm: Normal respiratory effort, + expiratory wheezing right upper lobe, otherwise clear to auscultation   Abdomen: Soft, non tender/non distended, no peritoneal signs noted   Extremities: No pedal edema, no cyanosis  Neurology: Anon focal                         12.9   13.99 )-----------( 414      ( 03 Feb 2022 06:59 )             38.6     02-03    134<L>  |  99  |  10  ----------------------------<  169<H>  4.3   |  24  |  0.62    Ca    9.9      03 Feb 2022 06:59  Phos  3.4     02-03  Mg     2.00     02-03      PT/INR - ( 02 Feb 2022 04:12 )   PT: 13.6 sec;   INR: 1.20 ratio         PTT - ( 02 Feb 2022 04:12 )  PTT:33.0 sec    PAST MEDICAL & SURGICAL HISTORY:  No pertinent past medical history    No significant past surgical history    FAMILY HISTORY:    Social History:  Former smoker, 40 + pack years, no ETOH,   .  Lives with  (28 Jan 2022 15:03)      RADIOLOGY:  [ ] Reviewed and interpreted by me    PULMONARY FUNCTION TESTS:    EKG: CHIEF COMPLAINT: Patient is a 77y old  Female who presents with a chief complaint of Wheezing, SOB, right hilar mass. (03 Feb 2022 07:25)    Interval Events: None reported overnight. Clinically stable. VSS. Patient reports feeling well. She is saturating well on RA.     REVIEW OF SYSTEMS:  See above  [x] All other systems negative    OBJECTIVE:  ICU Vital Signs Last 24 Hrs  T(C): 37 (03 Feb 2022 05:49), Max: 37.1 (02 Feb 2022 11:45)  T(F): 98.6 (03 Feb 2022 05:49), Max: 98.7 (02 Feb 2022 11:45)  HR: 93 (03 Feb 2022 05:49) (89 - 117)  BP: 126/59 (03 Feb 2022 05:49) (91/67 - 129/60)  BP(mean): 75 (03 Feb 2022 05:49) (62 - 75)  ABP: --  ABP(mean): --  RR: 18 (03 Feb 2022 05:49) (16 - 25)  SpO2: 100% (03 Feb 2022 05:49) (94% - 100%)    02-02 @ 07:01  -  02-03 @ 07:00  --------------------------------------------------------  IN: 120 mL / OUT: 0 mL / NET: 120 mL    CAPILLARY BLOOD GLUCOSE    HOSPITAL MEDICATIONS:  MEDICATIONS  (STANDING):  albuterol/ipratropium for Nebulization 3 milliLiter(s) Nebulizer every 8 hours  budesonide  80 MICROgram(s)/formoterol 4.5 MICROgram(s) Inhaler 2 Puff(s) Inhalation two times a day  chlorhexidine 4% Liquid 1 Application(s) Topical daily  enoxaparin Injectable 40 milliGRAM(s) SubCutaneous daily  guaiFENesin  milliGRAM(s) Oral every 12 hours  influenza  Vaccine (HIGH DOSE) 0.7 milliLiter(s) IntraMuscular once  sodium chloride 0.9% for Nebulization 5 milliLiter(s) Nebulizer every 8 hours    MEDICATIONS  (PRN):  acetaminophen     Tablet .. 650 milliGRAM(s) Oral every 6 hours PRN Temp greater or equal to 38C (100.4F), Mild Pain (1 - 3), Moderate Pain (4 - 6)  albuterol/ipratropium for Nebulization 3 milliLiter(s) Nebulizer every 6 hours PRN Shortness of Breath and/or Wheezing  benzocaine 15 mG/menthol 3.6 mG Lozenge 1 Lozenge Oral every 4 hours PRN Sore Throat      PHYSICAL EXAM  General: Laying in bed, NAD   Cards: +s1, s2  Pulm: Normal respiratory effort, + expiratory wheezing right upper lobe, otherwise clear to auscultation   Abdomen: Soft, non tender/non distended, no peritoneal signs noted   Extremities: No pedal edema, no cyanosis  Neurology: Anon focal                         12.9   13.99 )-----------( 414      ( 03 Feb 2022 06:59 )             38.6     02-03    134<L>  |  99  |  10  ----------------------------<  169<H>  4.3   |  24  |  0.62    Ca    9.9      03 Feb 2022 06:59  Phos  3.4     02-03  Mg     2.00     02-03      PT/INR - ( 02 Feb 2022 04:12 )   PT: 13.6 sec;   INR: 1.20 ratio         PTT - ( 02 Feb 2022 04:12 )  PTT:33.0 sec    PAST MEDICAL & SURGICAL HISTORY:  No pertinent past medical history    No significant past surgical history    FAMILY HISTORY:    Social History:  Former smoker, 40 + pack years, no ETOH,   .  Lives with  (28 Jan 2022 15:03)      RADIOLOGY:  [ ] Reviewed and interpreted by me    PULMONARY FUNCTION TESTS:    EKG:

## 2022-02-03 NOTE — CONSULT NOTE ADULT - ASSESSMENT
78 yo F with no significant PMH who presented from outpatient clinic for wheezing, SOB, and a right hilar mass, preliminary biopsy results show non-small cell lung carcinoma.    #Lung Carcinoma  - Patient has had wheezing since mid January, found to have a RLL mass by Pulmonology  - Discussed with Pulmonology, mass involves station 7 lymph node. It extends into the posterior mediastinum, subcarinal space, and right hilum with focal severe narrowing of the RUL bronchus, diffuse severe narrowing of the bronchus intermedius, and subtotal occlusion of the RLL bronchus. It is inseparable from the esophagus at the level of the juan m, but no esophageal dilation. Also as seen on CT, there are shoddy precarinal, right paratracheal, and anterior-posterior window LNs of inconclusive significance  - S/p bronchoscopy with endobronchial stent of the bronchus intermedius, tumor debulking, and biopsy of the lung mass  - Preliminary biopsy results show non-small cell carcinoma per Pulmonology  - Awaiting final pathology. Will also need NGS and PDL-1 testing  - Likely stage III, f/u bronchoscopy staging as LNs were removed for examination  - Obtain MRI brain to evaluate for metastasis  - Likely will need PET scan outpatient  - Unlikely to be amenable to surgery at this time given infiltrative extent of disease and LAD  - Will likely need radiation but can set up Radiation Oncology outpatient appointment  - Patient also should have appointment with Medical Oncology at [facility] about a week after discharge to begin chemotherapy (vs immunotherapy) treatment as an outpatient      Aryles Hedjar, MD, PGY-4  Hematology/Oncology Fellow  [facility]  Pager: [phone]  After 5PM and on weekends and holidays, please call the inpatient fellow on call.
Impression:  # Dysphagia secondary to mediastinal mass compressing: Given extrinsic compression causing symptoms, no role for EGD with stent placement  # Mediastinal mass    Recommendation:  - f/u pulm to delineate primary cause of mass  - no role for advanced GI and stent placement  - diet as tolerated, aspiration precaustion  - would place on liquid diet only  - supportive care    Dawood Bell  [phone]  [id number]  Please call/page on call fellow on weekends and weekdays after 5pm

## 2022-02-03 NOTE — PROGRESS NOTE ADULT - ATTENDING COMMENTS
agree with above  s/p bronch, BI stent placement  stable pulmonary status  prelim is NSCLC  MRI brain pending

## 2022-02-03 NOTE — DISCHARGE NOTE PROVIDER - NSDCCPCAREPLAN_GEN_ALL_CORE_FT
PRINCIPAL DISCHARGE DIAGNOSIS  Diagnosis: Pulmonary mass  Assessment and Plan of Treatment: - You were noted with shortness of breath and wheezing and found with a right medastinal/ hilar mass on CT scans.   - You are not status post a bronchoscopy with stent placement on 2/2  - Continue on Duonebs and Normal Saline 0.9% nebulized three times a day and Duonebs every 6 hours as needed for shortness of breath.   - Continue on Mucinex twice a day.   - Continue with Aerobika three times a day.   - Please follow up with Dr. Goode outpatient on 2/18/22 @ 1030 AM.   - Please follow up with [address], [place] [address]. Please call [phone] to make an appointment with 1 week from discharge to start chemotherapy treatment.   - You may need to see a radiation oncologist and have a PET scan outpatient, but [facility] will arrange for this.      SECONDARY DISCHARGE DIAGNOSES  Diagnosis: Dysphagia  Assessment and Plan of Treatment: - You were noted with difficult swallowing and the feeling of food being "stuck" in your chest. This is cause by mass compression of your esophagus.   - Please eat smaller bites, pureed food and/or a full liquid diet with ensure three times a day.   - Please follow up with your primary care physician in 1-2 weeks from discharge.     PRINCIPAL DISCHARGE DIAGNOSIS  Diagnosis: Pulmonary mass  Assessment and Plan of Treatment: - You were noted with shortness of breath and wheezing and found with a right medastinal/ hilar mass on CT scans.   - You are not status post a bronchoscopy with stent placement on 2/2  - Continue on Duonebs and Normal Saline 0.9% nebulized three times a day and Duonebs every 6 hours as needed for shortness of breath.   - Continue on Mucinex twice a day.   - Continue with Aerobika three times a day.   - Please follow up with Dr. Goode outpatient on 2/18/22 @ 1030 AM.   - Please follow up with [facility] [address], [place] [address]. Please call [phone] to make an appointment with 1 week from discharge to start chemotherapy treatment.   - You may need to see a radiation oncologist and have a PET scan outpatient, but [facility] will arrange for this.      SECONDARY DISCHARGE DIAGNOSES  Diagnosis: Chronic obstructive pulmonary disease, unspecified COPD type  Assessment and Plan of Treatment: - You were noted with a smoking history and reported you quit. Given your history and wheezing, you likely have COPD.   - Please continue on Symbicort twice a day.   - With improvement in breathing, please continue to follow up with Dr. Goode or Dr. Mcqueen for pulmonary function testing outpatient.    Diagnosis: Dysphagia  Assessment and Plan of Treatment: - You were noted with difficult swallowing and the feeling of food being "stuck" in your chest. This is cause by mass compression of your esophagus.   - Please eat smaller bites, pureed food and/or a full liquid diet with ensure three times a day.   - Please follow up with your primary care physician in 1-2 weeks from discharge.     PRINCIPAL DISCHARGE DIAGNOSIS  Diagnosis: Pulmonary mass  Assessment and Plan of Treatment: - You were noted with shortness of breath and wheezing and found with a right medastinal/ hilar mass on CT scans.   - You are not status post a bronchoscopy with stent placement on 2/2  - Continue on Duonebs and Normal Saline 0.9% nebulized three times a day and Duonebs every 6 hours as needed for shortness of breath.   - Continue on Mucinex twice a day.   - Continue with Aerobika three times a day.   - Please follow up with Dr. Goode outpatient on 2/18/22 @ 1030 AM.   - Please follow up with [facility] [address], [place] [address]. Please call [phone] to make an appointment with 1 week from discharge to start chemotherapy treatment.   - You may need to see a radiation oncologist and have a PET scan outpatient, but [facility] will arrange for this.      SECONDARY DISCHARGE DIAGNOSES  Diagnosis: Chronic obstructive pulmonary disease, unspecified COPD type  Assessment and Plan of Treatment: - You were noted with a smoking history and reported you quit. Given your history and wheezing, you likely have COPD.   - Please follow up with pulmonary office for maintenance inhaler.   - With improvement in breathing, please continue to follow up with Dr. Goode or Dr. Mcqueen for pulmonary function testing outpatient.    Diagnosis: Dysphagia  Assessment and Plan of Treatment: - You were noted with difficult swallowing and the feeling of food being "stuck" in your chest. This is cause by mass compression of your esophagus.   - Please eat smaller bites, pureed food and/or a full liquid diet with ensure three times a day.   - Please follow up with your primary care physician in 1-2 weeks from discharge.     PRINCIPAL DISCHARGE DIAGNOSIS  Diagnosis: Pulmonary mass  Assessment and Plan of Treatment: - You were noted with shortness of breath and wheezing and found with a right medastinal/ hilar mass on CT scans.   - You are not status post a bronchoscopy with stent placement on 2/2  - Continue on Duonebs and Normal Saline 0.9% nebulized three times a day and Duonebs every 6 hours as needed for shortness of breath.   - Continue on Mucinex twice a day.   - Continue with Aerobika three times a day.   - Please follow up with Dr. Goode outpatient on 2/18/22 @ 1030 AM.   - Please follow up with [facility] [address], [place] [address]. Please call [phone] to make an appointment with 1 week from discharge to start chemotherapy treatment.   - You may need to see a radiation oncologist and have a PET scan outpatient, but [facility] will arrange for this.      SECONDARY DISCHARGE DIAGNOSES  Diagnosis: Chronic obstructive pulmonary disease, unspecified COPD type  Assessment and Plan of Treatment: - You were noted with a smoking history and reported you quit. Given your history and wheezing, you likely have COPD.   - Continue on Symbicort twice a day. Please follow up with pulmonary office for maintenance inhaler.   - With improvement in breathing, please continue to follow up with Dr. Goode or Dr. Mcqueen for pulmonary function testing outpatient.    Diagnosis: Dysphagia  Assessment and Plan of Treatment: - You were noted with difficult swallowing and the feeling of food being "stuck" in your chest. This is cause by mass compression of your esophagus.   - Please eat smaller bites, pureed food and/or a full liquid diet with ensure three times a day.   - Please follow up with your primary care physician in 1-2 weeks from discharge.

## 2022-02-03 NOTE — CONSULT NOTE ADULT - ATTENDING COMMENTS
76 y/o F, no medical care for many years, +significant smoking history, p/w worsening SOB and wheezing and found to have large R hilar lung mass. Imaging studies reviewed in detail, c/w severe narrowing of the RUL bronchus and subtotal occlusion of RLL bronchus. S/p bronchoscopy with endobronchial stent placement and tumor debulking by interventional pulmonary. Preliminary biopsy c/w NSCLC, and f/u biopsy of mediastinal node sampling for staging and treatment planning, likely at least stage III disease. MRI brain for complete staging. Diagnosis of NSCLC and potential treatment options reviewed in detail with the patient at bedside. No plans for inpatient chemotherapy. Will continue to f/u with you.
77 year old female with a mediastinal mass. Pt complains of dysphagia. Esophagram shows extrinsic compression due to the mass.    Plan: No role for esophageal stent with extrinsic compression.

## 2022-02-03 NOTE — PROGRESS NOTE ADULT - NUTRITIONAL ASSESSMENT
This patient has been assessed with a concern for Malnutrition and has been determined to have a diagnosis/diagnoses of Severe protein-calorie malnutrition.    This patient is being managed with:   Diet Full Liquid-  Entered: Feb 2 2022  6:01PM

## 2022-02-03 NOTE — DISCHARGE NOTE PROVIDER - CARE PROVIDER_API CALL
[facility] [address] [place] [address]. [phone],   Phone: (   )    -  Fax: (   )    -  Follow Up Time:     Cyrus Dodd)  [address] [place] [address], [place] [address]  Phone: [phone]  Fax: [phone]  Scheduled Appointment: 02/18/2022 10:30 AM   [facility] [address] [place] [address]. [phone],   Phone: (   )    -  Fax: (   )    -  Follow Up Time:     Cyrus Dodd)  [address] [place] [address]  Phone: [phone]  Fax: [phone]  Scheduled Appointment: 02/18/2022 10:30 AM    Rosey Mcqueen)  Critical Care Medicine; Internal Medicine; Pulmonary Disease; Sleep Medicine  [address] [place] [address]  Phone: [phone]  Fax: [phone]  Follow Up Time:

## 2022-02-03 NOTE — DISCHARGE NOTE PROVIDER - YES NO FOR MLM POSITIVE OR NEGATIVE COVID RESULT
critical illness/venous access/hemodynamic monitoring , Total Parenteral Nutrition/TPN/venous access

## 2022-02-03 NOTE — PROGRESS NOTE ADULT - ASSESSMENT
76 YO Vaccinated x 2 Female with PMHx of former smoking (40-60 pack year, quit 8 yrs ago) and recent dysphagia who presented from outpatient clinic for decreased PO intake, 25lb weight loss, wheezing, SOB and right hilar mass. Now admitted to RCU for further monitoring.     # NEUROLOGY  - AOx3 at baseline with no acute issues. [place]    # CARDIOVASCULAR  - No acute issues.   - Monitor HR/ BP     # RESPIRATORY  - Former smoking (40-60 pack year, quit 8 yrs ago) and now noted with decreased PO intake, 25lb weight loss, wheezing and SOB.   Found with right hilar mass with encasement of the right lung airways.   - s/p IR PULM rigid Bronch with Bx and bronchial intermedius stent placement  - Continue on Duonebs PRN and Symbicort PRN.   - c/w Bronchodilator, NS nebulizer, and Mucinex    # GI  - Dysphagic likely second to right hilar mass  - esophogram completed on 2/1 noted with Narrowing of the esophagus at the level of the patient's known mediastinal mass,   with mild delay of passage of the liquid barium and the barium tablet   - GI following - recommends clear liquid only, advance as tolerated - no role for advance GI and stent placement at this time    # RENAL  - No acute issues   - Monitor renal function and UOP.     # INFECTIOUS DISEASE  - Vaccinated x 2 with Pfizer (last in 3/2021)   - COVID PCR 1/28, Covid PRCR ordered for today for pre-op  - No acute issues.     # HEME  - DVT PPX with Lovenox sc daily     # ENDOCRINE  - No acute issues.   - A1C 5.7   - Lipid panel with slightly elevated LDL.     # ETHICS/ GOC  - FULL CODE     # DISPO - TBD, likely back home.    78 YO Vaccinated x 2 Female with PMHx of former smoking (40-60 pack year, quit 8 yrs ago) and recent dysphagia who presented from outpatient clinic for decreased PO intake, 25lb weight loss, wheezing, SOB and right hilar mass. Now admitted to RCU for further monitoring.     # NEUROLOGY  - AOx3 at baseline with no acute issues. [place]  - MRI brain w IV contrast pending     # CARDIOVASCULAR  - No acute issues.   - Monitor HR/ BP     # RESPIRATORY  - Former smoking (40-60 pack year, quit 8 yrs ago) and now noted with decreased PO intake, 25lb weight loss, wheezing and SOB.   Found with right hilar mass with encasement of the right lung airways.   - s/p IR PULM rigid Bronch with Bx and bronchial intermedius stent placement  - Continue on Duonebs PRN and Symbicort PRN.   - c/w Bronchodilator, NS nebulizer, and Mucinex  - CXR done (2/3) - Right bronchial stent is in place. No pneumomediastinum or pneumothorax    # GI  - Dysphagic likely second to right hilar mass  - esophogram completed on 2/1 noted with Narrowing of the esophagus at the level of the patient's known mediastinal mass,   with mild delay of passage of the liquid barium and the barium tablet   - Diet advanced from Liquid to Pureed - Tolerating well - GI following: no role for advance GI and stent placement at this time    # RENAL  - No acute issues   - Monitor renal function and UOP.     # INFECTIOUS DISEASE  - Vaccinated x 2 with Pfizer (last in 3/2021)   - COVID PCR 1/28, Covid PRCR ordered for today for pre-op  - No acute issues.     # HEME  - DVT PPX with Lovenox sc daily     # ENDOCRINE  - No acute issues.   - A1C 5.7   - Lipid panel with slightly elevated LDL.     # ETHICS/ GOC  - FULL CODE     # DISPO - TBD, likely back home.

## 2022-02-03 NOTE — CONSULT NOTE ADULT - SUBJECTIVE AND OBJECTIVE BOX
Chief Complaint:  Patient is a 77y old  Female who presents with a chief complaint of Wheezing, SOB, right hilar mass. (02 Feb 2022 08:54)      HPI: Pt is a 76 yo Female with PMHx of chronic smoking (40-60 pack year, quit 8 yrs ago) who had subacute dysphagia who presented from outpatient clinic for decreased PO intake, 25lb weight loss, wheezing, SOB and right hilar mass. No prior hx of dysphagia. No melena, hematochezia, or chest pain. Does complain of shortness of breath.  Allergies:  No Known Allergies      Home Medications:    Hospital Medications:  acetaminophen     Tablet .. 650 milliGRAM(s) Oral every 6 hours PRN  ALBUTerol    0.083% 2.5 milliGRAM(s) Nebulizer every 6 hours PRN  ALBUTerol    90 MICROgram(s) HFA Inhaler 1 Puff(s) Inhalation every 4 hours  budesonide  80 MICROgram(s)/formoterol 4.5 MICROgram(s) Inhaler 2 Puff(s) Inhalation two times a day  chlorhexidine 4% Liquid 1 Application(s) Topical daily  influenza  Vaccine (HIGH DOSE) 0.7 milliLiter(s) IntraMuscular once      PMHX/PSHX:  No pertinent past medical history    No significant past surgical history        Family history:      Social History:     ROS: As per HPI, 14-point ROS negative otherwise.    General:  No wt loss, fevers, chills, night sweats, fatigue,   Eyes:  Good vision, no reported pain  ENT:  No sore throat, pain, runny nose, dysphagia  CV:  No pain, palpitations, hypo/hypertension  Resp:  No dyspnea, cough, tachypnea, wheezing  GI:  See HPI  :  No pain, bleeding, incontinence, nocturia  Muscle:  No pain, weakness  Neuro:  No weakness, tingling, memory problems  Psych:  No fatigue, insomnia, mood problems, depression  Endocrine:  No polyuria, polydipsia, cold/heat intolerance  Heme:  No petechiae, ecchymosis, easy bruisability  Skin:  No rash, edema      PHYSICAL EXAM:     Vital Signs:  Vital Signs Last 24 Hrs  T(C): 36.9 (02 Feb 2022 05:58), Max: 37.1 (01 Feb 2022 22:15)  T(F): 98.5 (02 Feb 2022 05:58), Max: 98.7 (01 Feb 2022 22:15)  HR: 105 (02 Feb 2022 09:47) (94 - 107)  BP: 118/53 (02 Feb 2022 05:58) (118/53 - 131/64)  BP(mean): 69 (02 Feb 2022 05:58) (69 - 69)  RR: 16 (02 Feb 2022 05:58) (16 - 18)  SpO2: 94% (02 Feb 2022 09:47) (94% - 99%)  Daily     Daily     GENERAL:  Appears stated age, well-groomed, well-nourished, no distress  HEENT:  NC/AT,  conjunctivae clear and pink  CHEST:  Full & symmetric excursion, no increased effort  HEART:  Regular rhythm, no JVD  ABDOMEN:  Soft, non-tender, non-distended, normoactive bowel sounds,  no masses , no hepatosplenomegaly  EXTREMITIES:  no cyanosis, clubbing or edema  SKIN:  No rash/erythema/ecchymoses/petechiae/wounds/abscess/warm/dry  NEURO:  Alert, oriented, nonfocal    LABS:                        12.2   8.66  )-----------( 344      ( 02 Feb 2022 04:12 )             37.6     02-02    135  |  100  |  9   ----------------------------<  77  4.0   |  24  |  0.67    Ca    9.4      02 Feb 2022 04:12  Phos  3.4     02-02  Mg     2.00     02-02        PT/INR - ( 02 Feb 2022 04:12 )   PT: 13.6 sec;   INR: 1.20 ratio         PTT - ( 02 Feb 2022 04:12 )  PTT:33.0 sec        Imaging:        < from: Xray Esophagram Single Contrast (02.01.22 @ 08:42) >  IMPRESSION:    Narrowing of the esophagus at the level of the patient's known   mediastinal mass, with mild delay of passage of the liquid barium and the   barium tablet.    --- End of Report ---    < end of copied text >    
78 yo F with no significant PMH who presented from outpatient clinic for wheezing, SOB, and a right hilar mass.    The patient does not have significant past medical history. She was feeling well until earlier this month, initially only with occasional bronchitis but became wheezing and with ACKERMAN. No personal or family history of lung disease. Due to her wheezing and SOB, the patient has seen multiple outpatient doctors for her SOB and wheezing. She has since had several course of oral steroids but no bronchodilators.     She was sent in by her pulmonologist as outpatient who noted a large right sided hilar mass and was sent to the ED. She also endorses decreased PO intake and also 25lb weight loss in the last several weeks. She has had some pain in her right shoulder, not related to respirations, for the past couple months. She also states that in the last several days she has felt sensation of something being stuck in the middle of her chest after eating. Currently does not have that pain or sensation and is tolerating PO. Currently she is feeling well, on RA at 100 percent and feeling some improvement with albuterol. Denies any change in voice, chocking sensation or sore throat.     Upon admission, she was seen by Pulmonology and had a bronchoscopy with an endobronchial stent of the bronchus intermedius, with tumor debulking, and biopsy. The preliminary biopsy shows non-small cell carcinoma per Pulmonology, which prompted the Oncology consult.    Allergies  No Known Allergies    MEDICATIONS  (STANDING):  albuterol/ipratropium for Nebulization 3 milliLiter(s) Nebulizer every 8 hours  budesonide  80 MICROgram(s)/formoterol 4.5 MICROgram(s) Inhaler 2 Puff(s) Inhalation two times a day  chlorhexidine 4% Liquid 1 Application(s) Topical daily  enoxaparin Injectable 40 milliGRAM(s) SubCutaneous daily  guaiFENesin  milliGRAM(s) Oral every 12 hours  influenza  Vaccine (HIGH DOSE) 0.7 milliLiter(s) IntraMuscular once  sodium chloride 0.9% for Nebulization 5 milliLiter(s) Nebulizer every 8 hours    MEDICATIONS  (PRN):  acetaminophen     Tablet .. 650 milliGRAM(s) Oral every 6 hours PRN Temp greater or equal to 38C (100.4F), Mild Pain (1 - 3), Moderate Pain (4 - 6)  albuterol/ipratropium for Nebulization 3 milliLiter(s) Nebulizer every 6 hours PRN Shortness of Breath and/or Wheezing  benzocaine 15 mG/menthol 3.6 mG Lozenge 1 Lozenge Oral every 4 hours PRN Sore Throat      PAST MEDICAL & SURGICAL HISTORY:  No pertinent past medical history    No significant past surgical history        FAMILY HISTORY:  Family history of stomach cancer      SOCIAL HISTORY: Smoked about 3/4 pack a day x 40-50 years, quit 7 years ago. No significant alcohol or drug use.    REVIEW OF SYSTEMS:  CONSTITUTIONAL: no fever  EYES/ENT: No visual changes; no throat pain   NECK: No pain or stiffness  RESPIRATORY: +SOB, wheezing, cough  CARDIOVASCULAR: No chest pain or palpitations  GASTROINTESTINAL: No abdominal pain. No N/V/D/C  GENITOURINARY: No dysuria or hematuria  NEUROLOGICAL: No numbness or focal weakness  SKIN: No itching, burning, rashes, or lesions   Psych: No depression   MSK: no joint pain  Allergy: no urticaria         T(F): 98.5 (02-03-22 @ 13:09), Max: 98.6 (02-03-22 @ 05:49)  HR: 100 (02-03-22 @ 14:51)  BP: 133/73 (02-03-22 @ 13:09)  RR: 20 (02-03-22 @ 13:09)  SpO2: 97% (02-03-22 @ 13:09)  Wt(kg): --    GENERAL: NAD, patient appears comfortable  HEENT: EOMI, MMM, no oropharyngeal lesions or erythema appreciated  Pulm: audible expiratory wheezing without stethoscope auscultation and expiratory and inspiratory wheezing bilaterally on stethoscope auscultation. No rhonchi or rales appreciated. No accessory muscle use for respirations  CV: RRR, S1, S2, no m/g/r  ABDOMEN: soft, NT, ND, no masses felt, no HSM  MSK: nl ROM  EXTREMITIES: no appreciable edema in b/l LE  Neuro: A&Ox3, no focal deficits. CNs II-XII intact, sensation intact bilateral UEs/LEs, strength 5/5 bilateral UEs/LEs  SKIN: warm and dry, no visible rash                          12.9   13.99 )-----------( 414      ( 03 Feb 2022 06:59 )             38.6       02-03    134<L>  |  99  |  10  ----------------------------<  169<H>  4.3   |  24  |  0.62    Ca    9.9      03 Feb 2022 06:59  Phos  3.4     02-03  Mg     2.00     02-03        Phosphorus Level, Serum: 3.4 mg/dL (02-03 @ 06:59)  Magnesium, Serum: 2.00 mg/dL (02-03 @ 06:59)

## 2022-02-04 NOTE — PROGRESS NOTE ADULT - NUTRITIONAL ASSESSMENT
This patient has been assessed with a concern for Malnutrition and has been determined to have a diagnosis/diagnoses of Severe protein-calorie malnutrition.    This patient is being managed with:   Diet Regular-  Pureed (PUREED)  Entered: Feb  3 2022 10:57AM

## 2022-02-04 NOTE — PROGRESS NOTE ADULT - ATTENDING COMMENTS
agree with above  s/p bronch, BI stent placement  stable pulmonary status  prelim is NSCLC  MRI brain pending agree with above  s/p bronch, BI stent placement  stable pulmonary status  prelim is NSCLC  oncology evaluation appreciated  pt will need MRI Brain, PET scan, outpt oncology and pulm follow up

## 2022-02-04 NOTE — PROGRESS NOTE ADULT - SUBJECTIVE AND OBJECTIVE BOX
CHIEF COMPLAINT: Patient is a 77y old  Female who presents with a chief complaint of Wheezing, SOB, right hilar mass. (03 Feb 2022 07:25)    Interval Events: None reported overnight. Clinically stable. VSS. Patient reports feeling well. She is saturating well on RA.     REVIEW OF SYSTEMS:  See above  [x] All other systems negative    ICU Vital Signs Last 24 Hrs  T(C): 37.2 (04 Feb 2022 05:00), Max: 37.2 (04 Feb 2022 05:00)  T(F): 98.9 (04 Feb 2022 05:00), Max: 98.9 (04 Feb 2022 05:00)  HR: 93 (04 Feb 2022 05:00) (88 - 108)  BP: 141/67 (04 Feb 2022 05:00) (117/52 - 141/67)  RR: 20 (04 Feb 2022 05:00) (20 - 20)  SpO2: 100% (04 Feb 2022 05:00) (97% - 100%)    02-02 @ 07:01  -  02-03 @ 07:00  --------------------------------------------------------  IN: 120 mL / OUT: 0 mL / NET: 120 mL    CAPILLARY BLOOD GLUCOSE    HOSPITAL MEDICATIONS:  MEDICATIONS  (STANDING):  albuterol/ipratropium for Nebulization 3 milliLiter(s) Nebulizer every 8 hours  budesonide  80 MICROgram(s)/formoterol 4.5 MICROgram(s) Inhaler 2 Puff(s) Inhalation two times a day  chlorhexidine 4% Liquid 1 Application(s) Topical daily  enoxaparin Injectable 40 milliGRAM(s) SubCutaneous daily  guaiFENesin  milliGRAM(s) Oral every 12 hours  influenza  Vaccine (HIGH DOSE) 0.7 milliLiter(s) IntraMuscular once  sodium chloride 0.9% for Nebulization 5 milliLiter(s) Nebulizer every 8 hours    MEDICATIONS  (PRN):  acetaminophen     Tablet .. 650 milliGRAM(s) Oral every 6 hours PRN Temp greater or equal to 38C (100.4F), Mild Pain (1 - 3), Moderate Pain (4 - 6)  albuterol/ipratropium for Nebulization 3 milliLiter(s) Nebulizer every 6 hours PRN Shortness of Breath and/or Wheezing  benzocaine 15 mG/menthol 3.6 mG Lozenge 1 Lozenge Oral every 4 hours PRN Sore Throat      PHYSICAL EXAM  General: Laying in bed, NAD   Cards: +s1, s2  Pulm: Normal respiratory effort, + expiratory wheezing right upper lobe, otherwise clear to auscultation   Abdomen: Soft, non tender/non distended, no peritoneal signs noted   Extremities: No pedal edema, no cyanosis  Neurology: Anon focal                                        11.5   9.45  )-----------( 354      ( 04 Feb 2022 08:01 )             36.6     02-04    136  |  98  |  10  ----------------------------<  91  4.1   |  26  |  0.63    Ca    9.0      04 Feb 2022 08:01  Phos  3.1     02-04  Mg     2.00     02-04    TPro  6.7  /  Alb  3.2<L>  /  TBili  0.5  /  DBili  x   /  AST  25  /  ALT  21  /  AlkPhos  72  02-04      PAST MEDICAL & SURGICAL HISTORY:  No pertinent past medical history    No significant past surgical history    FAMILY HISTORY:    Social History:  Former smoker, 40 + pack years, no ETOH,   .  Lives with  (28 Jan 2022 15:03)      RADIOLOGY:  [ ] Reviewed and interpreted by me    PULMONARY FUNCTION TESTS:    EKG:

## 2022-02-04 NOTE — PROVIDER CONTACT NOTE (OTHER) - SITUATION
Patient had elevated temp 110.5, denies complaints at present, PA made aware, Tylenol given as ordered

## 2022-02-04 NOTE — PROGRESS NOTE ADULT - ASSESSMENT
76 YO Vaccinated x 2 Female with PMHx of former smoking (40-60 pack year, quit 8 yrs ago) and recent dysphagia who presented from outpatient clinic for decreased PO intake, 25lb weight loss, wheezing, SOB and right hilar mass. Now admitted to RCU for further monitoring.     # NEUROLOGY  - AOx3 at baseline with no acute issues. [place]  - MRI brain w IV contrast pending     # CARDIOVASCULAR  - No acute issues.   - Monitor HR/ BP     # RESPIRATORY  - Former smoking (40-60 pack year, quit 8 yrs ago) and now noted with decreased PO intake, 25lb weight loss, wheezing and SOB.   Found with right hilar mass with encasement of the right lung airways.   - s/p IR PULM rigid Bronch with Bx and bronchial intermedius stent placement  - Continue on Duonebs PRN and Symbicort PRN.   - c/w Bronchodilator, NS nebulizer, and Mucinex  - CXR done (2/3) - Right bronchial stent is in place. No pneumomediastinum or pneumothorax    # GI  - Dysphagic likely second to right hilar mass  - esophogram completed on 2/1 noted with Narrowing of the esophagus at the level of the patient's known mediastinal mass,   with mild delay of passage of the liquid barium and the barium tablet   - Diet advanced from Liquid to Pureed - Tolerating well - GI following: no role for advance GI and stent placement at this time    # RENAL  - No acute issues   - Monitor renal function and UOP.     # INFECTIOUS DISEASE  - Vaccinated x 2 with Pfizer (last in 3/2021)   - COVID PCR 1/28, Covid PRCR ordered for today for pre-op  - No acute issues.     # HEME  - DVT PPX with Lovenox sc daily     # ENDOCRINE  - No acute issues.   - A1C 5.7   - Lipid panel with slightly elevated LDL.     # ETHICS/ GOC  - FULL CODE     # DISPO - TBD, likely back home.

## 2022-02-05 NOTE — PROGRESS NOTE ADULT - SUBJECTIVE AND OBJECTIVE BOX
CHIEF COMPLAINT: Patient is a 77y old  Female who presents with a chief complaint of Wheezing, SOB, right hilar mass. (03 Feb 2022 07:25)    Interval Events: None reported overnight. Clinically stable. VSS. Patient reports feeling well. She is saturating well on RA.     REVIEW OF SYSTEMS:  See above  [x] All other systems negative  Vital Signs Last 24 Hrs  T(C): 37.1 (05 Feb 2022 05:26), Max: 38.1 (04 Feb 2022 17:50)  T(F): 98.7 (05 Feb 2022 05:26), Max: 100.5 (04 Feb 2022 17:50)  HR: 81 (05 Feb 2022 05:26) (81 - 120)  BP: 122/60 (05 Feb 2022 05:26) (104/58 - 132/73)  RR: 18 (05 Feb 2022 05:26) (16 - 18)  SpO2: 100% (05 Feb 2022 05:26) (95% - 100%)    02-02 @ 07:01  -  02-03 @ 07:00  --------------------------------------------------------  IN: 120 mL / OUT: 0 mL / NET: 120 mL    CAPILLARY BLOOD GLUCOSE    HOSPITAL MEDICATIONS:  MEDICATIONS  (STANDING):  albuterol/ipratropium for Nebulization 3 milliLiter(s) Nebulizer every 8 hours  budesonide  80 MICROgram(s)/formoterol 4.5 MICROgram(s) Inhaler 2 Puff(s) Inhalation two times a day  chlorhexidine 4% Liquid 1 Application(s) Topical daily  enoxaparin Injectable 40 milliGRAM(s) SubCutaneous daily  guaiFENesin  milliGRAM(s) Oral every 12 hours  influenza  Vaccine (HIGH DOSE) 0.7 milliLiter(s) IntraMuscular once  sodium chloride 0.9% for Nebulization 5 milliLiter(s) Nebulizer every 8 hours    MEDICATIONS  (PRN):  acetaminophen     Tablet .. 650 milliGRAM(s) Oral every 6 hours PRN Temp greater or equal to 38C (100.4F), Mild Pain (1 - 3), Moderate Pain (4 - 6)  albuterol/ipratropium for Nebulization 3 milliLiter(s) Nebulizer every 6 hours PRN Shortness of Breath and/or Wheezing  benzocaine 15 mG/menthol 3.6 mG Lozenge 1 Lozenge Oral every 4 hours PRN Sore Throat      PHYSICAL EXAM  General: Laying in bed, NAD   Cards: +s1, s2  Pulm: Normal respiratory effort, + expiratory wheezing right upper lobe, otherwise clear to auscultation   Abdomen: Soft, non tender/non distended, no peritoneal signs noted   Extremities: No pedal edema, no cyanosis  Neurology: Anon focal                           12.2   8.67  )-----------( 332      ( 05 Feb 2022 07:22 )             38.0   02-05    136  |  98  |  8   ----------------------------<  88  3.6   |  26  |  0.58    Ca    9.2      05 Feb 2022 07:22  Phos  3.1     02-04  Mg     2.00     02-04    TPro  6.7  /  Alb  3.1<L>  /  TBili  0.5  /  DBili  x   /  AST  19  /  ALT  19  /  AlkPhos  74  02-05        PAST MEDICAL & SURGICAL HISTORY:  No pertinent past medical history    No significant past surgical history    FAMILY HISTORY:    Social History:  Former smoker, 40 + pack years, no ETOH,   .  Lives with  (28 Jan 2022 15:03)      RADIOLOGY:  [ ] Reviewed and interpreted by me    PULMONARY FUNCTION TESTS:    EKG: CHIEF COMPLAINT: Patient is a 77y old  Female who presents with a chief complaint of Wheezing, SOB, right hilar mass. (03 Feb 2022 07:25)    Interval Events: None reported overnight. Patient c/o increased wheezing.  Clinically stable. VSS. Patient reports feeling well. She is saturating well on RA.     REVIEW OF SYSTEMS:  See above  [x] All other systems negative  Vital Signs Last 24 Hrs  T(C): 37.1 (05 Feb 2022 05:26), Max: 38.1 (04 Feb 2022 17:50)  T(F): 98.7 (05 Feb 2022 05:26), Max: 100.5 (04 Feb 2022 17:50)  HR: 81 (05 Feb 2022 05:26) (81 - 120)  BP: 122/60 (05 Feb 2022 05:26) (104/58 - 132/73)  RR: 18 (05 Feb 2022 05:26) (16 - 18)  SpO2: 100% (05 Feb 2022 05:26) (95% - 100%)    02-02 @ 07:01  -  02-03 @ 07:00  --------------------------------------------------------  IN: 120 mL / OUT: 0 mL / NET: 120 mL    CAPILLARY BLOOD GLUCOSE    HOSPITAL MEDICATIONS:  MEDICATIONS  (STANDING):  albuterol/ipratropium for Nebulization 3 milliLiter(s) Nebulizer every 8 hours  budesonide  80 MICROgram(s)/formoterol 4.5 MICROgram(s) Inhaler 2 Puff(s) Inhalation two times a day  chlorhexidine 4% Liquid 1 Application(s) Topical daily  enoxaparin Injectable 40 milliGRAM(s) SubCutaneous daily  guaiFENesin  milliGRAM(s) Oral every 12 hours  influenza  Vaccine (HIGH DOSE) 0.7 milliLiter(s) IntraMuscular once  sodium chloride 0.9% for Nebulization 5 milliLiter(s) Nebulizer every 8 hours    MEDICATIONS  (PRN):  acetaminophen     Tablet .. 650 milliGRAM(s) Oral every 6 hours PRN Temp greater or equal to 38C (100.4F), Mild Pain (1 - 3), Moderate Pain (4 - 6)  albuterol/ipratropium for Nebulization 3 milliLiter(s) Nebulizer every 6 hours PRN Shortness of Breath and/or Wheezing  benzocaine 15 mG/menthol 3.6 mG Lozenge 1 Lozenge Oral every 4 hours PRN Sore Throat      PHYSICAL EXAM  General: Laying in bed, NAD   Cards: +s1, s2  Pulm: Normal respiratory effort, + expiratory wheezing right upper lobe,+ audible inspiratory wheezing   Abdomen: Soft, non tender/non distended, no peritoneal signs noted   Extremities: No pedal edema, no cyanosis  Neurology: Anon focal                           12.2   8.67  )-----------( 332      ( 05 Feb 2022 07:22 )             38.0   02-05    136  |  98  |  8   ----------------------------<  88  3.6   |  26  |  0.58    Ca    9.2      05 Feb 2022 07:22  Phos  3.1     02-04  Mg     2.00     02-04    TPro  6.7  /  Alb  3.1<L>  /  TBili  0.5  /  DBili  x   /  AST  19  /  ALT  19  /  AlkPhos  74  02-05        PAST MEDICAL & SURGICAL HISTORY:  No pertinent past medical history    No significant past surgical history    FAMILY HISTORY:    Social History:  Former smoker, 40 + pack years, no ETOH,   .  Lives with  (28 Jan 2022 15:03)      RADIOLOGY:  [ ] Reviewed and interpreted by me    PULMONARY FUNCTION TESTS:    EKG: CHIEF COMPLAINT: Patient is a 77y old  Female who presents with a chief complaint of Wheezing, SOB, right hilar mass. (03 Feb 2022 07:25)    Interval Events: None reported overnight. Patient c/o increased audible wheezing.  Clinically stable. VSS. Patient reports feeling well. She is saturating well on RA.     REVIEW OF SYSTEMS:  See above  [x] All other systems negative  Vital Signs Last 24 Hrs  T(C): 37.1 (05 Feb 2022 05:26), Max: 38.1 (04 Feb 2022 17:50)  T(F): 98.7 (05 Feb 2022 05:26), Max: 100.5 (04 Feb 2022 17:50)  HR: 81 (05 Feb 2022 05:26) (81 - 120)  BP: 122/60 (05 Feb 2022 05:26) (104/58 - 132/73)  RR: 18 (05 Feb 2022 05:26) (16 - 18)  SpO2: 100% (05 Feb 2022 05:26) (95% - 100%)    02-02 @ 07:01  -  02-03 @ 07:00  --------------------------------------------------------  IN: 120 mL / OUT: 0 mL / NET: 120 mL    CAPILLARY BLOOD GLUCOSE    HOSPITAL MEDICATIONS:  MEDICATIONS  (STANDING):  albuterol/ipratropium for Nebulization 3 milliLiter(s) Nebulizer every 8 hours  budesonide  80 MICROgram(s)/formoterol 4.5 MICROgram(s) Inhaler 2 Puff(s) Inhalation two times a day  chlorhexidine 4% Liquid 1 Application(s) Topical daily  enoxaparin Injectable 40 milliGRAM(s) SubCutaneous daily  guaiFENesin  milliGRAM(s) Oral every 12 hours  influenza  Vaccine (HIGH DOSE) 0.7 milliLiter(s) IntraMuscular once  sodium chloride 0.9% for Nebulization 5 milliLiter(s) Nebulizer every 8 hours    MEDICATIONS  (PRN):  acetaminophen     Tablet .. 650 milliGRAM(s) Oral every 6 hours PRN Temp greater or equal to 38C (100.4F), Mild Pain (1 - 3), Moderate Pain (4 - 6)  albuterol/ipratropium for Nebulization 3 milliLiter(s) Nebulizer every 6 hours PRN Shortness of Breath and/or Wheezing  benzocaine 15 mG/menthol 3.6 mG Lozenge 1 Lozenge Oral every 4 hours PRN Sore Throat      PHYSICAL EXAM  General: Laying in bed, NAD   Cards: +s1, s2  Pulm: Normal respiratory effort, + expiratory wheezing right upper lobe,+ audible inspiratory wheezing   Abdomen: Soft, non tender/non distended, no peritoneal signs noted   Extremities: No pedal edema, no cyanosis  Neurology: Anon focal                           12.2   8.67  )-----------( 332      ( 05 Feb 2022 07:22 )             38.0   02-05    136  |  98  |  8   ----------------------------<  88  3.6   |  26  |  0.58    Ca    9.2      05 Feb 2022 07:22  Phos  3.1     02-04  Mg     2.00     02-04    TPro  6.7  /  Alb  3.1<L>  /  TBili  0.5  /  DBili  x   /  AST  19  /  ALT  19  /  AlkPhos  74  02-05        PAST MEDICAL & SURGICAL HISTORY:  No pertinent past medical history    No significant past surgical history    FAMILY HISTORY:    Social History:  Former smoker, 40 + pack years, no ETOH,   .  Lives with  (28 Jan 2022 15:03)      RADIOLOGY:  [ ] Reviewed and interpreted by me    PULMONARY FUNCTION TESTS:    EKG:

## 2022-02-05 NOTE — PROGRESS NOTE ADULT - ATTENDING COMMENTS
agree with above  s/p bronch, BI stent placement  stable pulmonary status  prelim is NSCLC  oncology evaluation appreciated  pt will need MRI Brain, PET scan, outpt oncology and pulm follow up agree with above  s/p bronch, BI stent placement  stable pulmonary status  prelim is NSCLC  oncology evaluation appreciated  pt will need MRI Brain, PET scan, outpt oncology and pulm follow up  F/U CT chest  Awaiting repeat Speech and swallow eval.  + Aerobika Patient seen and examined. Agree with above. Patient is a 77F who presents with SOB and wheezing in the setting of a new right hilar mass. She underwent bronchoscopy with BI stent placement. She has had overall improvement but has had continued cough and wheezing.    1. Acute Hypoxemic Respiratory Failure - improved. Continue airway clearance and mobilization of secretions. Will ask RT to provide aerobika. Continue bronchodilators  - CXR with concern for RML mucus plugging/atelectasis - will plan for CT scan to evaluate for occlusion or stent migration  - Tentatively planned for followup bronchoscopy with Dr. Dodd next week  2. Oropharyngeal dysphagia - initially due to tumor compression  - Followup repeat swallow evaluation now that patient has had tumor debulking and stent placement  - Aspiration precautions  3. Suspected NSCLC - final pathology pending  - Oncology evaluation  - outpatient PET/CT scan  - MRI brain pending  4. DVT proph

## 2022-02-05 NOTE — PROGRESS NOTE ADULT - ASSESSMENT
76 YO Vaccinated x 2 Female with PMHx of former smoking (40-60 pack year, quit 8 yrs ago) and recent dysphagia who presented from outpatient clinic for decreased PO intake, 25lb weight loss, wheezing, SOB and right hilar mass. Now admitted to RCU for further monitoring.     # NEUROLOGY  - AOx3 at baseline with no acute issues. [place]  - MRI brain w IV contrast pending     # CARDIOVASCULAR  - No acute issues.   - Monitor HR/ BP     # RESPIRATORY  - Former smoking (40-60 pack year, quit 8 yrs ago) and now noted with decreased PO intake, 25lb weight loss, wheezing and SOB.   Found with right hilar mass with encasement of the right lung airways.   - s/p IR PULM rigid Bronch with Bx and bronchial intermedius stent placement  - Continue on Duonebs PRN and Symbicort PRN.   - c/w Bronchodilator, NS nebulizer, and Mucinex  - CXR done (2/3) - Right bronchial stent is in place. No pneumomediastinum or pneumothorax    # GI  - Dysphagic likely second to right hilar mass  - esophogram completed on 2/1 noted with Narrowing of the esophagus at the level of the patient's known mediastinal mass,   with mild delay of passage of the liquid barium and the barium tablet   - Diet advanced from Liquid to Pureed - Tolerating well - GI following: no role for advance GI and stent placement at this time    # RENAL  - No acute issues   - Monitor renal function and UOP.     # INFECTIOUS DISEASE  - Vaccinated x 2 with Pfizer (last in 3/2021)   - COVID PCR 1/28, Covid PRCR ordered for today for pre-op  - No acute issues.     # HEME  - DVT PPX with Lovenox sc daily     # ENDOCRINE  - No acute issues.   - A1C 5.7   - Lipid panel with slightly elevated LDL.     # ETHICS/ GOC  - FULL CODE     # DISPO - TBD, likely back home.

## 2022-02-06 NOTE — PROGRESS NOTE ADULT - SUBJECTIVE AND OBJECTIVE BOX
CHIEF COMPLAINT: Patient is a 77y old  Female who presents with a chief complaint of Wheezing, SOB, right hilar mass. (03 Feb 2022 07:25)    Interval Events: None reported overnight. Patient c/o increased audible wheezing.  Clinically stable. VSS. Patient reports feeling well. She is saturating well on RA.     REVIEW OF SYSTEMS:  See above  [x] All other systems negative    ICU Vital Signs Last 24 Hrs  T(C): 36.7 (06 Feb 2022 06:11), Max: 37.3 (05 Feb 2022 23:21)  T(F): 98.1 (06 Feb 2022 06:11), Max: 99.2 (05 Feb 2022 23:21)  HR: 95 (06 Feb 2022 06:11) (90 - 107)  BP: 132/64 (06 Feb 2022 06:11) (130/58 - 132/64)  BP(mean): --  ABP: --  ABP(mean): --  RR: 18 (06 Feb 2022 06:11) (18 - 18)  SpO2: 100% (06 Feb 2022 06:11) (98% - 100%)    02-02 @ 07:01  -  02-03 @ 07:00  --------------------------------------------------------  IN: 120 mL / OUT: 0 mL / NET: 120 mL    CAPILLARY BLOOD GLUCOSE    HOSPITAL MEDICATIONS:  MEDICATIONS  (STANDING):  albuterol/ipratropium for Nebulization 3 milliLiter(s) Nebulizer every 8 hours  budesonide  80 MICROgram(s)/formoterol 4.5 MICROgram(s) Inhaler 2 Puff(s) Inhalation two times a day  chlorhexidine 4% Liquid 1 Application(s) Topical daily  enoxaparin Injectable 40 milliGRAM(s) SubCutaneous daily  guaiFENesin  milliGRAM(s) Oral every 12 hours  influenza  Vaccine (HIGH DOSE) 0.7 milliLiter(s) IntraMuscular once  sodium chloride 0.9% for Nebulization 5 milliLiter(s) Nebulizer every 8 hours    MEDICATIONS  (PRN):  acetaminophen     Tablet .. 650 milliGRAM(s) Oral every 6 hours PRN Temp greater or equal to 38C (100.4F), Mild Pain (1 - 3), Moderate Pain (4 - 6)  albuterol/ipratropium for Nebulization 3 milliLiter(s) Nebulizer every 6 hours PRN Shortness of Breath and/or Wheezing  benzocaine 15 mG/menthol 3.6 mG Lozenge 1 Lozenge Oral every 4 hours PRN Sore Throat      PHYSICAL EXAM  General: Laying in bed, NAD   Cards: +s1, s2  Pulm: Normal respiratory effort, + expiratory wheezing right upper lobe,+ audible inspiratory wheezing   Abdomen: Soft, non tender/non distended, no peritoneal signs noted   Extremities: No pedal edema, no cyanosis  Neurology: non-focal                           12.2   8.67  )-----------( 332      ( 05 Feb 2022 07:22 )             38.0   02-05    136  |  98  |  8   ----------------------------<  88  3.6   |  26  |  0.58    Ca    9.2      05 Feb 2022 07:22  Phos  3.1     02-04  Mg     2.00     02-04    TPro  6.7  /  Alb  3.1<L>  /  TBili  0.5  /  DBili  x   /  AST  19  /  ALT  19  /  AlkPhos  74  02-05        PAST MEDICAL & SURGICAL HISTORY:  No pertinent past medical history    No significant past surgical history    FAMILY HISTORY:    Social History:  Former smoker, 40 + pack years, no ETOH,   .  Lives with  (28 Jan 2022 15:03)      RADIOLOGY:    CXR (2/5): R perihilar soft tissue mass unchanged from prior & infiltrates similar  CT chest (2/5): Subcarinal mass 2.6 x 4.0cm with mass effect on bronchus intermedius, stent within bronchus intermedius & appears patent, partial RML atelectasis & tree-in-bud nodules with mucoid impacted bronchi, partial RLL collapse with areas of tari-in-bud nodularity, narrowing of RLL bronchus distal to the stent likely from tumor involvement.    PULMONARY FUNCTION TESTS:    EKG: None collected today

## 2022-02-06 NOTE — PROGRESS NOTE ADULT - ATTENDING COMMENTS
Patient seen and examined. Agree with above. Patient is a 77F who presents with SOB and wheezing in the setting of a new right hilar mass. She underwent bronchoscopy with BI stent placement. She has had overall improvement but has had continued cough and wheezing.    1. Acute Hypoxemic Respiratory Failure - improved. Continue airway clearance and mobilization of secretions. Continue aerobika. Continue bronchodilators  - Currently saturating well on RA  - CXR with concern for RML mucus plugging/atelectasis. Subsequent CT chest noted with patent stent and in good position  - Tentatively planned for followup bronchoscopy with Dr. Dodd next week - if needed though may be able to get by with airway clearance and bronchodilatortherapy  2. Oropharyngeal dysphagia - initially due to tumor compression  - Followup repeat swallow evaluation now that patient has had tumor debulking and stent placement  - Aspiration precautions  3. Suspected NSCLC - final pathology pending  - Oncology evaluation  - outpatient PET/CT scan  - MRI brain today - patient has a Aero stent made of Nitinol which is MRI conditional. MRI team made aware.  4. DVT proph    Prognosis guarded

## 2022-02-06 NOTE — PROGRESS NOTE ADULT - TIME BILLING
As above.
As above.
review of records/results, medical evaluation and management, discussion with consultants, and discharge planning
review of records/results, medical evaluation and management, discussion with consultants, and discharge planning

## 2022-02-06 NOTE — PROGRESS NOTE ADULT - ASSESSMENT
76 YO Vaccinated x 2 Female with PMHx of former smoking (40-60 pack year, quit 8 yrs ago) and recent dysphagia who presented from outpatient clinic for decreased PO intake, 25lb weight loss, wheezing, SOB and right hilar mass. Now admitted to RCU for further monitoring.     # NEUROLOGY  - AOx3 at baseline with no acute issues. [place]  - MRI brain w IV contrast pending     # CARDIOVASCULAR  - No acute issues.   - Monitor HR/ BP     # RESPIRATORY  - Former smoking (40-60 pack year, quit 8 yrs ago) and now noted with decreased PO intake, 25lb weight loss, wheezing and SOB.   Found with right hilar mass with encasement of the right lung airways.   - s/p IR PULM rigid Bronch with Bx and bronchial intermedius stent placement  - Continue on Duonebs PRN and Symbicort PRN.   - c/w Bronchodilator, NS nebulizer, and Mucinex  - CXR done (2/3) - Right bronchial stent is in place. No pneumomediastinum or pneumothorax  - CXR (2/5) stable; CT chest (2/5) as above.    # GI  - Dysphagic likely second to right hilar mass  - esophogram completed on 2/1 noted with Narrowing of the esophagus at the level of the patient's known mediastinal mass,   with mild delay of passage of the liquid barium and the barium tablet   - Diet advanced from Liquid to Pureed - Tolerating well - GI following: no role for advance GI and stent placement at this time    # RENAL  - No acute issues   - Monitor renal function and UOP.     # INFECTIOUS DISEASE  - Vaccinated x 2 with Pfizer (last in 3/2021)   - COVID PCR 1/28, Covid PRCR ordered for today for pre-op  - No acute issues.     # HEME  - DVT PPX with Lovenox sc daily     # ENDOCRINE  - No acute issues.   - A1C 5.7   - Lipid panel with slightly elevated LDL.     # ETHICS/ GOC  - FULL CODE     # DISPO - TBD, likely back home.    76 YO Vaccinated x 2 Female with PMHx of former smoking (40-60 pack year, quit 8 yrs ago) and recent dysphagia who presented from outpatient clinic for decreased PO intake, 25lb weight loss, wheezing, SOB and right hilar mass. Now admitted to RCU for further monitoring.     # NEUROLOGY  - AOx3 at baseline with no acute issues. [place]  - MRI brain w IV contrast performed, will follow for read    # CARDIOVASCULAR  - No acute issues.   - Monitor HR/ BP     # RESPIRATORY  - Former smoking (40-60 pack year, quit 8 yrs ago) and now noted with decreased PO intake, 25lb weight loss, wheezing and SOB.   Found with right hilar mass with encasement of the right lung airways.   - s/p IR PULM rigid Bronch with Bx and bronchial intermedius stent placement  - Continue on Duonebs PRN and Symbicort PRN.   - c/w Bronchodilator, NS nebulizer, and Mucinex  - CXR done (2/3) - Right bronchial stent is in place. No pneumomediastinum or pneumothorax  - CXR (2/5) stable; CT chest (2/5) as above.    # GI  - Dysphagic likely second to right hilar mass  - esophogram completed on 2/1 noted with Narrowing of the esophagus at the level of the patient's known mediastinal mass,   with mild delay of passage of the liquid barium and the barium tablet   - Diet advanced from Liquid to Pureed - Tolerating well - GI following: no role for advance GI and stent placement at this time    # RENAL  - No acute issues   - Monitor renal function and UOP.     # INFECTIOUS DISEASE  - Vaccinated x 2 with Pfizer (last in 3/2021)   - COVID PCR 1/28, Covid PRCR ordered for today for pre-op  - No acute issues.     # HEME  - DVT PPX with Lovenox sc daily     # ENDOCRINE  - No acute issues.   - A1C 5.7   - Lipid panel with slightly elevated LDL.     # ETHICS/ GOC  - FULL CODE     # DISPO - TBD, likely back home.

## 2022-02-07 NOTE — DISCHARGE NOTE NURSING/CASE MANAGEMENT/SOCIAL WORK - PATIENT PORTAL LINK FT
You can access the FollowMyHealth Patient Portal offered by [facility] by registering at the following website: [email or web address]. By joining [employer]’s FollowMyHealth portal, you will also be able to view your health information using other applications (apps) compatible with our system.

## 2022-02-07 NOTE — PROGRESS NOTE ADULT - ATTENDING COMMENTS
Preliminary oncologic workup including pathology and radiographic findings discussed with the patient and her  at bedside. We discussed possible treatment options, based on final pathology results from recent EBUS and mediastinum evaluation. When stable for discharge, patient can f/u with medical oncology at [facility].

## 2022-02-07 NOTE — PROGRESS NOTE ADULT - SUBJECTIVE AND OBJECTIVE BOX
CHIEF COMPLAINT: Patient is a 77y old  Female who presents with a chief complaint of Wheezing, SOB, right hilar mass. (06 Feb 2022 07:52)    INTERVAL EVENTS:   - RML atelectasis from 2/5 via CT CHEST and continued on airway clearance.     ROS: Seen by bedside during AM rounds     OBJECTIVE:  ICU Vital Signs Last 24 Hrs  T(C): 36.7 (07 Feb 2022 05:59), Max: 36.9 (06 Feb 2022 22:18)  T(F): 98.1 (07 Feb 2022 05:59), Max: 98.4 (06 Feb 2022 22:18)  HR: 78 (07 Feb 2022 05:59) (78 - 103)  BP: 112/57 (07 Feb 2022 05:59) (112/57 - 133/55)  BP(mean): --  ABP: --  ABP(mean): --  RR: 18 (07 Feb 2022 05:59) (18 - 18)  SpO2: 98% (07 Feb 2022 05:59) (98% - 100%)    CAPILLARY BLOOD GLUCOSE    PHYSICAL EXAM:  General:   HEENT:   Lymph Nodes:  Neck:   Respiratory:   Cardiovascular:   Abdomen:   Extremities:   Skin:   Neurological:  Psychiatry:    HOSPITAL MEDICATIONS:  MEDICATIONS  (STANDING):  albuterol/ipratropium for Nebulization 3 milliLiter(s) Nebulizer every 8 hours  budesonide  80 MICROgram(s)/formoterol 4.5 MICROgram(s) Inhaler 2 Puff(s) Inhalation two times a day  chlorhexidine 4% Liquid 1 Application(s) Topical daily  enoxaparin Injectable 40 milliGRAM(s) SubCutaneous daily  guaiFENesin  milliGRAM(s) Oral every 12 hours  influenza  Vaccine (HIGH DOSE) 0.7 milliLiter(s) IntraMuscular once  sodium chloride 0.9% for Nebulization 5 milliLiter(s) Nebulizer every 8 hours    MEDICATIONS  (PRN):  acetaminophen     Tablet .. 650 milliGRAM(s) Oral every 6 hours PRN Temp greater or equal to 38C (100.4F), Mild Pain (1 - 3), Moderate Pain (4 - 6)  albuterol/ipratropium for Nebulization 3 milliLiter(s) Nebulizer every 6 hours PRN Shortness of Breath and/or Wheezing  benzocaine 15 mG/menthol 3.6 mG Lozenge 1 Lozenge Oral every 4 hours PRN Sore Throat    LABS:                        11.7   8.96  )-----------( 325      ( 06 Feb 2022 07:46 )             36.3     02-06    135  |  98  |  9   ----------------------------<  111<H>  3.8   |  29  |  0.62    Ca    9.2      06 Feb 2022 07:46    TPro  6.5  /  Alb  3.1<L>  /  TBili  0.6  /  DBili  x   /  AST  19  /  ALT  16  /  AlkPhos  71  02-06 CHIEF COMPLAINT: Patient is a 77y old  Female who presents with a chief complaint of Wheezing, SOB, right hilar mass. (06 Feb 2022 07:52)    INTERVAL EVENTS:   - RML atelectasis from 2/5 via CT CHEST and continued on airway clearance.   - Noisy/ rhonchus chest (R>L) and CXR with patent BI.      ROS: Seen by bedside during AM rounds and denies SOB or chest pain.     OBJECTIVE:  ICU Vital Signs Last 24 Hrs  T(C): 36.7 (07 Feb 2022 05:59), Max: 36.9 (06 Feb 2022 22:18)  T(F): 98.1 (07 Feb 2022 05:59), Max: 98.4 (06 Feb 2022 22:18)  HR: 78 (07 Feb 2022 05:59) (78 - 103)  BP: 112/57 (07 Feb 2022 05:59) (112/57 - 133/55)  BP(mean): --  ABP: --  ABP(mean): --  RR: 18 (07 Feb 2022 05:59) (18 - 18)  SpO2: 98% (07 Feb 2022 05:59) (98% - 100%)    CAPILLARY BLOOD GLUCOSE    PHYSICAL EXAM:  General: NAD   Cards: S1/S2, no murmurs   Pulm: BL rhonchis  Abdomen: Soft, NTND. BS (+)   Extremities: No pedal edema. EFRA of BL upper and lower extremities.  Neurology: AOx3 with no focal neurological deficits     HOSPITAL MEDICATIONS:  MEDICATIONS  (STANDING):  albuterol/ipratropium for Nebulization 3 milliLiter(s) Nebulizer every 8 hours  budesonide  80 MICROgram(s)/formoterol 4.5 MICROgram(s) Inhaler 2 Puff(s) Inhalation two times a day  chlorhexidine 4% Liquid 1 Application(s) Topical daily  enoxaparin Injectable 40 milliGRAM(s) SubCutaneous daily  guaiFENesin  milliGRAM(s) Oral every 12 hours  influenza  Vaccine (HIGH DOSE) 0.7 milliLiter(s) IntraMuscular once  sodium chloride 0.9% for Nebulization 5 milliLiter(s) Nebulizer every 8 hours    MEDICATIONS  (PRN):  acetaminophen     Tablet .. 650 milliGRAM(s) Oral every 6 hours PRN Temp greater or equal to 38C (100.4F), Mild Pain (1 - 3), Moderate Pain (4 - 6)  albuterol/ipratropium for Nebulization 3 milliLiter(s) Nebulizer every 6 hours PRN Shortness of Breath and/or Wheezing  benzocaine 15 mG/menthol 3.6 mG Lozenge 1 Lozenge Oral every 4 hours PRN Sore Throat    LABS:                        11.7   8.96  )-----------( 325      ( 06 Feb 2022 07:46 )             36.3     02-06    135  |  98  |  9   ----------------------------<  111<H>  3.8   |  29  |  0.62    Ca    9.2      06 Feb 2022 07:46    TPro  6.5  /  Alb  3.1<L>  /  TBili  0.6  /  DBili  x   /  AST  19  /  ALT  16  /  AlkPhos  71  02-06

## 2022-02-07 NOTE — PROGRESS NOTE ADULT - ASSESSMENT
76 YO Vaccinated x 2 Female with PMHx of former smoking (40-60 pack year, quit 8 yrs ago) and recent dysphagia who presented from outpatient clinic for decreased PO intake, 25lb weight loss, wheezing, SOB and right hilar mass. Now admitted to RCU for bronch, bx and stenting of BI and new COPD management.    78 YO Vaccinated x 2 Female with PMHx of former smoking (40-60 pack year, quit 8 yrs ago) and recent dysphagia who presented from outpatient clinic for decreased PO intake, 25lb weight loss, wheezing, SOB and right hilar mass. Now admitted to RCU for bronch, bx and stenting of BI and new COPD management.     # NEUROLOGY  - AOx3 at baseline with no acute issues. [place]  - MRI brain negative.     # CARDIOVASCULAR  - No acute issues.   - Monitor HR/ BP     # RESPIRATORY  - Former smoking (40-60 pack year, quit 8 yrs ago) and now noted with decreased PO intake, 25lb weight loss, wheezing and SOB and found with right hilar mass with encasement of the right lung airways.   - s/p IR PULM rigid bronchoscopy 2/2 with balloon dilatation of 90% bronchial stenosis of bronchus intermedius, tumor debulking, bronchial stent placement in the bronchus intermedius and endobronchial bx.   - Course complicated with mucous plug and s/p CT CHEST 2/5 with subcarinal mass (2.6x4.0cm) with mass effect on BI, stent patent, RML with partial atelectasis and tree-in-bud nodules/ mucous impaction  - Continue on Mucinex, Duonebs and NS 0.9% TID and Symbicort BID.   - Monitor respiratory status     # GI  - Dysphagic likely second to right hilar mass  - Esophogram 2/1 noted with narrowing of the esophagus at the level of the patient's known mediastinal mass with mild delay of passage of the barium  - GI consulted who recc no stent. Diet advanced from Liquid to Pureed as per GI.  - SS re-eval 2/7 and continued on Pureed diet.      # RENAL  - No acute issues   - Monitor renal function and UOP.     # INFECTIOUS DISEASE  - Vaccinated x 2 with Pfizer (last in 3/2021)   - COVID PCR 1/28, Covid PRCR ordered for today for pre-op  - No acute issues.     # HEME  - DVT PPX with Lovenox sc daily     # ENDOCRINE  - No acute issues.   - A1C 5.7   - Lipid panel with slightly elevated LDL.     # ETHICS/ GOC  - FULL CODE     # DISPO - HOME

## 2022-02-07 NOTE — SWALLOW BEDSIDE ASSESSMENT ADULT - SWALLOW EVAL: DIAGNOSIS
1. Patient demonstrated a functional oral stage for puree, minced and moist solids, mildly thick and thin liquids marked by adequate bolus collection, transfer and posterior transport. 2. Patient demonstrated a functional pharyngeal phase for aforementioned consistencies marked by a present pharyngeal swallow trigger with hyolaryngeal elevation noted upon digital palpation without evidence of airway penetration/aspiration. Of note, patient reporting "discomfort" in mid-sternum region post trials of minced and moist solids, therefore, additional solid trials deferred at this time.
1) Functional oral phase for thin liquids, pureed and regular solids marked by adequate oral aperture, timely mastication and A-P transport. 2) Functional pharyngeal phase for thin, pureed, and regular solids marked by initiation of the pharyngeal swallow with hyolaryngeal elevation and excursion upon digital palpation without evidence of airway invasion. Denied globus sensation throughout.

## 2022-02-07 NOTE — SWALLOW BEDSIDE ASSESSMENT ADULT - SWALLOW EVAL: CURRENT DIET
Regular solids with thin liquids
Pankaj with thin liquids- Received clearance for solid trials from IRIS Chavis

## 2022-02-07 NOTE — PROGRESS NOTE ADULT - ASSESSMENT
78 yo F with no significant PMH who presented from outpatient clinic for wheezing, SOB, and a right hilar mass, preliminary biopsy results show non-small cell lung carcinoma.    #Lung Carcinoma  - Patient has had wheezing since mid January, found to have a RLL mass by Pulmonology  - Discussed with Pulmonology, mass involves station 7 lymph node. It extends into the posterior mediastinum, subcarinal space, and right hilum with focal severe narrowing of the RUL bronchus, diffuse severe narrowing of the bronchus intermedius, and subtotal occlusion of the RLL bronchus. It is inseparable from the esophagus at the level of the juan m, but no esophageal dilation. Also as seen on CT, there are shoddy precarinal, right paratracheal, and anterior-posterior window LNs of inconclusive significance  - S/p bronchoscopy 2/2/22 with endobronchial stent of the bronchus intermedius, tumor debulking, and biopsy of the lung mass  - Preliminary biopsy results show non-small cell carcinoma per Pulmonology  - Awaiting final pathology. Will also need NGS and PDL-1 testing  - Likely stage III, f/u bronchoscopy staging as LNs were removed for examination  - MRI brain shows no evidence of brain metastasis  - Likely will need PET scan outpatient  - Unlikely to be amenable to surgery at this time given infiltrative extent of disease and LAD  - Will likely need radiation but can set up Radiation Oncology outpatient appointment unless patient will be kept inpatient for a longer period of time  - Patient also should have appointment with Medical Oncology at [facility] about a week after discharge to begin chemotherapy (vs immunotherapy) treatment as an outpatient      Aryles Hedjar, MD, PGY-4  Hematology/Oncology Fellow  [facility]  Pager: [phone]  After 5PM and on weekends and holidays, please call the inpatient fellow on call.

## 2022-02-07 NOTE — DISCHARGE NOTE NURSING/CASE MANAGEMENT/SOCIAL WORK - NSDCFUADDAPPT_GEN_ALL_CORE_FT
- Please follow up with [facility] [address] [place] [address] Brayan HANSEN, [place] [address]. Please call [phone] to make appointment.   - Please follow up with Dr. Goode outpatient on 2/18/22 @ 1030AM   - Please follow up with your PCP outpatient

## 2022-02-07 NOTE — DISCHARGE NOTE NURSING/CASE MANAGEMENT/SOCIAL WORK - NSDCPEFALRISK_GEN_ALL_CORE
For information on Fall & Injury Prevention, visit: [email or web address] OR  [email or web address] OR  https://www.cdc.gov/steadi/patient.html

## 2022-02-07 NOTE — SWALLOW BEDSIDE ASSESSMENT ADULT - COMMENTS
As per pulmonology PA "77 Y.O. female with no significant PMH who presents from outpatient clinic for wheezing, sob and right hilar mass." Patient with CXR 1/28/22 "Right subhilar opacity may represent mass." White blood cell count is within the normal range.     SLP received patient upright in chair, alert and able to follow all 1 step directives. Patient endorses weight loss over the last few months and reported difficulty with PO intake (mostly solids), stating it "gets stuck" and pointed to midsternal region. Patient reports that the use of a liquid wash is effective in clearing "stuck" sensation. Patient denied globus sensation throughout this assessment.
Per progress 2/7/22, patient is a "78 yo F with no significant PMH who presented from outpatient clinic for wheezing, SOB, and a right hilar mass, preliminary biopsy results show non-small cell lung carcinoma."    WBC is WFL. Most recent CT of chest revealed "Clear lungs post endobronchial stent placement with decreasing right effusion.". Most recent MR of head revealed "No evidence of metastatic disease."    Patient seen at bedside this afternoon for a reassessment of the swallow function, at which time patient was alert and cooperative. Patient is known to this service as patient was seen for an initial assessment on 1/31/22 (please see full report), at which time regular solids with thin liquids was recommended. Patient now s/p BI stent. Per IRIS Chavis, SLP attempt solid trials for possible diet advancement. Patient denies pain or difficulty with puree and thin liquid diet.

## 2022-02-07 NOTE — PROGRESS NOTE ADULT - ATTENDING COMMENTS
agree with above  wheezing today, pulse ox stable. CXR better  pt had not been getting the saline neb -- I think this was a communication/[place] issue with the pt who didn't know what it was for  MRI brain negative  d/c plan, unless other intervention planned as intpt by IP

## 2022-02-07 NOTE — SWALLOW BEDSIDE ASSESSMENT ADULT - ADDITIONAL RECOMMENDATIONS
1) Monitor for PO intake/tolerance   2) Utilize liquid wash as needed
Reconsult this department as patient becomes medically optimized for reassessment to determine candidacy of diet advancement.

## 2022-02-07 NOTE — SWALLOW BEDSIDE ASSESSMENT ADULT - ASR SWALLOW REFERRAL
consider GI consult given complaint of "food stuck" sensation in mid sternal region
RD consult to ensure daily caloric/hydration needs

## 2022-02-07 NOTE — SWALLOW BEDSIDE ASSESSMENT ADULT - ASR SWALLOW RECOMMEND DIAG
Objective testing NOT warranted at this time given clinical presentation as stated above.
Cinesophagram NOT indicated due to clinical presentation as described

## 2022-02-07 NOTE — PROGRESS NOTE ADULT - SUBJECTIVE AND OBJECTIVE BOX
INTERVAL HPI/OVERNIGHT EVENTS:  Patient S&E at bedside. She notes persistent wheezing, no significant SOB unless she is talking a lot. She also endorses non-bloody mucoid cough and "difficulty eating my food," but denies difficulty swallowing or keeping the food down. No F/C, CP, abdominal pain, N/V, diarrhea, rash, or edema.      VITAL SIGNS:  T(F): 98.1 (02-07-22 @ 05:59)  HR: 102 (02-07-22 @ 09:17)  BP: 112/57 (02-07-22 @ 05:59)  RR: 18 (02-07-22 @ 05:59)  SpO2: 100% (02-07-22 @ 09:17)  Wt(kg): --    PHYSICAL EXAM:    Constitutional: NAD  Eyes: EOMI, sclera non-icteric  Neck: supple  Respiratory: no increased wob, but diffuse inspiratory and expiratory wheezing bilaterally with rhonchi. No rales  Cardiovascular: RRR, S1, S2, no m/g/r  Gastrointestinal: soft, NTND, no masses palpable, no HSM  Extremities: no c/c/e  Neurological: AAOx3    MEDICATIONS  (STANDING):  albuterol/ipratropium for Nebulization 3 milliLiter(s) Nebulizer every 8 hours  budesonide  80 MICROgram(s)/formoterol 4.5 MICROgram(s) Inhaler 2 Puff(s) Inhalation two times a day  chlorhexidine 4% Liquid 1 Application(s) Topical daily  enoxaparin Injectable 40 milliGRAM(s) SubCutaneous daily  guaiFENesin  milliGRAM(s) Oral every 12 hours  influenza  Vaccine (HIGH DOSE) 0.7 milliLiter(s) IntraMuscular once  sodium chloride 0.9% for Nebulization 5 milliLiter(s) Nebulizer every 8 hours    MEDICATIONS  (PRN):  acetaminophen     Tablet .. 650 milliGRAM(s) Oral every 6 hours PRN Temp greater or equal to 38C (100.4F), Mild Pain (1 - 3), Moderate Pain (4 - 6)  albuterol/ipratropium for Nebulization 3 milliLiter(s) Nebulizer every 6 hours PRN Shortness of Breath and/or Wheezing  benzocaine 15 mG/menthol 3.6 mG Lozenge 1 Lozenge Oral every 4 hours PRN Sore Throat      LABS:                        11.7   8.96  )-----------( 325      ( 06 Feb 2022 07:46 )             36.3     02-06    135  |  98  |  9   ----------------------------<  111<H>  3.8   |  29  |  0.62    Ca    9.2      06 Feb 2022 07:46    TPro  6.5  /  Alb  3.1<L>  /  TBili  0.6  /  DBili  x   /  AST  19  /  ALT  16  /  AlkPhos  71  02-06          RADIOLOGY & ADDITIONAL TESTS:

## 2022-02-08 NOTE — PROGRESS NOTE ADULT - SUBJECTIVE AND OBJECTIVE BOX
CHIEF COMPLAINT: Patient is a 77y old  Female who presents with a chief complaint of Wheezing, SOB, right hilar mass. (07 Feb 2022 11:33)    INTERVAL EVENTS:   - No interval events overnight.   - Right lung rhonchorous, however stent appears patent on chest XRAY. Duoneb/ NS nebs encouraged.     ROS: Seen by bedside during AM rounds     OBJECTIVE:  ICU Vital Signs Last 24 Hrs  T(C): 37.3 (08 Feb 2022 06:19), Max: 37.3 (07 Feb 2022 21:40)  T(F): 99.2 (08 Feb 2022 06:19), Max: 99.2 (07 Feb 2022 21:40)  HR: 88 (08 Feb 2022 06:19) (88 - 103)  BP: 126/51 (08 Feb 2022 06:19) (126/51 - 136/56)  BP(mean): --  ABP: --  ABP(mean): --  RR: 18 (08 Feb 2022 06:19) (17 - 18)  SpO2: 96% (08 Feb 2022 06:19) (94% - 100%)    CAPILLARY BLOOD GLUCOSE    PHYSICAL EXAM:  General:   HEENT:   Lymph Nodes:  Neck:   Respiratory:   Cardiovascular:   Abdomen:   Extremities:   Skin:   Neurological:  Psychiatry:    HOSPITAL MEDICATIONS:  MEDICATIONS  (STANDING):  albuterol/ipratropium for Nebulization 3 milliLiter(s) Nebulizer every 8 hours  budesonide  80 MICROgram(s)/formoterol 4.5 MICROgram(s) Inhaler 2 Puff(s) Inhalation two times a day  chlorhexidine 4% Liquid 1 Application(s) Topical daily  enoxaparin Injectable 40 milliGRAM(s) SubCutaneous daily  guaiFENesin  milliGRAM(s) Oral every 12 hours  influenza  Vaccine (HIGH DOSE) 0.7 milliLiter(s) IntraMuscular once  sodium chloride 0.9% for Nebulization 5 milliLiter(s) Nebulizer every 8 hours    MEDICATIONS  (PRN):  acetaminophen     Tablet .. 650 milliGRAM(s) Oral every 6 hours PRN Temp greater or equal to 38C (100.4F), Mild Pain (1 - 3), Moderate Pain (4 - 6)  albuterol/ipratropium for Nebulization 3 milliLiter(s) Nebulizer every 6 hours PRN Shortness of Breath and/or Wheezing  benzocaine 15 mG/menthol 3.6 mG Lozenge 1 Lozenge Oral every 4 hours PRN Sore Throat    LABS:   CHIEF COMPLAINT: Patient is a 77y old  Female who presents with a chief complaint of Wheezing, SOB, right hilar mass. (07 Feb 2022 11:33)    INTERVAL EVENTS:   - No interval events overnight.   - Right lung rhonchorous, however stent appears patent on chest XRAY. Duoneb/ NS nebs encouraged and PRNs added with improvement.   - DC home today.     ROS: Seen by bedside during AM rounds and noted with wheezing. No SOB or pain.     OBJECTIVE:  ICU Vital Signs Last 24 Hrs  T(C): 37.3 (08 Feb 2022 06:19), Max: 37.3 (07 Feb 2022 21:40)  T(F): 99.2 (08 Feb 2022 06:19), Max: 99.2 (07 Feb 2022 21:40)  HR: 88 (08 Feb 2022 06:19) (88 - 103)  BP: 126/51 (08 Feb 2022 06:19) (126/51 - 136/56)  BP(mean): --  ABP: --  ABP(mean): --  RR: 18 (08 Feb 2022 06:19) (17 - 18)  SpO2: 96% (08 Feb 2022 06:19) (94% - 100%)    CAPILLARY BLOOD GLUCOSE    PHYSICAL EXAM:  General: NAD   Cards: S1/S2, no murmurs   Pulm: BL rhonchis  Abdomen: Soft, NTND. BS (+)   Extremities: No pedal edema. EFRA of BL upper and lower extremities.  Neurology: AOx3 with no focal neurological deficits     HOSPITAL MEDICATIONS:  MEDICATIONS  (STANDING):  albuterol/ipratropium for Nebulization 3 milliLiter(s) Nebulizer every 8 hours  budesonide  80 MICROgram(s)/formoterol 4.5 MICROgram(s) Inhaler 2 Puff(s) Inhalation two times a day  chlorhexidine 4% Liquid 1 Application(s) Topical daily  enoxaparin Injectable 40 milliGRAM(s) SubCutaneous daily  guaiFENesin  milliGRAM(s) Oral every 12 hours  influenza  Vaccine (HIGH DOSE) 0.7 milliLiter(s) IntraMuscular once  sodium chloride 0.9% for Nebulization 5 milliLiter(s) Nebulizer every 8 hours    MEDICATIONS  (PRN):  acetaminophen     Tablet .. 650 milliGRAM(s) Oral every 6 hours PRN Temp greater or equal to 38C (100.4F), Mild Pain (1 - 3), Moderate Pain (4 - 6)  albuterol/ipratropium for Nebulization 3 milliLiter(s) Nebulizer every 6 hours PRN Shortness of Breath and/or Wheezing  benzocaine 15 mG/menthol 3.6 mG Lozenge 1 Lozenge Oral every 4 hours PRN Sore Throat    LABS:

## 2022-02-08 NOTE — PROGRESS NOTE ADULT - REASON FOR ADMISSION
Wheezing, SOB, right hilar mass.

## 2022-02-08 NOTE — CHART NOTE - NSCHARTNOTEFT_GEN_A_CORE
Patient assessed by RDN on 1/31, now for malnutrition follow up. Spoke with pt and obtained subjective information from extensive chart review.     Current Diet : Diet, Regular:   Pureed (PUREED) (02-03-22 @ 10:57)    PO intake:  ~ 50%  Current Weight: Unavailable  Height (cm): 157.5 (02-02 @ 12:12)  Weight (kg): 77.5 (02-02 @ 12:12)  BMI (kg/m2): 31.2 (02-02 @ 12:12)    Nutrition Interval Events: Pt re-evaluated by SLP 2/7 for possible diet upgrade. SLP recommended continuation of Pureed solids with Thin liquids. Pt is [place] and may have misinterpreted what SLP said regarding maintaining pureed diet texture. Patient thought that this RDN would offer diet upgrade from Puree. Explained to pt that the nutrition department complies with SLP recommendations and that RDNs were not able to upgrade diet. Pt appeared very dismayed and said that she cannot eat pureed diet any longer. Offered to provide Vital Shakes (520 kcal, 22 gm protein per 8.5 oz) x 2/day and Magic Cups (290 kcal, 9 gm protein per 4 oz) x 2/day to enhance oral intake. Pt said that this would be fine but that she couldn't "subsist" on shakes and liquids alone. Suggest having SLP re-visit with patient to explain reason for texture of diet chosen and to discuss medical necessity of this diet. No GI distress noted. No edema nor pressure injuries indicated. Encourage and monitor oral intake, especially of nutrition supplements. RDN services to remain available as needed.     __________________ Pertinent Medications__________________   MEDICATIONS  (STANDING):  albuterol/ipratropium for Nebulization 3 milliLiter(s) Nebulizer every 8 hours  budesonide  80 MICROgram(s)/formoterol 4.5 MICROgram(s) Inhaler 2 Puff(s) Inhalation two times a day  chlorhexidine 4% Liquid 1 Application(s) Topical daily  enoxaparin Injectable 40 milliGRAM(s) SubCutaneous daily  guaiFENesin  milliGRAM(s) Oral every 12 hours  influenza  Vaccine (HIGH DOSE) 0.7 milliLiter(s) IntraMuscular once  sodium chloride 0.9% for Nebulization 5 milliLiter(s) Nebulizer every 8 hours    MEDICATIONS  (PRN):  acetaminophen     Tablet .. 650 milliGRAM(s) Oral every 6 hours PRN Temp greater or equal to 38C (100.4F), Mild Pain (1 - 3), Moderate Pain (4 - 6)  albuterol/ipratropium for Nebulization 3 milliLiter(s) Nebulizer every 6 hours PRN Shortness of Breath and/or Wheezing  benzocaine 15 mG/menthol 3.6 mG Lozenge 1 Lozenge Oral every 4 hours PRN Sore Throat      __________________ Pertinent Labs__________________   02-06 Na135 mmol/L Glu 111 mg/dL<H> K+ 3.8 mmol/L Cr  0.62 mg/dL BUN 9 mg/dL 02-04 Phos 3.1 mg/dL 02-06 Alb 3.1 g/dL<L> 01-31 Chol 170 mg/dL LDL --    HDL 41 mg/dL<L> Trig 92 mg/dL      Estimated Needs:   [x] no change since previous assessment      Nutrition Diagnosis: Severe malnutrition  [x] ongoing    Goal(s):  1. Patient to meet > 75% estimated energy needs    Recommendations:   1. Request SLP to explain to patient importance of chosen diet texture and encourage compliance.  2. Will provide Vital Shakes (520 kcal, 22 gm protein per 8.5 oz) x 2/day and Magic Cups (290 kcal, 9 gm protein per 4 oz) x 2/day (these will come up on patient's meal trays).    Monitoring and Evaluation:   1. Monitor weights, labs, BMs, skin integrity, PO intake and edema.  2. RD services to remain available. Request obtaining new weight to assess trend and determine adequacy of PO intake. Oral supplementation for additional calories and protein.

## 2022-02-08 NOTE — PROGRESS NOTE ADULT - PROVIDER SPECIALTY LIST ADULT
Pulmonology
Anesthesia
Heme/Onc
Pulmonology

## 2022-02-08 NOTE — PROGRESS NOTE ADULT - ASSESSMENT
76 YO Vaccinated x 2 Female with PMHx of former smoking (40-60 pack year, quit 8 yrs ago) and recent dysphagia who presented from outpatient clinic for decreased PO intake, 25lb weight loss, wheezing, SOB and right hilar mass. Now admitted to RCU for bronch, bx and stenting of BI and new COPD management.     # NEUROLOGY  - AOx3 at baseline with no acute issues. [place]  - MRI brain negative.     # CARDIOVASCULAR  - No acute issues.   - Monitor HR/ BP     # RESPIRATORY  - Former smoking (40-60 pack year, quit 8 yrs ago) and now noted with decreased PO intake, 25lb weight loss, wheezing and SOB and found with right hilar mass with encasement of the right lung airways.   - s/p IR PULM rigid bronchoscopy 2/2 with balloon dilatation of 90% bronchial stenosis of bronchus intermedius, tumor debulking, bronchial stent placement in the bronchus intermedius and endobronchial bx.   - Course complicated with mucous plug and s/p CT CHEST 2/5 with subcarinal mass (2.6x4.0cm) with mass effect on BI, stent patent, RML with partial atelectasis and tree-in-bud nodules/ mucous impaction  - Continue on Mucinex, Duonebs and NS 0.9% TID and Symbicort BID.   - Monitor respiratory status     # GI  - Dysphagic likely second to right hilar mass  - Esophogram 2/1 noted with narrowing of the esophagus at the level of the patient's known mediastinal mass with mild delay of passage of the barium  - GI consulted who recc no stent. Diet advanced from Liquid to Pureed as per GI.  - SS re-eval 2/7 and continued on Pureed diet.      # RENAL  - No acute issues   - Monitor renal function and UOP.     # INFECTIOUS DISEASE  - Vaccinated x 2 with Pfizer (last in 3/2021)   - COVID PCR 1/28, Covid PRCR ordered for today for pre-op  - No acute issues.     # HEME  - DVT PPX with Lovenox sc daily     # ENDOCRINE  - No acute issues.   - A1C 5.7   - Lipid panel with slightly elevated LDL.     # ETHICS/ GOC  - FULL CODE     # DISPO - HOME   76 YO Vaccinated x 2 Female with PMHx of former smoking (40-60 pack year, quit 8 yrs ago) and recent dysphagia who presented from outpatient clinic for decreased PO intake, 25lb weight loss, wheezing, SOB and right hilar mass. Now admitted to RCU for bronch, bx and stenting of BI and new COPD management.     # NEUROLOGY  - AOx3 at baseline with no acute issues. [place]  - MRI brain negative.     # CARDIOVASCULAR  - No acute issues.   - Monitor HR/ BP     # RESPIRATORY  - Former smoking (40-60 pack year, quit 8 yrs ago) and now noted with decreased PO intake, 25lb weight loss, wheezing and SOB and found with right hilar mass with encasement of the right lung airways.   - s/p IR PULM rigid bronchoscopy 2/2 with balloon dilatation of 90% bronchial stenosis of bronchus intermedius, tumor debulking, bronchial stent placement in the bronchus intermedius and endobronchial bx.   - Course complicated with mucous plug and s/p CT CHEST 2/5 with subcarinal mass (2.6x4.0cm) with mass effect on BI, stent patent, RML with partial atelectasis and tree-in-bud nodules/ mucous impaction  - Continue on Mucinex, Duonebs and NS 0.9% TID, Duoneb PRN and Symbicort BID.   - Monitor respiratory status     # GI  - Dysphagic likely second to right hilar mass  - Esophogram 2/1 noted with narrowing of the esophagus at the level of the patient's known mediastinal mass with mild delay of passage of the barium  - GI consulted who recc no stent. Diet advanced from Liquid to Pureed as per GI.  - SS re-eval 2/7 and continued on Pureed diet.      # RENAL  - No acute issues   - Monitor renal function and UOP.     # INFECTIOUS DISEASE  - Vaccinated x 2 with Pfizer (last in 3/2021)   - COVID PCR 1/28, Covid PRCR ordered for today for pre-op  - No acute issues.     # HEME  - DVT PPX with Lovenox sc daily     # ENDOCRINE  - No acute issues.   - A1C 5.7   - Lipid panel with slightly elevated LDL.     # ETHICS/ GOC  - FULL CODE     # DISPO - HOME TODAY.

## 2022-02-22 NOTE — HISTORY OF PRESENT ILLNESS
[de-identified] : 77f with recently diagnosed NSCLC with squamous features, presenting for initial oncology evaluation. \par \par - Patient has had wheezing since late December 2021, found to have a RLL mass by Pulmonology\par - Discussed with Pulmonology, mass involves station 7 lymph node. It extends into the posterior mediastinum, subcarinal space, and right hilum with focal severe narrowing of the RUL bronchus, diffuse severe narrowing of the bronchus intermedius, and subtotal occlusion of the RLL bronchus. It is inseparable from the esophagus at the level of the juan m, but no esophageal dilation. Also as seen on CT, there are shoddy precarinal, right paratracheal, and anterior-posterior window LNs of inconclusive significance\par -No distant metastasis on CT a/p and MRI brain\par - S/p bronchoscopy 2/2/22 with endobronchial stent of the bronchus intermedius, tumor debulking, and biopsy of the lung mass\par - Biopsy results show non-small cell carcinoma with Squamous features\par - NGS and PDL-1 testing not back\par - PET/CT is pending and scheduled for 2/25\par \par On CT chest 1/28/22 there is a Superior segment right lower lobe posterior perihilar mass with direct extension to the right hilum, subcarinal space, and posterior mediastinum. Encasement and narrowing of the right lung airways. Mass involves station 7 lymph node. It extends into the posterior mediastinum, subcarinal space, and right hilum with focal severe narrowing of the RUL bronchus, diffuse severe narrowing of the bronchus intermedius, and subtotal occlusion of the RLL bronchus. It is inseparable from the esophagus at the level of the juan m, but no esophageal dilation. Also as seen on CT, there are shoddy precarinal, right paratracheal, and anterior-posterior window LNs of inconclusive significance.\par Repeat CT chest 2/5/2022 - Subcarinal mass measuring approximately 2.6 x 4.0 cm with mass effect on the bronchus intermedius. A stent is noted within the bronchus intermedius and appears patent. \par \par Today, patient presents with her  Buddy. She has 2 sons, one lives nearby in [facility] [place] and one lives in [place]. \par She is on a puree diet, Buddy helps prepare her meals\par She has a 40 pack year smoking history, she quit 7 years ago

## 2022-02-22 NOTE — REVIEW OF SYSTEMS
[Fatigue] : fatigue [Dysphagia] : dysphagia [Shortness Of Breath] : shortness of breath [Wheezing] : wheezing [Cough] : cough [SOB on Exertion] : shortness of breath during exertion [Negative] : Allergic/Immunologic

## 2022-02-22 NOTE — PHYSICAL EXAM
[Ambulatory and capable of all self care but unable to carry out any work activities] : Status 2- Ambulatory and capable of all self care but unable to carry out any work activities. Up and about more than 50% of waking hours [Normal] : affect appropriate [de-identified] : Diffuse wheezing

## 2022-02-22 NOTE — ASSESSMENT
[FreeTextEntry1] : 77f with recently diagnosed NSCLC with squamous features, no distant mets, presenting for oncology evaluation.\par On CT chest 1/28/22 there is a Superior segment right lower lobe posterior perihilar mass with direct extension to the right hilum, subcarinal space, and posterior mediastinum. Encasement and narrowing of the right lung airways.\par Mass involves station 7 lymph node. It extends into the posterior mediastinum, subcarinal space, and right hilum with focal severe narrowing of the RUL bronchus, diffuse severe narrowing of the bronchus intermedius, and subtotal occlusion of the RLL bronchus. It is inseparable from the esophagus at the level of the juan m, but no esophageal dilation. Also as seen on CT, there are shoddy precarinal, right paratracheal, and anterior-posterior window LNs of inconclusive significance.\par She is s/p Bronchial stent placement. \par Repeat CT chest 2/5/2022 - Subcarinal mass measuring approximately 2.6 x 4.0 cm with mass effect on the bronchus intermedius. A stent is noted within the bronchus intermedius and appears patent. \par \par Natural course of disease and treatment expectations were discussed with patient and her  in detail. \par All questions answered to their satisfaction. \par Will refer to rad onc for evaluation and consideration of concurrent chemo/rt with curative intent if possible, and if this is not possible, palliative RT to subcarinal mass to help with symptoms. \par \par -Foundation one PDL1 TPS and NGS\par -Guardant 360\par -Rad onc consult\par -PET/CT\par -GI consult for dysphagia\par -Nutrition consult\par -RTC 10-14 days\par \par Total time of this visit, including time spent face to face with patient and/or via video/audio, and also in preparing for today's visit for MDM and documentation (Medical Decision Making, including consideration of possible diagnoses, management options, complex medical record review, review of diagnostic tests and information, consideration and discussion of significant complications based on comorbidities, and discussion with providers involved with the care of the patient) 74 minutes.\par

## 2022-02-24 NOTE — REVIEW OF SYSTEMS
[Cough] : cough [Wheezing] : wheezing [SOB on Exertion] : sob on exertion [Negative] : Endocrine [Sputum] : sputum [Fever] : no fever [Chills] : no chills [Hemoptysis] : no hemoptysis [Chest Tightness] : no chest tightness [Dyspnea] : no dyspnea [Pleuritic Pain] : no pleuritic pain [A.M. Dry Mouth] : no a.m. dry mouth [Orthopnea] : no orthopnea [Hay Fever] : no hay fever

## 2022-02-24 NOTE — REASON FOR VISIT
[Lung Cancer] : lung cancer [Consultation] : a consultation [Spouse] : spouse [TextBox_44] : Interventional Pulmonology Consultation for Bronchial Obstruction [TextBox_13] : Dr. Woodard

## 2022-02-24 NOTE — END OF VISIT
[] : Fellow [Time Spent: ___ minutes] : I have spent [unfilled] minutes of time on the encounter. [>50% of the face to face encounter time was spent on counseling and/or coordination of care for ___] : Greater than 50% of the face to face encounter time was spent on counseling and/or coordination of care for [unfilled] [FreeTextEntry3] : Interventional Pulmonology Attending Addendum\par \par Patient seen and examined during the office visit with the fellow. In summary, 76 y/o F with newly diagnosed NSCLC, with near complete obstruction of the BI, s/p rigid bronch, BI stent placement and EBUS biopsy. Presents to establish care. While symptoms of dyspnea improved, has some symptoms which are likely related to lobar disease. Ordered PET CT for staging, oncology and rad onc eval. [facility] eval for dysphagia. Rad onc eval for local radiotherapy. Stent information and education provided. Will need surveillance bronchoscopy in 4 weeks. Agree with assessment and plan as aobve. \par \par \par

## 2022-02-24 NOTE — DISCUSSION/SUMMARY
[FreeTextEntry1] : \par The patients most recent CT scan was reviewed by my independently and my interpretation is stated below:\par \par Subcarinal mass, obliteration of the RBI on the pre-op CT with compression of the distal airways too. Post op images with stent in good place, sitting distal to RUL and proximal to RML. Extrinsic compression of RML and RLL segments.

## 2022-02-24 NOTE — PHYSICAL EXAM
[No Acute Distress] : no acute distress [Normal Appearance] : normal appearance [Normal Oropharynx] : normal oropharynx [No Neck Mass] : no neck mass [Normal Rate/Rhythm] : normal rate/rhythm [Normal S1, S2] : normal s1, s2 [No Murmurs] : no murmurs [No Resp Distress] : no resp distress [Wheeze] : wheeze [No Abnormalities] : no abnormalities [Benign] : benign [Normal Gait] : normal gait [No Clubbing] : no clubbing [No Cyanosis] : no cyanosis [No Edema] : no edema [FROM] : FROM [Normal Color/ Pigmentation] : normal color/ pigmentation [No Focal Deficits] : no focal deficits [Oriented x3] : oriented x3 [Normal Affect] : normal affect [Normal Rhythm and Effort] : normal rhythm and effort [No Acc Muscle Use] : no acc muscle use [Normal to Percussion] : normal to percussion [Normal Mood] : normal mood [TextBox_68] : Wheezing, more on RLL.

## 2022-02-24 NOTE — ASSESSMENT
[FreeTextEntry1] : 1. Non small cell lung cancer - squamous cell carcinoma\par 2. Right hilar mass causing obstruction of bronchus intermedius s/p stent placement\par \par - Continue nebulizations with 0.9% saline and bronchodilators may change from TID to BID\par - Continue Symbicort BID - will refill prescription\par - Will order chest x ray\par - Will order PET scan, for staging purposes. MRI brain was negative in the hospital. \par - Oncology follow up next week to determine treatment options. \par - Will fax records to patient's PCP as per her request\par - Referred to Dr. Babita Mcgovern from [facility] for persistent dysphagia which could be in the setting of the subcarinal mass. \par - Referral to Rad/onc to consider local radiotherapy. \par - RTC in 4-6 weeks. Will need stent surveillance at the time. \par \par Stent card provided to the patient. Stent education performed. Advised to contact service if any new clinical symptoms. Advised to present to the ER for evaluation if any of the following symptoms noted: New or increased shortness of breath, new or increased chest pain, new or increased cough, new or increased hoarseness of loss of voice. Patient demonstrated understanding with verbal confirmation.\par \par \par Discussed with Dr. Dodd

## 2022-02-24 NOTE — HISTORY OF PRESENT ILLNESS
[Former] : former [TextBox_4] : Interventional Pulmonology Consultation/Visit Note\par \par 76 y/o woman former smoker recently admitted for dysphagia, weight loss, wheezing and shortness of breath and found to have right hilar mass. Was noted to have significant narrowing of the bronchus intermedius. She underwent rigid bronchoscopy with balloon dilatation of 90% bronchial stenosis of bronchus intermedius, tumor debulking and bronchial stent placement (10 X 30mm) in the bronchus intermedius, as well as  endobronchial ultrasound-guided biopsy of subcarinal mass. 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 She presents today to establish care for her central airway obstruction.\par \par Overall, while her dyspnea has improved, she continues to experience wheezing. She regularly uses her nebulizers (both saline and bronchodiators) as well as mucinex. She has an appointment with medical oncology. She has not received a PET CT yet. In addition, she also complain of dysphagia. While she was seen by GI in the hospital, no outpatient care has been established with the GI team.

## 2022-03-04 NOTE — VITALS
[Maximal Pain Intensity: 0/10] : 0/10 [80: Normal activity with effort; some signs or symptoms of disease.] : 80: Normal activity with effort; some signs or symptoms of disease.  [Date: ____________] : Patient's last distress assessment performed on [unfilled]. [4 - Distress Level] : Distress Level: 4

## 2022-03-10 NOTE — H&P ADULT - PROBLEM SELECTOR PLAN 1
Still treatment naive  As above, s/p debulking and r. bronchial stent  chemo naive, was supposed to start on 3/14. Onc. aware of patient admission, will follow up with recs Still treatment naive  As above, s/p debulking and r. bronchial stent  chemo naive, was supposed to start on 3/14. Onc. aware of patient admission, will follow up with recs  If patient has pain, can try IV tylenol since patient is NPO.

## 2022-03-10 NOTE — ED PROVIDER NOTE - PHYSICAL EXAMINATION
Gen: A&Ox4   HEENT: Atraumatic. Mucous membranes moist, no scleral icterus.  CV: tachycardic. No significant lower extremity edema.   Resp: Respirations unlabored. CTAB on left, diminished on right.   GI: Abdomen non tender to palpation, soft and non-distended.   Skin/MSK: No open wounds. No ecchymosis appreciated.  Neuro: Following commands. No facial drop.   Psych: Appropriate mood, cooperative

## 2022-03-10 NOTE — ED CLERICAL - NS ED CLERK NOTE PRE-ARRIVAL INFORMATION; ADDITIONAL PRE-ARRIVAL INFORMATION
Hilar mass with stent placed in bronchus 1 month ago.  SOB, dysphagia- sent for immediate CXR and poss OR.  Please call Dr Groves once CXR resulted.

## 2022-03-10 NOTE — ED PROVIDER NOTE - PROGRESS NOTE DETAILS
Maxwell Weber PGY2: Interventional pulm recommending admission to medicine. F/u CT chest. Maxwell Weber PGY2: Interventional pulm in ED recommending admission to medicine. F/u CT chest. Maxwell Weber PGY2: Case discussed with hospitalist and pt accepted for admission. Ct pending.

## 2022-03-10 NOTE — H&P ADULT - PROBLEM SELECTOR PLAN 2
S/P debulking and stent  CT chest ordered to assess for stent placement  Interventional pulmonology consulted, recs noted  GI team emailed, will follow up with recs  For now monitor electrolytes, replete as needed and maintenance fluid

## 2022-03-10 NOTE — ED PROVIDER NOTE - ATTENDING CONTRIBUTION TO CARE
wendy: pt here with inablity to keep down po for at least 3 days.  pt says she can't tolerate even liquids, comes back up in 15-20 minutes.  abd soft no r/g, fairly recent dx small cell lung ca, first chemo scheduled, had stent placed right side lung about 3 weeks ago.  pe decreased bs right side, rough rhonci on right.  pt awake alert and appropriate.  spoke with pulm and pulm intervention, xray looks ok, ct being done, needs admission and GI involvement    I performed a history and physical exam of the patient and discussed their management with the resident and /or advanced care provider. I reviewed the resident and /or ACP's note and agree with the documented findings and plan of care. My medical decison making and observations are found above.

## 2022-03-10 NOTE — H&P ADULT - HISTORY OF PRESENT ILLNESS
78 yo lady former smoker recently diagnosed with NSCLC s/p debulking and R. bronchial stent, discharged a month ago now presents with worsening dysphagia and progressing shortness of breath, especially with exertion. Patient reports that for the last 3 days she has trouble tolerating any PO intake. Patient is chemo naive and was set to start chemo on 3/14. She denies any f/c or recent infection.         PAST MEDICAL & SURGICAL HISTORY:  No pertinent past medical history    No significant past surgical history        Review of Systems:   CONSTITUTIONAL: No fever, +weight loss and weakness   EYES: No eye pain, visual disturbances, or discharge  ENMT:  No difficulty hearing, tinnitus, vertigo; No sinus or throat pain  NECK: No pain or stiffness  RESPIRATORY: No cough, wheezing, chills or hemoptysis; No shortness of breath  CARDIOVASCULAR: No chest pain, palpitations, dizziness, or leg swelling  GASTROINTESTINAL: No abdominal or epigastric pain. No nausea, vomiting, or hematemesis; No diarrhea or constipation. No melena or hematochezia.  GENITOURINARY: No dysuria, frequency, hematuria, or incontinence  NEUROLOGICAL: No headaches, memory loss, loss of strength, numbness, or tremors  SKIN: No itching, burning, rashes, or lesions   LYMPH NODES: No enlarged glands  ENDOCRINE: No heat or cold intolerance; No hair loss  MUSCULOSKELETAL: No joint pain or swelling; No muscle, back, or extremity pain  PSYCHIATRIC: No depression, anxiety, mood swings, or difficulty sleeping  HEME/LYMPH: No easy bruising, or bleeding gums  ALLERGY AND IMMUNOLOGIC: No hives or eczema    Allergies    No Known Allergies    Intolerances        Social History: Long time smoker, denies excessive etoh or drug use    FAMILY HISTORY:  No pertinent FH    MEDICATIONS  (STANDING):    MEDICATIONS  (PRN):      T(C): 36.7 (03-10-22 @ 16:33), Max: 36.7 (03-10-22 @ 16:33)  HR: 93 (03-10-22 @ 16:33) (93 - 107)  BP: 128/76 (03-10-22 @ 16:33) (123/74 - 144/79)  RR: 20 (03-10-22 @ 16:33) (20 - 24)  SpO2: 100% (03-10-22 @ 16:33) (94% - 100%)  Height (cm): 157.5 (03-10-22 @ 13:01)  CAPILLARY BLOOD GLUCOSE        I&O's Summary      PHYSICAL EXAM:  GENERAL: NAD, well-developed  HEAD:  Atraumatic, Normocephalic  EYES: EOMI, PERRLA, conjunctiva and sclera clear  NECK: Supple, No elevated JVD  CHEST/LUNG: Clear on left, diminished on the right  HEART: Regular rate and rhythm; No murmurs, rubs, or gallops  ABDOMEN: Soft, Nontender, Nondistended; Bowel sounds present  EXTREMITIES:  2+ Peripheral Pulses, No clubbing, cyanosis, or edema  PSYCH: AAOx3  NEUROLOGY: CN II-XII grossly intact, moving all extremities  SKIN: No rashes or lesions    LABS:                        13.5   9.79  )-----------( 398      ( 10 Mar 2022 14:29 )             42.3     03-10    137  |  93<L>  |  15  ----------------------------<  106<H>  3.7   |  28  |  0.52    Ca    10.6<H>      10 Mar 2022 14:29    TPro  7.6  /  Alb  3.3  /  TBili  0.6  /  DBili  x   /  AST  19  /  ALT  12  /  AlkPhos  80  03-10                RADIOLOGY & ADDITIONAL TESTS:    ECG Personally Reviewed -     Imaging Personally Reviewed:    Consultant(s) Notes Reviewed:      Care Discussed with Consultants/Other Providers:   78 yo lady former smoker recently diagnosed with NSCLC s/p debulking and R. bronchial stent, discharged a month ago now presents with worsening dysphagia and progressing shortness of breath, especially with exertion. Patient reports that for the last 3 days she has trouble tolerating any PO intake. Patient is chemo naive and was set to start chemo on 3/14. She denies any f/c or recent infection.         PAST MEDICAL & SURGICAL HISTORY:  No pertinent past medical history    No significant past surgical history        Review of Systems:   CONSTITUTIONAL: No fever, +weight loss and weakness   EYES: No eye pain, visual disturbances, or discharge  ENMT:  No difficulty hearing, tinnitus, vertigo; No sinus or throat pain  NECK: No pain or stiffness  RESPIRATORY: as per hpi  CARDIOVASCULAR: No chest pain, palpitations, dizziness, or leg swelling  GASTROINTESTINAL: No abdominal or epigastric pain. No nausea, vomiting, or hematemesis; No diarrhea or constipation. No melena or hematochezia.  GENITOURINARY: No dysuria, frequency, hematuria, or incontinence  NEUROLOGICAL: No headaches, memory loss, loss of strength, numbness, or tremors  SKIN: No itching, burning, rashes, or lesions   LYMPH NODES: No enlarged glands  ENDOCRINE: No heat or cold intolerance; No hair loss  MUSCULOSKELETAL: No joint pain or swelling; No muscle, back, or extremity pain  PSYCHIATRIC: No depression, anxiety, mood swings, or difficulty sleeping  HEME/LYMPH: No easy bruising, or bleeding gums  ALLERGY AND IMMUNOLOGIC: No hives or eczema    Allergies    No Known Allergies    Intolerances        Social History: Long time smoker, denies excessive etoh or drug use    FAMILY HISTORY:  No pertinent FH    MEDICATIONS  (STANDING):    MEDICATIONS  (PRN):      T(C): 36.7 (03-10-22 @ 16:33), Max: 36.7 (03-10-22 @ 16:33)  HR: 93 (03-10-22 @ 16:33) (93 - 107)  BP: 128/76 (03-10-22 @ 16:33) (123/74 - 144/79)  RR: 20 (03-10-22 @ 16:33) (20 - 24)  SpO2: 100% (03-10-22 @ 16:33) (94% - 100%)  Height (cm): 157.5 (03-10-22 @ 13:01)  CAPILLARY BLOOD GLUCOSE        I&O's Summary      PHYSICAL EXAM:  GENERAL: NAD, well-developed  HEAD:  Atraumatic, Normocephalic  EYES: EOMI, PERRLA, conjunctiva and sclera clear  NECK: Supple, No elevated JVD  CHEST/LUNG: Clear on left, diminished on the right  HEART: Regular rate and rhythm; No murmurs, rubs, or gallops  ABDOMEN: Soft, Nontender, Nondistended; Bowel sounds present  EXTREMITIES:  2+ Peripheral Pulses, No clubbing, cyanosis, or edema  PSYCH: AAOx3  NEUROLOGY: CN II-XII grossly intact, moving all extremities  SKIN: No rashes or lesions    LABS:                        13.5   9.79  )-----------( 398      ( 10 Mar 2022 14:29 )             42.3     03-10    137  |  93<L>  |  15  ----------------------------<  106<H>  3.7   |  28  |  0.52    Ca    10.6<H>      10 Mar 2022 14:29    TPro  7.6  /  Alb  3.3  /  TBili  0.6  /  DBili  x   /  AST  19  /  ALT  12  /  AlkPhos  80  03-10                RADIOLOGY & ADDITIONAL TESTS:    ECG Personally Reviewed -     Imaging Personally Reviewed:    Consultant(s) Notes Reviewed:      Care Discussed with Consultants/Other Providers:

## 2022-03-10 NOTE — CONSULT NOTE ADULT - ASSESSMENT
77F PMHx of former smoking, recently diagnosed non small cell lung cancer with non-resectable mass, now s/p  tumor debulking and bronchial stent placement to bronchus intermedius in early Feb, presents to the ER with worsening shortness of breath and dysphagia.    #Shortness of breath:  - patient CXR shows stent in grossly normal position  - pending CT to evaluate further for stent placement as well as any plugging of the stent  #Dysphagia:  - patient with dysphagia to thin liquids  - concern for mass effect on esophagus--> pending Ct    Recommendations;  - follow up CT findings to evaluate if any bronchoscopic intervention is needed  - will need gastroenterology evaluation for dysphagia     77F PMHx of former smoking, recently diagnosed non small cell lung cancer with non-resectable mass, now s/p  tumor debulking and bronchial stent placement to bronchus intermedius in early Feb, presents to the ER with worsening shortness of breath and dysphagia.    #Shortness of breath:  - patient CXR shows stent in grossly normal position  - pending CT to evaluate further for stent placement as well as any plugging of the stent  #Dysphagia:  - patient with dysphagia to thin liquids  - concern for mass effect on esophagus--> pending Ct    Recommendations;  - follow up CT findings to evaluate if any bronchoscopic intervention is needed  - Will discuss with rad/onc about plans for concurrent RT vs palliative RT.   - Oncology cx as patient was due to start systemic therapy next week.   - Will need gastroenterology evaluation for dysphagia. Discussed with advanced GI to assess if any role for endoscopic intervention for dysphagia.

## 2022-03-10 NOTE — ED PROVIDER NOTE - CLINICAL SUMMARY MEDICAL DECISION MAKING FREE TEXT BOX
wendy: pt here with inablity to keep down po for at least 3 days.  pt says she can't tolerate even liquids, comes back up in 15-20 minutes.  abd soft no r/g, fairly recent dx small cell lung ca, first chemo scheduled, had stent placed right side lung about 3 weeks ago.  pe decreased bs right side, rough rhonci on right.  pt awake alert and appropriate.  spoke with pulm and pulm intervention, xray looks ok, ct being done, needs admission and GI involvement

## 2022-03-10 NOTE — ED ADULT NURSE REASSESSMENT NOTE - NS ED NURSE REASSESS COMMENT FT1
received report from  SO Pino. Pt is a/o x 3. pt denies complaints at this time. no complaints of chest pain, headache, nausea, dizziness, vomiting, SOB, fever, chills  verbalized. Pt has 20g iv placed to right AC with no redness or swelling noted. Awaiting further orders. Will continue to monitor. pt respirations even and unlabored with no accessory muscle use. pt O2 sat 96% on RA.

## 2022-03-10 NOTE — ED ADULT NURSE NOTE - OBJECTIVE STATEMENT
Break coverage RN- Received pt in room 9. pt A&OX3, ambulatory,  at bedside. pt with hx of lung CA, starting treatment Monday. Pt c/o difficulty swallowing and SOB x4-5 days. pt also c/o back pain, states the pain is not new. as per , MD believes stent may have moved.  Called MD and was told to go to the ER.  pt placed on cardiac monitor, NSR at this time. airway patent, pt slightly tachypneic, but in no distress, sating 97% on room air at this time. vitals as noted. denies any chest pain, cough, fever/chills, headache, dizziness, n/v/d. iv to be placed labs to be sent, pt stable, safety maintained, will endorse report to primary RN Odette for further plan.

## 2022-03-10 NOTE — ED PROVIDER NOTE - NS ED ROS FT
Gen: Denies fever.   HEENT: Denies headache. Denies congestion.  CV: Denies chest pain. Denies lightheadedness.  Skin: Denies rash.   Resp: +SOB. +cough.  GI: Denies abd pain. Denies nausea. Denies vomiting. Denies diarrhea. Denies melena. Denies hematochezia.  Msk: Denies extremity swelling. Denies extremity pain.  : Denies dysuria. Denies hematuria.  Neuro: Denies LOC. Denies dizziness. Denies new numbness/tingling. Denies new focal weakness.  Psych: Denies SI

## 2022-03-10 NOTE — ED PROVIDER NOTE - OBJECTIVE STATEMENT
78 yo F PMHx of former smoking, recently diagnosed w/ hilar mass now s/p  tumor debulking and bronchial stent placement to bronchus intermedius in early Feb 2022, found to be small cell carcinoma, presenting now for dysphagia w/ difficulty swallowing. Associated increasing SOB and productive cough. Denies fevers, chills. Denies N/V/D. Has not yet started chemo.

## 2022-03-10 NOTE — ED ADULT TRIAGE NOTE - CHIEF COMPLAINT QUOTE
Pt c/o difficulty swallowing and SOB x4-5 days.  MD believes stent may have moved.  Called MD and was told to go to the ER.  Also has back pain that isn't new.  Hx lung cancer and stent in lung.  Chemo to start Monday

## 2022-03-10 NOTE — H&P ADULT - ASSESSMENT
76 yo lady former smoker recently diagnosed with NSCLC s/p debulking and R. bronchial stent, discharged a month ago now presents with worsening dysphagia and progressing shortness of breath, especially with exertion.

## 2022-03-10 NOTE — CONSULT NOTE ADULT - SUBJECTIVE AND OBJECTIVE BOX
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 Patient reports she was having dysphagia for some time, but now is unable to tolerate liquids. She report recurrent belching as well. Patient also reports increased SOB and productive cough.    Interventional pulmonary consulted for management of stent and shortness of breath.     PAST MEDICAL & SURGICAL HISTORY:  No pertinent past medical history    No significant past surgical history        FAMILY HISTORY:      SOCIAL HISTORY:  Smoking: [ ] Never Smoked [X ] Former Smoker - 40 pck years  Substance Use: [ ] Never Used [ ] Used ____  EtOH Use:  Marital Status: [ ] Single [ ]  [ ]  [ ]   Sexual History:   Occupation:  Recent Travel:  Country of Birth:  Advance Directives:    Allergies    No Known Allergies    Intolerances        HOME MEDICATIONS:    REVIEW OF SYSTEMS:  Constitutional: [ X] negative [ ] fevers [ ] chills [ ] weight loss [ ] weight gain  HEENT: [X ] negative [ ] dry eyes [ ] eye irritation [ ] postnasal drip [ ] nasal congestion  CV: [ X] negative  [ ] chest pain [ ] orthopnea [ ] palpitations [ ] murmur  Resp: [ ] negative [ ] cough [X ] shortness of breath [ ] dyspnea [ ] wheezing [ ] sputum [ ] hemoptysis  GI:  ] negative [X ] nausea [X ] vomiting [ ] diarrhea [ ] constipation [ ] abd pain [ ] dysphagia   : [ X] negative [ ] dysuria [ ] nocturia [ ] hematuria [ ] increased urinary frequency  Musculoskeletal: [ X] negative [ ] back pain [ ] myalgias [ ] arthralgias [ ] fracture  Skin: [ ] negative [ ] rash [ ] itch  Neurological: [X ] negative [ ] headache [ ] dizziness [ ] syncope [ ] weakness [ ] numbness  Psychiatric: [ ] negative [ ] anxiety [ ] depression  Endocrine: [ ] negative [ ] diabetes [ ] thyroid problem  Hematologic/Lymphatic: [ ] negative [ ] anemia [ ] bleeding problem  Allergic/Immunologic: [ ] negative [ ] itchy eyes [ ] nasal discharge [ ] hives [ ] angioedema  [ ] All other systems negative  [ ] Unable to assess ROS because ________    OBJECTIVE:  ICU Vital Signs Last 24 Hrs  T(C): 36.6 (10 Mar 2022 13:01), Max: 36.6 (10 Mar 2022 13:01)  T(F): 97.9 (10 Mar 2022 13:01), Max: 97.9 (10 Mar 2022 13:01)  HR: 103 (10 Mar 2022 13:27) (103 - 107)  BP: 144/79 (10 Mar 2022 13:27) (123/74 - 144/79)  BP(mean): --  ABP: --  ABP(mean): --  RR: 24 (10 Mar 2022 13:27) (20 - 24)  SpO2: 97% (10 Mar 2022 13:27) (94% - 97%)        CAPILLARY BLOOD GLUCOSE          PHYSICAL EXAM:  General: no acute distress, sitting in bed  HEENT: no icterus  Neck: supple  Respiratory:   Cardiovascular:   Abdomen:   Extremities:   Skin:   Neurological:  Psychiatry:    HOSPITAL MEDICATIONS:    LABS:                        13.5   9.79  )-----------( 398      ( 10 Mar 2022 14:29 )             42.3     Hgb Trend: 13.5<--, 13.4<--  03-10    137  |  93<L>  |  15  ----------------------------<  106<H>  3.7   |  28  |  0.52    Ca    10.6<H>      10 Mar 2022 14:29    TPro  7.6  /  Alb  3.3  /  TBili  0.6  /  DBili  x   /  AST  19  /  ALT  12  /  AlkPhos  80  03-10    Creatinine Trend: 0.52<--        Venous Blood Gas:  03-10 @ 14:29  7.42/52/20/34/31.3  VBG Lactate: 1.6      MICROBIOLOGY:     RADIOLOGY:  [ ] Reviewed and interpreted by me    PULMONARY FUNCTION TESTS:    EKG: Chief Complain: Dysphagia. Occasional wheezing and dyspnea.     77F PMHx of former smoking, recently diagnosed w/ hilar mass now s/p  tumor debulking and bronchial stent placement to bronchus intermedius in early Feb 2022, found to be non small cell carcinoma, presenting now for dysphagia w/ difficulty swallowing. Patient reports she was having dysphagia for some time, but now is unable to tolerate liquids. She report recurrent belching as well. Patient also reports increased SOB and productive cough on occasion.     Interventional pulmonary consulted for management of stent and shortness of breath.     PAST MEDICAL & SURGICAL HISTORY:  No pertinent past medical history    No significant past surgical history        FAMILY HISTORY:      SOCIAL HISTORY:  Smoking: [ ] Never Smoked [X ] Former Smoker - 40 pck years  Substance Use: [ ] Never Used [ ] Used ____  EtOH Use:  Marital Status: [ ] Single [ ]  [ ]  [ ]   Sexual History:   Occupation:  Recent Travel:  Country of Birth:  Advance Directives:    Allergies    No Known Allergies    Intolerances        HOME MEDICATIONS:    REVIEW OF SYSTEMS:  Constitutional: [ X] negative [ ] fevers [ ] chills [ ] weight loss [ ] weight gain  HEENT: [X ] negative [ ] dry eyes [ ] eye irritation [ ] postnasal drip [ ] nasal congestion  CV: [ X] negative  [ ] chest pain [ ] orthopnea [ ] palpitations [ ] murmur  Resp: [ ] negative [ ] cough [X ] shortness of breath [ ] dyspnea [ ] wheezing [ ] sputum [ ] hemoptysis  GI:  ] negative [X ] nausea [X ] vomiting [ ] diarrhea [ ] constipation [ ] abd pain [ ] dysphagia   : [ X] negative [ ] dysuria [ ] nocturia [ ] hematuria [ ] increased urinary frequency  Musculoskeletal: [ X] negative [ ] back pain [ ] myalgias [ ] arthralgias [ ] fracture  Skin: [ ] negative [ ] rash [ ] itch  Neurological: [X ] negative [ ] headache [ ] dizziness [ ] syncope [ ] weakness [ ] numbness  Psychiatric: [ ] negative [ ] anxiety [ ] depression  Endocrine: [ ] negative [ ] diabetes [ ] thyroid problem  Hematologic/Lymphatic: [ ] negative [ ] anemia [ ] bleeding problem  Allergic/Immunologic: [ ] negative [ ] itchy eyes [ ] nasal discharge [ ] hives [ ] angioedema  [ ] All other systems negative  [ ] Unable to assess ROS because ________    OBJECTIVE:  ICU Vital Signs Last 24 Hrs  T(C): 36.6 (10 Mar 2022 13:01), Max: 36.6 (10 Mar 2022 13:01)  T(F): 97.9 (10 Mar 2022 13:01), Max: 97.9 (10 Mar 2022 13:01)  HR: 103 (10 Mar 2022 13:27) (103 - 107)  BP: 144/79 (10 Mar 2022 13:27) (123/74 - 144/79)  BP(mean): --  ABP: --  ABP(mean): --  RR: 24 (10 Mar 2022 13:27) (20 - 24)  SpO2: 97% (10 Mar 2022 13:27) (94% - 97%)        CAPILLARY BLOOD GLUCOSE          PHYSICAL EXAM:  General: no acute distress, sitting in bed  HEENT: no icterus  Neck: supple  Respiratory:   Cardiovascular:   Abdomen:   Extremities:   Skin:   Neurological:  Psychiatry:    HOSPITAL MEDICATIONS:    LABS:                        13.5   9.79  )-----------( 398      ( 10 Mar 2022 14:29 )             42.3     Hgb Trend: 13.5<--, 13.4<--  03-10    137  |  93<L>  |  15  ----------------------------<  106<H>  3.7   |  28  |  0.52    Ca    10.6<H>      10 Mar 2022 14:29    TPro  7.6  /  Alb  3.3  /  TBili  0.6  /  DBili  x   /  AST  19  /  ALT  12  /  AlkPhos  80  03-10    Creatinine Trend: 0.52<--        Venous Blood Gas:  03-10 @ 14:29  7.42/52/20/34/31.3  VBG Lactate: 1.6      MICROBIOLOGY:     RADIOLOGY:  [X Reviewed and interpreted by me    BI stent in appropriate place on xray. Normal aeration of both lungs.      Interventional Pulmonology Consultation Note    Chief Complain: Dysphagia. Occasional wheezing and dyspnea.     77F PMHx of former smoking, recently diagnosed w/ hilar mass now s/p  tumor debulking and bronchial stent placement to bronchus intermedius in early Feb 2022, found to be non small cell carcinoma, presenting now for dysphagia w/ difficulty swallowing. Patient reports she was having dysphagia for some time, but now is unable to tolerate liquids. She report recurrent belching as well. Patient also reports increased SOB and productive cough on occasion.     Interventional pulmonary consulted for management of stent and shortness of breath.     PAST MEDICAL & SURGICAL HISTORY:  No pertinent past medical history    No significant past surgical history        FAMILY HISTORY:      SOCIAL HISTORY:  Smoking: [ ] Never Smoked [X ] Former Smoker - 40 pck years  Substance Use: [ ] Never Used [ ] Used ____  EtOH Use:  Marital Status: [ ] Single [ ]  [ ]  [ ]   Sexual History:   Occupation:  Recent Travel:  Country of Birth:  Advance Directives:    Allergies    No Known Allergies    Intolerances        HOME MEDICATIONS:    REVIEW OF SYSTEMS:  Constitutional: [ X] negative [ ] fevers [ ] chills [ ] weight loss [ ] weight gain  HEENT: [X ] negative [ ] dry eyes [ ] eye irritation [ ] postnasal drip [ ] nasal congestion  CV: [ X] negative  [ ] chest pain [ ] orthopnea [ ] palpitations [ ] murmur  Resp: [ ] negative [ ] cough [X ] shortness of breath [ ] dyspnea [ ] wheezing [ ] sputum [ ] hemoptysis  GI:  ] negative [X ] nausea [X ] vomiting [ ] diarrhea [ ] constipation [ ] abd pain [ ] dysphagia   : [ X] negative [ ] dysuria [ ] nocturia [ ] hematuria [ ] increased urinary frequency  Musculoskeletal: [ X] negative [ ] back pain [ ] myalgias [ ] arthralgias [ ] fracture  Skin: [ ] negative [ ] rash [ ] itch  Neurological: [X ] negative [ ] headache [ ] dizziness [ ] syncope [ ] weakness [ ] numbness  Psychiatric: [ ] negative [ ] anxiety [ ] depression  Endocrine: [ ] negative [ ] diabetes [ ] thyroid problem  Hematologic/Lymphatic: [ ] negative [ ] anemia [ ] bleeding problem  Allergic/Immunologic: [ ] negative [ ] itchy eyes [ ] nasal discharge [ ] hives [ ] angioedema  [ ] All other systems negative  [ ] Unable to assess ROS because ________    OBJECTIVE:  ICU Vital Signs Last 24 Hrs  T(C): 36.6 (10 Mar 2022 13:01), Max: 36.6 (10 Mar 2022 13:01)  T(F): 97.9 (10 Mar 2022 13:01), Max: 97.9 (10 Mar 2022 13:01)  HR: 103 (10 Mar 2022 13:27) (103 - 107)  BP: 144/79 (10 Mar 2022 13:27) (123/74 - 144/79)  BP(mean): --  ABP: --  ABP(mean): --  RR: 24 (10 Mar 2022 13:27) (20 - 24)  SpO2: 97% (10 Mar 2022 13:27) (94% - 97%)        CAPILLARY BLOOD GLUCOSE          PHYSICAL EXAM:  General: no acute distress, sitting in bed  HEENT: no icterus  Neck: supple  Respiratory:   Cardiovascular:   Abdomen:   Extremities:   Skin:   Neurological:  Psychiatry:    HOSPITAL MEDICATIONS:    LABS:                        13.5   9.79  )-----------( 398      ( 10 Mar 2022 14:29 )             42.3     Hgb Trend: 13.5<--, 13.4<--  03-10    137  |  93<L>  |  15  ----------------------------<  106<H>  3.7   |  28  |  0.52    Ca    10.6<H>      10 Mar 2022 14:29    TPro  7.6  /  Alb  3.3  /  TBili  0.6  /  DBili  x   /  AST  19  /  ALT  12  /  AlkPhos  80  03-10    Creatinine Trend: 0.52<--        Venous Blood Gas:  03-10 @ 14:29  7.42/52/20/34/31.3  VBG Lactate: 1.6      MICROBIOLOGY:     RADIOLOGY:  [X Reviewed and interpreted by me    BI stent in appropriate place on xray. Normal aeration of both lungs.      Interventional Pulmonology Consultation Note    Chief Complain: Dysphagia. Occasional wheezing and dyspnea.     77F PMHx of former smoking, recently diagnosed w/ hilar mass now s/p  tumor debulking and bronchial stent placement to bronchus intermedius in early Feb 2022, found to be non small cell carcinoma, presenting now for dysphagia w/ difficulty swallowing. Patient reports she was having dysphagia for some time, but now is unable to tolerate liquids. She report recurrent belching as well. Patient also reports increased SOB and productive cough on occasion.     Interventional pulmonary consulted for management of stent and shortness of breath.     PAST MEDICAL & SURGICAL HISTORY:  No pertinent past medical history    No significant past surgical history        FAMILY HISTORY:      SOCIAL HISTORY:  Smoking: [ ] Never Smoked [X ] Former Smoker - 40 pck years  Substance Use: [ ] Never Used [ ] Used ____  EtOH Use:  Marital Status: [ ] Single [ ]  [ ]  [ ]   Sexual History:   Occupation:  Recent Travel:  Country of Birth:  Advance Directives:    Allergies    No Known Allergies    Intolerances        HOME MEDICATIONS:    REVIEW OF SYSTEMS:  Constitutional: [ X] negative [ ] fevers [ ] chills [ ] weight loss [ ] weight gain  HEENT: [X ] negative [ ] dry eyes [ ] eye irritation [ ] postnasal drip [ ] nasal congestion  CV: [ X] negative  [ ] chest pain [ ] orthopnea [ ] palpitations [ ] murmur  Resp: [ ] negative [ ] cough [X ] shortness of breath [ ] dyspnea [ ] wheezing [ ] sputum [ ] hemoptysis  GI:  ] negative [X ] nausea [X ] vomiting [ ] diarrhea [ ] constipation [ ] abd pain [ ] dysphagia   : [ X] negative [ ] dysuria [ ] nocturia [ ] hematuria [ ] increased urinary frequency  Musculoskeletal: [ X] negative [ ] back pain [ ] myalgias [ ] arthralgias [ ] fracture  Skin: [ ] negative [ ] rash [ ] itch  Neurological: [X ] negative [ ] headache [ ] dizziness [ ] syncope [ ] weakness [ ] numbness  Psychiatric: [ ] negative [ ] anxiety [ ] depression  Endocrine: [ ] negative [ ] diabetes [ ] thyroid problem  Hematologic/Lymphatic: [ ] negative [ ] anemia [ ] bleeding problem  Allergic/Immunologic: [ ] negative [ ] itchy eyes [ ] nasal discharge [ ] hives [ ] angioedema  [ ] All other systems negative  [ ] Unable to assess ROS because ________    OBJECTIVE:  ICU Vital Signs Last 24 Hrs  T(C): 36.6 (10 Mar 2022 13:01), Max: 36.6 (10 Mar 2022 13:01)  T(F): 97.9 (10 Mar 2022 13:01), Max: 97.9 (10 Mar 2022 13:01)  HR: 103 (10 Mar 2022 13:27) (103 - 107)  BP: 144/79 (10 Mar 2022 13:27) (123/74 - 144/79)  BP(mean): --  ABP: --  ABP(mean): --  RR: 24 (10 Mar 2022 13:27) (20 - 24)  SpO2: 97% (10 Mar 2022 13:27) (94% - 97%)        CAPILLARY BLOOD GLUCOSE          PHYSICAL EXAM:  General: well appearing female. NAD, sitting in bed  HEENT: no icterus  Neck:  supple  Respiratory: right sided wheeze; upper airway sound trasmission  Cardiovascular: S1/S2, RRR, no murmurs  Abdomen: soft, nontender, nondistended  Extremities: no LE edema  Skin: good turgor  Neurological:AxOx3  Psychiatry: normal mood and affect       HOSPITAL MEDICATIONS:    LABS:                        13.5   9.79  )-----------( 398      ( 10 Mar 2022 14:29 )             42.3     Hgb Trend: 13.5<--, 13.4<--  03-10    137  |  93<L>  |  15  ----------------------------<  106<H>  3.7   |  28  |  0.52    Ca    10.6<H>      10 Mar 2022 14:29    TPro  7.6  /  Alb  3.3  /  TBili  0.6  /  DBili  x   /  AST  19  /  ALT  12  /  AlkPhos  80  03-10    Creatinine Trend: 0.52<--        Venous Blood Gas:  03-10 @ 14:29  7.42/52/20/34/31.3  VBG Lactate: 1.6      MICROBIOLOGY:     RADIOLOGY:  [X Reviewed and interpreted by me    BI stent in appropriate place on xray. Normal aeration of both lungs.

## 2022-03-11 NOTE — PATIENT PROFILE ADULT - FALL HARM RISK - UNIVERSAL INTERVENTIONS
Bed in lowest position, wheels locked, appropriate side rails in place/Call bell, personal items and telephone in reach/Instruct patient to call for assistance before getting out of bed or chair/Non-slip footwear when patient is out of bed/[place] to call system/Physically safe environment - no spills, clutter or unnecessary equipment/Purposeful Proactive Rounding/Room/bathroom lighting operational, light cord in reach

## 2022-03-11 NOTE — PROGRESS NOTE ADULT - PROBLEM SELECTOR PLAN 2
S/P debulking and stent  CT chest with compression of esophagus due to tumor.   GI recs appreciated, offered PEG. patient is undecided.   - tentatively planning for PEG next week.   - offered temporary NGT placement for nutrition and meds over the weekend. patient is too overwhelmed, will defer for now.   - c/w IVF. D5w+LR.   - strict NPO.

## 2022-03-11 NOTE — PROGRESS NOTE ADULT - ASSESSMENT
78 yo lady former smoker recently diagnosed with NSCLC s/p debulking and R. bronchial stent, discharged a month ago now presents with worsening dysphagia and progressing shortness of breath, especially with exertion. CT showed tumor extension into left main bronchus. as well as external compression of esophagus from lung ca.

## 2022-03-11 NOTE — PROGRESS NOTE ADULT - PROBLEM SELECTOR PLAN 1
Still treatment naive  As above, s/p debulking and r. bronchial stent  repeat CT showed left main bronchial invasion of tumor.   discussed with IP, Rad/Onc and Heme/Onc.   no plan for urgent surgical intervention of lung lesions.  will start inpatient chemo today 3/11 per onc.   further assessment for Rad/onc after chemo.

## 2022-03-11 NOTE — PROGRESS NOTE ADULT - ASSESSMENT
77F PMHx of former smoking, recently diagnosed non small cell lung cancer with non-resectable mass, now s/p  tumor debulking and bronchial stent placement to bronchus intermedius in early Feb, presents to the ER with worsening shortness of breath and dysphagia.    #Shortness of breath and dysphagia  - patient CXR shows stent in grossly normal position  - CT shows enlarged mass from prior causes compression of mid esophagus in addition to narrowing of right upper and middle lobe bronchi  - left sided stent remains patent    Recommendations;  - at this time, no acute bronchoscopic interventions are required  - will discuss with rad/onc about plans for concurrent RT vs palliative RT  - pending further GI recs  - Oncology cx as patient was due to start systemic therapy next week.            77F PMHx of former smoking, recently diagnosed non small cell lung cancer with non-resectable mass, now s/p  tumor debulking and bronchial stent placement to bronchus intermedius in early Feb, presents to the ER with worsening shortness of breath and dysphagia.    #Shortness of breath and dysphagia  - patient CXR shows stent in grossly normal position  - CT shows enlarged mass from prior causes compression of mid esophagus in addition to narrowing of right upper and middle lobe bronchi  - right sided stent remains patent    Recommendations;  - at this time, we will continue to monitor respiratory symptoms to assess if further intervention needed for LMB narrowing.   - will discuss with rad/onc about plans for concurrent RT vs palliative RT  - pending further GI recs  - Oncology cx as patient was due to start systemic therapy next week.

## 2022-03-11 NOTE — CONSULT NOTE ADULT - ASSESSMENT
77f with recently diagnosed stage IIIA NSCLC, s/p outpatient onc and rad onc eval with plan to start chemotherapy on monday 3/14, with plan to add rt when symptoms are better controlled, presenting to the ED with dysphagia.   Treatment plan for the patient is potentially curative, therefore palliative rt to the mass causing esophageal obstruction is not recommended as this will limit the curative plans.   Plan discussed with Pulm, rad onc and primary team.  Will start weekly carbo/taxol as inpatient.  Discussed sequential weekly carbo/taxol with hopes of adding RT in the future.  Discussed the natural course of disease, rationale for treatment and treatment expectation.  All side effects, risks and benefits were discussed in detail, hand outs detailing the treatment drugs and their side effects were provided, and all questions were answered to the apparent satisfaction of the patient. Consent to start treatment was signed by patient and witnessed by IRIS Portillo.   Patient's  was contacted over the phone and plan reviewed with him in detail.  All questions answered.     -Carbo AUC 2, Taxol 40 mg/m2  -Orders written and submitted to chemo nurse and onc pharmacy  -GI input appreciated  -Pulm and rad onc input appreciated.  -Supportive care, pain control, Nutrition, PT, DVT ppx  -Outpatient oncology f/u    Will follow. Please do not hesitate to call back with questions.     Cintia Willis MD  Medical Oncology Attending  C: [phone]

## 2022-03-11 NOTE — PROGRESS NOTE ADULT - SUBJECTIVE AND OBJECTIVE BOX
CHIEF COMPLAINT:    Interval Events:    REVIEW OF SYSTEMS:  Constitutional: [ ] negative [ ] fevers [ ] chills [ ] weight loss [ ] weight gain  HEENT: [ ] negative [ ] dry eyes [ ] eye irritation [ ] postnasal drip [ ] nasal congestion  CV: [ ] negative  [ ] chest pain [ ] orthopnea [ ] palpitations [ ] murmur  Resp: [ ] negative [ ] cough [ ] shortness of breath [ ] dyspnea [ ] wheezing [ ] sputum [ ] hemoptysis  GI: [ ] negative [ ] nausea [ ] vomiting [ ] diarrhea [ ] constipation [ ] abd pain [ ] dysphagia   : [ ] negative [ ] dysuria [ ] nocturia [ ] hematuria [ ] increased urinary frequency  Musculoskeletal: [ ] negative [ ] back pain [ ] myalgias [ ] arthralgias [ ] fracture  Skin: [ ] negative [ ] rash [ ] itch  Neurological: [ ] negative [ ] headache [ ] dizziness [ ] syncope [ ] weakness [ ] numbness  Psychiatric: [ ] negative [ ] anxiety [ ] depression  Endocrine: [ ] negative [ ] diabetes [ ] thyroid problem  Hematologic/Lymphatic: [ ] negative [ ] anemia [ ] bleeding problem  Allergic/Immunologic: [ ] negative [ ] itchy eyes [ ] nasal discharge [ ] hives [ ] angioedema  [ ] All other systems negative  [ ] Unable to assess ROS because ________    OBJECTIVE:  ICU Vital Signs Last 24 Hrs  T(C): 36.7 (11 Mar 2022 04:05), Max: 37.5 (10 Mar 2022 21:35)  T(F): 98 (11 Mar 2022 04:05), Max: 99.5 (10 Mar 2022 21:35)  HR: 91 (11 Mar 2022 09:55) (87 - 107)  BP: 148/64 (11 Mar 2022 04:05) (123/74 - 148/64)  BP(mean): --  ABP: --  ABP(mean): --  RR: 17 (11 Mar 2022 04:05) (16 - 24)  SpO2: 92% (11 Mar 2022 09:55) (92% - 100%)        CAPILLARY BLOOD GLUCOSE          PHYSICAL EXAM:  General:   HEENT:   Lymph Nodes:  Neck:   Respiratory:   Cardiovascular:   Abdomen:   Extremities:   Skin:   Neurological:  Psychiatry:    HOSPITAL MEDICATIONS:          albuterol/ipratropium for Nebulization 3 milliLiter(s) Nebulizer every 6 hours  budesonide  80 MICROgram(s)/formoterol 4.5 MICROgram(s) Inhaler 2 Puff(s) Inhalation two times a day    acetaminophen     Tablet .. 650 milliGRAM(s) Oral every 6 hours PRN  acetaminophen   IVPB .. 1000 milliGRAM(s) IV Intermittent once  melatonin 3 milliGRAM(s) Oral at bedtime PRN          lactated ringers. 1000 milliLiter(s) IV Continuous <Continuous>    influenza  Vaccine (HIGH DOSE) 0.7 milliLiter(s) IntraMuscular once          LABS:                        13.5   9.79  )-----------( 398      ( 10 Mar 2022 14:29 )             42.3     Hgb Trend: 13.5<--, 13.4<--  03-10    137  |  93<L>  |  15  ----------------------------<  106<H>  3.7   |  28  |  0.52    Ca    10.6<H>      10 Mar 2022 14:29    TPro  7.6  /  Alb  3.3  /  TBili  0.6  /  DBili  x   /  AST  19  /  ALT  12  /  AlkPhos  80  03-10    Creatinine Trend: 0.52<--        Venous Blood Gas:  03-10 @ 14:29  7.42/52/20/34/31.3  VBG Lactate: 1.6      MICROBIOLOGY:     RADIOLOGY:  [ ] Reviewed and interpreted by me    PULMONARY FUNCTION TESTS:    EKG: CHIEF COMPLAINT: dysphagia    Interval Events: Patient seen and examined at bedside. No acute events overnight.    REVIEW OF SYSTEMS:  Constitutional: [ X] negative [ ] fevers [ ] chills [ ] weight loss [ ] weight gain  HEENT: [ X] negative [ ] dry eyes [ ] eye irritation [ ] postnasal drip [ ] nasal congestion  CV: [X ] negative  [ ] chest pain [ ] orthopnea [ ] palpitations [ ] murmur  Resp: [ ] negative [ ] cough [X ] shortness of breath [ ] dyspnea [ ] wheezing [ ] sputum [ ] hemoptysis  GI: [ ] negative [ ] nausea [ ] vomiting [ ] diarrhea [ ] constipation [ ] abd pain [ X] dysphagia   : [X ] negative [ ] dysuria [ ] nocturia [ ] hematuria [ ] increased urinary frequency  Musculoskeletal: [X ] negative [ ] back pain [ ] myalgias [ ] arthralgias [ ] fracture  Skin: [ ] negative [ ] rash [ ] itch  Neurological: [ ] negative [ ] headache [ ] dizziness [ ] syncope [ ] weakness [ ] numbness  Psychiatric: [ ] negative [ ] anxiety [ ] depression  Endocrine: [ ] negative [ ] diabetes [ ] thyroid problem  Hematologic/Lymphatic: [ ] negative [ ] anemia [ ] bleeding problem  Allergic/Immunologic: [ ] negative [ ] itchy eyes [ ] nasal discharge [ ] hives [ ] angioedema  [ ] All other systems negative  [ ] Unable to assess ROS because ________    OBJECTIVE:  ICU Vital Signs Last 24 Hrs  T(C): 36.7 (11 Mar 2022 04:05), Max: 37.5 (10 Mar 2022 21:35)  T(F): 98 (11 Mar 2022 04:05), Max: 99.5 (10 Mar 2022 21:35)  HR: 91 (11 Mar 2022 09:55) (87 - 107)  BP: 148/64 (11 Mar 2022 04:05) (123/74 - 148/64)  BP(mean): --  ABP: --  ABP(mean): --  RR: 17 (11 Mar 2022 04:05) (16 - 24)  SpO2: 92% (11 Mar 2022 09:55) (92% - 100%)        CAPILLARY BLOOD GLUCOSE          PHYSICAL EXAM:  General: well appearing female. NAD, sitting in bed  HEENT: no icterus  Neck:  supple  Respiratory: right sided wheeze; upper airway sound trasmission  Cardiovascular: S1/S2, RRR, no murmurs  Abdomen: soft, nontender, nondistended  Extremities: no LE edema  Skin: good turgor  Neurological:AxOx3  Psychiatry: normal mood and affect     HOSPITAL MEDICATIONS:          albuterol/ipratropium for Nebulization 3 milliLiter(s) Nebulizer every 6 hours  budesonide  80 MICROgram(s)/formoterol 4.5 MICROgram(s) Inhaler 2 Puff(s) Inhalation two times a day    acetaminophen     Tablet .. 650 milliGRAM(s) Oral every 6 hours PRN  acetaminophen   IVPB .. 1000 milliGRAM(s) IV Intermittent once  melatonin 3 milliGRAM(s) Oral at bedtime PRN          lactated ringers. 1000 milliLiter(s) IV Continuous <Continuous>    influenza  Vaccine (HIGH DOSE) 0.7 milliLiter(s) IntraMuscular once          LABS:                        13.5   9.79  )-----------( 398      ( 10 Mar 2022 14:29 )             42.3     Hgb Trend: 13.5<--, 13.4<--  03-10    137  |  93<L>  |  15  ----------------------------<  106<H>  3.7   |  28  |  0.52    Ca    10.6<H>      10 Mar 2022 14:29    TPro  7.6  /  Alb  3.3  /  TBili  0.6  /  DBili  x   /  AST  19  /  ALT  12  /  AlkPhos  80  03-10    Creatinine Trend: 0.52<--        Venous Blood Gas:  03-10 @ 14:29  7.42/52/20/34/31.3  VBG Lactate: 1.6      MICROBIOLOGY:     RADIOLOGY:  [X ] Reviewed and interpreted by me     CHIEF COMPLAINT: dysphagia    Interval Events: Patient seen and examined at bedside. No acute events overnight.    REVIEW OF SYSTEMS:  Constitutional: [ X] negative [ ] fevers [ ] chills [ ] weight loss [ ] weight gain  HEENT: [ X] negative [ ] dry eyes [ ] eye irritation [ ] postnasal drip [ ] nasal congestion  CV: [X ] negative  [ ] chest pain [ ] orthopnea [ ] palpitations [ ] murmur  Resp: [ ] negative [ ] cough [X ] shortness of breath [ ] dyspnea [ ] wheezing [ ] sputum [ ] hemoptysis  GI: [ ] negative [ ] nausea [ ] vomiting [ ] diarrhea [ ] constipation [ ] abd pain [ X] dysphagia   : [X ] negative [ ] dysuria [ ] nocturia [ ] hematuria [ ] increased urinary frequency  Musculoskeletal: [X ] negative [ ] back pain [ ] myalgias [ ] arthralgias [ ] fracture  Skin: [ ] negative [ ] rash [ ] itch  Neurological: [ ] negative [ ] headache [ ] dizziness [ ] syncope [ ] weakness [ ] numbness  Psychiatric: [ ] negative [ ] anxiety [ ] depression  Endocrine: [ ] negative [ ] diabetes [ ] thyroid problem  Hematologic/Lymphatic: [ ] negative [ ] anemia [ ] bleeding problem  Allergic/Immunologic: [ ] negative [ ] itchy eyes [ ] nasal discharge [ ] hives [ ] angioedema  [ ] All other systems negative  [ ] Unable to assess ROS because ________    OBJECTIVE:  ICU Vital Signs Last 24 Hrs  T(C): 36.7 (11 Mar 2022 04:05), Max: 37.5 (10 Mar 2022 21:35)  T(F): 98 (11 Mar 2022 04:05), Max: 99.5 (10 Mar 2022 21:35)  HR: 91 (11 Mar 2022 09:55) (87 - 107)  BP: 148/64 (11 Mar 2022 04:05) (123/74 - 148/64)  BP(mean): --  ABP: --  ABP(mean): --  RR: 17 (11 Mar 2022 04:05) (16 - 24)  SpO2: 92% (11 Mar 2022 09:55) (92% - 100%)        CAPILLARY BLOOD GLUCOSE          PHYSICAL EXAM:  General: well appearing female. NAD, sitting in bed  HEENT: no icterus  Neck:  supple  Respiratory: right sided wheeze; upper airway sound trasmission  Cardiovascular: S1/S2, RRR, no murmurs  Abdomen: soft, nontender, nondistended  Extremities: no LE edema  Skin: good turgor  Neurological:AxOx3  Psychiatry: normal mood and affect     HOSPITAL MEDICATIONS:          albuterol/ipratropium for Nebulization 3 milliLiter(s) Nebulizer every 6 hours  budesonide  80 MICROgram(s)/formoterol 4.5 MICROgram(s) Inhaler 2 Puff(s) Inhalation two times a day    acetaminophen     Tablet .. 650 milliGRAM(s) Oral every 6 hours PRN  acetaminophen   IVPB .. 1000 milliGRAM(s) IV Intermittent once  melatonin 3 milliGRAM(s) Oral at bedtime PRN          lactated ringers. 1000 milliLiter(s) IV Continuous <Continuous>    influenza  Vaccine (HIGH DOSE) 0.7 milliLiter(s) IntraMuscular once          LABS:                        13.5   9.79  )-----------( 398      ( 10 Mar 2022 14:29 )             42.3     Hgb Trend: 13.5<--, 13.4<--  03-10    137  |  93<L>  |  15  ----------------------------<  106<H>  3.7   |  28  |  0.52    Ca    10.6<H>      10 Mar 2022 14:29    TPro  7.6  /  Alb  3.3  /  TBili  0.6  /  DBili  x   /  AST  19  /  ALT  12  /  AlkPhos  80  03-10    Creatinine Trend: 0.52<--        Venous Blood Gas:  03-10 @ 14:29  7.42/52/20/34/31.3  VBG Lactate: 1.6      MICROBIOLOGY:     RADIOLOGY:  [X ] Reviewed and interpreted by me - CT CHEST with BI stent in good place. No debri. Stent patent. LMB narrowed but patent compared to previous CT CHEST on previous admission.

## 2022-03-11 NOTE — PROGRESS NOTE ADULT - SUBJECTIVE AND OBJECTIVE BOX
[facility] Division of Hospital Medicine  Fredrick Lau MD  In House Pager [phone]    Patient is a 77y old  Female who presents with a chief complaint of worsening shortness of breath/dysphagia (11 Mar 2022 10:54)      SUBJECTIVE / OVERNIGHT EVENTS:  No overnight events. Labs and vitals reviewed. patient admitted to solid and liquid dysphagia, progressive ACKERMAN.   Patient seen and examined at bedside, patient is very anxious and worry about her symptoms. GI has offered PEG placement, patient will discuss with family.   No fever, no chills, no SOB, no CP, no n/v/d, no abd pain, no dysuria      MEDICATIONS  (STANDING):  albuterol/ipratropium for Nebulization 3 milliLiter(s) Nebulizer every 6 hours  budesonide  80 MICROgram(s)/formoterol 4.5 MICROgram(s) Inhaler 2 Puff(s) Inhalation two times a day  CARBOplatin IVPB (eMAR) 224 milliGRAM(s) IV Intermittent once  dexAMETHasone  IVPB 8 milliGRAM(s) IV Intermittent once  diphenhydrAMINE Injectable 25 milliGRAM(s) IV Push once  influenza  Vaccine (HIGH DOSE) 0.7 milliLiter(s) IntraMuscular once  lactated ringers. 1000 milliLiter(s) (75 mL/Hr) IV Continuous <Continuous>  PACLitaxel IVPB (eMAR) 68.4 milliGRAM(s) IV Intermittent once    MEDICATIONS  (PRN):  acetaminophen     Tablet .. 650 milliGRAM(s) Oral every 6 hours PRN Temp greater or equal to 38C (100.4F), Mild Pain (1 - 3), Moderate Pain (4 - 6)  diphenhydrAMINE Injectable 50 milliGRAM(s) IV Push once PRN PRN Chemotherapy Reaction  melatonin 3 milliGRAM(s) Oral at bedtime PRN Insomnia  methylPREDNISolone sodium succinate Injectable 125 milliGRAM(s) IV Push once PRN PRN Chemotherapy Reaction    CAPILLARY BLOOD GLUCOSE        I&O's Summary      PHYSICAL EXAM:  Vital Signs Last 24 Hrs  T(C): 36.6 (11 Mar 2022 12:31), Max: 37.5 (10 Mar 2022 21:35)  T(F): 97.9 (11 Mar 2022 12:31), Max: 99.5 (10 Mar 2022 21:35)  HR: 93 (11 Mar 2022 12:31) (87 - 97)  BP: 131/62 (11 Mar 2022 12:31) (126/60 - 148/64)  BP(mean): --  RR: 18 (11 Mar 2022 12:31) (16 - 20)  SpO2: 96% (11 Mar 2022 12:31) (92% - 100%)    Gen: NAD; sitting in bed comfortably   Pulm: no accessory muscle use; expiratory rhonchi over left lung field. high pitch insp/exp wheezing over right lung field. audible wheezing with prolong speaking.   Cards: RRR, nl S1/S2; no LE edema; no JVD  Abd: non-distended; soft and NT on exam; +bs  Ext: EFRA; no joint effusion or tenderness in upper and lower extremities; no cyanosis  Neuro: Awake and Alert; non-focal; moving all extremities.   Skin: no new rashes; warm to touch;     LABS:                        13.5   9.79  )-----------( 398      ( 10 Mar 2022 14:29 )             42.3     03-10    137  |  93<L>  |  15  ----------------------------<  106<H>  3.7   |  28  |  0.52    Ca    10.6<H>      10 Mar 2022 14:29    TPro  7.6  /  Alb  3.3  /  TBili  0.6  /  DBili  x   /  AST  19  /  ALT  12  /  AlkPhos  80  03-10                RADIOLOGY & ADDITIONAL TESTS:  Results Reviewed: Y  Imaging Personally Reviewed: Y  Electrocardiogram Personally Reviewed: Y    COORDINATION OF CARE:  Care Discussed with Consultants/Other Providers [Y/N]: Y  Prior or Outpatient Records Reviewed [Y/N]: Y

## 2022-03-11 NOTE — CONSULT NOTE ADULT - ASSESSMENT
77F PMHx of former smoking, recently diagnosed non small cell lung cancer with non-resectable mass, s/p  rigid bronchoscopy, tumor debulking and bronchial stent placement to bronchus intermedius in 02/2022 presents for dysphagia.    Impression:  # Dysphagia secondary to extrinsic compression due to known lung CA.  # Non small cell lung cancer with non-resectable mass, s/p  rigid bronchoscopy, tumor debulking and bronchial stent placement to bronchus intermedius in 02/2022    Recommendations:  - Will review images with advanced attending and radiology     Lucina Pace MD  Gastroenterology Fellow  Pager: [phone]  Please call answering service [phone] / on-call GI fellow after 5pm and before 8am, and on weekends.  77F PMHx of former smoking, recently diagnosed non small cell lung cancer with non-resectable mass, s/p  rigid bronchoscopy, tumor debulking and bronchial stent placement to bronchus intermedius in 02/2022 presents for dysphagia.    Impression:  # Dysphagia secondary to extrinsic compression due to known lung CA.  # Non small cell lung cancer with non-resectable mass, s/p  rigid bronchoscopy, tumor debulking and bronchial stent placement to bronchus intermedius in 02/2022    Recommendations:  - Discussed with patient - While esophageal stent is in the setting of  extrinsic compression carries risk of migration and complication - so we recommend PEG feeding tube placement.  - Risks and benefits discussed with patient - she will discuss with her family and make decision,  - We can tentatively plan for EGD + PEG by General GI team Monday 03/14/2022 if patient is agreeable       Lucina Pace MD  Gastroenterology Fellow  Pager: [phone]  Please call answering service [phone] / on-call GI fellow after 5pm and before 8am, and on weekends.

## 2022-03-11 NOTE — CONSULT NOTE ADULT - TIME BILLING
Review of EMR, imaging, labs, discussions with other sub-specialists as well as discussion with patient and primary team and co-ordination or care.
Dysphagia.

## 2022-03-11 NOTE — CONSULT NOTE ADULT - SUBJECTIVE AND OBJECTIVE BOX
Patient is a 77y old  Female who presents with a chief complaint of worsening shortness of breath/dysphagia (11 Mar 2022 14:41)      HPI:  76 yo lady former smoker recently diagnosed with NSCLC s/p debulking and R. bronchial stent, discharged a month ago now presents with worsening dysphagia and progressing shortness of breath, especially with exertion. Patient reports that for the last 3 days she has trouble tolerating any PO intake. Patient is chemo naive and was set to start chemo on 3/14. She denies any f/c or recent infection.         PAST MEDICAL & SURGICAL HISTORY:  No pertinent past medical history    No significant past surgical history        Review of Systems:   CONSTITUTIONAL: No fever, +weight loss and weakness   EYES: No eye pain, visual disturbances, or discharge  ENMT:  No difficulty hearing, tinnitus, vertigo; No sinus or throat pain  NECK: No pain or stiffness  RESPIRATORY: as per hpi  CARDIOVASCULAR: No chest pain, palpitations, dizziness, or leg swelling  GASTROINTESTINAL: No abdominal or epigastric pain. No nausea, vomiting, or hematemesis; No diarrhea or constipation. No melena or hematochezia.  GENITOURINARY: No dysuria, frequency, hematuria, or incontinence  NEUROLOGICAL: No headaches, memory loss, loss of strength, numbness, or tremors  SKIN: No itching, burning, rashes, or lesions   LYMPH NODES: No enlarged glands  ENDOCRINE: No heat or cold intolerance; No hair loss  MUSCULOSKELETAL: No joint pain or swelling; No muscle, back, or extremity pain  PSYCHIATRIC: No depression, anxiety, mood swings, or difficulty sleeping  HEME/LYMPH: No easy bruising, or bleeding gums  ALLERGY AND IMMUNOLOGIC: No hives or eczema    Allergies    No Known Allergies    Intolerances        Social History: Long time smoker, denies excessive etoh or drug use    FAMILY HISTORY:  No pertinent FH    MEDICATIONS  (STANDING):    MEDICATIONS  (PRN):      T(C): 36.7 (03-10-22 @ 16:33), Max: 36.7 (03-10-22 @ 16:33)  HR: 93 (03-10-22 @ 16:33) (93 - 107)  BP: 128/76 (03-10-22 @ 16:33) (123/74 - 144/79)  RR: 20 (03-10-22 @ 16:33) (20 - 24)  SpO2: 100% (03-10-22 @ 16:33) (94% - 100%)  Height (cm): 157.5 (03-10-22 @ 13:01)  CAPILLARY BLOOD GLUCOSE        I&O's Summary      PHYSICAL EXAM:  GENERAL: NAD, well-developed  HEAD:  Atraumatic, Normocephalic  EYES: EOMI, PERRLA, conjunctiva and sclera clear  NECK: Supple, No elevated JVD  CHEST/LUNG: Clear on left, diminished on the right  HEART: Regular rate and rhythm; No murmurs, rubs, or gallops  ABDOMEN: Soft, Nontender, Nondistended; Bowel sounds present  EXTREMITIES:  2+ Peripheral Pulses, No clubbing, cyanosis, or edema  PSYCH: AAOx3  NEUROLOGY: CN II-XII grossly intact, moving all extremities  SKIN: No rashes or lesions    LABS:                        13.5   9.79  )-----------( 398      ( 10 Mar 2022 14:29 )             42.3     03-10    137  |  93<L>  |  15  ----------------------------<  106<H>  3.7   |  28  |  0.52    Ca    10.6<H>      10 Mar 2022 14:29    TPro  7.6  /  Alb  3.3  /  TBili  0.6  /  DBili  x   /  AST  19  /  ALT  12  /  AlkPhos  80  03-10                RADIOLOGY & ADDITIONAL TESTS:    ECG Personally Reviewed -     Imaging Personally Reviewed:    Consultant(s) Notes Reviewed:      Care Discussed with Consultants/Other Providers:   (10 Mar 2022 17:08)       Oncologic History:      ROS: as above     PAST MEDICAL & SURGICAL HISTORY:  No pertinent past medical history    No significant past surgical history        SOCIAL HISTORY:    FAMILY HISTORY:      MEDICATIONS  (STANDING):  albuterol/ipratropium for Nebulization 3 milliLiter(s) Nebulizer every 6 hours  budesonide  80 MICROgram(s)/formoterol 4.5 MICROgram(s) Inhaler 2 Puff(s) Inhalation two times a day  CARBOplatin IVPB (eMAR) 224 milliGRAM(s) IV Intermittent once  dextrose 5% + lactated ringers. 1000 milliLiter(s) (75 mL/Hr) IV Continuous <Continuous>  influenza  Vaccine (HIGH DOSE) 0.7 milliLiter(s) IntraMuscular once  PACLitaxel IVPB (eMAR) 68.4 milliGRAM(s) IV Intermittent once    MEDICATIONS  (PRN):  acetaminophen     Tablet .. 650 milliGRAM(s) Oral every 6 hours PRN Temp greater or equal to 38C (100.4F), Mild Pain (1 - 3), Moderate Pain (4 - 6)  diphenhydrAMINE Injectable 50 milliGRAM(s) IV Push once PRN PRN Chemotherapy Reaction  melatonin 3 milliGRAM(s) Oral at bedtime PRN Insomnia  methylPREDNISolone sodium succinate Injectable 125 milliGRAM(s) IV Push once PRN PRN Chemotherapy Reaction      Allergies    No Known Allergies    Intolerances        Vital Signs Last 24 Hrs  T(C): 36.6 (11 Mar 2022 12:31), Max: 37.5 (10 Mar 2022 21:35)  T(F): 97.9 (11 Mar 2022 12:31), Max: 99.5 (10 Mar 2022 21:35)  HR: 93 (11 Mar 2022 12:31) (87 - 97)  BP: 131/62 (11 Mar 2022 12:31) (126/60 - 148/64)  BP(mean): --  RR: 18 (11 Mar 2022 12:31) (16 - 20)  SpO2: 96% (11 Mar 2022 12:31) (92% - 100%)    PHYSICAL EXAM  General: adult in NAD  HEENT: clear oropharynx, anicteric sclera, pink conjunctiva  Neck: supple  CV: normal S1/S2 with no murmur rubs or gallops  Lungs: positive air movement b/l ant lungs, clear to auscultation, no wheezes, no rales  Abdomen: soft non-tender non-distended, no hepatosplenomegaly  Ext: no clubbing cyanosis or edema  Skin: no rashes and no petechiae  Neuro: alert and oriented X 3, none focal    LABS:                          13.5   9.79  )-----------( 398      ( 10 Mar 2022 14:29 )             42.3         Mean Cell Volume : 92.0 fL  Mean Cell Hemoglobin : 29.3 pg  Mean Cell Hemoglobin Concentration : 31.9 gm/dL  Auto Neutrophil # : 8.00 K/uL  Auto Lymphocyte # : 1.01 K/uL  Auto Monocyte # : 0.68 K/uL  Auto Eosinophil # : 0.01 K/uL  Auto Basophil # : 0.04 K/uL  Auto Neutrophil % : 81.8 %  Auto Lymphocyte % : 10.3 %  Auto Monocyte % : 6.9 %  Auto Eosinophil % : 0.1 %  Auto Basophil % : 0.4 %      Serial CBC's  03-10 @ 14:29  Hct-42.3 / Hgb-13.5 / Plat-398 / RBC-4.60 / WBC-9.79  Serial CBC's  03-08 @ 17:04  Hct-41.5 / Hgb-13.4 / Plat-381 / RBC-4.46 / WBC-10.58      03-10    137  |  93<L>  |  15  ----------------------------<  106<H>  3.7   |  28  |  0.52    Ca    10.6<H>      10 Mar 2022 14:29    TPro  7.6  /  Alb  3.3  /  TBili  0.6  /  DBili  x   /  AST  19  /  ALT  12  /  AlkPhos  80  03-10            RADIOLOGY & ADDITIONAL STUDIES:  IMPRESSION:    In comparison with 2/25/2022, the 5.6 x 4.2 cm subcarinal masses is   slightly enlarged; previously 5 x 3.8 cm. Compression and stenosis of the   mid esophagus caused by the mass is again noted. The upper esophagus is   mildly dilated containing retained ingested material. Aspiration   precaution is recommended.    The stent within the bronchus intermedius remains patent. Again noted,   the right upper lobe and right middle lobe bronchi are severely narrowed   but patent. Some of the segmental branches of the right lower lobe are   obliterated.    Difficult to measure cavitated consolidative opacity within superior   segment of right lower lobe is slightly enlarged.    Interval decrease of tree-in-bud opacities within basilar right lower   lobe likely representing decrease of aspiration pneumonitis.

## 2022-03-12 NOTE — PROGRESS NOTE ADULT - SUBJECTIVE AND OBJECTIVE BOX
[facility] Division of Hospital Medicine  Fredrick Lau MD  In House Pager [phone]    Patient is a 77y old  Female who presents with a chief complaint of worsening shortness of breath/dysphagia (11 Mar 2022 15:22)      SUBJECTIVE / OVERNIGHT EVENTS:  No overnight events. Labs and vitals reviewed. started on chemo by onc yesterday.   Patient seen and examined at bedside, no acute complaints.  No fever, no chills, no SOB, no CP, no n/v/d, no abd pain, no dysuria      MEDICATIONS  (STANDING):  albuterol/ipratropium for Nebulization 3 milliLiter(s) Nebulizer every 6 hours  budesonide  80 MICROgram(s)/formoterol 4.5 MICROgram(s) Inhaler 2 Puff(s) Inhalation two times a day  dextrose 5% + lactated ringers. 1000 milliLiter(s) (75 mL/Hr) IV Continuous <Continuous>  influenza  Vaccine (HIGH DOSE) 0.7 milliLiter(s) IntraMuscular once    MEDICATIONS  (PRN):  acetaminophen     Tablet .. 650 milliGRAM(s) Oral every 6 hours PRN Temp greater or equal to 38C (100.4F), Mild Pain (1 - 3), Moderate Pain (4 - 6)  diphenhydrAMINE Injectable 50 milliGRAM(s) IV Push once PRN PRN Chemotherapy Reaction  melatonin 3 milliGRAM(s) Oral at bedtime PRN Insomnia  methylPREDNISolone sodium succinate Injectable 125 milliGRAM(s) IV Push once PRN PRN Chemotherapy Reaction    CAPILLARY BLOOD GLUCOSE        I&O's Summary    11 Mar 2022 07:01  -  12 Mar 2022 07:00  --------------------------------------------------------  IN: 750 mL / OUT: 600 mL / NET: 150 mL        PHYSICAL EXAM:  Vital Signs Last 24 Hrs  T(C): 36.8 (12 Mar 2022 04:59), Max: 36.8 (12 Mar 2022 04:59)  T(F): 98.2 (12 Mar 2022 04:59), Max: 98.2 (12 Mar 2022 04:59)  HR: 90 (12 Mar 2022 04:59) (89 - 109)  BP: 104/47 (12 Mar 2022 04:59) (104/47 - 139/65)  BP(mean): --  RR: 16 (12 Mar 2022 04:59) (16 - 18)  SpO2: 94% (12 Mar 2022 04:59) (92% - 99%)    Gen: NAD; sitting in bed comfortably   Pulm: no accessory muscle use; audible rhonchi b/l.    Cards: RRR, nl S1/S2; no LE edema; no JVD  Abd: non-distended; soft and NT on exam; +bs  Ext: EFRA; no joint effusion or tenderness in upper and lower extremities; no cyanosis  Neuro: Awake and Alert; non-focal; moving all extremities.   Skin: no new rashes; warm to touch;     LABS:                        10.9   6.87  )-----------( 314      ( 12 Mar 2022 05:44 )             34.7     03-12    138  |  101  |  12  ----------------------------<  178<H>  3.5   |  25  |  0.34<L>    Ca    9.0      12 Mar 2022 05:44  Phos  2.1     03-12  Mg     1.80     03-12    TPro  7.6  /  Alb  3.3  /  TBili  0.6  /  DBili  x   /  AST  19  /  ALT  12  /  AlkPhos  80  03-10                RADIOLOGY & ADDITIONAL TESTS:  Results Reviewed: Y  Imaging Personally Reviewed: Y  Electrocardiogram Personally Reviewed: Y    COORDINATION OF CARE:  Care Discussed with Consultants/Other Providers [Y/N]: Y  Prior or Outpatient Records Reviewed [Y/N]: Y

## 2022-03-12 NOTE — PROGRESS NOTE ADULT - ASSESSMENT
77f with recently diagnosed stage IIIA NSCLC, s/p outpatient onc and rad onc eval with plan to start chemotherapy on monday 3/14, with plan to add rt when symptoms are better controlled, presenting to the ED with dysphagia.   Treatment plan for the patient is potentially curative, therefore palliative rt to the mass causing esophageal obstruction is not recommended as this will limit the curative plans.   Plan discussed with Pulm, rad onc and primary team.  Will start weekly carbo/taxol as inpatient.  Discussed sequential weekly carbo/taxol with hopes of adding RT in the future.  Discussed the natural course of disease, rationale for treatment and treatment expectation.  All side effects, risks and benefits were discussed in detail, hand outs detailing the treatment drugs and their side effects were provided, and all questions were answered to the apparent satisfaction of the patient. Consent to start treatment was signed by patient and witnessed by IRIS Portillo.   Patient's  was contacted over the phone and plan reviewed with him in detail.  All questions answered.     -S/p C1 Carbo AUC 2, Taxol 40 mg/m2 3/11/2022  -Monitor swallowing. Speech and swallow eval.   -No contraindications to NGT placement if pt unable to swallow. Unclear if tube can pass through the obstructed esophagus.  -GI input appreciated  -Pulm and rad onc input appreciated.  -Supportive care, pain control, Nutrition, PT, DVT ppx  -Outpatient oncology f/u    Will follow. Please do not hesitate to call back with questions.     Cintia Willis MD  Medical Oncology Attending  C: [phone]

## 2022-03-12 NOTE — PROGRESS NOTE ADULT - SUBJECTIVE AND OBJECTIVE BOX
INTERVAL HPI/OVERNIGHT EVENTS:  Patient seen at bedside.  Feels well and has no complaints. Tolerated chemotherapy well. No n/v.  Swallowed some water today without difficulty.     VITAL SIGNS:  T(F): 98.3 (03-12-22 @ 12:04)  HR: 75 (03-12-22 @ 15:21)  BP: 121/59 (03-12-22 @ 12:04)  RR: 16 (03-12-22 @ 12:04)  SpO2: 95% (03-12-22 @ 15:21)  Wt(kg): --    PHYSICAL EXAM:    Constitutional: NAD, resting in bed comfortably  Eyes: EOMI, sclera non-icteric  Neck: supple, no LAP  Respiratory: CTA b/l, good air entry b/l, no wheezing, rhonchi or crackels  Cardiovascular: RRR, normal S1S2, no M/R/G  Gastrointestinal: soft, NTND  Extremities: no edema  Neurological: AAOx3, non focal  Skin: Normal temperature    MEDICATIONS  (STANDING):  albuterol/ipratropium for Nebulization 3 milliLiter(s) Nebulizer every 6 hours  budesonide  80 MICROgram(s)/formoterol 4.5 MICROgram(s) Inhaler 2 Puff(s) Inhalation two times a day  dextrose 5% + lactated ringers. 1000 milliLiter(s) (75 mL/Hr) IV Continuous <Continuous>  influenza  Vaccine (HIGH DOSE) 0.7 milliLiter(s) IntraMuscular once    MEDICATIONS  (PRN):  acetaminophen     Tablet .. 650 milliGRAM(s) Oral every 6 hours PRN Temp greater or equal to 38C (100.4F), Mild Pain (1 - 3), Moderate Pain (4 - 6)  diphenhydrAMINE Injectable 50 milliGRAM(s) IV Push once PRN PRN Chemotherapy Reaction  melatonin 3 milliGRAM(s) Oral at bedtime PRN Insomnia  methylPREDNISolone sodium succinate Injectable 125 milliGRAM(s) IV Push once PRN PRN Chemotherapy Reaction      Allergies    No Known Allergies    Intolerances        LABS:                        11.1   9.92  )-----------( 321      ( 12 Mar 2022 15:00 )             35.6     03-12    138  |  101  |  12  ----------------------------<  178<H>  3.5   |  25  |  0.34<L>    Ca    9.0      12 Mar 2022 05:44  Phos  2.1     03-12  Mg     1.80     03-12            RADIOLOGY & ADDITIONAL TESTS:  Studies reviewed.

## 2022-03-12 NOTE — PROGRESS NOTE ADULT - ASSESSMENT
78 yo lady former smoker recently diagnosed with NSCLC s/p debulking and R. bronchial stent, discharged a month ago now presents with worsening dysphagia and progressing shortness of breath, especially with exertion. CT showed tumor extension into left main bronchus. as well as external compression of esophagus from lung ca. started on chemo on 3/11 by Oncology.

## 2022-03-12 NOTE — PROGRESS NOTE ADULT - PROBLEM SELECTOR PLAN 1
Still treatment naive  As above, s/p debulking and r. bronchial stent  repeat CT showed left main bronchial invasion of tumor.   discussed with IP, Rad/Onc and Heme/Onc.   no plan for urgent surgical intervention of lung lesions.  started inpatient chemo today 3/11 per onc.   further assessment for Rad/onc after chemo.

## 2022-03-12 NOTE — PROGRESS NOTE ADULT - PROBLEM SELECTOR PLAN 2
S/P debulking and stent  CT chest with compression of esophagus due to tumor.   GI recs appreciated, offered PEG. patient is undecided.   - tentatively planning for PEG next week.   - offered temporary NGT placement for nutrition and meds over the weekend. patient is too overwhelmed, will defer for now.   - c/w IVF. D5w+LR.   - strict NPO.  - offered temp NGT, family declined at this time. S/P debulking and stent  CT chest with compression of esophagus due to tumor.   GI recs appreciated, offered PEG. patient is undecided.   - tentatively planning for PEG next week.   - offered temporary NGT placement for nutrition and meds over the weekend. patient is too overwhelmed, will defer for now.   - c/w IVF. D5w+LR.   - strict NPO.  - offered NGT, but patient want to discuss with oncology first.   - GI plan for PEG monday.

## 2022-03-13 NOTE — PROGRESS NOTE ADULT - SUBJECTIVE AND OBJECTIVE BOX
[facility] Division of Hospital Medicine  Fredrick Lau MD  In House Pager [phone]    Patient is a 77y old  Female who presents with a chief complaint of worsening shortness of breath/dysphagia (13 Mar 2022 12:36)      SUBJECTIVE / OVERNIGHT EVENTS:  No overnight events. Labs and vitals reviewed.  Patient seen and examined at bedside, complaining of unable to sleep overnight and productive phlegm coughing with sensation of saliva stuck in her throat.   No fever, no chills, no SOB, no CP, no n/v/d, no abd pain, no dysuria      MEDICATIONS  (STANDING):  albuterol/ipratropium for Nebulization 3 milliLiter(s) Nebulizer every 6 hours  budesonide  80 MICROgram(s)/formoterol 4.5 MICROgram(s) Inhaler 2 Puff(s) Inhalation two times a day  dextrose 5% + lactated ringers. 1000 milliLiter(s) (75 mL/Hr) IV Continuous <Continuous>  influenza  Vaccine (HIGH DOSE) 0.7 milliLiter(s) IntraMuscular once  sodium chloride 3%  Inhalation 4 milliLiter(s) Inhalation every 12 hours    MEDICATIONS  (PRN):  acetaminophen     Tablet .. 650 milliGRAM(s) Oral every 6 hours PRN Temp greater or equal to 38C (100.4F), Mild Pain (1 - 3), Moderate Pain (4 - 6)  diphenhydrAMINE Injectable 50 milliGRAM(s) IV Push once PRN PRN Chemotherapy Reaction  melatonin 3 milliGRAM(s) Oral at bedtime PRN Insomnia  methylPREDNISolone sodium succinate Injectable 125 milliGRAM(s) IV Push once PRN PRN Chemotherapy Reaction    CAPILLARY BLOOD GLUCOSE        I&O's Summary    12 Mar 2022 06:01  -  13 Mar 2022 07:00  --------------------------------------------------------  IN: 825 mL / OUT: 1800 mL / NET: -975 mL        PHYSICAL EXAM:  Vital Signs Last 24 Hrs  T(C): 36.8 (13 Mar 2022 11:03), Max: 36.9 (12 Mar 2022 16:27)  T(F): 98.3 (13 Mar 2022 11:03), Max: 98.5 (12 Mar 2022 20:07)  HR: 96 (13 Mar 2022 11:03) (85 - 96)  BP: 125/76 (13 Mar 2022 11:03) (118/48 - 140/73)  BP(mean): --  RR: 18 (13 Mar 2022 11:03) (17 - 18)  SpO2: 94% (13 Mar 2022 11:03) (94% - 98%)    Gen: NAD; sitting in bed comfortably   Pulm: no accessory muscle use; expiratory wheezing diffusely.   Cards: RRR, nl S1/S2; no LE edema; no JVD  Abd: non-distended; soft and NT on exam; +bs  Ext: EFRA; no joint effusion or tenderness in upper and lower extremities; no cyanosis  Neuro: Awake and Alert; non-focal; moving all extremities.   Skin: no new rashes; warm to touch;     LABS:                        10.5   9.54  )-----------( 255      ( 13 Mar 2022 07:08 )             32.9     03-13    139  |  103  |  13  ----------------------------<  112<H>  3.1<L>   |  29  |  0.41<L>    Ca    9.1      13 Mar 2022 07:08  Phos  2.3     03-13  Mg     1.90     03-13                  RADIOLOGY & ADDITIONAL TESTS:  Results Reviewed: Y  Imaging Personally Reviewed: Y  Electrocardiogram Personally Reviewed: Y    COORDINATION OF CARE:  Care Discussed with Consultants/Other Providers [Y/N]: Y  Prior or Outpatient Records Reviewed [Y/N]: Y

## 2022-03-13 NOTE — PROGRESS NOTE ADULT - PROBLEM SELECTOR PLAN 1
Still treatment naive  As above, s/p debulking and r. bronchial stent  repeat CT showed left main bronchial invasion of tumor.   discussed with IP, Rad/Onc and Heme/Onc.   no plan for urgent surgical intervention of lung lesions.  started inpatient chemo 3/11 per onc.   further assessment for Rad/onc after chemo.

## 2022-03-13 NOTE — CHART NOTE - NSCHARTNOTEFT_GEN_A_CORE
Patient previously seen by GI today and not agreeable to PEG placement. After discussion w/ heme/onc, patient is now agreeable.     Please repeat covid test today.   Please make NPO at midnight for possible EGD/PEG placement tomrorow, pending endoscopy suite availability; however more likely procedure will be done on tuesday.     GI will continue to follow.     All recommendations are tentative until note is attested by attending.     Damion Thomas, PGY5  Gastroenterology/Hepatology Fellow  Available on [employer] Teams  [phone] ([employer] Short Range Pager)  [phone] (Long Range Pager)    After 5pm, please contact the on-call GI fellow. [phone]

## 2022-03-13 NOTE — CHART NOTE - NSCHARTNOTEFT_GEN_A_CORE
GI following for dysphagia and possibility of PEG tube. Discussed w/ patient at bedside today. She is not interested in pursuing PEG tube at this time. Would like to attempt oral feeding, and if not tolerating then would pursue NGT feeding. I explained that NGT is not a long term solution, and if dysphagia is expected to be present throughout course of treatment then possible PEG would be beneficial; however patient continued to refuse PEG, and wanted to speak only with her primary team and oncologist.     GI will sign off. Please call back if patient is interested in PEG placement.     Damion Thomas, PGY5  Gastroenterology/Hepatology Fellow  Available on [employer] Teams  [phone] ([employer] Short Range Pager)  [phone] (Long Range Pager)    After 5pm, please contact the on-call GI fellow. [phone]

## 2022-03-13 NOTE — PROGRESS NOTE ADULT - SUBJECTIVE AND OBJECTIVE BOX
INTERVAL HPI/OVERNIGHT EVENTS:  Patient seen at bedside.      VITAL SIGNS:  T(F): 98.3 (03-13-22 @ 11:03)  HR: 96 (03-13-22 @ 11:03)  BP: 125/76 (03-13-22 @ 11:03)  RR: 18 (03-13-22 @ 11:03)  SpO2: 94% (03-13-22 @ 11:03)  Wt(kg): --    PHYSICAL EXAM:    Constitutional: NAD, resting in bed comfortably  Eyes: EOMI, sclera non-icteric  Neck: supple, no LAP  Respiratory: CTA b/l, good air entry b/l, no wheezing, rhonchi or crackels  Cardiovascular: RRR, normal S1S2, no M/R/G  Gastrointestinal: soft, NTND  Extremities: no edema  Neurological: AAOx3, non focal  Skin: Normal temperature    MEDICATIONS  (STANDING):  albuterol/ipratropium for Nebulization 3 milliLiter(s) Nebulizer every 6 hours  budesonide  80 MICROgram(s)/formoterol 4.5 MICROgram(s) Inhaler 2 Puff(s) Inhalation two times a day  dextrose 5% + lactated ringers. 1000 milliLiter(s) (75 mL/Hr) IV Continuous <Continuous>  influenza  Vaccine (HIGH DOSE) 0.7 milliLiter(s) IntraMuscular once  sodium chloride 3%  Inhalation 4 milliLiter(s) Inhalation every 12 hours    MEDICATIONS  (PRN):  acetaminophen     Tablet .. 650 milliGRAM(s) Oral every 6 hours PRN Temp greater or equal to 38C (100.4F), Mild Pain (1 - 3), Moderate Pain (4 - 6)  diphenhydrAMINE Injectable 50 milliGRAM(s) IV Push once PRN PRN Chemotherapy Reaction  melatonin 3 milliGRAM(s) Oral at bedtime PRN Insomnia  methylPREDNISolone sodium succinate Injectable 125 milliGRAM(s) IV Push once PRN PRN Chemotherapy Reaction      Allergies    No Known Allergies    Intolerances        LABS:                        10.5   9.54  )-----------( 255      ( 13 Mar 2022 07:08 )             32.9     03-13    139  |  103  |  13  ----------------------------<  112<H>  3.1<L>   |  29  |  0.41<L>    Ca    9.1      13 Mar 2022 07:08  Phos  2.3     03-13  Mg     1.90     03-13            RADIOLOGY & ADDITIONAL TESTS:  Studies reviewed.

## 2022-03-13 NOTE — PROGRESS NOTE ADULT - ASSESSMENT
76 yo lady former smoker recently diagnosed with NSCLC s/p debulking and R. bronchial stent, discharged a month ago now presents with worsening dysphagia and progressing shortness of breath, especially with exertion. CT showed tumor extension into left main bronchus. as well as external compression of esophagus from lung ca. started on chemo on 3/11 by Oncology. Gi plan for PEG placement.

## 2022-03-13 NOTE — PROGRESS NOTE ADULT - PROBLEM SELECTOR PLAN 2
S/P debulking and stent  CT chest with compression of esophagus due to tumor.   GI recs appreciated, offered PEG. patient is undecided.   - c/w IVF. D5w+LR.   - strict NPO.  - GI plan for PEG monday vs tuesday depending on OR availability.

## 2022-03-13 NOTE — PROGRESS NOTE ADULT - ASSESSMENT
77f with recently diagnosed stage IIIA NSCLC, s/p outpatient onc and rad onc eval with plan to start chemotherapy on monday 3/14, with plan to add rt when symptoms are better controlled, presenting to the ED with dysphagia.   Treatment plan for the patient is potentially curative, therefore palliative rt to the mass causing esophageal obstruction is not recommended as this will limit the curative plans.   Plan discussed with Pulm, rad onc and primary team.  Will start weekly carbo/taxol as inpatient.  Discussed sequential weekly carbo/taxol with hopes of adding RT in the future.  Discussed the natural course of disease, rationale for treatment and treatment expectation.  All side effects, risks and benefits were discussed in detail, hand outs detailing the treatment drugs and their side effects were provided, and all questions were answered to the apparent satisfaction of the patient. Consent to start treatment was signed by patient and witnessed by IRIS Portillo.   Patient's  was contacted over the phone and plan reviewed with him in detail.  All questions answered.     -S/p C1 Carbo AUC 2, Taxol 40 mg/m2 3/11/2022  -Monitor swallowing. Speech and swallow eval.   -GI input appreciated  -Pulm and rad onc input appreciated.  -Supportive care, pain control, Nutrition, PT, DVT ppx  -Outpatient oncology f/u    Will follow. Please do not hesitate to call back with questions.     Cintia Willis MD  Medical Oncology Attending  C: [phone]

## 2022-03-14 NOTE — PROGRESS NOTE ADULT - SUBJECTIVE AND OBJECTIVE BOX
CHIEF COMPLAINT:    Interval Events: No acute events overnight. Endorses stable respiratory status. IR and GI consulted for PEG placement. Anesthesia consulted and at bedside as well.     REVIEW OF SYSTEMS:  Constitutional: [ ] negative [ ] fevers [ ] chills [ ] weight loss [ ] weight gain  HEENT: [ ] negative [ ] dry eyes [ ] eye irritation [ ] postnasal drip [ ] nasal congestion  CV: [ ] negative  [ ] chest pain [ ] orthopnea [ ] palpitations [ ] murmur  Resp: [ ] negative [X] cough [ ] shortness of breath [ ] dyspnea [X] wheezing [ ] sputum [ ] hemoptysis  GI: [ ] negative [ ] nausea [ ] vomiting [ ] diarrhea [ ] constipation [ ] abd pain [ ] dysphagia   : [ ] negative [ ] dysuria [ ] nocturia [ ] hematuria [ ] increased urinary frequency  Musculoskeletal: [ ] negative [ ] back pain [ ] myalgias [ ] arthralgias [ ] fracture  Skin: [ ] negative [ ] rash [ ] itch  Neurological: [ ] negative [ ] headache [ ] dizziness [ ] syncope [ ] weakness [ ] numbness  Psychiatric: [ ] negative [ ] anxiety [ ] depression  Endocrine: [ ] negative [ ] diabetes [ ] thyroid problem  Hematologic/Lymphatic: [ ] negative [ ] anemia [ ] bleeding problem  Allergic/Immunologic: [ ] negative [ ] itchy eyes [ ] nasal discharge [ ] hives [ ] angioedema  [X] All other systems negative  [ ] Unable to assess ROS because ________    OBJECTIVE:  ICU Vital Signs Last 24 Hrs  T(C): 37 (14 Mar 2022 12:43), Max: 37.3 (14 Mar 2022 05:50)  T(F): 98.6 (14 Mar 2022 12:43), Max: 99.2 (14 Mar 2022 05:50)  HR: 85 (14 Mar 2022 12:43) (82 - 95)  BP: 113/48 (14 Mar 2022 12:43) (111/54 - 120/56)  BP(mean): --  ABP: --  ABP(mean): --  RR: 18 (14 Mar 2022 12:43) (17 - 18)  SpO2: 92% (14 Mar 2022 12:43) (92% - 98%)        03-13 @ 07:01  -  03-14 @ 07:00  --------------------------------------------------------  IN: 0 mL / OUT: 1300 mL / NET: -1300 mL      CAPILLARY BLOOD GLUCOSE        PHYSICAL EXAM  General: NAD  HEENT: EOMI PERRLA  Neck: Supple  Respiratory: Right sided wheeze; upper airway sound transmission, Left sided coarse breath sounds  Cardiovascular: S1/S2, RRR, no murmurs  Abdomen: soft, nontender, nondistended  Extremities: no LE edema  Skin: Good turgor  Neurological: AxOx3  Psychiatry: normal mood and affect     HOSPITAL MEDICATIONS:  MEDICATIONS  (STANDING):  albuterol/ipratropium for Nebulization 3 milliLiter(s) Nebulizer every 6 hours  budesonide  80 MICROgram(s)/formoterol 4.5 MICROgram(s) Inhaler 2 Puff(s) Inhalation two times a day  dextrose 5% + lactated ringers. 1000 milliLiter(s) (75 mL/Hr) IV Continuous <Continuous>  enoxaparin Injectable 40 milliGRAM(s) SubCutaneous every 24 hours  influenza  Vaccine (HIGH DOSE) 0.7 milliLiter(s) IntraMuscular once  potassium chloride  10 mEq/100 mL IVPB 10 milliEquivalent(s) IV Intermittent every 1 hour  sodium chloride 3%  Inhalation 4 milliLiter(s) Inhalation every 12 hours    MEDICATIONS  (PRN):  acetaminophen     Tablet .. 650 milliGRAM(s) Oral every 6 hours PRN Temp greater or equal to 38C (100.4F), Mild Pain (1 - 3), Moderate Pain (4 - 6)  diphenhydrAMINE Injectable 50 milliGRAM(s) IV Push once PRN PRN Chemotherapy Reaction  melatonin 3 milliGRAM(s) Oral at bedtime PRN Insomnia  methylPREDNISolone sodium succinate Injectable 125 milliGRAM(s) IV Push once PRN PRN Chemotherapy Reaction      LABS:                        10.5   9.00  )-----------( 238      ( 14 Mar 2022 07:08 )             33.1     Hgb Trend: 10.5<--, 10.5<--, 11.1<--, 10.9<--, 13.5<--  03-14    137  |  99  |  6<L>  ----------------------------<  112<H>  3.1<L>   |  30  |  0.36<L>    Ca    8.9      14 Mar 2022 07:08  Phos  2.6     03-14  Mg     1.50     03-14      Creatinine Trend: 0.36<--, 0.41<--, 0.34<--, 0.52<--            MICROBIOLOGY:       RADIOLOGY:  [ ] Reviewed and interpreted by me    PULMONARY FUNCTION TESTS:    EKG: CHIEF COMPLAINT: Dyspnea. Dysphagia.     Interval Events: No acute events overnight. Endorses stable respiratory status. IR and GI consulted for PEG placement. Anesthesia consulted and at bedside as well.     REVIEW OF SYSTEMS:  Constitutional: [ ] negative [ ] fevers [ ] chills [ ] weight loss [ ] weight gain  HEENT: [ ] negative [ ] dry eyes [ ] eye irritation [ ] postnasal drip [ ] nasal congestion  CV: [ ] negative  [ ] chest pain [ ] orthopnea [ ] palpitations [ ] murmur  Resp: [ ] negative [X] cough [ ] shortness of breath [ ] dyspnea [X] wheezing [ ] sputum [ ] hemoptysis  GI: [ ] negative [ ] nausea [ ] vomiting [ ] diarrhea [ ] constipation [ ] abd pain [ ] dysphagia   : [ ] negative [ ] dysuria [ ] nocturia [ ] hematuria [ ] increased urinary frequency  Musculoskeletal: [ ] negative [ ] back pain [ ] myalgias [ ] arthralgias [ ] fracture  Skin: [ ] negative [ ] rash [ ] itch  Neurological: [ ] negative [ ] headache [ ] dizziness [ ] syncope [ ] weakness [ ] numbness  Psychiatric: [ ] negative [ ] anxiety [ ] depression  Endocrine: [ ] negative [ ] diabetes [ ] thyroid problem  Hematologic/Lymphatic: [ ] negative [ ] anemia [ ] bleeding problem  Allergic/Immunologic: [ ] negative [ ] itchy eyes [ ] nasal discharge [ ] hives [ ] angioedema  [X] All other systems negative  [ ] Unable to assess ROS because ________    OBJECTIVE:  ICU Vital Signs Last 24 Hrs  T(C): 37 (14 Mar 2022 12:43), Max: 37.3 (14 Mar 2022 05:50)  T(F): 98.6 (14 Mar 2022 12:43), Max: 99.2 (14 Mar 2022 05:50)  HR: 85 (14 Mar 2022 12:43) (82 - 95)  BP: 113/48 (14 Mar 2022 12:43) (111/54 - 120/56)  BP(mean): --  ABP: --  ABP(mean): --  RR: 18 (14 Mar 2022 12:43) (17 - 18)  SpO2: 92% (14 Mar 2022 12:43) (92% - 98%)        03-13 @ 07:01  -  03-14 @ 07:00  --------------------------------------------------------  IN: 0 mL / OUT: 1300 mL / NET: -1300 mL      CAPILLARY BLOOD GLUCOSE        PHYSICAL EXAM  General: NAD  HEENT: EOMI PERRLA  Neck: Supple  Respiratory: Right sided wheeze; upper airway sound transmission, Left sided coarse breath sounds  Cardiovascular: S1/S2, RRR, no murmurs  Abdomen: soft, nontender, nondistended  Extremities: no LE edema  Skin: Good turgor  Neurological: AxOx3  Psychiatry: normal mood and affect     HOSPITAL MEDICATIONS:  MEDICATIONS  (STANDING):  albuterol/ipratropium for Nebulization 3 milliLiter(s) Nebulizer every 6 hours  budesonide  80 MICROgram(s)/formoterol 4.5 MICROgram(s) Inhaler 2 Puff(s) Inhalation two times a day  dextrose 5% + lactated ringers. 1000 milliLiter(s) (75 mL/Hr) IV Continuous <Continuous>  enoxaparin Injectable 40 milliGRAM(s) SubCutaneous every 24 hours  influenza  Vaccine (HIGH DOSE) 0.7 milliLiter(s) IntraMuscular once  potassium chloride  10 mEq/100 mL IVPB 10 milliEquivalent(s) IV Intermittent every 1 hour  sodium chloride 3%  Inhalation 4 milliLiter(s) Inhalation every 12 hours    MEDICATIONS  (PRN):  acetaminophen     Tablet .. 650 milliGRAM(s) Oral every 6 hours PRN Temp greater or equal to 38C (100.4F), Mild Pain (1 - 3), Moderate Pain (4 - 6)  diphenhydrAMINE Injectable 50 milliGRAM(s) IV Push once PRN PRN Chemotherapy Reaction  melatonin 3 milliGRAM(s) Oral at bedtime PRN Insomnia  methylPREDNISolone sodium succinate Injectable 125 milliGRAM(s) IV Push once PRN PRN Chemotherapy Reaction      LABS:                        10.5   9.00  )-----------( 238      ( 14 Mar 2022 07:08 )             33.1     Hgb Trend: 10.5<--, 10.5<--, 11.1<--, 10.9<--, 13.5<--  03-14    137  |  99  |  6<L>  ----------------------------<  112<H>  3.1<L>   |  30  |  0.36<L>    Ca    8.9      14 Mar 2022 07:08  Phos  2.6     03-14  Mg     1.50     03-14      Creatinine Trend: 0.36<--, 0.41<--, 0.34<--, 0.52<--            MICROBIOLOGY:       RADIOLOGY:  [ ] Reviewed and interpreted by me    PULMONARY FUNCTION TESTS:    EKG:

## 2022-03-14 NOTE — PROGRESS NOTE ADULT - ASSESSMENT
77f with recently diagnosed stage IIIA NSCLC, s/p outpatient onc and rad onc eval with plan to start chemotherapy on monday 3/14, with plan to add rt when symptoms are better controlled, presenting to the ED with dysphagia.   Treatment plan for the patient is potentially curative, therefore palliative rt to the mass causing esophageal obstruction is not recommended as this will limit the curative plans.   Plan discussed with Pulm, rad onc and primary team.  Will start weekly carbo/taxol as inpatient.  Discussed sequential weekly carbo/taxol with hopes of adding RT in the future.  Discussed the natural course of disease, rationale for treatment and treatment expectation.  All side effects, risks and benefits were discussed in detail, hand outs detailing the treatment drugs and their side effects were provided, and all questions were answered to the apparent satisfaction of the patient. Consent to start treatment was signed by patient and witnessed by IRIS Portillo.   Patient's  was contacted over the phone and plan reviewed with him in detail.  All questions answered.     -S/p C1 Carbo AUC 2, Taxol 40 mg/m2 3/11/2022  -Monitor swallowing. Speech and swallow eval.   -GI input appreciated  -Pulm and rad onc input appreciated.  -Supportive care, pain control, Nutrition, PT, DVT ppx  -Outpatient oncology f/u    Will follow. Please do not hesitate to call back with questions.     Cintia Willis MD  Medical Oncology Attending  C: [phone]

## 2022-03-14 NOTE — PROGRESS NOTE ADULT - SUBJECTIVE AND OBJECTIVE BOX
Gastroenterology/Hepatology Progress Note      Interval Events:   - no acute events overnight  - interested in PEG placement    Allergies:  No Known Allergies      Hospital Medications:  acetaminophen     Tablet .. 650 milliGRAM(s) Oral every 6 hours PRN  albuterol/ipratropium for Nebulization 3 milliLiter(s) Nebulizer every 6 hours  budesonide  80 MICROgram(s)/formoterol 4.5 MICROgram(s) Inhaler 2 Puff(s) Inhalation two times a day  dextrose 5% + lactated ringers. 1000 milliLiter(s) IV Continuous <Continuous>  diphenhydrAMINE Injectable 50 milliGRAM(s) IV Push once PRN  influenza  Vaccine (HIGH DOSE) 0.7 milliLiter(s) IntraMuscular once  melatonin 3 milliGRAM(s) Oral at bedtime PRN  methylPREDNISolone sodium succinate Injectable 125 milliGRAM(s) IV Push once PRN  sodium chloride 3%  Inhalation 4 milliLiter(s) Inhalation every 12 hours      ROS: 14 point ROS negative unless otherwise state in subjective    PHYSICAL EXAM:   Vital Signs:  Vital Signs Last 24 Hrs  T(C): 37.3 (14 Mar 2022 05:50), Max: 37.3 (14 Mar 2022 05:50)  T(F): 99.2 (14 Mar 2022 05:50), Max: 99.2 (14 Mar 2022 05:50)  HR: 88 (14 Mar 2022 05:50) (82 - 96)  BP: 111/54 (14 Mar 2022 05:50) (111/54 - 125/76)  BP(mean): --  RR: 18 (14 Mar 2022 05:50) (17 - 18)  SpO2: 97% (14 Mar 2022 05:50) (94% - 98%)  Daily     Daily     GENERAL:  No acute distress  HEENT:  NCAT, no scleral icterus  CHEST: stridor, no resp distress  HEART:  RRR  ABDOMEN:  Soft, non-tender, non-distended, normoactive bowel sounds, no masses  EXTREMITIES:  No cyanosis, clubbing, or edema  SKIN:  No rash/erythema/ecchymoses/petechiae/wounds/abscess/warm/dry  NEURO:  Alert and oriented x 3, no asterixis, no tremor    LABS:                        10.5   9.54  )-----------( 255      ( 13 Mar 2022 07:08 )             32.9       03-13    139  |  103  |  13  ----------------------------<  112<H>  3.1<L>   |  29  |  0.41<L>    Ca    9.1      13 Mar 2022 07:08  Phos  2.3     03-13  Mg     1.90     03-13                  Imaging:           Gastroenterology/Hepatology Progress Note    Interval Events:   - no acute events overnight  - interested in PEG placement    Allergies:  No Known Allergies      Hospital Medications:  acetaminophen     Tablet .. 650 milliGRAM(s) Oral every 6 hours PRN  albuterol/ipratropium for Nebulization 3 milliLiter(s) Nebulizer every 6 hours  budesonide  80 MICROgram(s)/formoterol 4.5 MICROgram(s) Inhaler 2 Puff(s) Inhalation two times a day  dextrose 5% + lactated ringers. 1000 milliLiter(s) IV Continuous <Continuous>  diphenhydrAMINE Injectable 50 milliGRAM(s) IV Push once PRN  influenza  Vaccine (HIGH DOSE) 0.7 milliLiter(s) IntraMuscular once  melatonin 3 milliGRAM(s) Oral at bedtime PRN  methylPREDNISolone sodium succinate Injectable 125 milliGRAM(s) IV Push once PRN  sodium chloride 3%  Inhalation 4 milliLiter(s) Inhalation every 12 hours      ROS: 14 point ROS negative unless otherwise state in subjective    PHYSICAL EXAM:   Vital Signs:  Vital Signs Last 24 Hrs  T(C): 37.3 (14 Mar 2022 05:50), Max: 37.3 (14 Mar 2022 05:50)  T(F): 99.2 (14 Mar 2022 05:50), Max: 99.2 (14 Mar 2022 05:50)  HR: 88 (14 Mar 2022 05:50) (82 - 96)  BP: 111/54 (14 Mar 2022 05:50) (111/54 - 125/76)  BP(mean): --  RR: 18 (14 Mar 2022 05:50) (17 - 18)  SpO2: 97% (14 Mar 2022 05:50) (94% - 98%)  Daily     Daily     GENERAL:  No acute distress  HEENT:  NCAT, no scleral icterus  CHEST: stridor, no resp distress  HEART:  RRR  ABDOMEN:  Soft, non-tender, non-distended, normoactive bowel sounds, no masses  EXTREMITIES:  No cyanosis, clubbing, or edema  SKIN:  No rash/erythema/ecchymoses/petechiae/wounds/abscess/warm/dry  NEURO:  Alert and oriented x 3, no asterixis, no tremor    LABS:                        10.5   9.54  )-----------( 255      ( 13 Mar 2022 07:08 )             32.9       03-13    139  |  103  |  13  ----------------------------<  112<H>  3.1<L>   |  29  |  0.41<L>    Ca    9.1      13 Mar 2022 07:08  Phos  2.3     03-13  Mg     1.90     03-13                  Imaging:

## 2022-03-14 NOTE — CHART NOTE - NSCHARTNOTEFT_GEN_A_CORE
PRE-INTERVENTIONAL RADIOLOGY PROCEDURE NOTE    Patient Age: 77 years old  Patient Gender: Female    Procedure (including site / side if known): PEG tube placement     Diagnosis / Indication: Dysphagia, NSCLC    Interventional Radiology Attending Physician: Dr. Croft    Ordering Attending Physician: Dr. Parson     Pertinent medical history: NSCLC    Pertinent labs:                       10.5   9.00  )-----------( 238      ( 14 Mar 2022 07:08 )             33.1   03-14    137  |  99  |  6<L>  ----------------------------<  112<H>  3.1<L>   |  30  |  0.36<L>    Ca    8.9      14 Mar 2022 07:08  Phos  2.6     03-14  Mg     1.50     03-14        Patient and Family aware? Yes           Contact #: ________[id number]_____________

## 2022-03-14 NOTE — PROGRESS NOTE ADULT - ASSESSMENT
77F PMHx of former smoking, recently diagnosed non small cell lung cancer with non-resectable mass, now s/p  tumor debulking and bronchial stent placement to bronchus intermedius in early Feb, presents to the ER with worsening shortness of breath and dysphagia. Patient currently planned for PEG placement by either IR or GI    #Preoperative evaluation - Planned for PEG placement due to extrinsic compression of esophagus. Patient has non small cell lung cancer with non-resectable mass, now s/p  tumor debulking and bronchial stent placement to bronchus intermedius. BI stent is in place based on imaging. Patient is currently at respiratory baseline.  - Discussed at length with anesthesia at bedside. Ideally, PEG would be placed under local anesthesia given presence of BI stent and extrinsic compression of airways by mass. However, if general anesthesia and intubation is necessary recommend ETT placement as proximal to vocal cords as possible to avoid any dislodgement of BI stent  - Patient is high risk for low risk procedure given underlying airway anatomy but is optimized from pulmonary standpoint.     Jayson Gracia MD  Pulmonary & Critical Care Fellow  [phone]

## 2022-03-14 NOTE — PROGRESS NOTE ADULT - PROBLEM SELECTOR PLAN 1
Still treatment naive  As above, s/p debulking and r. bronchial stent  repeat CT showed left main bronchial invasion of tumor.   discussed with IP, Rad/Onc and Heme/Onc.   no plan for urgent surgical intervention of lung lesions.  started inpatient chemo 3/11 per onc.   further assessment for Rad/onc after chemo as premature RT can inhibit curative approach Still treatment naive  As above, s/p debulking and r. bronchial stent  repeat CT showed left main bronchial invasion of tumor.   discussed with IP, Rad/Onc and Heme/Onc.   no plan for urgent surgical intervention of lung lesions.  started inpatient chemo 3/11 per onc.   dw onc attending, next chemo on Fri - no need for port/picc - chemo as long as pt admitted but should not hold up discharge per onc   further assessment for Rad/onc after chemo as premature RT can inhibit curative approach

## 2022-03-14 NOTE — PROGRESS NOTE ADULT - PROBLEM SELECTOR PLAN 4
Lovenox for dvt prophylaxis  hypomagnesemia, hypokalemia - replete Lovenox for dvt prophylaxis - will start after IR procedure cassia   hypomagnesemia, hypokalemia - replete

## 2022-03-14 NOTE — CONSULT NOTE ADULT - SUBJECTIVE AND OBJECTIVE BOX
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     Allergies:   Medications (Abx/Cardiac/Anticoagulation/Blood Products)      Data:    T(C): 37.3  HR: 89  BP: 111/54  RR: 18  SpO2: 97%    -WBC 9.00 / HgB 10.5 / Hct 33.1 / Plt 238  -Na 137 / Cl 99 / BUN 6 / Glucose 112  -K 3.1 / CO2 30 / Cr 0.36  -ALT -- / Alk Phos -- / T.Bili --  -INR 1.20 / PTT 33.0      Radiology:     CT chest 3/10/2022  IMPRESSION:    In comparison with 2/25/2022, the 5.6 x 4.2 cm subcarinal masses is slightly enlarged; previously 5 x 3.8 cm. Compression and stenosis of the mid esophagus caused by the mass is again noted. The upper esophagus is mildly dilated containing retained ingested material. Aspiration precaution is recommended.    The stent within the bronchus intermedius remains patent. Again noted, the right upper lobe and right middle lobe bronchi are severely narrowed but patent. Some of the segmental branches of the right lower lobe are obliterated.    Difficult to measure cavitated consolidative opacity within superior segment of right lower lobe is slightly enlarged.    Interval decrease of tree-in-bud opacities within basilar right lower lobe likely representing decrease of aspiration pneumonitis.

## 2022-03-14 NOTE — PROGRESS NOTE ADULT - PROBLEM SELECTOR PLAN 2
S/P debulking and stent  CT chest with compression of esophagus due to tumor.   GI recs appreciated, high possibility of stent migration, rec for PEG, opt agreeable   - c/w IVF. D5w+LR.   - strict NPO.  - dw GI - safer approach by IR - consulted, requested pre-procedure anesthesia eval, will call today

## 2022-03-14 NOTE — PROGRESS NOTE ADULT - ASSESSMENT
77F PMHx of former smoking, recently diagnosed non small cell lung cancer with non-resectable mass, s/p  rigid bronchoscopy, tumor debulking and bronchial stent placement to bronchus intermedius in 02/2022 presents for dysphagia.    Impression:  # Dysphagia secondary to extrinsic compression due to known lung CA. Per heme/onc, patient expected to have significant improvement during chemo, however will require nutritional support during this period. Unclear whether endoscopicly placed PEG will be successful, however can attempt once patient optimized from pulm standpoint. If unable to place endoscopically due to mid-esophagus stenosis, will recommend IR PEG placement  # Non small cell lung cancer with non-resectable mass, s/p  rigid bronchoscopy, tumor debulking and bronchial stent placement to bronchus intermedius in 02/2022    Recommendations:  - please obtain pulmonary optimization given patient's stridor  - once optimized can plan for EGD/PEG, possibly tomorrow  - rest of care per primary team    GI will continue to follow.     All recommendations are tentative until note is attested by attending.     Damion Thomas, PGY5  Gastroenterology/Hepatology Fellow  Available on [employer] Teams  [phone] ([employer] Short Range Pager)  [phone] (Long Range Pager)    After 5pm, please contact the on-call GI fellow. [phone]   77F PMHx of former smoking, recently diagnosed non small cell lung cancer with non-resectable mass, s/p  rigid bronchoscopy, tumor debulking and bronchial stent placement to bronchus intermedius in 02/2022 presents for dysphagia.    Impression:  # Dysphagia secondary to extrinsic compression due to known lung CA. Per heme/onc, patient expected to have significant improvement during chemo, however will require nutritional support during this period. Unclear whether endoscopicly placed PEG will be successful, however can attempt once patient optimized from pulm standpoint. Given esophageal stricture and fluid/food filled esophagus, she will likely require intubation for procedure. If unable to place endoscopically due to mid-esophagus stenosis, will recommend IR PEG placement  # Non small cell lung cancer with non-resectable mass, s/p  rigid bronchoscopy, tumor debulking and bronchial stent placement to bronchus intermedius in 02/2022    Recommendations:  - please obtain pulmonary optimization given patient's stridor  - will discuss w/ patient possibility of requiring intubation for EGD, and risks involved  - once optimized can plan for EGD/PEG, possibly tomorrow  - rest of care per primary team    GI will continue to follow.     All recommendations are tentative until note is attested by attending.     Damion Thomas, PGY5  Gastroenterology/Hepatology Fellow  Available on [employer] Teams  [phone] ([employer] Short Range Pager)  [phone] (Long Range Pager)    After 5pm, please contact the on-call GI fellow. [phone]

## 2022-03-14 NOTE — CONSULT NOTE ADULT - ASSESSMENT
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 IR consulted for Gastrostomy tube placement. Discussed with GI: PEG likely difficult to pass through mid esophageal narrowing and patient high risk for intubation.     Imaging reviewed. Stomach can be safely accessed.    -- IR will plan to perform gastrostomy tube placement on Wednesday, March 16 pending anesthesia evaluation.  -- NPO at 11:59PM on 3/15/2022  -- Patient is not on anticoagulation on this time. Hold a.m. anticoagulation.  -- please complete IR pre-procedure note  -- please place IR procedure request order under Dr. Croft.  -- IR Pager [phone]

## 2022-03-14 NOTE — PROGRESS NOTE ADULT - SUBJECTIVE AND OBJECTIVE BOX
Patient is a 77y old  Female who presents with a chief complaint of worsening shortness of breath/dysphagia (14 Mar 2022 10:34)      SUBJECTIVE / OVERNIGHT EVENTS: had 1 episode of diarrhea post neb treatement - no n/v - breathing at baseline - sats remains ok on RA - lost IV access, midline nurse being called     ROS:  No HA/DZ  No Vision changes   No N/V/D  No Edema  No Rash  NO weakness, numbness    MEDICATIONS  (STANDING):  albuterol/ipratropium for Nebulization 3 milliLiter(s) Nebulizer every 6 hours  budesonide  80 MICROgram(s)/formoterol 4.5 MICROgram(s) Inhaler 2 Puff(s) Inhalation two times a day  dextrose 5% + lactated ringers. 1000 milliLiter(s) (75 mL/Hr) IV Continuous <Continuous>  influenza  Vaccine (HIGH DOSE) 0.7 milliLiter(s) IntraMuscular once  magnesium sulfate  IVPB 1 Gram(s) IV Intermittent once  potassium chloride  10 mEq/100 mL IVPB 10 milliEquivalent(s) IV Intermittent every 1 hour  sodium chloride 3%  Inhalation 4 milliLiter(s) Inhalation every 12 hours    MEDICATIONS  (PRN):  acetaminophen     Tablet .. 650 milliGRAM(s) Oral every 6 hours PRN Temp greater or equal to 38C (100.4F), Mild Pain (1 - 3), Moderate Pain (4 - 6)  diphenhydrAMINE Injectable 50 milliGRAM(s) IV Push once PRN PRN Chemotherapy Reaction  melatonin 3 milliGRAM(s) Oral at bedtime PRN Insomnia  methylPREDNISolone sodium succinate Injectable 125 milliGRAM(s) IV Push once PRN PRN Chemotherapy Reaction      T(C): 37.3 (03-14-22 @ 05:50)  HR: 89 (03-14-22 @ 07:01)  BP: 111/54 (03-14-22 @ 05:50)  RR: 18 (03-14-22 @ 05:50)  SpO2: 97% (03-14-22 @ 07:01)  CAPILLARY BLOOD GLUCOSE        I&O's Summary    13 Mar 2022 07:01  -  14 Mar 2022 07:00  --------------------------------------------------------  IN: 0 mL / OUT: 1300 mL / NET: -1300 mL        PHYSICAL EXAM:  GENERAL: NAD, well-developed, AOx3  HEAD:  Atraumatic, Normocephalic  EYES: EOMI, PERRL, conjunctiva and sclera clear  NECK: Supple, No JVD  CHEST/LUNG: mild upper airway rhonchi   HEART: Regular rate and rhythm; No murmurs, rubs, or gallops, No Edema  ABDOMEN: Soft, Nontender, Nondistended; Bowel sounds present  EXTREMITIES:  2+ Peripheral Pulses, No clubbing, cyanosis  PSYCH: No SI/HI  NEUROLOGY: non-focal  SKIN: No rashes or lesions    LABS:                        10.5   9.00  )-----------( 238      ( 14 Mar 2022 07:08 )             33.1     03-14    137  |  99  |  6<L>  ----------------------------<  112<H>  3.1<L>   |  30  |  0.36<L>    Ca    8.9      14 Mar 2022 07:08  Phos  2.6     03-14  Mg     1.50     03-14                    RADIOLOGY & ADDITIONAL TESTS:    Imaging Personally Reviewed:    Consultant(s) Notes Reviewed:      Care Discussed with Consultants/Other Providers:

## 2022-03-15 NOTE — PROGRESS NOTE ADULT - SUBJECTIVE AND OBJECTIVE BOX
Patient is a 77y old  Female who presents with a chief complaint of worsening shortness of breath/dysphagia (14 Mar 2022 15:47)      SUBJECTIVE / OVERNIGHT EVENTS: this morning w new onset AFIB w RVR to 180s - s/p 250cc bolus, iv lopressor 5mg x1, then hypotensive later with persistent RVR, s.p 500cc bolus - she is however clinically asymptomatic     ROS:  No HA/DZ  No Vision changes   No CP, SOB  No N/V/D  No Edema  No Rash  NO weakness, numbness    MEDICATIONS  (STANDING):  albuterol/ipratropium for Nebulization 3 milliLiter(s) Nebulizer every 6 hours  budesonide  80 MICROgram(s)/formoterol 4.5 MICROgram(s) Inhaler 2 Puff(s) Inhalation two times a day  dextrose 5% + lactated ringers. 1000 milliLiter(s) (125 mL/Hr) IV Continuous <Continuous>  enoxaparin Injectable 40 milliGRAM(s) SubCutaneous every 24 hours  influenza  Vaccine (HIGH DOSE) 0.7 milliLiter(s) IntraMuscular once  magnesium sulfate  IVPB 1 Gram(s) IV Intermittent once  potassium chloride  10 mEq/100 mL IVPB 10 milliEquivalent(s) IV Intermittent every 1 hour  sodium chloride 3%  Inhalation 4 milliLiter(s) Inhalation every 12 hours    MEDICATIONS  (PRN):  acetaminophen     Tablet .. 650 milliGRAM(s) Oral every 6 hours PRN Temp greater or equal to 38C (100.4F), Mild Pain (1 - 3), Moderate Pain (4 - 6)  diphenhydrAMINE Injectable 50 milliGRAM(s) IV Push once PRN PRN Chemotherapy Reaction  melatonin 3 milliGRAM(s) Oral at bedtime PRN Insomnia  methylPREDNISolone sodium succinate Injectable 125 milliGRAM(s) IV Push once PRN PRN Chemotherapy Reaction      T(C): 36.9 (03-15-22 @ 07:30)  HR: 157 (03-15-22 @ 10:12)  BP: 88/57 (03-15-22 @ 10:12)  RR: 20 (03-15-22 @ 09:23)  SpO2: 99% (03-15-22 @ 09:24)  CAPILLARY BLOOD GLUCOSE        I&O's Summary    14 Mar 2022 07:01  -  15 Mar 2022 07:00  --------------------------------------------------------  IN: 375 mL / OUT: 0 mL / NET: 375 mL        PHYSICAL EXAM:  GENERAL: NAD, well-developed, AOx3  HEAD:  Atraumatic, Normocephalic  EYES: EOMI, PERRL, conjunctiva and sclera clear  NECK: Supple, No JVD  CHEST/LUNG: mild upper airway stridor   HEART: iregular rate and rhythm  ABDOMEN: Soft, Nontender, Nondistended; Bowel sounds present  EXTREMITIES:  2+ Peripheral Pulses, No clubbing, cyanosis  PSYCH: No SI/HI  NEUROLOGY: non-focal  SKIN: No rashes or lesions    LABS:                        11.0   9.48  )-----------( 242      ( 15 Mar 2022 05:46 )             36.4     03-15    135  |  97<L>  |  5<L>  ----------------------------<  115<H>  3.5   |  30  |  0.38<L>    Ca    9.0      15 Mar 2022 05:46  Phos  2.7     03-15  Mg     1.90     03-15      PT/INR - ( 15 Mar 2022 05:46 )   PT: 15.3 sec;   INR: 1.32 ratio         PTT - ( 15 Mar 2022 05:46 )  PTT:29.5 sec  CARDIAC MARKERS ( 15 Mar 2022 07:59 )  x     / x     / 15 U/L / x     / <1.0 ng/mL              RADIOLOGY & ADDITIONAL TESTS:    Imaging Personally Reviewed:    Consultant(s) Notes Reviewed:      Care Discussed with Consultants/Other Providers:

## 2022-03-15 NOTE — DIETITIAN INITIAL EVALUATION ADULT. - OTHER INFO
Pt was recently diagnosed with NSCLC and is s/p tumor debulking and placement of bronchial stent in early Feb 20222. Pt was experiencing dysphagia during that admission. Dysphagia was due to extrinsic compression due to known lung cancer. Pt was discharged on a pureed diet.   Spoke with Pt and Pt's  at bedside. Pt was able to tolerate the pureed diet well when first discharged. Pt's dysphagia became worse and Pt was unable to tolerate liquids for a day or two  PTA. Pt has been NPO for 6 days. Pt refused a NGT but has now agreed to have a PEG placed. Procedure was cancelled today since Pt developed A Fib.  Pt's weight during last admission   Nutrition consult was requested for tube feeding recommendations. Pt was recently diagnosed with NSCLC and is s/p tumor debulking and placement of bronchial stent in early Feb 20222. Pt was experiencing dysphagia during that admission. Dysphagia is due to extrinsic compression due to known lung cancer. Pt was discharged on a pureed diet.   Spoke with Pt and Pt's  at bedside. Pt was able to tolerate a pureed diet well when first discharged. Pt's dysphagia became worse and she was unable to tolerate liquids for a day or two PTA. Pt has been eating less than 75% of her usual intake since January 2022.Pt has been NPO for 6 days. Pt refused a NGT but has now agreed to have a PEG placed. Procedure was cancelled today since Pt developed A Fib.  Pt's weight in 9/2021 was 87.1 kg and her weight at last admission Jan 2022 was 77.5 kg. Her current admission weight was 70 kg. Pt had an 11.1 kg/12.52% decrease in weight in 6 months.   Recommendations for tube feeding; Continuous feeding of Jevity 1.2 via PEG of 1300 ml/24 hours. Start feeding at 20 ml/hours and increase by 10 ml/hr q 4 hours until goal rate of 54 ml/houris reached.  At goal the feeding will provide 1560 kcal, 72.2 gm protein meeting Pt's nutrient needs.

## 2022-03-15 NOTE — PROGRESS NOTE ADULT - SUBJECTIVE AND OBJECTIVE BOX
CHIEF COMPLAINT:    Interval Events: No acute events overnight. Noted to be in rapid A fib    REVIEW OF SYSTEMS:  Constitutional: [ ] negative [ ] fevers [ ] chills [ ] weight loss [ ] weight gain  HEENT: [ ] negative [ ] dry eyes [ ] eye irritation [ ] postnasal drip [ ] nasal congestion  CV: [ ] negative  [ ] chest pain [ ] orthopnea [ ] palpitations [ ] murmur  Resp: [ ] negative [X] cough [ ] shortness of breath [ ] dyspnea [X] wheezing [ ] sputum [ ] hemoptysis  GI: [ ] negative [ ] nausea [ ] vomiting [ ] diarrhea [ ] constipation [ ] abd pain [ ] dysphagia   : [ ] negative [ ] dysuria [ ] nocturia [ ] hematuria [ ] increased urinary frequency  Musculoskeletal: [ ] negative [ ] back pain [ ] myalgias [ ] arthralgias [ ] fracture  Skin: [ ] negative [ ] rash [ ] itch  Neurological: [ ] negative [ ] headache [ ] dizziness [ ] syncope [ ] weakness [ ] numbness  Psychiatric: [ ] negative [ ] anxiety [ ] depression  Endocrine: [ ] negative [ ] diabetes [ ] thyroid problem  Hematologic/Lymphatic: [ ] negative [ ] anemia [ ] bleeding problem  Allergic/Immunologic: [ ] negative [ ] itchy eyes [ ] nasal discharge [ ] hives [ ] angioedema  [X] All other systems negative  [ ] Unable to assess ROS because ________    OBJECTIVE:  ICU Vital Signs Last 24 Hrs  T(C): 37 (14 Mar 2022 12:43), Max: 37.3 (14 Mar 2022 05:50)  T(F): 98.6 (14 Mar 2022 12:43), Max: 99.2 (14 Mar 2022 05:50)  HR: 85 (14 Mar 2022 12:43) (82 - 95)  BP: 113/48 (14 Mar 2022 12:43) (111/54 - 120/56)  RR: 18 (14 Mar 2022 12:43) (17 - 18)  SpO2: 92% (14 Mar 2022 12:43) (92% - 98%)        03-13 @ 07:01  -  03-14 @ 07:00  --------------------------------------------------------  IN: 0 mL / OUT: 1300 mL / NET: -1300 mL      CAPILLARY BLOOD GLUCOSE        PHYSICAL EXAM  General: NAD  HEENT: EOMI PERRLA  Neck: Supple  Respiratory: Right sided wheeze; upper airway sound transmission, Left sided coarse breath sounds  Cardiovascular: S1/S2, RRR, no murmurs  Abdomen: soft, nontender, nondistended  Extremities: no LE edema  Skin: Good turgor  Neurological: AxOx3  Psychiatry: normal mood and affect     HOSPITAL MEDICATIONS:  MEDICATIONS  (STANDING):  albuterol/ipratropium for Nebulization 3 milliLiter(s) Nebulizer every 6 hours  budesonide  80 MICROgram(s)/formoterol 4.5 MICROgram(s) Inhaler 2 Puff(s) Inhalation two times a day  dextrose 5% + lactated ringers. 1000 milliLiter(s) (75 mL/Hr) IV Continuous <Continuous>  enoxaparin Injectable 40 milliGRAM(s) SubCutaneous every 24 hours  influenza  Vaccine (HIGH DOSE) 0.7 milliLiter(s) IntraMuscular once  potassium chloride  10 mEq/100 mL IVPB 10 milliEquivalent(s) IV Intermittent every 1 hour  sodium chloride 3%  Inhalation 4 milliLiter(s) Inhalation every 12 hours    MEDICATIONS  (PRN):  acetaminophen     Tablet .. 650 milliGRAM(s) Oral every 6 hours PRN Temp greater or equal to 38C (100.4F), Mild Pain (1 - 3), Moderate Pain (4 - 6)  diphenhydrAMINE Injectable 50 milliGRAM(s) IV Push once PRN PRN Chemotherapy Reaction  melatonin 3 milliGRAM(s) Oral at bedtime PRN Insomnia  methylPREDNISolone sodium succinate Injectable 125 milliGRAM(s) IV Push once PRN PRN Chemotherapy Reaction      LABS:                        10.5   9.00  )-----------( 238      ( 14 Mar 2022 07:08 )             33.1     Hgb Trend: 10.5<--, 10.5<--, 11.1<--, 10.9<--, 13.5<--  03-14    137  |  99  |  6<L>  ----------------------------<  112<H>  3.1<L>   |  30  |  0.36<L>    Ca    8.9      14 Mar 2022 07:08  Phos  2.6     03-14  Mg     1.50     03-14      Creatinine Trend: 0.36<--, 0.41<--, 0.34<--, 0.52<--        RADIOLOGY:  [X] Reviewed and interpreted by me - Ne wnimer findings. Stent in place.

## 2022-03-15 NOTE — DIETITIAN NUTRITION RISK NOTIFICATION - ETIOLOGY-BASIS
[facility] patient, fax to [phone]. RX#[id number].  
Chronic illness

## 2022-03-15 NOTE — CONSULT NOTE ADULT - ASSESSMENT
77F former smoker recently diagnosed with NSCLC IIIa s/p debulking and R. bronchial stent, discharged a month ago now presents with worsening dysphagia and ACKERMAN. Found to have malignancy mass externally compression esophagus, now pending PEG. Course complicated by atrial fibrillation with RVR on 3/15.     #Atrial fibrillation with RVR with hypotension - Asymptomatic. No previous history of atrial fibrillation. HR 140s, BP 80/50s (baseline 100/50s). Did not respond to Metoprolol 5mg IVP x2.   #NSCLC 3a - s/p debulking and R bronchial stent. Started on chemotherapy on 3/11.     Recommendations:  -Given esophageal compression by mass, VIK DCCV is contraindicated.   -Would start patient on Amiodarone (150mg bolus followed by 1mg/min for 6 hours, then 0.5mg/min for 18 hours). Following IV amiodarone, will then be transitioned to PO amiodarone for total 10g load.   -CHADSVASC at least 3, so AC is indicated  to reduce risk of CVA (Lovenox in particular given malignancy). However, given pending PEG and unclear malignancy burden, would discuss risk/benefits of AC with GI, Heme/onc, and the patient.   -Resume telemetry.   -Obtain formal TTE.  77F former smoker recently diagnosed with NSCLC IIIa s/p debulking and R. bronchial stent, discharged a month ago now presents with worsening dysphagia and ACKERMAN. Found to have malignancy mass externally compression esophagus, now pending PEG. Course complicated by atrial fibrillation with RVR on 3/15.     #Atrial fibrillation with RVR with hypotension - Asymptomatic. No previous history of atrial fibrillation. HR 140s, BP 80/50s (baseline 100/50s). Did not respond to Metoprolol 5mg IVP x2.   #NSCLC 3a - s/p debulking and R bronchial stent. Started on chemotherapy on 3/11.     Recommendations:  -Given esophageal compression by mass, VIK DCCV is contraindicated.   -Would start patient on Amiodarone (150mg bolus followed by 1mg/min for 6 hours, then 0.5mg/min for 18 hours). Following IV amiodarone, will then be transitioned to PO amiodarone for total 10g load.   -CHADSVASC at least 3, so AC is indicated  to reduce risk of CVA (Lovenox in particular given malignancy). However, given pending PEG and unclear malignancy burden, would discuss risk/benefits of AC with GI, Heme/onc, and the patient.   -No further cardiac intervention or workup indicated prior to PEG placement.   -Resume telemetry.   -Obtain formal TTE.

## 2022-03-15 NOTE — PROGRESS NOTE ADULT - PROBLEM SELECTOR PLAN 1
Still treatment naive  As above, s/p debulking and r. bronchial stent  repeat CT showed left main bronchial invasion of tumor.   discussed with IP, Rad/Onc and Heme/Onc.   no plan for urgent surgical intervention of lung lesions.  started inpatient chemo 3/11 per onc.   dw onc attending, next chemo on Fri - no need for port/picc - chemo as long as pt admitted but should not hold up discharge per onc   further assessment for Rad/onc after chemo as premature RT can inhibit curative approach

## 2022-03-15 NOTE — CHART NOTE - NSCHARTNOTEFT_GEN_A_CORE
GI following for dysphagia and possible PEG placement. After discussion w/ team and IR, patient planned for IR guided PEG placement tomorrow.     GI will sign off. Please reconsult as necessary.     All recommendations are tentative until note is attested by attending.     Damion Thoams, PGY5  Gastroenterology/Hepatology Fellow  Available on [employer] Teams  [phone] ([employer] Short Range Pager)  [phone] (Long Range Pager)    After 5pm, please contact the on-call GI fellow. [phone]

## 2022-03-15 NOTE — CHART NOTE - NSCHARTNOTEFT_GEN_A_CORE
Called by RN at change of shift that patient has elevated HR in 170's with 's.  Pt had just ambulated to bathroom and returned to bed   Requested full set of VS and EKG   Arrived at bedside to evaluate patient   Patient is alert, oriented x4 and in no distress with no complaints offered   Denies any CP, SOB   Patient stated she had just ambulated to BR to void urine, has back pain but is not new and patient attributes it to "her cancer pain"  Over the past day patient states she had 3-4 episodes of watery diarrhea and has been NPO for 5 days awaiting PED placement for dysphagia 2/2 NSCLC  Bedside tele monitor shows 's - 160's appears to be atrial fibrillation with RVR  EKG done and confirmed AFib with RVR rate 150's   IVF bolus of  cc ordered x 1 with IV metoprolol 5 mg IVP x 1   Patient tolerated both well and HR decreased to 120's - 130's with repeat /64  Discussed case with attending Dr Jose G Parson and will continue to monitor and rate control to keep rate control less than 120's   HR still labile and increased to 130's 140's   Second bolus of  cc ordered with IV metoprolol 5mg x 1   Cards consult called  TTE ordered   Cardiac enzymes ordered   TSH added to labs   Awaiting results and will continue to monitor patient

## 2022-03-15 NOTE — PROGRESS NOTE ADULT - ASSESSMENT
78 yo lady former smoker recently diagnosed with NSCLC s/p debulking and R. bronchial stent, discharged a month ago now presents with worsening dysphagia and progressing shortness of breath, especially with exertion. CT showed tumor extension into left main bronchus. as well as external compression of esophagus from lung ca. started on chemo on 3/11 by Oncology. plan for PEG placement.

## 2022-03-15 NOTE — CONSULT NOTE ADULT - SUBJECTIVE AND OBJECTIVE BOX
Patient seen and evaluated at bedside    Chief Complaint: Atrial fibrillation w/ RVR    HPI:  78 yo lady former smoker recently diagnosed with NSCLC IIa s/p debulking and R. bronchial stent, discharged a month ago now presents with worsening dysphagia and progressing shortness of breath, especially with exertion. Patient reports that for the last 3 days she has trouble tolerating any PO intake. Patient is chemo naive and was set to start chemo on 3/14. She denies any f/c or recent infection.     Started on chemo on 3/11. Pending PEG due to compression of esophagus by mass. Course now complicated by new atrial fibrillation w/ RVR on 3/15 - prompting EP consult.     At bedside HR 140s s/p Metoprolol IVP x2. Patient is asymptomatic but BP 80/50s (baseline 100/50s).         PMHx:   NSCLC 3a        PSHx:   No significant past surgical history        Allergies:  No Known Allergies      Home Meds: As per admission medication reconcilliation.     Current Medications:   acetaminophen     Tablet .. 650 milliGRAM(s) Oral every 6 hours PRN  albuterol/ipratropium for Nebulization 3 milliLiter(s) Nebulizer every 6 hours  budesonide  80 MICROgram(s)/formoterol 4.5 MICROgram(s) Inhaler 2 Puff(s) Inhalation two times a day  dextrose 5% + lactated ringers. 1000 milliLiter(s) IV Continuous <Continuous>  diphenhydrAMINE Injectable 50 milliGRAM(s) IV Push once PRN  enoxaparin Injectable 40 milliGRAM(s) SubCutaneous every 24 hours  influenza  Vaccine (HIGH DOSE) 0.7 milliLiter(s) IntraMuscular once  melatonin 3 milliGRAM(s) Oral at bedtime PRN  methylPREDNISolone sodium succinate Injectable 125 milliGRAM(s) IV Push once PRN  potassium chloride  10 mEq/100 mL IVPB 10 milliEquivalent(s) IV Intermittent every 1 hour  sodium chloride 3%  Inhalation 4 milliLiter(s) Inhalation every 12 hours  zolpidem 5 milliGRAM(s) Oral at bedtime PRN      FAMILY HISTORY: NC      Social History: Long time smoker, denies excessive etoh or drug use      REVIEW OF SYSTEMS:  Constitutional:     [x ] negative [ ] fevers [ ] chills [ ] weight loss [ ] weight gain  HEENT:                  [x ] negative [ ] dry eyes [ ] eye irritation [ ] postnasal drip [ ] nasal congestion  CV:                         [ x] negative  [ ] chest pain [ ] orthopnea [ ] palpitations [ ] murmur  Resp:                     [x ] negative [ ] cough [ ] shortness of breath [ ] dyspnea [ ] wheezing [ ] sputum [ ]hemoptysis  GI:                          [ x] negative [ ] nausea [ ] vomiting [ ] diarrhea [ ] constipation [ ] abd pain [ ] dysphagia   :                        [ x] negative [ ] dysuria [ ] nocturia [ ] hematuria [ ] increased urinary frequency  Musculoskeletal: [x ] negative [ ] back pain [ ] myalgias [ ] arthralgias [ ] fracture  Skin:                       [ x] negative [ ] rash [ ] itch  Neurological:        [ x] negative [ ] headache [ ] dizziness [ ] syncope [ ] weakness [ ] numbness  Psychiatric:           [ x] negative [ ] anxiety [ ] depression  Endocrine:            [ x] negative [ ] diabetes [ ] thyroid problem  Heme/Lymph:      [ x] negative [ ] anemia [ ] bleeding problem  Allergic/Immune: [ x] negative [ ] itchy eyes [ ] nasal discharge [ ] hives [ ] angioedema    [ x] All other systems negative  [ ] Unable to assess ROS due to      Physical Exam:  T(F): 98.5 (03-15), Max: 99.6 (03-14)  HR: 157 (03-15) (85 - 178)  BP: 88/57 (03-15) (87/52 - 137/68)  RR: 20 (03-15)  SpO2: 99% (03-15)  General: Alert, no acute distress, appears comfortable   HEENT: No scleral icterus, EOMI, no facial dysmorphia, no external ear lesions   Cardiac: Regular rate and rhythm, no murmurs, no rubs, no gallops   Pulmonary: Clear breath sounds throughout, no wheezing, no stridor, no crackles   Abdomen: Nondistended, nontender, appears soft   Skin: no obvious rash or lesions   Extremities: no LE edema  Neurological: Moving all 4 extremities, no overt focal deficits noted   Psych: normal mood and affect     Cardiovascular Diagnostic Testing:    ECG: Personally reviewed:  Atrial fibrillation.     Echo: Personally reviewed:  Pending.     Stress Testing:    Cath:    Imaging:    CXR: Personally reviewed    Labs: Personally reviewed                        11.0   9.48  )-----------( 242      ( 15 Mar 2022 05:46 )             36.4     03-15    135  |  97<L>  |  5<L>  ----------------------------<  115<H>  3.5   |  30  |  0.38<L>    Ca    9.0      15 Mar 2022 05:46  Phos  2.7     03-15  Mg     1.90     03-15      PT/INR - ( 15 Mar 2022 05:46 )   PT: 15.3 sec;   INR: 1.32 ratio         PTT - ( 15 Mar 2022 05:46 )  PTT:29.5 sec        Thyroid Stimulating Hormone, Serum: 2.40 uIU/mL (03-15 @ 08:00)

## 2022-03-15 NOTE — PROGRESS NOTE ADULT - PROBLEM SELECTOR PLAN 5
Lovenox for dvt prophylaxis for now but might need full AC for new onset Afib depending on clinical course  DW ACP

## 2022-03-15 NOTE — CONSULT NOTE ADULT - REASON FOR ADMISSION
worsening shortness of breath/dysphagia

## 2022-03-15 NOTE — CONSULT NOTE ADULT - CONSULT REASON
shortness of breath
Gastrostomy tube placement
NSCLC
Atrial fibrillation
esophageal stent evaluation

## 2022-03-15 NOTE — PROGRESS NOTE ADULT - PROBLEM SELECTOR PLAN 2
S/P debulking and stent  CT chest with compression of esophagus due to tumor.   GI recs appreciated, high possibility of stent migration, rec for PEG, opt agreeable   - c/w IVF. D5w+LR.   - strict NPO.  - dw GI - safer approach by IR - consulted, requested pre-procedure anesthesia eval - called - updated IR on new AFIB - Ir will follow and determine scheduling  - If IR cancelled d/t Afib, will place NGT with Jevity feeding as has been npo for a number days already

## 2022-03-15 NOTE — DIETITIAN INITIAL EVALUATION ADULT. - ORAL INTAKE PTA/DIET HISTORY
Pt has a history of dysphagia. Pt was able to tolerate a pureed diet but the dysphagia worsened. Pt has a history of dysphagia. Pt was able to tolerate a pureed diet until the dysphagia worsened.

## 2022-03-15 NOTE — PROGRESS NOTE ADULT - ASSESSMENT
77F PMHx of former smoking, recently diagnosed non small cell lung cancer with non-resectable mass, now s/p  tumor debulking and bronchial stent placement to bronchus intermedius in early Feb, presents to the ER with worsening shortness of breath and dysphagia. PEG placement per IR. Discussed with anesthesia. Informed anesthesia team to contact us if any respiratory/airway issues noted during the procedure.   - Continue current management per oncology. Recommend starting inpatient chemo.   - No role for palliative radiation, given planned for concurrent chemo RT with curative intent.  - Bronchus intermedius stent in place and patent on imaging  - Left mainstem narrowing noted, will continue to monitor symptoms. No interventions needed as of now, if worsening noted, may need LMB stent.   - Continue airway clearance measures for the current indwelling stent.     IP to follow. Page IP service immediately or contact pulm fellow on call/MICU if any worsening of clinical status.

## 2022-03-15 NOTE — CONSULT NOTE ADULT - ATTENDING COMMENTS
Patient seen and examined in the ER with fellow. Agree with above mentioned assessment and plan. In summary, this is a 77F PMHx of former smoking, recently diagnosed non small cell lung cancer with non-resectable mass, s/p  rigid bronchoscopy, tumor debulking and bronchial stent placement to bronchus intermedius in early Feb. Patient with excellent symptomatic relief after the procedure. Seen by oncology and rad onc, and being planned for systemic chemotherapy followed by radiation therapy  given PET with no extra-thoracic disease. Patient has had progressive dysphagia, with difficulty keeping down liquids over the last few days per patient. Recommend GI cx to assess for role of any endoscopic intervention or recommendations given worsening symptoms.     CT CHEST reviewed. RBI stent patent. Narrowing of the LMB noted (worse compared to CT in January, similar to PET/CT late february). Discussed with oncology with possible plans for inpatient chemotherapy next week. Will also discuss with rad/onc team to ascertain plans and timing for radiation therapy. Will monitor respiratory symptoms during hospital stay to assess if any further intervention warranted from the airway standpoint.     IP team to follow. Please page IP team immediately or contact pulmonary or MICU fellow with any change in clinical status or respiratory status.     Discussed at length with patient, , Thoracic Med Onc team.
77F former smoker recently diagnosed with NSCLC IIIa s/p debulking and R. bronchial stent, discharged a month ago now presents with worsening dysphagia and ACKERMAN. Found to have malignancy mass externally compression esophagus, now pending PEG. Course complicated by atrial fibrillation with RVR on 3/15. Continue amio, plan for AC. Now in NSR. Will follow on tele.
78 yo lady former smoker, recently diagnosed w/ hilar mass now s/p tumor debulking and bronchial stent placement to bronchus intermedius in early Feb 2022, found to be non small cell carcinoma, presenting now for dysphagia w/ difficulty swallowing.     Evaluation for esophageal stent: High probability of migration due to the extrinsic nature of the tumor.

## 2022-03-16 NOTE — PROGRESS NOTE ADULT - SUBJECTIVE AND OBJECTIVE BOX
Patient is a 77y old  Female who presents with a chief complaint of worsening shortness of breath/dysphagia (15 Mar 2022 12:24)      SUBJECTIVE / OVERNIGHT EVENTS: converted back to NSR ~ 4pm, remains in sinus - BP has improved, she denies any new complaints     ROS:  No HA/DZ  No Vision changes   No CP, SOB  No N/V/D  No Edema  No Rash  NO weakness, numbness    MEDICATIONS  (STANDING):  aMIOdarone Infusion 1 mG/Min (33.3 mL/Hr) IV Continuous <Continuous>  aMIOdarone Infusion 0.5 mG/Min (16.7 mL/Hr) IV Continuous <Continuous>  budesonide  80 MICROgram(s)/formoterol 4.5 MICROgram(s) Inhaler 2 Puff(s) Inhalation two times a day  dextrose 5% + lactated ringers. 1000 milliLiter(s) (125 mL/Hr) IV Continuous <Continuous>  enoxaparin Injectable 40 milliGRAM(s) SubCutaneous every 24 hours  influenza  Vaccine (HIGH DOSE) 0.7 milliLiter(s) IntraMuscular once  sodium chloride 3%  Inhalation 4 milliLiter(s) Inhalation every 12 hours    MEDICATIONS  (PRN):  acetaminophen     Tablet .. 650 milliGRAM(s) Oral every 6 hours PRN Temp greater or equal to 38C (100.4F), Mild Pain (1 - 3), Moderate Pain (4 - 6)  diphenhydrAMINE Injectable 50 milliGRAM(s) IV Push once PRN PRN Chemotherapy Reaction  ipratropium    for Nebulization 500 MICROGram(s) Nebulizer every 6 hours PRN SOB/Wheezing  levalbuterol Inhalation 0.63 milliGRAM(s) Inhalation every 6 hours PRN SOB/Wheezing  melatonin 3 milliGRAM(s) Oral at bedtime PRN Insomnia  methylPREDNISolone sodium succinate Injectable 125 milliGRAM(s) IV Push once PRN PRN Chemotherapy Reaction  zolpidem 5 milliGRAM(s) Oral at bedtime PRN Insomnia      T(C): 37.1 (03-16-22 @ 05:30)  HR: 82 (03-16-22 @ 05:30)  BP: 132/58 (03-16-22 @ 05:30)  RR: 16 (03-16-22 @ 05:30)  SpO2: 97% (03-16-22 @ 05:30)  CAPILLARY BLOOD GLUCOSE        I&O's Summary      PHYSICAL EXAM:  GENERAL: NAD, well-developed, AOx3  HEAD:  Atraumatic, Normocephalic  EYES: EOMI, PERRL, conjunctiva and sclera clear  NECK: Supple, No JVD  CHEST/LUNG: mild upper airway stridor  HEART: Regular rate and rhythm; No murmurs, rubs, or gallops, No Edema  ABDOMEN: Soft, Nontender, Nondistended; Bowel sounds present  EXTREMITIES:  2+ Peripheral Pulses, No clubbing, cyanosis  PSYCH: No SI/HI  NEUROLOGY: non-focal  SKIN: No rashes or lesions    LABS:                        9.3    8.09  )-----------( 183      ( 16 Mar 2022 06:04 )             30.8     03-16    135  |  99  |  4<L>  ----------------------------<  451<HH>  3.4<L>   |  27  |  0.32<L>    Ca    8.3<L>      16 Mar 2022 06:04  Phos  2.2     03-16  Mg     1.70     03-16      PT/INR - ( 15 Mar 2022 05:46 )   PT: 15.3 sec;   INR: 1.32 ratio         PTT - ( 15 Mar 2022 05:46 )  PTT:29.5 sec  CARDIAC MARKERS ( 15 Mar 2022 07:59 )  x     / x     / 15 U/L / x     / <1.0 ng/mL              RADIOLOGY & ADDITIONAL TESTS:    Imaging Personally Reviewed:    Consultant(s) Notes Reviewed:      Care Discussed with Consultants/Other Providers:

## 2022-03-16 NOTE — PROGRESS NOTE ADULT - PROBLEM SELECTOR PLAN 2
S/P debulking and stent  CT chest with compression of esophagus due to tumor.   GI recs appreciated, high possibility of stent migration, rec for PEG, pt agreeable   - c/w IVF. D5w+LR.   - strict NPO.  - now back in NSR - will update IR  - per pulm optimized to proceed - no objection to proceed with PEG if ok from EP perspective   - If IR cancellea d/t Afib, will place NGT with Jevity feeding as has been npo for a number days already S/P debulking and stent  CT chest with compression of esophagus due to tumor.   GI recs appreciated, high possibility of stent migration, rec for PEG, pt agreeable   - c/w IVF. D5w+LR.   - strict NPO.  - now back in NSR and hemodynamically stable - updated IR - pt is on schedule for PEG today   - per pulm optimized to proceed - no objection to proceed with PEG if ok from EP perspective   - If IR PEG cancelled d/t Afib or other concerns, will place NGT with Jevity feeding as has been npo for a number days already

## 2022-03-16 NOTE — PROGRESS NOTE ADULT - SUBJECTIVE AND OBJECTIVE BOX
Patient seen and examined at bedside.    Overnight Events:   Atrial fibrillation broke, now patient in SR. Patient denies any CP, SOB, palpitations, abdominal pain.     Review Of Systems: No chest pain, shortness of breath, or palpitations            Current Meds:  acetaminophen     Tablet .. 650 milliGRAM(s) Oral every 6 hours PRN  aMIOdarone Infusion 1 mG/Min IV Continuous <Continuous>  aMIOdarone Infusion 0.5 mG/Min IV Continuous <Continuous>  budesonide  80 MICROgram(s)/formoterol 4.5 MICROgram(s) Inhaler 2 Puff(s) Inhalation two times a day  dextrose 5% + lactated ringers. 1000 milliLiter(s) IV Continuous <Continuous>  diphenhydrAMINE Injectable 50 milliGRAM(s) IV Push once PRN  enoxaparin Injectable 40 milliGRAM(s) SubCutaneous every 24 hours  influenza  Vaccine (HIGH DOSE) 0.7 milliLiter(s) IntraMuscular once  ipratropium    for Nebulization 500 MICROGram(s) Nebulizer every 6 hours PRN  levalbuterol Inhalation 0.63 milliGRAM(s) Inhalation every 6 hours PRN  melatonin 3 milliGRAM(s) Oral at bedtime PRN  methylPREDNISolone sodium succinate Injectable 125 milliGRAM(s) IV Push once PRN  potassium phosphate IVPB 15 milliMole(s) IV Intermittent once  sodium chloride 3%  Inhalation 4 milliLiter(s) Inhalation every 12 hours  zolpidem 5 milliGRAM(s) Oral at bedtime PRN      Vitals:  T(F): 98.7 (03-16), Max: 99.6 (03-15)  HR: 82 (03-16) (81 - 150)  BP: 132/58 (03-16) (90/64 - 132/58)  RR: 16 (03-16)  SpO2: 97% (03-16)  I&O's Summary      Physical Exam:  Appearance: No acute distress; well appearing  Eyes: EOMI, no scleral icterus   HEENT: Normal oral mucosa  Cardiovascular: RRR, S1, S2, no murmurs, rubs, or gallops; no edema; no JVD  Respiratory: Clear to auscultation bilaterally, no auditory stridor   Gastrointestinal: soft, non-tender, non-distended with normal bowel sounds  Musculoskeletal: No clubbing; no joint deformity   Neurologic: Non-focal  Lymphatic: No lymphadenopathy  Psychiatry: AAOx3, mood & affect appropriate  Skin: No rashes, ecchymoses, or cyanosis                          9.9    8.65  )-----------( 203      ( 16 Mar 2022 08:01 )             31.8     03-16    135  |  98  |  4<L>  ----------------------------<  132<H>  4.2   |  30  |  0.36<L>    Ca    8.6      16 Mar 2022 08:01  Phos  2.3     03-16  Mg     1.90     03-16      PT/INR - ( 15 Mar 2022 05:46 )   PT: 15.3 sec;   INR: 1.32 ratio         PTT - ( 15 Mar 2022 05:46 )  PTT:29.5 sec  CARDIAC MARKERS ( 15 Mar 2022 07:59 )  9 ng/L / x     / x     / 15 U/L / x     / <1.0 ng/mL  CARDIAC MARKERS ( 10 Mar 2022 14:29 )  10 ng/L / x     / x     / x     / x     / x                  New ECG(s): Personally reviewed    Echo:  CONCLUSIONS:  1. Calcified trileaflet aortic valve with normal opening.  2. Low normal left ventricular systolic function. No  segmental wall motion abnormalities. Endocardial  visualization enhanced with intravenous injection of echo  contrast (Definity).  3. Normal right ventricular size and function.  ------------------------------------------------------------------------  Confirmed on  3/15/2022 - 15:30:02 by PAPITO Bray    Stress Testing:     Cath:    Imaging:    Interpretation of Telemetry:  SR overnight, HR 70s.

## 2022-03-16 NOTE — PROGRESS NOTE ADULT - ASSESSMENT
76 yo lady former smoker recently diagnosed with NSCLC s/p debulking and R. bronchial stent, discharged a month ago now presents with worsening dysphagia and progressing shortness of breath, especially with exertion. CT showed tumor extension into left main bronchus. as well as external compression of esophagus from lung ca. started on chemo on 3/11 by Oncology. plan for PEG placement.

## 2022-03-16 NOTE — PROGRESS NOTE ADULT - SUBJECTIVE AND OBJECTIVE BOX
CHIEF COMPLAINT:    Interval Events: No acute events overnight. For PEG placement today.    REVIEW OF SYSTEMS:  Constitutional: [ ] negative [ ] fevers [ ] chills [ ] weight loss [ ] weight gain  HEENT: [ ] negative [ ] dry eyes [ ] eye irritation [ ] postnasal drip [ ] nasal congestion  CV: [ ] negative  [ ] chest pain [ ] orthopnea [ ] palpitations [ ] murmur  Resp: [ ] negative [X] cough [ ] shortness of breath [ ] dyspnea [X] wheezing [ ] sputum [ ] hemoptysis  GI: [ ] negative [ ] nausea [ ] vomiting [ ] diarrhea [ ] constipation [ ] abd pain [ ] dysphagia   : [ ] negative [ ] dysuria [ ] nocturia [ ] hematuria [ ] increased urinary frequency  Musculoskeletal: [ ] negative [ ] back pain [ ] myalgias [ ] arthralgias [ ] fracture  Skin: [ ] negative [ ] rash [ ] itch  Neurological: [ ] negative [ ] headache [ ] dizziness [ ] syncope [ ] weakness [ ] numbness  Psychiatric: [ ] negative [ ] anxiety [ ] depression  Endocrine: [ ] negative [ ] diabetes [ ] thyroid problem  Hematologic/Lymphatic: [ ] negative [ ] anemia [ ] bleeding problem  Allergic/Immunologic: [ ] negative [ ] itchy eyes [ ] nasal discharge [ ] hives [ ] angioedema  [X] All other systems negative  [ ] Unable to assess ROS because ________    OBJECTIVE:  ICU Vital Signs Last 24 Hrs  T(C): 37 (14 Mar 2022 12:43), Max: 37.3 (14 Mar 2022 05:50)  T(F): 98.6 (14 Mar 2022 12:43), Max: 99.2 (14 Mar 2022 05:50)  HR: 85 (14 Mar 2022 12:43) (82 - 95)  BP: 113/48 (14 Mar 2022 12:43) (111/54 - 120/56)  RR: 18 (14 Mar 2022 12:43) (17 - 18)  SpO2: 92% (14 Mar 2022 12:43) (92% - 98%)        03-13 @ 07:01  -  03-14 @ 07:00  --------------------------------------------------------  IN: 0 mL / OUT: 1300 mL / NET: -1300 mL      CAPILLARY BLOOD GLUCOSE        PHYSICAL EXAM  General: NAD  HEENT: EOMI PERRLA  Neck: Supple  Respiratory: Right sided wheeze; upper airway sound transmission, Left sided coarse breath sounds  Cardiovascular: S1/S2, RRR, no murmurs  Abdomen: soft, nontender, nondistended  Extremities: no LE edema  Skin: Good turgor  Neurological: AxOx3  Psychiatry: normal mood and affect     HOSPITAL MEDICATIONS:  MEDICATIONS  (STANDING):  albuterol/ipratropium for Nebulization 3 milliLiter(s) Nebulizer every 6 hours  budesonide  80 MICROgram(s)/formoterol 4.5 MICROgram(s) Inhaler 2 Puff(s) Inhalation two times a day  dextrose 5% + lactated ringers. 1000 milliLiter(s) (75 mL/Hr) IV Continuous <Continuous>  enoxaparin Injectable 40 milliGRAM(s) SubCutaneous every 24 hours  influenza  Vaccine (HIGH DOSE) 0.7 milliLiter(s) IntraMuscular once  potassium chloride  10 mEq/100 mL IVPB 10 milliEquivalent(s) IV Intermittent every 1 hour  sodium chloride 3%  Inhalation 4 milliLiter(s) Inhalation every 12 hours    MEDICATIONS  (PRN):  acetaminophen     Tablet .. 650 milliGRAM(s) Oral every 6 hours PRN Temp greater or equal to 38C (100.4F), Mild Pain (1 - 3), Moderate Pain (4 - 6)  diphenhydrAMINE Injectable 50 milliGRAM(s) IV Push once PRN PRN Chemotherapy Reaction  melatonin 3 milliGRAM(s) Oral at bedtime PRN Insomnia  methylPREDNISolone sodium succinate Injectable 125 milliGRAM(s) IV Push once PRN PRN Chemotherapy Reaction      LABS:                        10.5   9.00  )-----------( 238      ( 14 Mar 2022 07:08 )             33.1     Hgb Trend: 10.5<--, 10.5<--, 11.1<--, 10.9<--, 13.5<--  03-14    137  |  99  |  6<L>  ----------------------------<  112<H>  3.1<L>   |  30  |  0.36<L>    Ca    8.9      14 Mar 2022 07:08  Phos  2.6     03-14  Mg     1.50     03-14      Creatinine Trend: 0.36<--, 0.41<--, 0.34<--, 0.52<--        RADIOLOGY:  [X] Reviewed and interpreted by hira valdovinos findings. Stent in place.

## 2022-03-16 NOTE — PROGRESS NOTE ADULT - ASSESSMENT
77F former smoker recently diagnosed with NSCLC IIIa s/p debulking and R. bronchial stent, discharged a month ago now presents with worsening dysphagia and ACKERMAN. Found to have malignancy mass externally compression esophagus, now pending PEG. Course complicated by atrial fibrillation with RVR on 3/15.     #Atrial fibrillation with RVR - Started on Amiodarone.   #NSCLC 3a - s/p debulking and R bronchial stent. Started on chemotherapy on 3/11.   #Dysphagia - pending PEG.     Recommendations:  -Given esophageal compression by mass, VIK DCCV is contraindicated.   -CHADSVASC at least 3, so AC is indicated  to reduce risk of CVA (Lovenox in particular given malignancy). However, given pending PEG and unclear malignancy burden, would discuss risk/benefits of AC with GI, Heme/onc, and the patient.   -No further cardiac intervention or workup indicated prior to PEG placement.   -Once PEG ok'd for use, would initiate Amiodarone PO loading dose until 10g total is reach. Will then transition to Amiodarone 200mg PO QD.

## 2022-03-16 NOTE — PROGRESS NOTE ADULT - TIME BILLING
review of laboratory data, radiology results, discussion with primary team\patient, and monitoring for potential decompensation. Interventions were performed as documented above.
Review of records, labs, imaging, possible interventions discussed as above and discussion with multidisciplinary team.

## 2022-03-16 NOTE — PROGRESS NOTE ADULT - ASSESSMENT
77F PMHx of former smoking, recently diagnosed non small cell lung cancer with non-resectable mass, now s/p  tumor debulking and bronchial stent placement to bronchus intermedius in early Feb, presents to the ER with worsening shortness of breath and dysphagia. Patient s/p PEG placement per IR.  - Continue current management per oncology. Recommend starting inpatient chemo.   - No role for palliative radiation, given planned for concurrent chemo RT with curative intent.  - Bronchus intermedius stent in place and patent on imaging  - Left mainstem narrowing noted, will continue to monitor symptoms. No interventions needed as of now, if worsening noted, may need LMB stent.   - Continue airway clearance measures for the current indwelling stent.     IP to follow. Page IP service immediately or contact pulm fellow on call/MICU if any worsening of clinical status.

## 2022-03-16 NOTE — PROGRESS NOTE ADULT - PROBLEM SELECTOR PLAN 4
new onset  with RVR leading to shock  EP consulted - on amio  TSH, trop, CKmb normal  no prior TTE - ordered  IVF resuscitation, on Tele  now back to NSR - EP f.u   benefits from full AC based on CHADS score - awaiting PEG placement prior to starting new onset  with RVR leading to shock  EP consulted - on amio - maintenance dose per EP   TSH, trop, CKmb normal  no prior TTE - ordered  IVF resuscitation, on Tele  now back to NSR - EP f.u   benefits from full AC based on CHADS score - awaiting PEG placement prior to starting

## 2022-03-16 NOTE — PROGRESS NOTE ADULT - PROBLEM SELECTOR PLAN 5
Lovenox for dvt prophylaxis for now but will need full AC for new onset Afib depending on clinical course and PEG   hypokalemia, hypophosphatemia - replete  elevated sugar on BMP likely error - repeat   DW ACP Lovenox for dvt prophylaxis for now - held for IR this morning - but will need full AC for new onset Afib depending on clinical course and PEG   hypophosphatemia - replete  elevated sugar on BMP likely error - repeat   DW ACP

## 2022-03-17 NOTE — CHART NOTE - NSCHARTNOTEFT_GEN_A_CORE
Spoke with Dr Coffman at IR cleared to use PEG. DC IVF start tube feed  Spoke with EP fellow on call recommended to start loading dose amio 400mg q12hr for 7 days and then maintenance dose. Can dc amio drip

## 2022-03-17 NOTE — PROGRESS NOTE ADULT - ASSESSMENT
77f with recently diagnosed stage IIIA NSCLC, s/p outpatient onc and rad onc eval with plan to start chemotherapy on monday 3/14, with plan to add rt when symptoms are better controlled, presenting to the ED with dysphagia.   Treatment plan for the patient is potentially curative, therefore palliative rt to the mass causing esophageal obstruction is not recommended as this will limit the curative plans.   Plan discussed with Pulm, rad onc and primary team.  Started weekly carbo/taxol as inpatient.    S/p C1 Carbo AUC 2, Taxol 40 mg/m2 3/11/2022  S/p PEG placement by IR 3/16    -Due for C2 Carbo AUC 2, Taxol 40 mg/m2 tomorrow. Orders for chemotherapy written and submitted to chemo nurse.  -Labs including CBC, CMP prior to chemo  -Nutrition follow  up for PEG feeds  -Pulm and rad onc input appreciated.  -Supportive care, pain control, Nutrition, PT, DVT ppx  -Outpatient oncology f/u    Will follow. Please do not hesitate to call back with questions.     Cintia Willis MD  Medical Oncology Attending  C: [phone]

## 2022-03-17 NOTE — PROGRESS NOTE ADULT - SUBJECTIVE AND OBJECTIVE BOX
Patient is a 77y old  Female who presents with a chief complaint of worsening shortness of breath/dysphagia (16 Mar 2022 20:49)      SUBJECTIVE / OVERNIGHT EVENTS: s/p PEG, no events- remains in NSR     ROS:  No HA/DZ  No Vision changes   No CP, SOB  No N/V/D  No Edema  No Rash  NO weakness, numbness    MEDICATIONS  (STANDING):  aMIOdarone Infusion 0.5 mG/Min (16.7 mL/Hr) IV Continuous <Continuous>  budesonide  80 MICROgram(s)/formoterol 4.5 MICROgram(s) Inhaler 2 Puff(s) Inhalation two times a day  dextrose 5% + lactated ringers. 1000 milliLiter(s) (125 mL/Hr) IV Continuous <Continuous>  enoxaparin Injectable 40 milliGRAM(s) SubCutaneous every 24 hours  influenza  Vaccine (HIGH DOSE) 0.7 milliLiter(s) IntraMuscular once  sodium chloride 3%  Inhalation 4 milliLiter(s) Inhalation every 12 hours    MEDICATIONS  (PRN):  acetaminophen     Tablet .. 650 milliGRAM(s) Oral every 6 hours PRN Temp greater or equal to 38C (100.4F), Mild Pain (1 - 3), Moderate Pain (4 - 6)  diphenhydrAMINE Injectable 50 milliGRAM(s) IV Push once PRN PRN Chemotherapy Reaction  ipratropium    for Nebulization 500 MICROGram(s) Nebulizer every 6 hours PRN SOB/Wheezing  levalbuterol Inhalation 0.63 milliGRAM(s) Inhalation every 6 hours PRN SOB/Wheezing  melatonin 3 milliGRAM(s) Oral at bedtime PRN Insomnia  methylPREDNISolone sodium succinate Injectable 125 milliGRAM(s) IV Push once PRN PRN Chemotherapy Reaction  zolpidem 5 milliGRAM(s) Oral at bedtime PRN Insomnia      T(C): 37.3 (03-17-22 @ 06:29)  HR: 84 (03-17-22 @ 06:29)  BP: 127/67 (03-17-22 @ 06:29)  RR: 16 (03-17-22 @ 06:29)  SpO2: 95% (03-17-22 @ 06:29)  CAPILLARY BLOOD GLUCOSE        I&O's Summary      PHYSICAL EXAM:  GENERAL: NAD, well-developed, AOx3  HEAD:  Atraumatic, Normocephalic  EYES: EOMI, PERRL, conjunctiva and sclera clear  NECK: Supple, No JVD  CHEST/LUNG: upper airway stridor   HEART: Regular rate and rhythm; No murmurs, rubs, or gallops, No Edema  ABDOMEN: Soft, Nontender, Nondistended; Bowel sounds present, PEG - dressing c/d/i   EXTREMITIES:  2+ Peripheral Pulses, No clubbing, cyanosis  PSYCH: No SI/HI  NEUROLOGY: non-focal  SKIN: No rashes or lesions    LABS:                        9.9    8.65  )-----------( 203      ( 16 Mar 2022 08:01 )             31.8     03-16    135  |  98  |  4<L>  ----------------------------<  132<H>  4.2   |  30  |  0.36<L>    Ca    8.6      16 Mar 2022 08:01  Phos  2.3     03-16  Mg     1.90     03-16                    RADIOLOGY & ADDITIONAL TESTS:    Imaging Personally Reviewed:    Consultant(s) Notes Reviewed:      Care Discussed with Consultants/Other Providers:

## 2022-03-17 NOTE — PROGRESS NOTE ADULT - SUBJECTIVE AND OBJECTIVE BOX
s/p G-tube placement  no complaints offered    Vital Signs Last 24 Hrs  T(C): 36.2 (17 Mar 2022 11:17), Max: 37.3 (17 Mar 2022 06:29)  T(F): 97.1 (17 Mar 2022 11:17), Max: 99.1 (17 Mar 2022 06:29)  HR: 81 (17 Mar 2022 11:17) (73 - 86)  BP: 147/70 (17 Mar 2022 11:17) (109/54 - 147/70)  BP(mean): --  RR: 17 (17 Mar 2022 11:17) (15 - 18)  SpO2: 100% (17 Mar 2022 11:17) (95% - 100%)    Focused Exam findings:    General: NAD  Abdomen: soft, NT ND  G-tube in place  site c/d/i          LABS:                        10.9   8.56  )-----------( 212      ( 17 Mar 2022 10:05 )             34.2     03-17    137  |  99  |  4<L>  ----------------------------<  111<H>  3.8   |  30  |  0.38<L>    Ca    8.8      17 Mar 2022 10:05  Phos  2.3     03-16  Mg     1.70     03-17      I&O's Detail

## 2022-03-17 NOTE — PHYSICAL THERAPY INITIAL EVALUATION ADULT - PERTINENT HX OF CURRENT PROBLEM, REHAB EVAL
76 yo lady former smoker recently diagnosed with NSCLC s/p debulking and R. bronchial stent, discharged a month ago now presents with worsening dysphagia and progressing shortness of breath, especially with exertion. Patient reports that for the last 3 days she has trouble tolerating any PO intake. Patient is chemo naive and was set to start chemo on 3/14. She denies any f/c or recent infection. 78 yo lady former smoker recently diagnosed with NSCLC s/p debulking and Right bronchial stent, discharged a month ago now presents with worsening dysphagia and progressing shortness of breath, especially with exertion. Patient reports that for the last 3 days she has trouble tolerating any PO intake. Patient is chemo naive and was set to start chemo on 3/14. She denies any f/c or recent infection.

## 2022-03-17 NOTE — PROGRESS NOTE ADULT - PROBLEM SELECTOR PLAN 4
new onset  with RVR leading to shock  EP consulted - on amio  TSH, trop, CKmb normal  no prior TTE - ordered  IVF resuscitation, on Tele  now back to NSR - will start oral Amio now s/p PEG  EP f.u   benefits from full AC based on CHADS score -dw pt, agreeable - TTE with mild MR, no valvular pathology - will plan to start Eliquis - dw pulm and onc this AM

## 2022-03-17 NOTE — PHYSICAL THERAPY INITIAL EVALUATION ADULT - ADDITIONAL COMMENTS
Pt reports that she lives in a private house with her  with ~3 steps to enter and a flight of stairs to negotiate to bedroom; (+)1 handrail. Prior to  hospital admission pt was completely independent and used no assistive device. Pt denies any recent falls or use of home O2.    Pt left comfortable in bed, NAD, all lines intact, all precautions maintained, with call bell in reach, SpO2 92%,  @bedside, and RN aware.

## 2022-03-17 NOTE — CHART NOTE - NSCHARTNOTEFT_GEN_A_CORE
Called by RN to evaluate patient with infiltrated right fore arm midline catheter. Patient seen and assessed at bedside. Swelling and mild redness noted  above the midline catheter. Area hard and tender to touch. Recommended RN to remove IV line. Will keep right arm elevated and will ultrasound doppler of right arm.

## 2022-03-17 NOTE — PROGRESS NOTE ADULT - ASSESSMENT
77F former smoker recently diagnosed with NSCLC IIIa s/p debulking and R. bronchial stent, discharged a month ago now presents with worsening dysphagia and ACKERMAN. Found to have malignancy mass externally compression esophagus, now pending PEG. Course complicated by atrial fibrillation with RVR on 3/15.     #Atrial fibrillation with RVR - Started on Amiodarone, now SR.   #NSCLC 3a - s/p debulking and R bronchial stent. Started on chemotherapy on 3/11.   #Dysphagia - s/p PEG 3/16.    Recommendations:  -Given esophageal compression by mass, VIK DCCV is contraindicated.   -CHADSVASC at least 3, so AC is indicated  to reduce risk of CVA (Lovenox in particular given malignancy). However, given pending PEG and unclear malignancy burden, would discuss risk/benefits of AC with GI, Heme/onc, and the patient.   -Resume Amiodarone (400mg PO BID for 7 days, followed by 200mg PO QD afterwards).   -If any further questions, please feel free to contact us.

## 2022-03-17 NOTE — PROGRESS NOTE ADULT - PROBLEM SELECTOR PLAN 5
AC plan as above   DW ACP  dw sw and cm - will provide scripts once home TF regimen recs given  PT eval

## 2022-03-17 NOTE — PHYSICAL THERAPY INITIAL EVALUATION ADULT - DISCHARGE DISPOSITION, PT EVAL
Will keep pt on PT program while @ [facility] to prevent weakness/deconditioning./home/no skilled PT needs

## 2022-03-17 NOTE — PROGRESS NOTE ADULT - ASSESSMENT
77 year old female with dysphagia s/p G-tube placement in IR on 3/16    Plan:  routine 3 month exchange  re-consult prn

## 2022-03-17 NOTE — PHYSICAL THERAPY INITIAL EVALUATION ADULT - GENERAL OBSERVATIONS, REHAB EVAL
Pt encountered in semisupine position, no distress, AxOx4, with +IV, +Pulse oximeter, +PEG, +2L of O2 through nasal cannula and  @bedside.

## 2022-03-17 NOTE — PROGRESS NOTE ADULT - SUBJECTIVE AND OBJECTIVE BOX
Patient seen and examined at bedside.    Overnight Events:   s/p PEG yesterday. Patient asymptomatic. Has been in SR.     Review Of Systems: No chest pain, shortness of breath, or palpitations            Current Meds:  acetaminophen     Tablet .. 650 milliGRAM(s) Oral every 6 hours PRN  aMIOdarone    Tablet   Oral   aMIOdarone    Tablet 400 milliGRAM(s) Oral every 12 hours  budesonide  80 MICROgram(s)/formoterol 4.5 MICROgram(s) Inhaler 2 Puff(s) Inhalation two times a day  diphenhydrAMINE Injectable 50 milliGRAM(s) IV Push once PRN  enoxaparin Injectable 40 milliGRAM(s) SubCutaneous every 24 hours  influenza  Vaccine (HIGH DOSE) 0.7 milliLiter(s) IntraMuscular once  ipratropium    for Nebulization 500 MICROGram(s) Nebulizer every 6 hours PRN  levalbuterol Inhalation 0.63 milliGRAM(s) Inhalation every 6 hours PRN  melatonin 3 milliGRAM(s) Oral at bedtime PRN  methylPREDNISolone sodium succinate Injectable 125 milliGRAM(s) IV Push once PRN  sodium chloride 3%  Inhalation 4 milliLiter(s) Inhalation every 12 hours  zolpidem 5 milliGRAM(s) Oral at bedtime PRN      Vitals:  T(F): 97.1 (03-17), Max: 99.1 (03-17)  HR: 81 (03-17) (73 - 86)  BP: 147/70 (03-17) (109/54 - 147/70)  RR: 17 (03-17)  SpO2: 100% (03-17)  I&O's Summary      Physical Exam:  Appearance: No acute distress  Eyes: EOMI, no scleral icterus   HEENT: Normal oral mucosa  Cardiovascular: RRR, S1, S2, no murmurs, rubs, or gallops; no edema; no JVD  Respiratory: Clear to auscultation bilaterally, no auditory stridor   Gastrointestinal: soft, non-tender, non-distended with normal bowel sounds. PEG in place.  Musculoskeletal: No clubbing; no joint deformity   Neurologic: Non-focal  Psychiatry: AAOx3, mood & affect appropriate  Skin: No rashes, ecchymoses, or cyanosis                          10.9   8.56  )-----------( 212      ( 17 Mar 2022 10:05 )             34.2     03-17    137  |  99  |  4<L>  ----------------------------<  111<H>  3.8   |  30  |  0.38<L>    Ca    8.8      17 Mar 2022 10:05  Phos  2.3     03-16  Mg     1.70     03-17        CARDIAC MARKERS ( 15 Mar 2022 07:59 )  9 ng/L / x     / x     / 15 U/L / x     / <1.0 ng/mL  CARDIAC MARKERS ( 10 Mar 2022 14:29 )  10 ng/L / x     / x     / x     / x     / x                  New ECG(s): Personally reviewed    Echo:  CONCLUSIONS:  1. Calcified trileaflet aortic valve with normal opening.  2. Low normal left ventricular systolic function. No  segmental wall motion abnormalities. Endocardial  visualization enhanced with intravenous injection of echo  contrast (Definity).  3. Normal right ventricular size and function.  ------------------------------------------------------------------------  Confirmed on  3/15/2022 - 15:30:02 by PAPITO Bray    Stress Testing:     Cath:    Imaging:    Interpretation of Telemetry:

## 2022-03-17 NOTE — PROGRESS NOTE ADULT - PROBLEM SELECTOR PLAN 2
S/P debulking and stent  CT chest with compression of esophagus due to tumor.   GI recs appreciated, high possibility of stent migration, rec for PEG, pt agreeable   - now s/p IR guided PEG placement  - DW IR today - can start TF today - need to hold AC x 48h  - d/w dietary - continuos feeds now and possibly transition to bolus for discharge

## 2022-03-17 NOTE — PHYSICAL THERAPY INITIAL EVALUATION ADULT - PATIENT PROFILE REVIEW, REHAB EVAL
yes No Active Activity Order in the computer; spoke with SO Elliott prior to PT evaluation--> Pt OK for PT consult/OOB activity./yes

## 2022-03-17 NOTE — PROGRESS NOTE ADULT - SUBJECTIVE AND OBJECTIVE BOX
INTERVAL HPI/OVERNIGHT EVENTS:  Patient seen at bedside.  Feels well s/p PEG placement, has been able to tolerate meds and water     VITAL SIGNS:  T(F): 97.1 (03-17-22 @ 11:17)  HR: 81 (03-17-22 @ 11:17)  BP: 147/70 (03-17-22 @ 11:17)  RR: 17 (03-17-22 @ 11:17)  SpO2: 100% (03-17-22 @ 11:17)  Wt(kg): --    PHYSICAL EXAM:    Constitutional: NAD, resting in bed comfortably  Eyes: EOMI, sclera non-icteric  Neck: supple, no LAP  Respiratory: CTA b/l, good air entry b/l, no wheezing, rhonchi or crackels  Cardiovascular: RRR, normal S1S2, no M/R/G  Gastrointestinal: soft, NTND  Extremities: no edema  Neurological: AAOx3, non focal  Skin: Normal temperature    MEDICATIONS  (STANDING):  aMIOdarone    Tablet   Oral   aMIOdarone    Tablet 400 milliGRAM(s) Oral every 12 hours  budesonide  80 MICROgram(s)/formoterol 4.5 MICROgram(s) Inhaler 2 Puff(s) Inhalation two times a day  enoxaparin Injectable 40 milliGRAM(s) SubCutaneous every 24 hours  influenza  Vaccine (HIGH DOSE) 0.7 milliLiter(s) IntraMuscular once  sodium chloride 3%  Inhalation 4 milliLiter(s) Inhalation every 12 hours    MEDICATIONS  (PRN):  acetaminophen     Tablet .. 650 milliGRAM(s) Oral every 6 hours PRN Temp greater or equal to 38C (100.4F), Mild Pain (1 - 3), Moderate Pain (4 - 6)  diphenhydrAMINE Injectable 50 milliGRAM(s) IV Push once PRN PRN Chemotherapy Reaction  ipratropium    for Nebulization 500 MICROGram(s) Nebulizer every 6 hours PRN SOB/Wheezing  levalbuterol Inhalation 0.63 milliGRAM(s) Inhalation every 6 hours PRN SOB/Wheezing  melatonin 3 milliGRAM(s) Oral at bedtime PRN Insomnia  methylPREDNISolone sodium succinate Injectable 125 milliGRAM(s) IV Push once PRN PRN Chemotherapy Reaction  zolpidem 5 milliGRAM(s) Oral at bedtime PRN Insomnia      Allergies    No Known Allergies    Intolerances        LABS:                        10.9   8.56  )-----------( 212      ( 17 Mar 2022 10:05 )             34.2     03-17    137  |  99  |  4<L>  ----------------------------<  111<H>  3.8   |  30  |  0.38<L>    Ca    8.8      17 Mar 2022 10:05  Phos  2.3     03-16  Mg     1.70     03-17            RADIOLOGY & ADDITIONAL TESTS:  Studies reviewed.

## 2022-03-18 NOTE — PROGRESS NOTE ADULT - PROBLEM SELECTOR PLAN 2
S/P debulking and stent  CT chest with compression of esophagus due to tumor.   GI recs appreciated, high possibility of stent migration, rec for PEG  - now s/p IR guided PEG placement, continuous TF started, dietary to re-eval today for transition to bolus or change regimen to minimize TF related diarrhea - once home TF regimen clear, will provide script to sw and cm to set up home TF and home care   - DW IR -ok to start AC later today

## 2022-03-18 NOTE — PROGRESS NOTE ADULT - ASSESSMENT
77f with recently diagnosed stage IIIA NSCLC, s/p outpatient onc and rad onc eval with plan to start chemotherapy on monday 3/14, with plan to add rt when symptoms are better controlled, presenting to the ED with dysphagia.   Treatment plan for the patient is potentially curative, therefore palliative rt to the mass causing esophageal obstruction is not recommended as this will limit the curative plans.   Plan discussed with Pulm, rad onc and primary team.  Started weekly carbo/taxol as inpatient.    S/p C1 Carbo AUC 2, Taxol 40 mg/m2 3/11/2022  S/p PEG placement by IR 3/16    -Due for C2 Carbo AUC 2, Taxol 40 mg/m2 today. Orders for chemotherapy written and submitted to chemo nurse.  -Labs including CBC, CMP prior to chemo reviewed  -Nutrition follow  up for PEG feeds  -Pulm and rad onc input appreciated.  -Supportive care, pain control, Nutrition, PT, DVT ppx  -Outpatient oncology f/u    Will follow. Please do not hesitate to call back with questions.     Cintia Willis MD  Medical Oncology Attending  C: [phone]

## 2022-03-18 NOTE — PROGRESS NOTE ADULT - ASSESSMENT
77F former smoker recently diagnosed with NSCLC IIIa s/p debulking and R. bronchial stent, discharged a month ago now presents with worsening dysphagia and ACKERMAN. Found to have malignancy mass externally compression esophagus, s/p debulking and R bronchial stent. Started on chemotherapy on 3/11. S/p PEG for dysphagia. Course complicated by paroxysmal atrial fibrillation with RVR on 3/15. Self converted to SR on Amiodarone (IV then 400mg BID) received 3 doses so far.      PAF with RVR -now in SR.   -Continue Amiodarone (400mg via PEG BID for 7 days for total 5 gm, followed by 200mg PO QD afterwards).   Amiodarone teaching with instructions provided.  Routine LFT/TFT's Q 3-6 months and yearly PFT with CXR and opthalmology exam advised.   Patient stated understanding.   CHADSVASC  3, so AC is indicated  to reduce risk of CVA (Lovenox in particular given malignancy).    recommend AC if no contraindication   -Continue care per GI/oncology/primary team  -Will provided a follow up with EP in few months as patient is on Amiodarone therapy

## 2022-03-18 NOTE — PROGRESS NOTE ADULT - PROBLEM SELECTOR PLAN 1
Still treatment naive  As above, s/p debulking and r. bronchial stent  repeat CT showed left main bronchial invasion of tumor.   discussed with IP, Rad/Onc and Heme/Onc.   no plan for urgent surgical intervention of lung lesions.  started inpatient chemo 3/11 per onc, due for next dose today   no need for port/picc - chemo as long as pt admitted but should not hold up discharge per onc   further assessment for Rad/onc after chemo as premature RT can inhibit curative approach

## 2022-03-18 NOTE — PROGRESS NOTE ADULT - SUBJECTIVE AND OBJECTIVE BOX
Patient is a 77y old  Female who presents with a chief complaint of worsening shortness of breath/dysphagia (18 Mar 2022 12:18)    c/o wheezing, tolerating Amiodarone load.  Remains in SR.     PAST MEDICAL & SURGICAL HISTORY:  No pertinent past medical history    No significant past surgical history        MEDICATIONS  (STANDING):  aMIOdarone    Tablet   Oral   aMIOdarone    Tablet 400 milliGRAM(s) Oral every 12 hours  budesonide  80 MICROgram(s)/formoterol 4.5 MICROgram(s) Inhaler 2 Puff(s) Inhalation two times a day  CARBOplatin IVPB (eMAR) 224 milliGRAM(s) IV Intermittent once  diphenhydrAMINE Injectable 25 milliGRAM(s) IV Push once  enoxaparin Injectable 40 milliGRAM(s) SubCutaneous every 24 hours  famotidine Injectable 20 milliGRAM(s) IV Push once  influenza  Vaccine (HIGH DOSE) 0.7 milliLiter(s) IntraMuscular once  PACLitaxel IVPB (eMAR) 68.4 milliGRAM(s) IV Intermittent once  palonosetron Injectable 0.25 milliGRAM(s) IV Push once  potassium phosphate / sodium phosphate Tablet (K-PHOS No. 2) 1 Tablet(s) Oral two times a day  sodium chloride 3%  Inhalation 4 milliLiter(s) Inhalation every 12 hours    MEDICATIONS  (PRN):  acetaminophen     Tablet .. 650 milliGRAM(s) Oral every 6 hours PRN Temp greater or equal to 38C (100.4F), Mild Pain (1 - 3), Moderate Pain (4 - 6)  diphenhydrAMINE Injectable 50 milliGRAM(s) IV Push once PRN PRN Chemotherapy Reaction  diphenhydrAMINE Injectable 50 milliGRAM(s) IV Push once PRN PRN Chemotherapy Reaction  ipratropium    for Nebulization 500 MICROGram(s) Nebulizer every 6 hours PRN SOB/Wheezing  levalbuterol Inhalation 0.63 milliGRAM(s) Inhalation every 6 hours PRN SOB/Wheezing  melatonin 3 milliGRAM(s) Oral at bedtime PRN Insomnia  methylPREDNISolone sodium succinate Injectable 125 milliGRAM(s) IV Push once PRN PRN Chemotherapy Reaction  methylPREDNISolone sodium succinate Injectable 125 milliGRAM(s) IV Push once PRN PRN Chemotherapy Reaction  zolpidem 5 milliGRAM(s) Oral at bedtime PRN Insomnia            Vital Signs Last 24 Hrs  T(C): 36.6 (18 Mar 2022 12:08), Max: 37.3 (17 Mar 2022 16:20)  T(F): 97.8 (18 Mar 2022 12:08), Max: 99.1 (17 Mar 2022 16:20)  HR: 82 (18 Mar 2022 12:08) (82 - 94)  BP: 110/50 (18 Mar 2022 12:08) (110/50 - 128/62)  BP(mean): --  RR: 19 (18 Mar 2022 12:08) (18 - 19)  SpO2: 97% (18 Mar 2022 12:08) (93% - 98%)            INTERPRETATION OF TELEMETRY:  SR without recurrent PAF seen    ECG:        LABS:                        10.5   8.80  )-----------( 243      ( 18 Mar 2022 07:55 )             32.7     03-18    136  |  98  |  7   ----------------------------<  116<H>  3.3<L>   |  30  |  0.38<L>    Ca    9.0      18 Mar 2022 07:55  Phos  2.2     03-18  Mg     1.60     03-18                BNP  RADIOLOGY & ADDITIONAL STUDIES:  ------------------------------------------------------------------------  DIMENSIONS:  Dimensions:     Normal Values:  LA:     3.2 cm    2.0 - 4.0 cm  Ao:     2.7 cm    2.0 - 3.8 cm  SEPTUM: 0.9 cm    0.6 - 1.2 cm  PWT:    0.9 cm    0.6 - 1.1 cm  LVIDd:  4.7 cm    3.0 - 5.6 cm  LVIDs:  3.5 cm    1.8 - 4.0 cm  Derived Variables:  LVMI: 83 g/m2  RWT: 0.38  Ejection Fraction (Visual Estimate): 50 %  ------------------------------------------------------------------------  OBSERVATIONS:  Mitral Valve: Mitral annular calcification, otherwise  normal mitral valve. Mild mitral regurgitation.  Aortic Root: Normal aortic root.  Aortic Valve: Calcified trileaflet aortic valve with normal  opening.  Left Atrium: Normal left atrium.  LA volume index = 27  cc/m2.  Left Ventricle: Low normal left ventricular systolic  function. No segmental wall motion abnormalities.  Endocardial visualization enhanced with intravenous  injection of echo contrast (Definity). Normal left  ventricular internal dimensions and wall thicknesses.  Right Heart: Normal right atrium. Normal right ventricular  size and function. Normal tricuspid valve.  Mild-moderate  tricuspid regurgitation. Normal pulmonic valve.  Pericardium/PleuraNormal pericardium with no pericardial  effusion.  ------------------------------------------------------------------------  CONCLUSIONS:  1. Calcified trileaflet aortic valve with normal opening.  2. Low normal left ventricular systolic function. No  segmental wall motion abnormalities. Endocardial  visualization enhanced with intravenous injection of echo  contrast (Definity).  3. Normal right ventricular size and function.  ------------------------------------------------------------------------  Confirmed on  3/15/2022 - 15:30:02 by PAPITO Bray  ------------------------------------------------------------------------        PHYSICAL EXAM:    GENERAL: In no apparent distress, well nourished, and hydrated.  NECK: Supple and normal thyroid.  No JVD or carotid bruit.  Carotid pulse is 2+ bilaterally.  HEART: Regular rate and rhythm; 2/6 systolic murmurs, no rubs, or gallops.  PULMONARY: mild expiratory wheezing, no rales, wheezing, or rhonchi bilaterally.  ABDOMEN: Soft, Nontender, Nondistended; Bowel sounds present  EXTREMITIES:  2+ Peripheral Pulses, No clubbing, cyanosis, trace pitting edema  NEUROLOGICAL: Grossly nonfocal

## 2022-03-18 NOTE — PROGRESS NOTE ADULT - SUBJECTIVE AND OBJECTIVE BOX
Patient is a 77y old  Female who presents with a chief complaint of worsening shortness of breath/dysphagia (17 Mar 2022 15:21)      SUBJECTIVE / OVERNIGHT EVENTS: midline infiltrated overnight, new line placed this AM - remains in NSR - is having some diarrhea after TF were started     ROS:  No HA/DZ  No Vision changes   No CP, SOB  No N/V  No Edema  No Rash  NO weakness, numbness    MEDICATIONS  (STANDING):  aMIOdarone    Tablet   Oral   aMIOdarone    Tablet 400 milliGRAM(s) Oral every 12 hours  budesonide  80 MICROgram(s)/formoterol 4.5 MICROgram(s) Inhaler 2 Puff(s) Inhalation two times a day  CARBOplatin IVPB (eMAR) 224 milliGRAM(s) IV Intermittent once  dexAMETHasone  IVPB 8 milliGRAM(s) IV Intermittent once  diphenhydrAMINE Injectable 25 milliGRAM(s) IV Push once  enoxaparin Injectable 40 milliGRAM(s) SubCutaneous every 24 hours  famotidine Injectable 20 milliGRAM(s) IV Push once  influenza  Vaccine (HIGH DOSE) 0.7 milliLiter(s) IntraMuscular once  PACLitaxel IVPB (eMAR) 68.4 milliGRAM(s) IV Intermittent once  palonosetron Injectable 0.25 milliGRAM(s) IV Push once  potassium phosphate / sodium phosphate Tablet (K-PHOS No. 2) 1 Tablet(s) Oral two times a day  sodium chloride 3%  Inhalation 4 milliLiter(s) Inhalation every 12 hours    MEDICATIONS  (PRN):  acetaminophen     Tablet .. 650 milliGRAM(s) Oral every 6 hours PRN Temp greater or equal to 38C (100.4F), Mild Pain (1 - 3), Moderate Pain (4 - 6)  diphenhydrAMINE Injectable 50 milliGRAM(s) IV Push once PRN PRN Chemotherapy Reaction  diphenhydrAMINE Injectable 50 milliGRAM(s) IV Push once PRN PRN Chemotherapy Reaction  ipratropium    for Nebulization 500 MICROGram(s) Nebulizer every 6 hours PRN SOB/Wheezing  levalbuterol Inhalation 0.63 milliGRAM(s) Inhalation every 6 hours PRN SOB/Wheezing  melatonin 3 milliGRAM(s) Oral at bedtime PRN Insomnia  methylPREDNISolone sodium succinate Injectable 125 milliGRAM(s) IV Push once PRN PRN Chemotherapy Reaction  methylPREDNISolone sodium succinate Injectable 125 milliGRAM(s) IV Push once PRN PRN Chemotherapy Reaction  zolpidem 5 milliGRAM(s) Oral at bedtime PRN Insomnia      T(C): 36.6 (03-18-22 @ 06:27)  HR: 88 (03-18-22 @ 09:56)  BP: 125/60 (03-18-22 @ 06:27)  RR: 18 (03-18-22 @ 06:27)  SpO2: 96% (03-18-22 @ 09:56)  CAPILLARY BLOOD GLUCOSE        I&O's Summary    17 Mar 2022 07:01  -  18 Mar 2022 07:00  --------------------------------------------------------  IN: 410 mL / OUT: 250 mL / NET: 160 mL        PHYSICAL EXAM:  GENERAL: NAD, well-developed, AOx3  HEAD:  Atraumatic, Normocephalic  EYES: EOMI, PERRL, conjunctiva and sclera clear  NECK: Supple, No JVD  CHEST/LUNG: Stridor unchanged   HEART: Regular rate and rhythm; No murmurs, rubs, or gallops, No Edema  ABDOMEN: Soft, Nontender, Nondistended; Bowel sounds present  EXTREMITIES:  RUE midline site with some induration and swelling       LABS:                        10.5   8.80  )-----------( 243      ( 18 Mar 2022 07:55 )             32.7     03-18    136  |  98  |  7   ----------------------------<  116<H>  3.3<L>   |  30  |  0.38<L>    Ca    9.0      18 Mar 2022 07:55  Phos  2.2     03-18  Mg     1.60     03-18                    RADIOLOGY & ADDITIONAL TESTS:    Imaging Personally Reviewed:    Consultant(s) Notes Reviewed:      Care Discussed with Consultants/Other Providers:

## 2022-03-18 NOTE — PROGRESS NOTE ADULT - SUBJECTIVE AND OBJECTIVE BOX
INTERVAL HPI/OVERNIGHT EVENTS:  Patient seen at bedside.      VITAL SIGNS:  T(F): 97.8 (03-18-22 @ 12:08)  HR: 82 (03-18-22 @ 12:08)  BP: 110/50 (03-18-22 @ 12:08)  RR: 19 (03-18-22 @ 12:08)  SpO2: 97% (03-18-22 @ 12:08)  Wt(kg): --    PHYSICAL EXAM:    Constitutional: NAD, resting in bed comfortably  Eyes: EOMI, sclera non-icteric  Neck: supple, no LAP  Respiratory: CTA b/l, good air entry b/l, no wheezing, rhonchi or crackels  Cardiovascular: RRR, normal S1S2, no M/R/G  Gastrointestinal: soft, NTND  Extremities: no edema  Neurological: AAOx3, non focal  Skin: Normal temperature    MEDICATIONS  (STANDING):  aMIOdarone    Tablet   Oral   aMIOdarone    Tablet 400 milliGRAM(s) Oral every 12 hours  budesonide  80 MICROgram(s)/formoterol 4.5 MICROgram(s) Inhaler 2 Puff(s) Inhalation two times a day  CARBOplatin IVPB (eMAR) 224 milliGRAM(s) IV Intermittent once  diphenhydrAMINE Injectable 25 milliGRAM(s) IV Push once  enoxaparin Injectable 40 milliGRAM(s) SubCutaneous every 24 hours  famotidine Injectable 20 milliGRAM(s) IV Push once  influenza  Vaccine (HIGH DOSE) 0.7 milliLiter(s) IntraMuscular once  PACLitaxel IVPB (eMAR) 68.4 milliGRAM(s) IV Intermittent once  palonosetron Injectable 0.25 milliGRAM(s) IV Push once  potassium phosphate / sodium phosphate Tablet (K-PHOS No. 2) 1 Tablet(s) Oral two times a day  sodium chloride 3%  Inhalation 4 milliLiter(s) Inhalation every 12 hours    MEDICATIONS  (PRN):  acetaminophen     Tablet .. 650 milliGRAM(s) Oral every 6 hours PRN Temp greater or equal to 38C (100.4F), Mild Pain (1 - 3), Moderate Pain (4 - 6)  diphenhydrAMINE Injectable 50 milliGRAM(s) IV Push once PRN PRN Chemotherapy Reaction  diphenhydrAMINE Injectable 50 milliGRAM(s) IV Push once PRN PRN Chemotherapy Reaction  ipratropium    for Nebulization 500 MICROGram(s) Nebulizer every 6 hours PRN SOB/Wheezing  levalbuterol Inhalation 0.63 milliGRAM(s) Inhalation every 6 hours PRN SOB/Wheezing  melatonin 3 milliGRAM(s) Oral at bedtime PRN Insomnia  methylPREDNISolone sodium succinate Injectable 125 milliGRAM(s) IV Push once PRN PRN Chemotherapy Reaction  methylPREDNISolone sodium succinate Injectable 125 milliGRAM(s) IV Push once PRN PRN Chemotherapy Reaction  zolpidem 5 milliGRAM(s) Oral at bedtime PRN Insomnia      Allergies    No Known Allergies    Intolerances        LABS:                        10.5   8.80  )-----------( 243      ( 18 Mar 2022 07:55 )             32.7     03-18    136  |  98  |  7   ----------------------------<  116<H>  3.3<L>   |  30  |  0.38<L>    Ca    9.0      18 Mar 2022 07:55  Phos  2.2     03-18  Mg     1.60     03-18            RADIOLOGY & ADDITIONAL TESTS:  Studies reviewed.

## 2022-03-18 NOTE — PROGRESS NOTE ADULT - PROBLEM SELECTOR PLAN 4
new onset  with RVR leading to shock  EP consulted - on amio, transitioned to oral now s/p PEG  TSH, trop, CKmb normal  TTE with normal cardiac fx - mild MR, mod TR - no stigma of rheumatic DZ  now back to NSR   benefits from full AC based on CHADS score -dw pt, agreeable - TTE with mild MR, no valvular pathology - will plan to start Eliquis this afternoon - dw pulm and onc - no objection

## 2022-03-18 NOTE — CHART NOTE - NSCHARTNOTEFT_GEN_A_CORE
Source: Patient A&Ox4 [ x]    Family [x ]  at bedside    other [x ] chart, RN, ACP     Nutrition re-consulted for tube feeding.   76 yo lady former smoker recently diagnosed with NSCLC s/p debulking and R. bronchial stent, discharged a month ago now presents with worsening dysphagia and progressing shortness of breath, especially with exertion. CT showed tumor extension into left main bronchus. as well as external compression of esophagus from lung ca. started on chemo on 3/11 by Oncology. plan for PEG placement.     -Diarrhea may also be side effect of taxol?  -Pt reports >3 loose stools today after starting TF.  -Spoke with ACP to start psyllium powder and transition to bolus tomorrow. Hold off on changing formula for now.          Diet, NPO with Tube Feed:   Tube Feeding Modality: Gastrostomy  Jevity 1.2 Keith (JEVITY1.2RTH)  Total Volume for 24 Hours (mL): 1296  Continuous  Starting Tube Feed Rate {mL per Hour}: 20  Increase Tube Feed Rate by (mL): 10     Every 4 hours  Until Goal Tube Feed Rate (mL per Hour): 54  Tube Feed Duration (in Hours): 24  Tube Feed Start Time: 00:00 (03-17-22 @ 10:23)      GI: WDL. Last BM  3/18       Enteral /Parenteral Nutrition: Provides 1300 mL, 1560 keith, 72 gm pro    Anthropometrics: Height (cm): 157.5 (03-10), 157 (02-22), 157.5 (02-02)  Weight (kg): 70.8 (03-15), 69.9 (03-08), 77.5 (02-02)  BMI (kg/m2): 28.5 (03-15), 28.2 (03-10), 28.4 (03-08), 31.4 (02-22), 31.2 (02-02)    Edema: none  Pressure Injuries: none     __________________ Pertinent Medications__________________   MEDICATIONS  (STANDING):  aMIOdarone    Tablet   Oral   aMIOdarone    Tablet 400 milliGRAM(s) Oral every 12 hours  budesonide  80 MICROgram(s)/formoterol 4.5 MICROgram(s) Inhaler 2 Puff(s) Inhalation two times a day  CARBOplatin IVPB (eMAR) 224 milliGRAM(s) IV Intermittent once  enoxaparin Injectable 40 milliGRAM(s) SubCutaneous every 24 hours  influenza  Vaccine (HIGH DOSE) 0.7 milliLiter(s) IntraMuscular once  potassium phosphate / sodium phosphate Tablet (K-PHOS No. 2) 1 Tablet(s) Oral two times a day  sodium chloride 3%  Inhalation 4 milliLiter(s) Inhalation every 12 hours        __________________ Pertinent Labs__________________   03-18 Na136 mmol/L Glu 116 mg/dL<H> K+ 3.3 mmol/L<L> Cr  0.38 mg/dL<L> BUN 7 mg/dL 03-18 Phos 2.2 mg/dL<L>                Estimated Needs:   3725-5263 keith/d  70-84 gm pro/d    [x ] no change since previous assessment    Previous Nutrition Diagnosis: severe protein calorie malnutrition     Nutrition Diagnosis is [x ] ongoing       Recommendations:  1. Psyllium powder 3x daily.   2. Continue current continuous regimen for today.  3. Start bolus feeds tomorrow: 325 mL of Jevity 1.2 q6 hrs.   4. Free water boluses per MD.           Monitoring and Evaluation:      [ x] Tolerance to diet prescription [x ] weights [x ] follow up per protocol  [ ] other:

## 2022-03-18 NOTE — PROGRESS NOTE ADULT - PROBLEM SELECTOR PLAN 5
AC plan as above   f/u RUE duplex r/o DVT after midline infiltration   DW ACP  dw sw and cm - will provide scripts once home TF regimen recs given  PT eval - no needs   updated  yesterday extensively

## 2022-03-19 NOTE — PROGRESS NOTE ADULT - PROBLEM SELECTOR PLAN 5
AC plan as above   RUE w/o DVT, Left upper extremity duplex pending to evaluate for DVT in setting of left IV infiltration  DW ACP  PT eval - no needs   updated  and patient bedside     #Superficial thrombophlebitis  - warm compresses   - Right upper extremity US negative for DVT, pending LUE US

## 2022-03-19 NOTE — PROGRESS NOTE ADULT - PROBLEM SELECTOR PLAN 4
new onset  with RVR leading to shock  EP consulted - on amio, transitioned to oral now s/p PEG  TSH, trop, CKmb normal  TTE with normal cardiac fx - mild MR, mod TR - no stigma of rheumatic DZ  now back to NSR   benefits from full AC based on CHADS score -dw pt, agreeable - TTE with mild MR, no valvular pathology  on eliquis for AC

## 2022-03-19 NOTE — PROGRESS NOTE ADULT - SUBJECTIVE AND OBJECTIVE BOX
Patient is a 77y old  Female who presents with a chief complaint of worsening shortness of breath/dysphagia (17 Mar 2022 15:21)    SUBJECTIVE / OVERNIGHT EVENTS:  Patient complaining of some mild redness b/l at sites of bilateral line infiltration. ROS otherwise negative.     MEDICATIONS  (STANDING):  aMIOdarone    Tablet   Oral   aMIOdarone    Tablet 400 milliGRAM(s) Oral every 12 hours  apixaban 5 milliGRAM(s) Enteral Tube every 12 hours  budesonide  80 MICROgram(s)/formoterol 4.5 MICROgram(s) Inhaler 2 Puff(s) Inhalation two times a day  influenza  Vaccine (HIGH DOSE) 0.7 milliLiter(s) IntraMuscular once  psyllium Powder 1 Packet(s) Enteral Tube three times a day  sodium chloride 3%  Inhalation 4 milliLiter(s) Inhalation every 12 hours    MEDICATIONS  (PRN):  acetaminophen     Tablet .. 650 milliGRAM(s) Oral every 6 hours PRN Temp greater or equal to 38C (100.4F), Mild Pain (1 - 3), Moderate Pain (4 - 6)  diphenhydrAMINE Injectable 50 milliGRAM(s) IV Push once PRN PRN Chemotherapy Reaction  ipratropium    for Nebulization 500 MICROGram(s) Nebulizer every 6 hours PRN SOB/Wheezing  levalbuterol Inhalation 0.63 milliGRAM(s) Inhalation every 6 hours PRN SOB/Wheezing  melatonin 3 milliGRAM(s) Oral at bedtime PRN Insomnia  methylPREDNISolone sodium succinate Injectable 125 milliGRAM(s) IV Push once PRN PRN Chemotherapy Reaction  methylPREDNISolone sodium succinate Injectable 125 milliGRAM(s) IV Push once PRN PRN Chemotherapy Reaction  zolpidem 5 milliGRAM(s) Oral at bedtime PRN Insomnia    Vital Signs Last 24 Hrs  T(C): 36.7 (19 Mar 2022 12:00), Max: 36.7 (19 Mar 2022 12:00)  T(F): 98 (19 Mar 2022 12:00), Max: 98 (19 Mar 2022 12:00)  HR: 82 (19 Mar 2022 17:58) (79 - 93)  BP: 116/52 (19 Mar 2022 12:00) (110/43 - 171/72)  BP(mean): --  RR: 18 (19 Mar 2022 12:00) (18 - 19)  SpO2: 100% (19 Mar 2022 17:58) (97% - 100%)    I&O's Summary    18 Mar 2022 07:01  -  19 Mar 2022 07:00  --------------------------------------------------------  IN: 1002 mL / OUT: 0 mL / NET: 1002 mL    19 Mar 2022 07:01  -  19 Mar 2022 21:07  --------------------------------------------------------  IN: 840 mL / OUT: 0 mL / NET: 840 mL    PHYSICAL EXAM:  GENERAL: NAD, well-developed, AOx3  HEAD:  Atraumatic, Normocephalic  EYES: EOMI, PERRL, conjunctiva and sclera clear  NECK: Supple, No JVD  CHEST/LUNG: Stridor unchanged   HEART: Regular rate and rhythm; No murmurs, rubs, or gallops, No Edema  ABDOMEN: Soft, Nontender, Nondistended; Bowel sounds present  EXTREMITIES:  RUE midline site with some induration and swelling     LABS:                         10.7   7.89  )-----------( 290      ( 19 Mar 2022 06:47 )             33.3     03-19    139  |  100  |  9   ----------------------------<  181<H>  4.0   |  31  |  0.36<L>    Ca    8.7      19 Mar 2022 06:47  Phos  2.4     03-19  Mg     2.10     03-19    TPro  6.0  /  Alb  2.7<L>  /  TBili  0.4  /  DBili  x   /  AST  13  /  ALT  9   /  AlkPhos  70  03-19                  RADIOLOGY, EKG & ADDITIONAL TESTS: Reviewed.

## 2022-03-19 NOTE — PROGRESS NOTE ADULT - ASSESSMENT
78 yo lady former smoker recently diagnosed with NSCLC s/p debulking and R. bronchial stent, discharged a month ago now presents with worsening dysphagia and progressing shortness of breath, especially with exertion. CT showed tumor extension into left main bronchus. as well as external compression of esophagus from lung ca. started on chemo on 3/11 by Oncology. s/p PEG placement. Awaiting home tube feed set up.

## 2022-03-19 NOTE — PROGRESS NOTE ADULT - ASSESSMENT
78 yo F with recently diagnosed stage IIIA NSCLC, s/p outpatient onc and rad onc eval with plan to start chemotherapy on 3/14, with plan to add RT when symptoms better controlled, presenting to the ED with dysphagia.   Treatment plan for the patient is potentially curative, therefore palliative RT to the mass causing esophageal obstruction is not recommended as this will limit the curative plans.   Plan discussed with Pulm, rad onc and primary team.  Started weekly carbo/taxol as inpatient with plan to continue as outpatient     S/p C1 Carbo AUC 2, Taxol 40 mg/m2 3/11/2022 and C2 on 3/18/22  S/p PEG placement by IR 3/16    -Nutrition follow  up for PEG feeds  -Pulm and rad onc input appreciated.  -Supportive care, pain control, Nutrition, PT, DVT ppx  -Outpatient oncology f/u    Will follow. Please do not hesitate to call back with questions.

## 2022-03-19 NOTE — PROGRESS NOTE ADULT - SUBJECTIVE AND OBJECTIVE BOX
INTERVAL HPI/OVERNIGHT EVENTS:  Patient seen at bedside. Received Week 2 Carbo/Taxol n 3/18 and tolerated well with no N/V  Tolerating tube feeds well.    Vital Signs Last 24 Hrs  T(C): 36.7 (19 Mar 2022 12:00), Max: 36.7 (19 Mar 2022 12:00)  T(F): 98 (19 Mar 2022 12:00), Max: 98 (19 Mar 2022 12:00)  HR: 79 (19 Mar 2022 12:00) (78 - 93)  BP: 116/52 (19 Mar 2022 12:00) (110/43 - 171/72)  BP(mean): --  RR: 18 (19 Mar 2022 12:00) (18 - 19)  SpO2: 97% (19 Mar 2022 12:00) (97% - 98%)    PHYSICAL EXAM:    Constitutional: NAD, resting in bed comfortably  Eyes: EOMI, sclera non-icteric  Neck: supple, no LAP  Respiratory: CTA b/l, good air entry b/l, no wheezing, rhonchi or crackels  Cardiovascular: RRR, normal S1S2, no M/R/G  Gastrointestinal: soft, NTND  Extremities: no edema  Neurological: AAOx3, non focal  Skin: no rash    MEDICATIONS  (STANDING):  aMIOdarone    Tablet   Oral   aMIOdarone    Tablet 400 milliGRAM(s) Oral every 12 hours  apixaban 5 milliGRAM(s) Enteral Tube every 12 hours  budesonide  80 MICROgram(s)/formoterol 4.5 MICROgram(s) Inhaler 2 Puff(s) Inhalation two times a day  influenza  Vaccine (HIGH DOSE) 0.7 milliLiter(s) IntraMuscular once  psyllium Powder 1 Packet(s) Enteral Tube two times a day  sodium chloride 3%  Inhalation 4 milliLiter(s) Inhalation every 12 hours    MEDICATIONS  (PRN):  acetaminophen     Tablet .. 650 milliGRAM(s) Oral every 6 hours PRN Temp greater or equal to 38C (100.4F), Mild Pain (1 - 3), Moderate Pain (4 - 6)  diphenhydrAMINE Injectable 50 milliGRAM(s) IV Push once PRN PRN Chemotherapy Reaction  ipratropium    for Nebulization 500 MICROGram(s) Nebulizer every 6 hours PRN SOB/Wheezing  levalbuterol Inhalation 0.63 milliGRAM(s) Inhalation every 6 hours PRN SOB/Wheezing  melatonin 3 milliGRAM(s) Oral at bedtime PRN Insomnia  methylPREDNISolone sodium succinate Injectable 125 milliGRAM(s) IV Push once PRN PRN Chemotherapy Reaction  methylPREDNISolone sodium succinate Injectable 125 milliGRAM(s) IV Push once PRN PRN Chemotherapy Reaction  zolpidem 5 milliGRAM(s) Oral at bedtime PRN Insomnia        Allergies    No Known Allergies    Intolerances        LABS:                                   10.7   7.89  )-----------( 290      ( 19 Mar 2022 06:47 )             33.3       03-19    139  |  100  |  9   ----------------------------<  181<H>  4.0   |  31  |  0.36<L>    Ca    8.7      19 Mar 2022 06:47  Phos  2.4     03-19  Mg     2.10     03-19    TPro  6.0  /  Alb  2.7<L>  /  TBili  0.4  /  DBili  x   /  AST  13  /  ALT  9   /  AlkPhos  70  03-19      RADIOLOGY & ADDITIONAL TESTS:  Studies reviewed.

## 2022-03-19 NOTE — PROGRESS NOTE ADULT - PROBLEM SELECTOR PLAN 2
S/P debulking and stent  CT chest with compression of esophagus due to tumor.   GI recs appreciated, high possibility of stent migration, rec for PEG  - now s/p IR guided PEG placement, changed to bolus tube feed regimen today 3/19  - psyllium powder 1 packet three times a day to assist w/ tube feed induced diarrhea

## 2022-03-20 NOTE — PROVIDER CONTACT NOTE (OTHER) - BACKGROUND
77 year old female admitted with dysphagia. PMH of smoker. Recent diagnosis of NSCLC.
Pt admitted for SOB and dysphagia. scheduled for PEG tube placement.

## 2022-03-20 NOTE — PROVIDER CONTACT NOTE (OTHER) - ASSESSMENT
A&O x4. Breathing non-labored on 3L NC.
Pt denies any chest pain, palpitations, lightheadedness or dizziness. pt feeling anxious regarding peg placement

## 2022-03-20 NOTE — PROGRESS NOTE ADULT - PROBLEM SELECTOR PLAN 5
AC plan as above   RUE w/o DVT, Left upper extremity duplex w/o DVT.  DW ACP  PT eval - no needs   updated  and patient bedside

## 2022-03-20 NOTE — PROGRESS NOTE ADULT - PROBLEM SELECTOR PLAN 1
Still treatment naive  As above, s/p debulking and r. bronchial stent  repeat CT showed left main bronchial invasion of tumor.   discussed with IP, Rad/Onc and Heme/Onc.   no plan for urgent surgical intervention of lung lesions.  started inpatient chemo 3/11 per onc, due for next dose today   no need for port/picc - chemo as long as pt admitted but should not hold up discharge per onc   further assessment for Rad/onc after chemo as premature RT can inhibit curative approach    #Cellulitis   - concern for developing cellulitis around prior IV infiltration site RUE, started on cefazolin 1g q 8 hours  - notify onc in AM regarding concern for infection

## 2022-03-20 NOTE — PROVIDER CONTACT NOTE (OTHER) - SITUATION
Pt noted to have previous ecchymotic area r/t IV infiltration with VA duplex done in area during admission. Area noted to have new onset hardened, raised, and painful area.

## 2022-03-20 NOTE — CHART NOTE - NSCHARTNOTEFT_GEN_A_CORE
Patient's ambulatory O2 saturation sat 86-87% on room air going from the bed to the bathroom. O2 saturation improved with 2 L NC, with O2 saturation increasing back to 94%. Patient's ambulatory O2 saturation sat 87% on room air going from the bed to the bathroom. O2 saturation improved with 2 L NC, with O2 saturation increasing back to 94%. Patient's resting O2 saturation off oxygen is 87%. Patient's O2 saturation on ambulation is 87% on room air going from the bed to the bathroom. O2 saturation improved with 2 L NC, with O2 saturation increasing back to 94%.

## 2022-03-20 NOTE — PROGRESS NOTE ADULT - SUBJECTIVE AND OBJECTIVE BOX
Patient is a 77y old  Female who presents with a chief complaint of worsening shortness of breath/dysphagia (17 Mar 2022 15:21)    SUBJECTIVE / OVERNIGHT EVENTS: Patient complaining of worsening redness or RUE, LUE improved. ROS otherwise negative.    MEDICATIONS  (STANDING):  aMIOdarone    Tablet   Oral   aMIOdarone    Tablet 400 milliGRAM(s) Oral every 12 hours  apixaban 5 milliGRAM(s) Enteral Tube every 12 hours  budesonide  80 MICROgram(s)/formoterol 4.5 MICROgram(s) Inhaler 2 Puff(s) Inhalation two times a day  ceFAZolin   IVPB 1000 milliGRAM(s) IV Intermittent every 8 hours  ceFAZolin   IVPB      influenza  Vaccine (HIGH DOSE) 0.7 milliLiter(s) IntraMuscular once  lactobacillus acidophilus 1 Tablet(s) Oral two times a day  psyllium Powder 1 Packet(s) Enteral Tube three times a day  sodium chloride 3%  Inhalation 4 milliLiter(s) Inhalation every 12 hours    MEDICATIONS  (PRN):  acetaminophen     Tablet .. 650 milliGRAM(s) Oral every 6 hours PRN Temp greater or equal to 38C (100.4F), Mild Pain (1 - 3), Moderate Pain (4 - 6)  diphenhydrAMINE Injectable 50 milliGRAM(s) IV Push once PRN PRN Chemotherapy Reaction  ipratropium    for Nebulization 500 MICROGram(s) Nebulizer every 6 hours PRN SOB/Wheezing  levalbuterol Inhalation 0.63 milliGRAM(s) Inhalation every 6 hours PRN SOB/Wheezing  melatonin 3 milliGRAM(s) Oral at bedtime PRN Insomnia  methylPREDNISolone sodium succinate Injectable 125 milliGRAM(s) IV Push once PRN PRN Chemotherapy Reaction  methylPREDNISolone sodium succinate Injectable 125 milliGRAM(s) IV Push once PRN PRN Chemotherapy Reaction  zolpidem 5 milliGRAM(s) Oral at bedtime PRN Insomnia    Vital Signs Last 24 Hrs  T(C): 36.7 (20 Mar 2022 23:09), Max: 36.7 (20 Mar 2022 05:24)  T(F): 98 (20 Mar 2022 23:09), Max: 98.1 (20 Mar 2022 12:46)  HR: 78 (20 Mar 2022 23:09) (78 - 90)  BP: 116/68 (20 Mar 2022 23:09) (116/68 - 128/60)  BP(mean): --  RR: 18 (20 Mar 2022 23:09) (17 - 19)  SpO2: 98% (20 Mar 2022 23:09) (87% - 98%)    I&O's Summary    19 Mar 2022 07:01  -  20 Mar 2022 07:00  --------------------------------------------------------  IN: 1140 mL / OUT: 0 mL / NET: 1140 mL    20 Mar 2022 07:01  -  21 Mar 2022 00:44  --------------------------------------------------------  IN: 1425 mL / OUT: 0 mL / NET: 1425 mL    PHYSICAL EXAM:  GENERAL: NAD, well-developed, AOx3  HEAD:  Atraumatic, Normocephalic  EYES: EOMI, PERRL, conjunctiva and sclera clear  NECK: Supple, No JVD  CHEST/LUNG: Stridor unchanged   HEART: Regular rate and rhythm; No murmurs, rubs, or gallops, No Edema  ABDOMEN: Soft, Nontender, Nondistended; Bowel sounds present  EXTREMITIES:  RUE midline site with worsening induration and swelling     LABS:                         9.9    9.59  )-----------( 263      ( 20 Mar 2022 07:59 )             30.8     03-20    136  |  98  |  10  ----------------------------<  103<H>  3.8   |  30  |  0.42<L>    Ca    8.5      20 Mar 2022 07:59  Phos  2.7     03-20  Mg     2.00     03-20    TPro  5.4<L>  /  Alb  2.4<L>  /  TBili  0.4  /  DBili  x   /  AST  24  /  ALT  11  /  AlkPhos  63  03-20                  RADIOLOGY, EKG & ADDITIONAL TESTS: Reviewed.

## 2022-03-20 NOTE — PROGRESS NOTE ADULT - ASSESSMENT
76 yo F with recently diagnosed stage IIIA NSCLC, s/p outpatient onc and rad onc eval with plan to start chemotherapy on monday 3/14, with plan to add rt when symptoms are better controlled, presenting to the ED with dysphagia.   Treatment plan for the patient is potentially curative, therefore palliative rt to the mass causing esophageal obstruction is not recommended as this will limit the curative plans.   Plan discussed with Pulm, rad onc and primary team.  Started weekly carbo/taxol as inpatient.    S/p C1 Carbo AUC 2, Taxol 40 mg/m2 3/11/2022 and C2 Carbo/Taxol on 3/18/22  S/p PEG placement by IR 3/16     -Nutrition follow  up for PEG feeds  -Tolerating tube feeds well  -Continue Eliquis for bilateral UE symptomatic thrombophlebitis and recommend warm soaks for comfort  -Pulm and rad onc input appreciated.  -Supportive care, pain control, Nutrition, PT, DVT ppx  -d/c planning for 3/21/22.  -Outpatient oncology f/u after discharge with Dr. Willis at [facility]    Will follow. Please do not hesitate to call back with questions.

## 2022-03-20 NOTE — PROGRESS NOTE ADULT - PROBLEM SELECTOR PLAN 2
S/P debulking and stent  CT chest with compression of esophagus due to tumor.   GI recs appreciated, high possibility of stent migration, rec for PEG  - now s/p IR guided PEG placement, changed to bolus tube feed regimen 3/19  - psyllium powder 1 packet three times a day to assist w/ tube feed induced diarrhea

## 2022-03-20 NOTE — PROGRESS NOTE ADULT - SUBJECTIVE AND OBJECTIVE BOX
INTERVAL HPI/OVERNIGHT EVENTS:  Patient seen at bedside. Comfortable overall and tolerating tube feeds well. Complain of pain in RUE at site of thrombophlebitis    Vital Signs Last 24 Hrs  T(C): 36.7 (20 Mar 2022 05:24), Max: 36.7 (20 Mar 2022 05:24)  T(F): 98 (20 Mar 2022 05:24), Max: 98 (20 Mar 2022 05:24)  HR: 90 (20 Mar 2022 10:19) (79 - 90)  BP: 121/67 (20 Mar 2022 05:24) (112/60 - 121/67)  BP(mean): --  RR: 18 (20 Mar 2022 05:24) (18 - 18)  SpO2: 94% (20 Mar 2022 10:19) (94% - 100%)    PHYSICAL EXAM:    Constitutional: NAD, resting in bed comfortably  Eyes: EOMI, sclera non-icteric  Neck: supple, no LAP  Respiratory: CTA b/l, good air entry b/l, no wheezing, rhonchi or crackels  Cardiovascular: RRR, normal S1S2, no M/R/G  Gastrointestinal: soft, NTND  Extremities: bilateral UE ecchymoses and thrombophlebitis  Neurological: AAOx3, non focal  Skin: Normal temperature      MEDICATIONS  (STANDING):  aMIOdarone    Tablet   Oral   aMIOdarone    Tablet 400 milliGRAM(s) Oral every 12 hours  apixaban 5 milliGRAM(s) Enteral Tube every 12 hours  budesonide  80 MICROgram(s)/formoterol 4.5 MICROgram(s) Inhaler 2 Puff(s) Inhalation two times a day  influenza  Vaccine (HIGH DOSE) 0.7 milliLiter(s) IntraMuscular once  psyllium Powder 1 Packet(s) Enteral Tube three times a day  sodium chloride 3%  Inhalation 4 milliLiter(s) Inhalation every 12 hours    MEDICATIONS  (PRN):  acetaminophen     Tablet .. 650 milliGRAM(s) Oral every 6 hours PRN Temp greater or equal to 38C (100.4F), Mild Pain (1 - 3), Moderate Pain (4 - 6)  diphenhydrAMINE Injectable 50 milliGRAM(s) IV Push once PRN PRN Chemotherapy Reaction  ipratropium    for Nebulization 500 MICROGram(s) Nebulizer every 6 hours PRN SOB/Wheezing  levalbuterol Inhalation 0.63 milliGRAM(s) Inhalation every 6 hours PRN SOB/Wheezing  melatonin 3 milliGRAM(s) Oral at bedtime PRN Insomnia  methylPREDNISolone sodium succinate Injectable 125 milliGRAM(s) IV Push once PRN PRN Chemotherapy Reaction  methylPREDNISolone sodium succinate Injectable 125 milliGRAM(s) IV Push once PRN PRN Chemotherapy Reaction  zolpidem 5 milliGRAM(s) Oral at bedtime PRN Insomnia      Allergies:    No Known Allergies    Intolerances        LABS:                                   9.9    9.59  )-----------( 263      ( 20 Mar 2022 07:59 )             30.8     03-20    136  |  98  |  10  ----------------------------<  103<H>  3.8   |  30  |  0.42<L>    Ca    8.5      20 Mar 2022 07:59  Phos  2.7     03-20  Mg     2.00     03-20    TPro  5.4<L>  /  Alb  2.4<L>  /  TBili  0.4  /  DBili  x   /  AST  24  /  ALT  11  /  AlkPhos  63  03-20          RADIOLOGY & ADDITIONAL TESTS:    Bilateral UE dopplers showed no evidence of DVT but superficial thrombophlebitis

## 2022-03-21 NOTE — DISCHARGE NOTE NURSING/CASE MANAGEMENT/SOCIAL WORK - PATIENT PORTAL LINK FT
You can access the FollowMyHealth Patient Portal offered by [facility] by registering at the following website: [email or web address]. By joining [facility]’s FollowMyHealth portal, you will also be able to view your health information using other applications (apps) compatible with our system.

## 2022-03-21 NOTE — PROGRESS NOTE ADULT - PROBLEM SELECTOR PLAN 1
- concern for developing cellulitis around prior IV infiltration site RUE, started on cefazolin 1g q 8 hours  - now clinically with increased inudration and pain.   - switch patient to vancomycin bid by weight. monitor trough q4th dose.   - MRSA PCR nasal swab.  - c/w warm compress.

## 2022-03-21 NOTE — DISCHARGE NOTE NURSING/CASE MANAGEMENT/SOCIAL WORK - NSDCFUADDAPPT_GEN_ALL_CORE_FT
DOUG with Dr. Willis at [facility] on Friday March 25 at 2:30pm   Call for appointment within 7 days to follow up for cellulitis, amiodarone monitoring

## 2022-03-21 NOTE — DISCHARGE NOTE PROVIDER - CARE PROVIDER_API CALL
Cintia Willis)  Internal Medicine; Medical Oncology  [address] [place] [address]  Phone: [phone]  Fax: [phone]  Follow Up Time:     Maeve العراقي)  Family Medicine  [address] [place] [address], [address]  [place] [address]  Phone: [phone]  Fax: [phone]  Follow Up Time:

## 2022-03-21 NOTE — PROGRESS NOTE ADULT - SUBJECTIVE AND OBJECTIVE BOX
Patient seen and examined at bedside.    Overnight Events:   No events. Patient still SR. No symptoms.     Review Of Systems: No chest pain, shortness of breath, or palpitations            Current Meds:  acetaminophen     Tablet .. 650 milliGRAM(s) Oral every 6 hours PRN  aMIOdarone    Tablet   Oral   aMIOdarone    Tablet 400 milliGRAM(s) Oral every 12 hours  apixaban 5 milliGRAM(s) Enteral Tube every 12 hours  budesonide  80 MICROgram(s)/formoterol 4.5 MICROgram(s) Inhaler 2 Puff(s) Inhalation two times a day  ceFAZolin   IVPB 1000 milliGRAM(s) IV Intermittent every 8 hours  ceFAZolin   IVPB      diphenhydrAMINE Injectable 50 milliGRAM(s) IV Push once PRN  influenza  Vaccine (HIGH DOSE) 0.7 milliLiter(s) IntraMuscular once  ipratropium    for Nebulization 500 MICROGram(s) Nebulizer every 6 hours PRN  lactobacillus acidophilus 1 Tablet(s) Oral two times a day  levalbuterol Inhalation 0.63 milliGRAM(s) Inhalation every 6 hours PRN  melatonin 3 milliGRAM(s) Oral at bedtime PRN  methylPREDNISolone sodium succinate Injectable 125 milliGRAM(s) IV Push once PRN  methylPREDNISolone sodium succinate Injectable 125 milliGRAM(s) IV Push once PRN  psyllium Powder 1 Packet(s) Enteral Tube three times a day  sodium chloride 3%  Inhalation 4 milliLiter(s) Inhalation every 12 hours  zolpidem 5 milliGRAM(s) Oral at bedtime PRN      Vitals:  T(F): 97.7 (03-21), Max: 98.2 (03-21)  HR: 82 (03-21) (77 - 88)  BP: 119/43 (03-21) (116/68 - 128/62)  RR: 18 (03-21)  SpO2: 100% (03-21)  I&O's Summary    20 Mar 2022 07:01  -  21 Mar 2022 07:00  --------------------------------------------------------  IN: 1900 mL / OUT: 0 mL / NET: 1900 mL        Physical Exam:  Appearance: No acute distress  Eyes: EOMI, no scleral icterus   HEENT: Normal oral mucosa  Cardiovascular: RRR, S1, S2, no murmurs, rubs, or gallops; no edema; no JVD  Respiratory: Clear to auscultation bilaterally, no auditory stridor   Gastrointestinal: soft, non-tender, non-distended with normal bowel sounds. PEG in place.  Musculoskeletal: No clubbing; no joint deformity   Neurologic: Non-focal  Psychiatry: AAOx3, mood & affect appropriate  Skin: No rashes, ecchymoses, or cyanosis                            9.6    8.05  )-----------( 240      ( 21 Mar 2022 07:39 )             29.9     03-21    132<L>  |  94<L>  |  9   ----------------------------<  109<H>  3.9   |  31  |  0.41<L>    Ca    8.5      21 Mar 2022 07:39  Phos  3.9     03-21  Mg     1.90     03-21    TPro  5.3<L>  /  Alb  2.4<L>  /  TBili  0.4  /  DBili  x   /  AST  15  /  ALT  8   /  AlkPhos  60  03-21      CARDIAC MARKERS ( 15 Mar 2022 07:59 )  9 ng/L / x     / x     / 15 U/L / x     / <1.0 ng/mL              New ECG(s): Personally reviewed      Interpretation of Telemetry:  SR 70s.

## 2022-03-21 NOTE — DISCHARGE NOTE PROVIDER - NSDCFUADDAPPT_GEN_ALL_CORE_FT
DOUG with Dr. Willis at [facility] on Friday March 25 at 2:30pm  DOUG with Dr. Willis at [facility] on Friday March 25 at 2:30pm   Call for appointment within 7 days to follow up for cellulitis, amiodarone monitoring

## 2022-03-21 NOTE — DISCHARGE NOTE PROVIDER - NSDCMRMEDTOKEN_GEN_ALL_CORE_FT
acetaminophen 325 mg oral tablet: 2 tab(s) orally every 6 hours, As needed, Temp greater or equal to 38C (100.4F), Mild Pain (1 - 3), Moderate Pain (4 - 6)  budesonide-formoterol 80 mcg-4.5 mcg/inh inhalation aerosol: 1 puff(s) inhaled 2 times a day  guaiFENesin 600 mg oral tablet, extended release: 1 tab(s) orally every 12 hours  ipratropium-albuterol 0.5 mg-2.5 mg/3 mL inhalation solution: 3 milliliter(s) inhaled every 8 hours  sodium chloride 0.9% inhalation solution: 5 milliliter(s) inhaled every 8 hours   acetaminophen 325 mg oral tablet: 2 tab(s) orally every 6 hours, As needed, Temp greater or equal to 38C (100.4F), Mild Pain (1 - 3), Moderate Pain (4 - 6)  apixaban 5 mg oral tablet: 1 tab(s) orally every 12 hours  budesonide-formoterol 80 mcg-4.5 mcg/inh inhalation aerosol: 1 puff(s) inhaled 2 times a day  guaiFENesin 600 mg oral tablet, extended release: 1 tab(s) orally every 12 hours  ipratropium-albuterol 0.5 mg-2.5 mg/3 mL inhalation solution: 3 milliliter(s) inhaled every 8 hours  sodium chloride 0.9% inhalation solution: 5 milliliter(s) inhaled every 8 hours   acetaminophen 325 mg oral tablet: 2 tab(s) orally every 6 hours, As needed, Temp greater or equal to 38C (100.4F), Mild Pain (1 - 3), Moderate Pain (4 - 6)  amiodarone 200 mg oral tablet: 1 tab(s) orally once a day start on 3/24   apixaban 5 mg oral tablet: 1 tab(s) orally every 12 hours  Augmentin 125 mg-31.25 mg/5 mL oral liquid: 35 milliliter(s) orally every 12 hours FOR 8 DOSES   budesonide-formoterol 80 mcg-4.5 mcg/inh inhalation aerosol: 1 puff(s) inhaled 2 times a day  guaiFENesin 600 mg oral tablet, extended release: 1 tab(s) orally every 12 hours  ipratropium-albuterol 0.5 mg-2.5 mg/3 mL inhalation solution: 3 milliliter(s) inhaled every 8 hours  lactobacillus acidophilus oral capsule: 1 tab(s) by gastrostomy tube 2 times a day   melatonin 3 mg oral tablet: 1 tab(s) orally once a day (at bedtime), As needed, Insomnia  psyllium 3.4 g/7 g oral powder for reconstitution: 1 packet(s) by gastrostomy tube 3 times a day  sodium chloride 0.9% inhalation solution: 5 milliliter(s) inhaled every 8 hours   acetaminophen 325 mg/10.15 mL oral liquid: 20.3 milliliter(s) by gastrostomy tube 4 times a day, As Needed  for pain   amiodarone 200 mg oral tablet: 1 tab(s) by gastrostomy tube once a day  start on 3/24   apixaban 5 mg oral tablet: 1 tab(s) by gastrostomy tube every 12 hours   Augmentin 125 mg-31.25 mg/5 mL oral liquid: 35 milliliter(s) by gastrostomy tube every 12 hours  FOR 8 DOSES   budesonide-formoterol 80 mcg-4.5 mcg/inh inhalation aerosol: 1 puff(s) inhaled 2 times a day  guaiFENesin 100 mg/5 mL oral liquid: 10 milliliter(s) by gastrostomy tube 4 times a day, As Needed   ipratropium-albuterol 0.5 mg-2.5 mg/3 mL inhalation solution: 3 milliliter(s) inhaled every 8 hours  lactobacillus acidophilus oral capsule: 1 tab(s) by gastrostomy tube 2 times a day   melatonin 3 mg oral tablet: 1 tab(s) by gastrostomy tube once a day (at bedtime), As Needed  psyllium 3.4 g/7 g oral powder for reconstitution: 1 packet(s) by gastrostomy tube 3 times a day  sodium chloride 0.9% inhalation solution: 5 milliliter(s) inhaled every 8 hours

## 2022-03-21 NOTE — DISCHARGE NOTE PROVIDER - HOSPITAL COURSE
78 yo lady former smoker recently diagnosed with NSCLC s/p debulking and R. bronchial stent, discharged a month ago now presents with worsening dysphagia and progressing shortness of breath, especially with exertion. CT showed tumor extension into left main bronchus. as well as external compression of esophagus from lung ca. started on chemo on 3/11 by Oncology. s/p PEG placement. Awaiting home tube feed set up.    Squamous NSCLC.   ·  Plan: Still treatment naive  As above, s/p debulking and r. bronchial stent  repeat CT showed left main bronchial invasion of tumor.   discussed with IP, Rad/Onc and Heme/Onc.   no plan for urgent surgical intervention of lung lesions.  started inpatient chemo 3/11 per onc, due for next dose today   no need for port/picc - chemo as long as pt admitted but should not hold up discharge per onc   further assessment for Rad/onc after chemo as premature RT can inhibit curative approach  -Resting O2 sat     Cellulitis   - concern for developing cellulitis around prior IV infiltration site RUE  -started on cefazolin 1g q 8 hours  - notify onc in AM regarding concern for infection.    Dysphagia.   ·  Plan: S/P debulking and stent  CT chest with compression of esophagus due to tumor.   GI recs appreciated, high possibility of stent migration, rec for PEG  - now s/p IR guided PEG placement, changed to bolus tube feed regimen 3/19  - psyllium powder 1 packet three times a day to assist w/ tube feed induced diarrhea.  -Patient to go home on Bolus TF with [facility] to reinforce teaching    ACKERMAN (dyspnea on exertion).   ·  Plan: Interventional pulmonology consulted  CT with new LMB narrowing  Will continue nebs  no interventions for now.    Atrial fibrillation.   ·  Plan: new onset  with RVR leading to shock  EP consulted - on amio, transitioned to oral now s/p PEG  TSH, trop, CKmb normal  TTE with normal cardiac fx - mild MR, mod TR - no stigma of rheumatic DZ  now back to NSR   benefits from full AC based on CHADS score -dw pt, agreeable - TTE with mild MR, no valvular pathology  on eliquis for AC.    Prophylactic measure.   ·  Plan: AC plan as above   RUE w/o DVT, Left upper extremity duplex w/o DVT.  DW ACP  PT eval - no needs     Patient seen and evaluated. Reviewed discharge medications with patient and attending. All new medications requiring new prescriptions were sent to the pharmacy of patient's choice. Reviewed need for prescription for previous home medications and new prescriptions sent if requested. Medically cleared/stable for discharge as per   with appropriate follow up. Patient understands and agrees with plan of care.     78 yo lady former smoker recently diagnosed with NSCLC s/p debulking and R. bronchial stent, discharged a month ago now presents with worsening dysphagia and progressing shortness of breath, especially with exertion. CT showed tumor extension into left main bronchus. as well as external compression of esophagus from lung ca. started on chemo on 3/11 by Oncology. s/p PEG placement. Awaiting home tube feed set up.    Squamous NSCLC.   ·  Plan: Still treatment naive  As above, s/p debulking and r. bronchial stent  repeat CT showed left main bronchial invasion of tumor.   discussed with IP, Rad/Onc and Heme/Onc.   no plan for urgent surgical intervention of lung lesions.  started inpatient chemo 3/11 per onc, due for next dose today   no need for port/picc - chemo as long as pt admitted but should not hold up discharge per onc   further assessment for Rad/onc after chemo as premature RT can inhibit curative approach  -Resting O2 sat 87% room air Pt will have home 02   - FU with Dr. iWllis on Friday     Cellulitis   - concern for developing cellulitis around prior IV infiltration site RUE  -started on cefazolin 1g q 8 hours  - will dc home on augmentin for 7 day course   - Continue wm soaks     Dysphagia.   ·  Plan: S/P debulking and stent  CT chest with compression of esophagus due to tumor.   GI recs appreciated, high possibility of stent migration, rec for PEG  - now s/p IR guided PEG placement, changed to bolus tube feed regimen 3/19  - psyllium powder 1 packet three times a day to assist w/ tube feed induced diarrhea.  -Patient to go home on Bolus TF with [facility] to reinforce teaching  - Pt teaching done and pt comfortable for discharge with written instructions   ACKERMAN (dyspnea on exertion).     ·  Plan: Interventional pulmonology consulted  CT with new LMB narrowing  Will continue nebs  no interventions for now.    Atrial fibrillation.   ·  Plan: new onset  with RVR leading to shock  EP consulted - on amio, transitioned to oral now s/p PEG  TSH, trop, CKmb normal  TTE with normal cardiac fx - mild MR, mod TR - no stigma of rheumatic DZ  now back to NSR   benefits from full AC based on CHADS score -dw pt, agreeable - TTE with mild MR, no valvular pathology  on eliquis for AC.    Prophylactic measure.   ·  Plan: AC plan as above   RUE w/o DVT, Left upper extremity duplex w/o DVT.  DW ACP  PT eval - no needs     Patient seen and evaluated. Reviewed discharge medications with patient and attending. All new medications requiring new prescriptions were sent to the pharmacy of patient's choice. Reviewed need for prescription for previous home medications and new prescriptions sent if requested. Medically cleared/stable for discharge as per Dr. Lau with appropriate follow up. Patient understands and agrees with plan of care.

## 2022-03-21 NOTE — DISCHARGE NOTE PROVIDER - NSDCFUSCHEDAPPT_GEN_ALL_CORE_FT
DESIRAE POLLOCK L ; 03/23/2022 ; [facility] [address]  DESIRAE POLLOCK L ; 03/25/2022 ; [facility]  DESIRAE POLLOCK ; 03/28/2022 ; NPP [facility] [address]  DESIRAE POLLOCK L ; 03/29/2022 ; [facility]  DESIRAE POLLOCK L ; 04/01/2022 ; [facility]  DESIRAE POLLOCK ; 04/05/2022 ; [facility]  DESIRAE POLLOCK L ; 04/08/2022 ; [facility]  DESIRAE POLLOCK L ; 04/12/2022 ; [facility]  DESIRAE POLLOCK ; 04/15/2022 ; [facility]  DESIRAE POLLOCK L ; 04/22/2022 ; [facility]  DESIRAE POLLOCK L ; 04/29/2022 ; [facility]  DESIRAE POLLOCK L ; 05/05/2022 ; [facility]  DESIRAE POLLOCK L ; 05/06/2022 ; [facility]  DESIRAE POLLOCK L ; 05/12/2022 ; [facility]  DESIRAE POLLOCK L ; 05/13/2022 ; [facility]  DESIRAE POLLOCK ; 05/19/2022 ; NPP [facility] [address] [facility] DESIRAE POLLOCK L ; 03/25/2022 ; [facility]  DESIRAE POLLOCK L ; 03/28/2022 ; NPP Med [facility] [address]  DESIRAE POLLOCK L ; 03/29/2022 ; [facility]  DESIRAE POLLOCK L ; 04/01/2022 ; [facility]  DESIRAE POLLOCK L ; 04/05/2022 ; [facility]  DESIRAE POLLOCK L ; 04/08/2022 ; [facility]  DSEIRAE POLLOCK L ; 04/12/2022 ; [facility]  DESIRAE POLLOCK L ; 04/15/2022 ; [facility]  DESIRAE POLLOCK L ; 04/22/2022 ; [facility]  DESIRAE POLLOCK L ; 04/29/2022 ; [facility]  DESIRAE POLLOCK L ; 05/05/2022 ; [facility]  DESIREA POLLOCK L ; 05/06/2022 ; [facility]  DESIRAE POLLOCK L ; 05/12/2022 ; [facility]  DESIRAE POLLOCK L ; 05/13/2022 ; [facility]  DESIRAE POLLOCK L ; 05/19/2022 ; NPP [facility] [address]

## 2022-03-21 NOTE — PROGRESS NOTE ADULT - SUBJECTIVE AND OBJECTIVE BOX
[facility] Division [facility]  Fredrick Lau MD  In House Pager [phone]    Patient is a 77y old  Female who presents with a chief complaint of worsening shortness of breath/dysphagia (21 Mar 2022 13:07)      SUBJECTIVE / OVERNIGHT EVENTS:  No overnight events. Labs and vitals reviewed. overall stable.   Patient seen and examined at bedside, patient reports pain and increased induration over RUE from prior thrombophlebitis. d/c held, discussed with patient.   No fever, no chills, no SOB, no CP, no n/v/d, no abd pain, no dysuria      MEDICATIONS  (STANDING):  aMIOdarone    Tablet   Oral   aMIOdarone    Tablet 400 milliGRAM(s) Oral every 12 hours  apixaban 5 milliGRAM(s) Enteral Tube every 12 hours  budesonide  80 MICROgram(s)/formoterol 4.5 MICROgram(s) Inhaler 2 Puff(s) Inhalation two times a day  influenza  Vaccine (HIGH DOSE) 0.7 milliLiter(s) IntraMuscular once  lactobacillus acidophilus 1 Tablet(s) Oral two times a day  psyllium Powder 1 Packet(s) Enteral Tube three times a day  sodium chloride 3%  Inhalation 4 milliLiter(s) Inhalation every 12 hours  vancomycin  IVPB 1000 milliGRAM(s) IV Intermittent every 12 hours    MEDICATIONS  (PRN):  acetaminophen     Tablet .. 650 milliGRAM(s) Oral every 6 hours PRN Temp greater or equal to 38C (100.4F), Mild Pain (1 - 3), Moderate Pain (4 - 6)  diphenhydrAMINE Injectable 50 milliGRAM(s) IV Push once PRN PRN Chemotherapy Reaction  ipratropium    for Nebulization 500 MICROGram(s) Nebulizer every 6 hours PRN SOB/Wheezing  levalbuterol Inhalation 0.63 milliGRAM(s) Inhalation every 6 hours PRN SOB/Wheezing  melatonin 3 milliGRAM(s) Oral at bedtime PRN Insomnia  methylPREDNISolone sodium succinate Injectable 125 milliGRAM(s) IV Push once PRN PRN Chemotherapy Reaction  methylPREDNISolone sodium succinate Injectable 125 milliGRAM(s) IV Push once PRN PRN Chemotherapy Reaction  zolpidem 5 milliGRAM(s) Oral at bedtime PRN Insomnia    CAPILLARY BLOOD GLUCOSE        I&O's Summary    20 Mar 2022 07:01  -  21 Mar 2022 07:00  --------------------------------------------------------  IN: 1900 mL / OUT: 0 mL / NET: 1900 mL        PHYSICAL EXAM:  Vital Signs Last 24 Hrs  T(C): 36.5 (21 Mar 2022 11:25), Max: 36.8 (21 Mar 2022 04:55)  T(F): 97.7 (21 Mar 2022 11:25), Max: 98.2 (21 Mar 2022 04:55)  HR: 82 (21 Mar 2022 11:25) (77 - 88)  BP: 119/43 (21 Mar 2022 11:25) (116/68 - 128/62)  BP(mean): --  RR: 18 (21 Mar 2022 11:25) (18 - 18)  SpO2: 100% (21 Mar 2022 11:25) (95% - 100%)    Gen: NAD; sitting in bed comfortably   Pulm: no accessory muscle use; lungs clear on auscultation bilaterally; no wheezing or crackles.   Cards: RRR, nl S1/S2; no LE edema; no JVD  Abd: non-distended; soft and NT on exam; +bs  Ext: EFRA; no joint effusion or tenderness in upper and lower extremities; no cyanosis  Neuro: Awake and Alert; non-focal; moving all extremities.   Skin: RUE erythema and induration in the antecubital region. tender to palpation. worsen from prior.     LABS:                        9.6    8.05  )-----------( 240      ( 21 Mar 2022 07:39 )             29.9     03-21    132<L>  |  94<L>  |  9   ----------------------------<  109<H>  3.9   |  31  |  0.41<L>    Ca    8.5      21 Mar 2022 07:39  Phos  3.9     03-21  Mg     1.90     03-21    TPro  5.3<L>  /  Alb  2.4<L>  /  TBili  0.4  /  DBili  x   /  AST  15  /  ALT  8   /  AlkPhos  60  03-21                RADIOLOGY & ADDITIONAL TESTS:  Results Reviewed: Y  Imaging Personally Reviewed: Y  Electrocardiogram Personally Reviewed: Y    COORDINATION OF CARE:  Care Discussed with Consultants/Other Providers [Y/N]: Y  Prior or Outpatient Records Reviewed [Y/N]: Y

## 2022-03-21 NOTE — DISCHARGE NOTE PROVIDER - DETAILS OF MALNUTRITION DIAGNOSIS/DIAGNOSES
This patient has been assessed with a concern for Malnutrition and was treated during this hospitalization for the following Nutrition diagnosis/diagnoses:     -  03/15/2022: Severe protein-calorie malnutrition

## 2022-03-21 NOTE — DISCHARGE NOTE PROVIDER - NSDCCPCAREPLAN_GEN_ALL_CORE_FT
PRINCIPAL DISCHARGE DIAGNOSIS  Diagnosis: Dysphagia  Assessment and Plan of Treatment: Continue on Tube feedings Jevity 1.2 325cc 4x day   Free water flush 150cc pevery 6 hours   Daily PEG tube care as directed by nursing      SECONDARY DISCHARGE DIAGNOSES  Diagnosis: Small cell lung cancer  Assessment and Plan of Treatment: FU with Dr. Willis on Friday regarding fu for chemotherapy   You will have a radiation fu discussion when chemo is complete   Use 02 as directed at home via nasal cannula 2-3 L/min       Diagnosis: Cellulitis, upper arm  Assessment and Plan of Treatment: LUE doppler negative for deep vein thrombus   RUE doppler negative for deep vein thrombus, + for superficial   COntinue warm soaks to area   Augmentin antibiotic via peg for 4 more days   Fu with your medical doctor in 7 days for eval of arm    Diagnosis: Atrial fibrillation  Assessment and Plan of Treatment: Finish dosing of amiodorone 2x day then beging taking daily   You will need to follow up with your private MD for liver function blood work, Pulmmonary function testing and opthamology exams yearly   Eliquis started take 2x day monitor for bleeding     PRINCIPAL DISCHARGE DIAGNOSIS  Diagnosis: Dysphagia  Assessment and Plan of Treatment: Continue on Tube feedings Jevity 1.2 325cc 4x day   Free water flush 150cc pevery 6 hours   Daily PEG tube care as directed by nursing      SECONDARY DISCHARGE DIAGNOSES  Diagnosis: Small cell lung cancer  Assessment and Plan of Treatment: FU with Dr. Willis on Friday regarding fu for chemotherapy   You will have a radiation fu discussion when chemo is complete   Use 02 as directed at home via nasal cannula 2-3 L/min       Diagnosis: Cellulitis, upper arm  Assessment and Plan of Treatment: LUE doppler negative for deep vein thrombus   RUE doppler negative for deep vein thrombus, + for superficial   COntinue warm soaks to area   Augmentin antibiotic via peg for 4 more days   Fu with your medical doctor in 7 days for eval of arm    Diagnosis: Atrial fibrillation  Assessment and Plan of Treatment: begin daily amiodorone on 3/24   You will need to follow up with your private MD for liver function blood work, Pulmmonary function testing and opthamology exams yearly   Eliquis started take 2x day monitor for bleeding

## 2022-03-21 NOTE — PROGRESS NOTE ADULT - ATTENDING COMMENTS
77F former smoker recently diagnosed with NSCLC IIIa s/p debulking and R. bronchial stent, discharged a month ago now presents with worsening dysphagia and ACKERMAN. Found to have malignancy mass externally compression esophagus, now pending PEG. Course complicated by atrial fibrillation with RVR on 3/15. Cont amio po and will follow on tele.
77F former smoker recently diagnosed with NSCLC IIIa s/p debulking and R. bronchial stent, discharged a month ago now presents with worsening dysphagia and ACKERMAN. Found to have malignancy mass externally compression esophagus, now pending PEG. Course complicated by atrial fibrillation with RVR on 3/15. Now in NSR on amio- convert to oral when peg in place.
77F former smoker recently diagnosed with NSCLC IIIa s/p debulking and R. bronchial stent, discharged a month ago now presents with worsening dysphagia and ACKERMAN. Found to have malignancy mass externally compression esophagus, s/p debulking and R bronchial stent. Started on chemotherapy on 3/11. S/p PEG for dysphagia. Course complicated by paroxysmal atrial fibrillation with RVR on 3/15. Self converted to SR on Amiodarone (IV then 400mg BID) received 3 doses so far.  Continue Amiodarone (400mg via PEG BID for 7 days for total 5 gm, followed by 200mg PO QD afterwards). CHADSVASC  3, so AC is indicated  to reduce risk of CVA (Lovenox in particular given malignancy). Recommend AC if no contraindication. Will provided a follow up with EP in few months as patient is on Amiodarone therapy
77F former smoker recently diagnosed with NSCLC IIIa s/p debulking and R. bronchial stent, discharged a month ago now presents with worsening dysphagia and ACKERMAN. Found to have malignancy mass externally compression esophagus, s/p debulking and R bronchial stent. Started on chemotherapy on 3/11. S/p PEG for dysphagia. Course complicated by paroxysmal atrial fibrillation with RVR on 3/15. Self converted to SR on Amiodarone (IV then 400mg BID) received 3 doses so far. Cont AC. Will follow.
#Dysphagia: Extrinsic esophageal compression in setting of known lung cancer  #NSCLC    GI consulted for PEG placement. Unclear based on imaging if will be able to traverse esophageal stenosis with upper endoscope. Additionally, may require intubation for airway protection given underlying lung cancer, ongoing stridor, and anatomy. Would benefit from IR evaluation (GI has discussed case with them already). Appreciate pulmonary input for pre-procedure optimization and risk stratification.
Agree with above mentioned assessment and plan. Chemo per onc. BI stent in place LMB narrowing, clinical follow up. IP team to follow.
Patient seen and examined at the bedside. Agree with assessment and plan as stated above. CT reviewed. BI stent patent and in good place. LMB narrowing noted. Dyspnea likely multifactorial. Agree with oncology plan to proceed with inpatient systemic therapy. No plans for radiation at this time per RT as palliative radiation would preclude radiation with curative intent in the future given limited stage disease. Will monitor respiratory symptoms during inpatient stay to assess if further intervention from LMB needed. GI cx appreciated. No plans for endoscopic intervention from GI given extrinsic compression and high likelihood of stent to migrate. Patient to be set up for PEG to assist with nutrition. IP team to follow. Page IP team immediately with any changes or worsening of respiratory status.     Discussed at length with medical oncology, radiation oncology, advanced GI and primary teams.

## 2022-03-21 NOTE — PROGRESS NOTE ADULT - ASSESSMENT
76 yo lady former smoker recently diagnosed with NSCLC s/p debulking and R. bronchial stent, discharged a month ago now presents with worsening dysphagia and progressing shortness of breath, especially with exertion. CT showed tumor extension into left main bronchus. as well as external compression of esophagus from lung ca. started on chemo on 3/11 by Oncology. s/p PEG placement. Awaiting home tube feed set up.

## 2022-03-21 NOTE — PROGRESS NOTE ADULT - PROBLEM SELECTOR PLAN 4
Interventional pulmonology consulted  CT with new LMB narrowing  Will continue nebs  no interventions for now

## 2022-03-22 NOTE — PROGRESS NOTE ADULT - PROBLEM SELECTOR PROBLEM 4
ACKERMAN (dyspnea on exertion)
Atrial fibrillation
Prophylactic measure
Atrial fibrillation
Prophylactic measure
Atrial fibrillation
Atrial fibrillation
Prophylactic measure
ACKERMAN (dyspnea on exertion)
Atrial fibrillation
Prophylactic measure
Atrial fibrillation

## 2022-03-22 NOTE — PROGRESS NOTE ADULT - PROBLEM SELECTOR PLAN 1
- concern for developing cellulitis around prior IV infiltration site RUE, now on vancomycin.   - now clinically stable induration and pain resolved today.   - vancomycin bid. monitor trough q4th dose.   - MRSA PCR nasal swab pending.   - c/w warm compress.

## 2022-03-22 NOTE — PROGRESS NOTE ADULT - PROBLEM SELECTOR PROBLEM 1
Squamous NSCLC
Squamous NSCLC
Cellulitis of skin
Squamous NSCLC
Squamous NSCLC
Cellulitis of skin
Squamous NSCLC

## 2022-03-22 NOTE — PROGRESS NOTE ADULT - PROBLEM SELECTOR PROBLEM 2
Dysphagia
Squamous NSCLC
Dysphagia
Squamous NSCLC

## 2022-03-22 NOTE — PROGRESS NOTE ADULT - PROBLEM SELECTOR PROBLEM 3
ACKERMAN (dyspnea on exertion)
Dysphagia
ACKERMAN (dyspnea on exertion)
Dysphagia
ACKERMAN (dyspnea on exertion)

## 2022-03-22 NOTE — PROGRESS NOTE ADULT - PROBLEM SELECTOR PROBLEM 5
Prophylactic measure
Atrial fibrillation
Atrial fibrillation

## 2022-03-22 NOTE — PROGRESS NOTE ADULT - PROBLEM SELECTOR PLAN 3
S/P debulking and stent  CT chest with compression of esophagus due to tumor.   GI recs appreciated, high possibility of stent migration, rec for PEG  - now s/p IR guided PEG placement, changed to bolus tube feed regimen 3/19  - psyllium powder 1 packet three times a day to assist w/ tube feed induced diarrhea
Interventional pulmonology consulted  CT with new LMB narrowing  Will continue nebs  no interventions for now, pulzachary levine today
Interventional pulmonology consulted  Will continue nebs, follow up recs for further interventions.
Interventional pulmonology consulted  CT with new LMB narrowing  Will continue nebs  no interventions for now
Interventional pulmonology consulted  CT with new LMB narrowing  Will continue nebs  no interventions for now  high risk for PEG but otherwise optimized per pulm
S/P debulking and stent  CT chest with compression of esophagus due to tumor.   GI recs appreciated, high possibility of stent migration, rec for PEG  - now s/p IR guided PEG placement, changed to bolus tube feed regimen 3/19  - psyllium powder 1 packet three times a day to assist w/ tube feed induced diarrhea
Interventional pulmonology consulted  CT with new LMB narrowing  Will continue nebs  no interventions for now
Interventional pulmonology consulted  Will continue nebs, follow up recs for further interventions.  - no plan for pulmonary intervention at this time.
Interventional pulmonology consulted  CT with new LMB narrowing  Will continue nebs  no interventions for now
Interventional pulmonology consulted  Will continue nebs, follow up recs for further interventions.
Interventional pulmonology consulted  CT with new LMB narrowing  Will continue nebs  no interventions for now  high risk for PEG but otherwise optimized per pulm
Interventional pulmonology consulted  CT with new LMB narrowing  Will continue nebs  no interventions for now

## 2022-03-22 NOTE — PROGRESS NOTE ADULT - ASSESSMENT
76 yo lady former smoker recently diagnosed with NSCLC s/p debulking and R. bronchial stent, discharged a month ago now presents with worsening dysphagia and progressing shortness of breath, especially with exertion. CT showed tumor extension into left main bronchus. as well as external compression of esophagus from lung ca. started on chemo on 3/11 by Oncology. s/p PEG placement. home TF arranged. now with cellulitis/thrombophlebitis over RUE IV site. on IV abx.

## 2022-03-22 NOTE — PROGRESS NOTE ADULT - PROBLEM SELECTOR PLAN 6
AC plan as above   RUE w/o DVT, Left upper extremity duplex w/o DVT.  DW ACP  PT eval - no needs
AC plan as above   RUE w/o DVT, Left upper extremity duplex w/o DVT.  DW ACP  PT eval - no needs   updated  and patient bedside

## 2022-03-22 NOTE — PROGRESS NOTE ADULT - PROBLEM SELECTOR PLAN 2
Still treatment naive  As above, s/p debulking and r. bronchial stent  repeat CT showed left main bronchial invasion of tumor.   discussed with IP, Rad/Onc and Heme/Onc.   no plan for urgent surgical intervention of lung lesions.  started inpatient chemo 3/11 per onc  no need for port/picc - chemo as long as pt admitted but should not hold up discharge per onc   further assessment for Rad/onc after chemo as premature RT can inhibit curative approach

## 2022-03-22 NOTE — PROGRESS NOTE ADULT - NUTRITIONAL ASSESSMENT
This patient has been assessed with a concern for Malnutrition and has been determined to have a diagnosis/diagnoses of Severe protein-calorie malnutrition.    This patient is being managed with:   Diet NPO with Tube Feed-  Tube Feeding Modality: Gastrostomy  Jevity 1.2 Keith (JEVITY1.2RTH)  Total Volume for 24 Hours (mL): 1296  Continuous  Starting Tube Feed Rate {mL per Hour}: 20  Increase Tube Feed Rate by (mL): 10     Every 4 hours  Until Goal Tube Feed Rate (mL per Hour): 54  Tube Feed Duration (in Hours): 24  Tube Feed Start Time: 00:00  Entered: Mar 17 2022 10:23AM    
This patient has been assessed with a concern for Malnutrition and has been determined to have a diagnosis/diagnoses of Severe protein-calorie malnutrition.    This patient is being managed with:   Diet NPO with Tube Feed-  Tube Feeding Modality: Gastrostomy  Jevity 1.2 Keith (JEVITY1.2RTH)  Total Volume for 24 Hours (mL): 1296  Continuous  Starting Tube Feed Rate {mL per Hour}: 20  Increase Tube Feed Rate by (mL): 10     Every 4 hours  Until Goal Tube Feed Rate (mL per Hour): 54  Tube Feed Duration (in Hours): 24  Tube Feed Start Time: 00:00  Entered: Mar 17 2022 10:23AM    
This patient has been assessed with a concern for Malnutrition and has been determined to have a diagnosis/diagnoses of Severe protein-calorie malnutrition.    This patient is being managed with:   Diet NPO with Tube Feed-  Tube Feeding Modality: Gastrostomy  Jevity 1.2 Keith (JEVITY1.2RTH)  Total Volume for 24 Hours (mL): 1300  Bolus  Total Volume of Bolus (mL):  325  Total # of Feeds: 4  Tube Feed Frequency: Every 6 hours   Tube Feed Start Time: 23:59  Bolus Feed Rate (mL per Hour): 325   Bolus Feed Duration (in Hours): 1  Free Water Flush  Bolus   Total Volume per Flush (mL): 150   Frequency: Every 6 Hours   Total Daily Volume of Flush (mL): 600  Entered: Mar 19 2022  7:26PM    
This patient has been assessed with a concern for Malnutrition and has been determined to have a diagnosis/diagnoses of Severe protein-calorie malnutrition.    This patient is being managed with:   Diet NPO-  Entered: Mar 10 2022  1:50PM    
This patient has been assessed with a concern for Malnutrition and has been determined to have a diagnosis/diagnoses of Severe protein-calorie malnutrition.    This patient is being managed with:   Diet NPO with Tube Feed-  Tube Feeding Modality: Gastrostomy  Jevity 1.2 Keith (JEVITY1.2RTH)  Total Volume for 24 Hours (mL): 1300  Bolus  Total Volume of Bolus (mL):  325  Total # of Feeds: 4  Tube Feed Frequency: Every 6 hours   Tube Feed Start Time: 23:59  Bolus Feed Rate (mL per Hour): 325   Bolus Feed Duration (in Hours): 1  Free Water Flush  Bolus   Total Volume per Flush (mL): 150   Frequency: Every 6 Hours   Total Daily Volume of Flush (mL): 600  Entered: Mar 19 2022  7:26PM    
This patient has been assessed with a concern for Malnutrition and has been determined to have a diagnosis/diagnoses of Severe protein-calorie malnutrition.    This patient is being managed with:   Diet NPO-  Entered: Mar 10 2022  1:50PM

## 2022-03-22 NOTE — PROGRESS NOTE ADULT - PROBLEM SELECTOR PLAN 5
new onset  with RVR leading to shock  EP consulted - on amio, transitioned to oral now s/p PEG  TSH, trop, CKmb normal  TTE with normal cardiac fx - mild MR, mod TR - no stigma of rheumatic DZ  now back to NSR   benefits from full AC based on CHADS score -dw pt, agreeable - TTE with mild MR, no valvular pathology  on eliquis for AC  - send meds for insurance coverage.

## 2022-03-22 NOTE — PROGRESS NOTE ADULT - SUBJECTIVE AND OBJECTIVE BOX
[facility] Division of Hospital Medicine  Fredrick Lau MD  In House Pager [phone]    Patient is a 77y old  Female who presents with a chief complaint of worsening shortness of breath/dysphagia (21 Mar 2022 15:56)      SUBJECTIVE / OVERNIGHT EVENTS:  No overnight events. Labs and vitals reviewed.  Patient seen and examined at bedside, no acute complaints.  No fever, no chills, no SOB, no CP, no n/v/d, no abd pain, no dysuria      MEDICATIONS  (STANDING):  aMIOdarone    Tablet   Oral   aMIOdarone    Tablet 400 milliGRAM(s) Oral every 12 hours  apixaban 5 milliGRAM(s) Enteral Tube every 12 hours  budesonide  80 MICROgram(s)/formoterol 4.5 MICROgram(s) Inhaler 2 Puff(s) Inhalation two times a day  influenza  Vaccine (HIGH DOSE) 0.7 milliLiter(s) IntraMuscular once  lactobacillus acidophilus 1 Tablet(s) Oral two times a day  psyllium Powder 1 Packet(s) Enteral Tube three times a day  sodium chloride 3%  Inhalation 4 milliLiter(s) Inhalation every 12 hours  vancomycin  IVPB 1000 milliGRAM(s) IV Intermittent every 12 hours    MEDICATIONS  (PRN):  acetaminophen     Tablet .. 650 milliGRAM(s) Oral every 6 hours PRN Temp greater or equal to 38C (100.4F), Mild Pain (1 - 3), Moderate Pain (4 - 6)  diphenhydrAMINE Injectable 50 milliGRAM(s) IV Push once PRN PRN Chemotherapy Reaction  ipratropium    for Nebulization 500 MICROGram(s) Nebulizer every 6 hours PRN SOB/Wheezing  levalbuterol Inhalation 0.63 milliGRAM(s) Inhalation every 6 hours PRN SOB/Wheezing  melatonin 3 milliGRAM(s) Oral at bedtime PRN Insomnia  methylPREDNISolone sodium succinate Injectable 125 milliGRAM(s) IV Push once PRN PRN Chemotherapy Reaction  methylPREDNISolone sodium succinate Injectable 125 milliGRAM(s) IV Push once PRN PRN Chemotherapy Reaction  zolpidem 5 milliGRAM(s) Oral at bedtime PRN Insomnia    CAPILLARY BLOOD GLUCOSE        I&O's Summary    21 Mar 2022 07:01  -  22 Mar 2022 07:00  --------------------------------------------------------  IN: 1425 mL / OUT: 0 mL / NET: 1425 mL        PHYSICAL EXAM:  Vital Signs Last 24 Hrs  T(C): 36.7 (22 Mar 2022 09:02), Max: 36.7 (22 Mar 2022 05:28)  T(F): 98 (22 Mar 2022 09:02), Max: 98 (22 Mar 2022 05:28)  HR: 80 (22 Mar 2022 09:02) (75 - 80)  BP: 103/40 (22 Mar 2022 09:02) (103/40 - 116/55)  BP(mean): --  RR: 19 (22 Mar 2022 09:02) (17 - 19)  SpO2: 97% (22 Mar 2022 09:02) (95% - 97%)    Gen: NAD; sitting in bed comfortably   Pulm: no accessory muscle use; lungs clear on auscultation bilaterally; no wheezing or crackles.   Cards: RRR, nl S1/S2; no LE edema; no JVD  Abd: non-distended; soft and NT on exam; +bs  Ext: EFRA; no joint effusion or tenderness in upper and lower extremities; no cyanosis  Neuro: Awake and Alert; non-focal; moving all extremities.   Skin: RUE and LUE induration. blanching erythema over RUE, less painful on palpation today.     LABS:                        10.0   8.13  )-----------( 243      ( 22 Mar 2022 06:57 )             30.8     03-22    134<L>  |  96<L>  |  8   ----------------------------<  105<H>  4.1   |  31  |  0.41<L>    Ca    8.5      22 Mar 2022 06:57  Phos  3.8     03-22  Mg     2.00     03-22    TPro  5.5<L>  /  Alb  2.6<L>  /  TBili  0.5  /  DBili  x   /  AST  16  /  ALT  10  /  AlkPhos  60  03-22                RADIOLOGY & ADDITIONAL TESTS:  Results Reviewed: Y  Imaging Personally Reviewed: Y  Electrocardiogram Personally Reviewed: Y    COORDINATION OF CARE:  Care Discussed with Consultants/Other Providers [Y/N]: Y  Prior or Outpatient Records Reviewed [Y/N]: Y

## 2022-03-23 NOTE — CHART NOTE - NSCHARTNOTEFT_GEN_A_CORE
Patient seen and examined at bedside today.   thrombophlebitis and cellulitis of b/l UE. clinically improving.   will transition to PO Augmentin today, complete 4 more days of PO abx.   patient will follow up at [facility] with Dr. Willis on Friday to evaluate improvement prior to next dose of chemo.   medically stable for d/c home today.   discussed with patient, ACP and CM  55 minutes spent coordinating discharge     Vital Signs Last 24 Hrs  T(C): 36.5 (23 Mar 2022 12:30), Max: 36.7 (22 Mar 2022 20:09)  T(F): 97.7 (23 Mar 2022 12:30), Max: 98.1 (22 Mar 2022 20:09)  HR: 85 (23 Mar 2022 12:30) (75 - 88)  BP: 109/54 (23 Mar 2022 12:30) (100/60 - 117/63)  BP(mean): --  RR: 18 (23 Mar 2022 12:30) (16 - 18)  SpO2: 98% (23 Mar 2022 12:30) (95% - 100%)    CAPILLARY BLOOD GLUCOSE                              10.1   5.47  )-----------( 233      ( 23 Mar 2022 01:45 )             32.7     03-23    134<L>  |  96<L>  |  7   ----------------------------<  112<H>  4.0   |  32<H>  |  0.46<L>    Ca    9.2      23 Mar 2022 01:45  Phos  3.4     03-23  Mg     1.90     03-23    TPro  5.5<L>  /  Alb  2.6<L>  /  TBili  0.5  /  DBili  x   /  AST  16  /  ALT  10  /  AlkPhos  60  03-22

## 2022-03-23 NOTE — PROGRESS NOTE ADULT - PROVIDER SPECIALTY LIST ADULT
Heme/Onc
Hospitalist
Intervent Pulmonology
Electrophysiology
Electrophysiology
Heme/Onc
Heme/Onc
Electrophysiology
Heme/Onc
Hospitalist
Intervent Pulmonology
Intervent Pulmonology
Electrophysiology
Gastroenterology
Intervent Pulmonology
Hospitalist
Hospitalist
Intervent Radiology
Hospitalist

## 2022-03-23 NOTE — PROGRESS NOTE ADULT - SUBJECTIVE AND OBJECTIVE BOX
INTERVAL HPI/OVERNIGHT EVENTS:  Patient seen at bedside.      VITAL SIGNS:  T(F): 98 (03-23-22 @ 05:30)  HR: 85 (03-23-22 @ 05:30)  BP: 100/60 (03-23-22 @ 05:30)  RR: 18 (03-23-22 @ 05:30)  SpO2: 98% (03-23-22 @ 05:30)  Wt(kg): --    PHYSICAL EXAM:    Constitutional: NAD, resting in bed comfortably  Eyes: EOMI, sclera non-icteric  Neck: supple, no LAP  Respiratory: CTA b/l, good air entry b/l, no wheezing, rhonchi or crackels  Cardiovascular: RRR, normal S1S2, no M/R/G  Gastrointestinal: soft, NTND  Extremities: no edema  Neurological: AAOx3, non focal  Skin: Normal temperature    MEDICATIONS  (STANDING):  aMIOdarone    Tablet   Oral   aMIOdarone    Tablet 400 milliGRAM(s) Oral every 12 hours  apixaban 5 milliGRAM(s) Enteral Tube every 12 hours  budesonide  80 MICROgram(s)/formoterol 4.5 MICROgram(s) Inhaler 2 Puff(s) Inhalation two times a day  influenza  Vaccine (HIGH DOSE) 0.7 milliLiter(s) IntraMuscular once  lactobacillus acidophilus 1 Tablet(s) Oral two times a day  psyllium Powder 1 Packet(s) Enteral Tube three times a day  sodium chloride 3%  Inhalation 4 milliLiter(s) Inhalation every 12 hours  vancomycin  IVPB 1000 milliGRAM(s) IV Intermittent every 12 hours    MEDICATIONS  (PRN):  acetaminophen     Tablet .. 650 milliGRAM(s) Oral every 6 hours PRN Temp greater or equal to 38C (100.4F), Mild Pain (1 - 3), Moderate Pain (4 - 6)  diphenhydrAMINE Injectable 50 milliGRAM(s) IV Push once PRN PRN Chemotherapy Reaction  ipratropium    for Nebulization 500 MICROGram(s) Nebulizer every 6 hours PRN SOB/Wheezing  levalbuterol Inhalation 0.63 milliGRAM(s) Inhalation every 6 hours PRN SOB/Wheezing  melatonin 3 milliGRAM(s) Oral at bedtime PRN Insomnia  methylPREDNISolone sodium succinate Injectable 125 milliGRAM(s) IV Push once PRN PRN Chemotherapy Reaction  methylPREDNISolone sodium succinate Injectable 125 milliGRAM(s) IV Push once PRN PRN Chemotherapy Reaction      Allergies    No Known Allergies    Intolerances        LABS:                        10.1   5.47  )-----------( 233      ( 23 Mar 2022 01:45 )             32.7     03-23    134<L>  |  96<L>  |  7   ----------------------------<  112<H>  4.0   |  32<H>  |  0.46<L>    Ca    9.2      23 Mar 2022 01:45  Phos  3.4     03-23  Mg     1.90     03-23    TPro  5.5<L>  /  Alb  2.6<L>  /  TBili  0.5  /  DBili  x   /  AST  16  /  ALT  10  /  AlkPhos  60  03-22          RADIOLOGY & ADDITIONAL TESTS:  Studies reviewed.   INTERVAL HPI/OVERNIGHT EVENTS:  Patient seen at bedside.  Feels well, planned for dc home today.    VITAL SIGNS:  T(F): 98 (03-23-22 @ 05:30)  HR: 85 (03-23-22 @ 05:30)  BP: 100/60 (03-23-22 @ 05:30)  RR: 18 (03-23-22 @ 05:30)  SpO2: 98% (03-23-22 @ 05:30)  Wt(kg): --    PHYSICAL EXAM:    Constitutional: NAD, resting in bed comfortably  Eyes: EOMI, sclera non-icteric  Neck: supple, no LAP  Respiratory: CTA b/l, good air entry b/l, no wheezing, rhonchi or crackels  Cardiovascular: RRR, normal S1S2, no M/R/G  Gastrointestinal: soft, NTND  Extremities: no edema  Neurological: AAOx3, non focal  Skin: Normal temperature    MEDICATIONS  (STANDING):  aMIOdarone    Tablet   Oral   aMIOdarone    Tablet 400 milliGRAM(s) Oral every 12 hours  apixaban 5 milliGRAM(s) Enteral Tube every 12 hours  budesonide  80 MICROgram(s)/formoterol 4.5 MICROgram(s) Inhaler 2 Puff(s) Inhalation two times a day  influenza  Vaccine (HIGH DOSE) 0.7 milliLiter(s) IntraMuscular once  lactobacillus acidophilus 1 Tablet(s) Oral two times a day  psyllium Powder 1 Packet(s) Enteral Tube three times a day  sodium chloride 3%  Inhalation 4 milliLiter(s) Inhalation every 12 hours  vancomycin  IVPB 1000 milliGRAM(s) IV Intermittent every 12 hours    MEDICATIONS  (PRN):  acetaminophen     Tablet .. 650 milliGRAM(s) Oral every 6 hours PRN Temp greater or equal to 38C (100.4F), Mild Pain (1 - 3), Moderate Pain (4 - 6)  diphenhydrAMINE Injectable 50 milliGRAM(s) IV Push once PRN PRN Chemotherapy Reaction  ipratropium    for Nebulization 500 MICROGram(s) Nebulizer every 6 hours PRN SOB/Wheezing  levalbuterol Inhalation 0.63 milliGRAM(s) Inhalation every 6 hours PRN SOB/Wheezing  melatonin 3 milliGRAM(s) Oral at bedtime PRN Insomnia  methylPREDNISolone sodium succinate Injectable 125 milliGRAM(s) IV Push once PRN PRN Chemotherapy Reaction  methylPREDNISolone sodium succinate Injectable 125 milliGRAM(s) IV Push once PRN PRN Chemotherapy Reaction      Allergies    No Known Allergies    Intolerances        LABS:                        10.1   5.47  )-----------( 233      ( 23 Mar 2022 01:45 )             32.7     03-23    134<L>  |  96<L>  |  7   ----------------------------<  112<H>  4.0   |  32<H>  |  0.46<L>    Ca    9.2      23 Mar 2022 01:45  Phos  3.4     03-23  Mg     1.90     03-23    TPro  5.5<L>  /  Alb  2.6<L>  /  TBili  0.5  /  DBili  x   /  AST  16  /  ALT  10  /  AlkPhos  60  03-22          RADIOLOGY & ADDITIONAL TESTS:  Studies reviewed.

## 2022-03-23 NOTE — PROGRESS NOTE ADULT - REASON FOR ADMISSION
worsening shortness of breath/dysphagia

## 2022-03-23 NOTE — PROGRESS NOTE ADULT - ASSESSMENT
76 yo F with recently diagnosed stage IIIA NSCLC, s/p outpatient onc and rad onc eval with plan to start chemotherapy on monday 3/14, with plan to add rt when symptoms are better controlled, presenting to the ED with dysphagia.   Treatment plan for the patient is potentially curative, therefore palliative rt to the mass causing esophageal obstruction is not recommended as this will limit the curative plans.   Plan discussed with Pulm, rad onc and primary team.  Started weekly carbo/taxol as inpatient.    S/p C1 Carbo AUC 2, Taxol 40 mg/m2 3/11/2022 and C2 Carbo/Taxol on 3/18/22  S/p PEG placement by IR 3/16     -Tolerating tube feeds well  -Continue Eliquis for Afib and bilateral UE symptomatic thrombophlebitis   -Pulm and rad onc input appreciated.  -Supportive care, pain control, Nutrition, PT, DVT ppx  -d/c planned for today .  -Outpatient oncology f/u     Will follow. Please do not hesitate to call back with questions.     Cintia Willis MD  Medical Oncology Attending  C: [phone] 78 yo F with recently diagnosed stage IIIA NSCLC, s/p outpatient onc and rad onc eval with plan to start chemotherapy on monday 3/14, with plan to add rt when symptoms are better controlled, presenting to the ED with dysphagia.   Treatment plan for the patient is potentially curative, therefore palliative rt to the mass causing esophageal obstruction is not recommended as this will limit the curative plans.   Plan discussed with Pulm, rad onc and primary team.  Started weekly carbo/taxol as inpatient.    S/p C1 Carbo AUC 2, Taxol 40 mg/m2 3/11/2022 and C2 Carbo/Taxol on 3/18/22  S/p PEG placement by IR 3/16     -Phlebitis and cellulitis of arms, on abx, reports improvement. Plan is to dc on Augmentin  -Will evaluate in treatment room at [facility] on Friday to ensure that she can get chemotherapy and cellulitis/phlebitis is improved.   -Tolerating tube feeds well  -Continue Eliquis for Afib and bilateral UE symptomatic thrombophlebitis   -Pulm and rad onc input appreciated.  -Supportive care, pain control, Nutrition, PT, DVT ppx  -d/c planned for today .  -Outpatient oncology f/u     Will follow. Please do not hesitate to call back with questions.     Cintia Willis MD  Medical Oncology Attending  C: [phone]

## 2022-03-23 NOTE — CHART NOTE - NSCHARTNOTESELECT_GEN_ALL_CORE
Discharge/Event Note
Event Note
Follow Up/Nutrition Services
GI/Event Note
O2 Sat-Ambulatory/Event Note

## 2022-03-25 PROBLEM — K59.00 CONSTIPATION: Status: ACTIVE | Noted: 2022-01-01

## 2022-03-28 NOTE — HISTORY OF PRESENT ILLNESS
[FreeTextEntry1] : Ms. Heaton is 77 year old female with NSCLC with squamous features.\par \par Oncology Hx:\par \par Presented Jan, 2022 with wheezing, SOB, dysphagia and weight loss. X-Ray with right sided subhilar opacity and increased density in the right hilum.\par \par CT Chest/Abd/Pelvis 1/28/2022- An infiltrating heterogeneously hypoenhancing mass in the posterior perihilar RLL, posterior perihilar mass with extension into the posterior mediastinum, subcarinal space and right hilum.\par \par 2/2/2022- she underwent EBUS guided level 7/subcarinal mass biopsy and airway stent placement, pathology demonstrated Non-Small Cell Carcinoma with squamous features.\par \par CT chest 2/5/2022 noted subcarinal mass measuring 2.6 x 4.0 cm with mass effect on the bronchus intermedium, partial RML atelectasis, partial RLL and narrowing of the right lower lobe bronchus likely tumor involvement.\par \par 2/6/2022- Brain MRI-[facility]\par \par PET CT- 2/25/2022-\par FDG-avid mass in RLL posterior perihilar region, extending into the posterior mediastinum, subcarinal space, and right hilum, measuring up to approximately 5.7 cm, SUV 25.3 corresponds to known non-small cell lung cancer. The mass is similar in size as compared to prior CT scans from 2/5/2022 and 1/28/2022. Mildly FDG-avid right lower lobe tree in bud opacities and moderately FDG-avid peripheral right lower lobe opacities, new, or increased as compared to 2/5/2022. Correlate clinically for infection\par \par She saw Dr. Willis 2/22/22 with discussions of concurrent Chemo/RT. Awaiting Foundation one PDL1 TPS and NGS.\par \par 3/4/2022- Today she presents in initial consultation accompanied by her spouse. Noted to be wheezing, SPO2- 94 -95% RA. VSS. Reports increase exertional SOB, intermittent productive NB cough, upper right side pain, difficulty passing food, denies dysphagia, but find food stuck past the pharynx and have to sip water with every bite.. Denies chest, no GI/ symptoms.

## 2022-03-28 NOTE — PHYSICAL EXAM
[Normal] : well developed, well nourished, in no acute distress [Exaggerated Use Of Accessory Muscles For Inspiration] : no accessory muscle use [Heart Sounds] : normal S1 and S2 [de-identified] : RUL & RML with inspiratory/expiratory wheezing.

## 2022-03-28 NOTE — REVIEW OF SYSTEMS
[Fatigue] : fatigue [Shortness Of Breath] : shortness of breath [Wheezing] : wheezing [Cough] : cough [SOB on Exertion] : shortness of breath during exertion [Negative] : Allergic/Immunologic [Chest Pain] : no chest pain

## 2022-04-01 NOTE — PHYSICAL EXAM
[Ambulatory and capable of all self care but unable to carry out any work activities] : Status 2- Ambulatory and capable of all self care but unable to carry out any work activities. Up and about more than 50% of waking hours [Normal] : affect appropriate [de-identified] : Diffuse wheezing [de-identified] : PEG tube insertion site healing. No redness, no discharge.

## 2022-04-01 NOTE — HISTORY OF PRESENT ILLNESS
[de-identified] : 77f with recently diagnosed NSCLC with squamous features, presenting for initial oncology evaluation. \par \par - Patient has had wheezing since late December 2021, found to have a RLL mass by Pulmonology\par - Discussed with Pulmonology, mass involves station 7 lymph node. It extends into the posterior mediastinum, subcarinal space, and right hilum with focal severe narrowing of the RUL bronchus, diffuse severe narrowing of the bronchus intermedius, and subtotal occlusion of the RLL bronchus. It is inseparable from the esophagus at the level of the juan m, but no esophageal dilation. Also as seen on CT, there are shoddy precarinal, right paratracheal, and anterior-posterior window LNs of inconclusive significance\par -No distant metastasis on CT a/p and MRI brain\par - S/p bronchoscopy 2/2/22 with endobronchial stent of the bronchus intermedius, tumor debulking, and biopsy of the lung mass\par - Biopsy results show non-small cell carcinoma with Squamous features\par - NGS and PDL-1 testing not back\par - PET/CT is pending and scheduled for 2/25\par \par On CT chest 1/28/22 there is a Superior segment right lower lobe posterior perihilar mass with direct extension to the right hilum, subcarinal space, and posterior mediastinum. Encasement and narrowing of the right lung airways. Mass involves station 7 lymph node. It extends into the posterior mediastinum, subcarinal space, and right hilum with focal severe narrowing of the RUL bronchus, diffuse severe narrowing of the bronchus intermedius, and subtotal occlusion of the RLL bronchus. It is inseparable from the esophagus at the level of the juan m, but no esophageal dilation. Also as seen on CT, there are shoddy precarinal, right paratracheal, and anterior-posterior window LNs of inconclusive significance.\par Repeat CT chest 2/5/2022 - Subcarinal mass measuring approximately 2.6 x 4.0 cm with mass effect on the bronchus intermedius. A stent is noted within the bronchus intermedius and appears patent. \par \par Today, patient presents with her  Buddy. She has 2 sons, one lives nearby in [facility] and one lives in [place]. \par She is on a puree diet, Buddy helps prepare her meals\par She has a 40 pack year smoking history, she quit 7 years ago [de-identified] : continues to have wheezing and difficulty swallowing. Has been eating and drinking very little.\par No pain.

## 2022-04-01 NOTE — PHYSICAL EXAM
[Ambulatory and capable of all self care but unable to carry out any work activities] : Status 2- Ambulatory and capable of all self care but unable to carry out any work activities. Up and about more than 50% of waking hours [Normal] : affect appropriate [de-identified] : Diffuse wheezing

## 2022-04-01 NOTE — ASSESSMENT
[FreeTextEntry1] : 77f with recently diagnosed NSCLC with squamous features, no distant mets, presenting for oncology evaluation.\par On CT chest 1/28/22 there is a Superior segment right lower lobe posterior perihilar mass with direct extension to the right hilum, subcarinal space, and posterior mediastinum. Encasement and narrowing of the right lung airways.\par Mass involves station 7 lymph node. It extends into the posterior mediastinum, subcarinal space, and right hilum with focal severe narrowing of the RUL bronchus, diffuse severe narrowing of the bronchus intermedius, and subtotal occlusion of the RLL bronchus. It is inseparable from the esophagus at the level of the juan m, but no esophageal dilation. Also as seen on CT, there are shoddy precarinal, right paratracheal, and anterior-posterior window LNs of inconclusive significance.\par She is s/p Bronchial stent placement. \par Repeat CT chest 2/5/2022 - Subcarinal mass measuring approximately 2.6 x 4.0 cm with mass effect on the bronchus intermedius. A stent is noted within the bronchus intermedius and appears patent. \par \par Biomarker Findings\par Microsatellite status - MS-Stable\par Tumor Mutational [place] - 4 Muts/Mb\par Genomic Findings\par EGFR amplification\par KRAS amplification\par MYC amplification\par CDKN2A/B CDKN2A loss exon 2\par KEAP1 R320G\par MLL2 splice site 6234+1G>T\par TP53 R249M\par \par PD-L1 10%\par \par Had visit with Dr Interiano, recommendation is chemotherapy followed by reassessment for RT. RT can be offered if no metastatic disease on follow up scans. \par \par Pt was hospitalized after last clinic visit with poor PO intake and worsening wheezing. \par She received 2 weekly doses of carboplatin and taxol as inpatient. \par She had PEG placed. \par \par -C3 carbo/taxol this week\par -Port placement 4/4\par -Monitor labs\par -Repeat sans after C 6\par -F/u Nutrition consult\par -RTC 1 week\par \par Total time of this visit, including time spent face to face with patient and/or via video/audio, and also in preparing for today's visit for MDM and documentation (Medical Decision Making, including consideration of possible diagnoses, management options, complex medical record review, review of diagnostic tests and information, consideration and discussion of significant complications based on comorbidities, and discussion with providers involved with the care of the patient) 40 minutes.\par

## 2022-04-01 NOTE — HISTORY OF PRESENT ILLNESS
[de-identified] : 77f with recently diagnosed NSCLC with squamous features, presenting for initial oncology evaluation. \par \par - Patient has had wheezing since late December 2021, found to have a RLL mass by Pulmonology\par - Discussed with Pulmonology, mass involves station 7 lymph node. It extends into the posterior mediastinum, subcarinal space, and right hilum with focal severe narrowing of the RUL bronchus, diffuse severe narrowing of the bronchus intermedius, and subtotal occlusion of the RLL bronchus. It is inseparable from the esophagus at the level of the juan m, but no esophageal dilation. Also as seen on CT, there are shoddy precarinal, right paratracheal, and anterior-posterior window LNs of inconclusive significance\par -No distant metastasis on CT a/p and MRI brain\par - S/p bronchoscopy 2/2/22 with endobronchial stent of the bronchus intermedius, tumor debulking, and biopsy of the lung mass\par - Biopsy results show non-small cell carcinoma with Squamous features\par - NGS and PDL-1 testing not back\par - PET/CT is pending and scheduled for 2/25\par \par On CT chest 1/28/22 there is a Superior segment right lower lobe posterior perihilar mass with direct extension to the right hilum, subcarinal space, and posterior mediastinum. Encasement and narrowing of the right lung airways. Mass involves station 7 lymph node. It extends into the posterior mediastinum, subcarinal space, and right hilum with focal severe narrowing of the RUL bronchus, diffuse severe narrowing of the bronchus intermedius, and subtotal occlusion of the RLL bronchus. It is inseparable from the esophagus at the level of the juan m, but no esophageal dilation. Also as seen on CT, there are shoddy precarinal, right paratracheal, and anterior-posterior window LNs of inconclusive significance.\par Repeat CT chest 2/5/2022 - Subcarinal mass measuring approximately 2.6 x 4.0 cm with mass effect on the bronchus intermedius. A stent is noted within the bronchus intermedius and appears patent. \par \par Today, patient presents with her  Buddy. She has 2 sons, one lives nearby in [facility] and one lives in [place]. \par She is on a puree diet, Buddy helps prepare her meals\par She has a 40 pack year smoking history, she quit 7 years ago [de-identified] : S/p hospital admission, PEG placed and received 2 weekly doses of carbo/taxol as inpatient.\par PEG tube dressing is irritating. She does not have the correct supplies. \par Feeds are going OK. \par No pain, feels well. Tolerating chemo well. \par Cellulitis on forearm has resolved. \par Has appointment for PORT placement monday.\par Wheezing has improved.

## 2022-04-01 NOTE — ASSESSMENT
[FreeTextEntry1] : 77f with recently diagnosed NSCLC with squamous features, no distant mets, presenting for oncology evaluation.\par On CT chest 1/28/22 there is a Superior segment right lower lobe posterior perihilar mass with direct extension to the right hilum, subcarinal space, and posterior mediastinum. Encasement and narrowing of the right lung airways.\par Mass involves station 7 lymph node. It extends into the posterior mediastinum, subcarinal space, and right hilum with focal severe narrowing of the RUL bronchus, diffuse severe narrowing of the bronchus intermedius, and subtotal occlusion of the RLL bronchus. It is inseparable from the esophagus at the level of the juan m, but no esophageal dilation. Also as seen on CT, there are shoddy precarinal, right paratracheal, and anterior-posterior window LNs of inconclusive significance.\par She is s/p Bronchial stent placement. \par Repeat CT chest 2/5/2022 - Subcarinal mass measuring approximately 2.6 x 4.0 cm with mass effect on the bronchus intermedius. A stent is noted within the bronchus intermedius and appears patent. \par \par Had visit with Dr Interiano, recommendation is chemotherapy followed by reassessment for RT. RT can be offered if no metastatic disease on follow up scans. \par \par Biomarker Findings\par Microsatellite status - MS-Stable\par Tumor Mutational [place] - 4 Muts/Mb\par Genomic Findings\par EGFR amplification\par KRAS amplification\par MYC amplification\par CDKN2A/B CDKN2A loss exon 2\par KEAP1 R320G\par MLL2 splice site 6234+1G>T\par TP53 R249M\par \par PD-L1 10%\par \par PET CT and path/genomic findings reviewed with patient and her  in detail. \par Treatment with carboplatin/abraxane and keytruda offered. \par Discussed the natural course of disease, rationale for treatment and treatment expectation.\par All side effects, risks and benefits were discussed in detail, hand outs detailing the treatment drugs and their side effects were provided, and all questions were answered to the apparent satisfaction of the patient. Consent to start treatment was signed by patient and witnessed by Buddy, her .\par \par -Will start chemotherapy/immunotherapy\par -Labs today\par -Advised patient to go to the emergency room in case of worsening wheezing, SOB, or inability to take PO. \par -Labs today\par -Nutrition consult\par -RTC to start chemotherapy\par \par Total time of this visit, including time spent face to face with patient and/or via video/audio, and also in preparing for today's visit for MDM and documentation (Medical Decision Making, including consideration of possible diagnoses, management options, complex medical record review, review of diagnostic tests and information, consideration and discussion of significant complications based on comorbidities, and discussion with providers involved with the care of the patient) 60 minutes.\par

## 2022-04-01 NOTE — REVIEW OF SYSTEMS
[Fatigue] : fatigue [Recent Change In Weight] : ~T recent weight change [Dysphagia] : dysphagia [SOB on Exertion] : shortness of breath during exertion [Negative] : Allergic/Immunologic

## 2022-04-04 NOTE — ASU PATIENT PROFILE, ADULT - NSTOBACCONEVERSMOKERY/N_GEN_A
Glasses Rx given.
Monitor cataract for progression.
No prescription for glasses or contact lenses was given today.
Observe.
Patient given Rx for glasses.
Patient made aware of 24/7 emergency services.
Patient understands condition, prognosis and need for follow up care.
Recommended observation.
The cataracts are creating some visual symptoms that are tolerable at this time.
No

## 2022-04-04 NOTE — ASU DISCHARGE PLAN (ADULT/PEDIATRIC) - NURSING INSTRUCTIONS
Please feel free to contact us at [phone] if any  problem arises. After 6PM, Monday through Friday on weekends and on holidays, please call [phone] and ask for the radiology resident on call to be paged.

## 2022-04-04 NOTE — PHYSICAL EXAM
[No Acute Distress] : no acute distress [Normal Oropharynx] : normal oropharynx [Normal Appearance] : normal appearance [No Neck Mass] : no neck mass [Normal Rate/Rhythm] : normal rate/rhythm [Normal S1, S2] : normal s1, s2 [No Murmurs] : no murmurs [No Resp Distress] : no resp distress [No Acc Muscle Use] : no acc muscle use [No Abnormalities] : no abnormalities [Normal Gait] : normal gait [No Clubbing] : no clubbing [No Edema] : no edema [No Rash] : no rash [Normal Turgor] : normal turgor [No Focal Deficits] : no focal deficits [Oriented x3] : oriented x3 [Normal Affect] : normal affect [Normal Palpation] : normal palpation [Normal Rhythm and Effort] : normal rhythm and effort [TextBox_68] : +Decreased BS on the R, clear on the L [TextBox_89] : +PEG tube, dressed with gauze. Sutures in place [TextBox_105] : +Bruising on UE [TextBox_125] : +Bruising on the arms

## 2022-04-04 NOTE — ASU DISCHARGE PLAN (ADULT/PEDIATRIC) - NS MD DC FALL RISK RISK
For information on Fall & Injury Prevention, visit: [email or web address] OR  [email or web address] OR  https://www.cdc.gov/steadi/patient.html

## 2022-04-04 NOTE — ASU DISCHARGE PLAN (ADULT/PEDIATRIC) - ASU DC SPECIAL INSTRUCTIONSFT
Chest Port Placement Discharge Instructions    - You had a port implanted in your chest.   - The port is ready for use.  - YOU MAY RESUME HOME ELIQUIS IN 24 HOURS  - THERE IS SURGICAL GLUE OVERLYING THE CHEST AND NECK INCISIONS. DO NOT REMOVE. ALLOW TO FALL OFF NATURALLY. YOU MAY SHOWER IN 24 HOURS.  - No soaking or swimming for 2-4 weeks or until the site is completely healed.  - Do not perform any heavy lifting or put tension on the area for the next week or until the site is healed.  - Do not drive, engage in heavy lifting or strenuous activity, or drink any alcoholic beverages for the next 24 hours.     - You may resume your normal diet.  - It is normal to experience some pain over the site for the next few days. You may take Tylenol for that pain. Do not take more frequently than every 6 hours and do not exceed more than 3000mg of Tylenol in a 24 hour period.    Notify your primary physician and/or Interventional Radiology IMMEDIATELY if you experience any of the following       - Fever of 101F or 38C       - Chills or Rigors/ Shakes       - Swelling and/or Redness in the area around the port       - Worsening Pain       - Blood soaked bandages or worsening bleeding       - Lightheadedness and/or dizziness upon standing       - Chest Pain/ Tightness       - Shortness of Breath       - Difficulty walking    If you have a problem that you believe requires IMMEDIATE attention, please go to your NEAREST Emergency Room.

## 2022-04-04 NOTE — PRE PROCEDURE NOTE - PRE PROCEDURE EVALUATION
Vascular & Interventional Radiology    HPI: 77y Female with recently diagnosed NSCLC with squamous features, no distant mets, presenting for oncology evaluation. On CT chest 1/28/22 there is a Superior segment right lower lobe posterior perihilar mass with direct extension to the right hilum, subcarinal space, and posterior mediastinum. Encasement and narrowing of the right lung airways. Mass involves station 7 lymph node. It extends into the posterior mediastinum, subcarinal space, and right hilum with focal severe narrowing of the RUL bronchus, diffuse severe narrowing of the bronchus intermedius, and subtotal occlusion of the RLL bronchus. It is inseparable from the esophagus at the level of the juan m, but no esophageal dilation. Also as seen on CT, there are shoddy precarinal, right paratracheal, and anterior-posterior window LNs of inconclusive significance. She is s/p Bronchial stent placement.     Data:  T(C): 36.6  HR: 90  BP: 111/63  SpO2: 95%    Exam  Resting comfortably, NAD, normal respirations.    -WBC 5.93 / HgB 9.5 / Hct 29.3 / Plt 357  -Na 135 / Cl 99 / BUN 11 / Glucose 116  -K 4.2 / CO2 28 / Cr 0.52  -ALT 19 / Alk Phos 64 / T.Bili 0.3    Imaging: reviewed    Plan:   - 77y Female presents for chest port insertion  - Informed consent obtained.

## 2022-04-04 NOTE — REVIEW OF SYSTEMS
[Negative] : Endocrine [Cough] : cough [SOB on Exertion] : sob on exertion [Hemoptysis] : no hemoptysis [Sputum] : no sputum [Dyspnea] : no dyspnea [Pleuritic Pain] : no pleuritic pain [Chest Discomfort] : no chest discomfort [Raynaud] : no raynaud [Rash] : no rash [TextBox_69] : +PEG discomfort [TextBox_104] : +Bruising

## 2022-04-04 NOTE — END OF VISIT
[] : Fellow [Time Spent: ___ minutes] : I have spent [unfilled] minutes of time on the encounter. [>50% of the face to face encounter time was spent on counseling and/or coordination of care for ___] : Greater than 50% of the face to face encounter time was spent on counseling and/or coordination of care for [unfilled] [FreeTextEntry3] : \par Agree with assessment plan as discussed above.  Patient with non-small cell lung cancer with bronchus intermedius stent placed which is patent.  Also has left mainstem narrowing but is symptomatically doing okay.  Has a peg tube placed by interventional radiology.  She was advised to follow-up with interventional radiology given the issues with the PEG tube.  From the bronchus intermedius stent perspective patient was advised to continue therapy as discussed above.  Stent care instructions were provided.  Contact information was provided in case of any emergencies related to the stent.  The patient was advised to present to the ER in case of any respiratory distress or dyspnea or issues with the stent.  The patient will continue disease directed therapy which is chemotherapy followed by radiation.  We will continue clinical follow-up to assess if any endobronchial intervention is needed.  We will also perform surveillance bronchoscopy in another month.

## 2022-04-04 NOTE — ASSESSMENT
[FreeTextEntry1] : 77 year-old F with PMH NSCLC with BI stent, dysphagia s/p PEG, and pAF who presented to the interventional pulmonary clinic for follow up. She is doing well from a respiratory standpoint, but has been having other issues at present\par \par BI Stent\par - Plan for surveillance in mid-April, will follow up with patient\par - Continue airway clearance with albuterol and hypertonic saline\par - Continue Symbicort\par - Continue with Heme/Onc follow up, agree with plan for port placement\par - Will need radiation, plan is for definitive radiation after chemo. \par \par Stent card provided to the patient. Stent education performed. Advised to contact service if any new clinical symptoms. Advised to present to the ER for evaluation if any of the following symptoms noted: New or increased shortness of breath, new or increased chest pain, new or increased cough, new or increased hoarseness of loss of voice. Patient demonstrated understanding with verbal confirmation.\par \par PEG\par - Patient with issues with PEG tube\par - Placed by IR on 3/16, will reach out to IR to establish follow up\par - Also advised patient to establish care with GI in the community for continued care\par \par Case discussed with Dr. Dodd\par \par Dawood Vanessa, PGY-6\par Pulmonary and Critical Care Medicine

## 2022-04-04 NOTE — HISTORY OF PRESENT ILLNESS
[TextBox_4] : Interventional Pulmonology Follow Up Note\par \par 76 y/o woman former smoker recently admitted for dysphagia, weight loss, wheezing and shortness of breath and found to have right hilar mass. Was noted to have significant narrowing of the bronchus intermedius. She underwent rigid bronchoscopy with balloon dilatation of 90% bronchial stenosis of bronchus intermedius, tumor debulking and bronchial stent placement (10 X 30mm) in the bronchus intermedius, as well as endobronchial ultrasound-guided biopsy of subcarinal mass. 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 She presents today to establish care for her central airway obstruction.\par \par In the interim patient was hospitalized and required PEG placement. She was also placed on Eliquis and Amiodarone for paroxysmal atrial fibrillation. Her breathing has improved, but she reports issues with PEG tube and anticoagulation. She is currently holding Eliquis in preparation for port placement for chemotherapy. She has received 3 administrations of chemo and is following with heme/onc.

## 2022-04-04 NOTE — DISCUSSION/SUMMARY
[FreeTextEntry1] : The patient's most recent CT scan of the chest was reviewed.  It demonstrated the bronchus in the mid stent was patent.  There was mild narrowing noted in the right mainstem bronchus.  There was also narrowing noted at the mid level of the left mainstem bronchi.

## 2022-04-06 NOTE — ASSESSMENT
[FreeTextEntry1] : 77f with recently diagnosed NSCLC with squamous features, no distant mets, presenting for oncology evaluation.\par On CT chest 1/28/22 there is a Superior segment right lower lobe posterior perihilar mass with direct extension to the right hilum, subcarinal space, and posterior mediastinum. Encasement and narrowing of the right lung airways.\par Mass involves station 7 lymph node. It extends into the posterior mediastinum, subcarinal space, and right hilum with focal severe narrowing of the RUL bronchus, diffuse severe narrowing of the bronchus intermedius, and subtotal occlusion of the RLL bronchus. It is inseparable from the esophagus at the level of the juan m, but no esophageal dilation. Also as seen on CT, there are shoddy precarinal, right paratracheal, and anterior-posterior window LNs of inconclusive significance.\par She is s/p Bronchial stent placement. \par Repeat CT chest 2/5/2022 - Subcarinal mass measuring approximately 2.6 x 4.0 cm with mass effect on the bronchus intermedius. A stent is noted within the bronchus intermedius and appears patent. \par \par Biomarker Findings\par Microsatellite status - MS-Stable\par Tumor Mutational [place] - 4 Muts/Mb\par Genomic Findings\par EGFR amplification\par KRAS amplification\par MYC amplification\par CDKN2A/B CDKN2A loss exon 2\par KEAP1 R320G\par MLL2 splice site 6234+1G>T\par TP53 R249M\par \par PD-L1 10%\par \par Had visit with Dr Interiano, recommendation is chemotherapy followed by reassessment for RT. RT can be offered if no metastatic disease on follow up scans. \par Now s/p PEG placement and Port a cath placement. \par This weeks is C5 carbo/taxol\par \par -C5 carbo/taxol this week\par -Scans next week to assess for RT\par -Monitor labs\par -Hgb downtrended to 9.5 Will monitor closely. \par -Repeat sans after C6\par -F/u Nutrition consult\par -RTC 1 week\par \par Total time of this visit, including time spent face to face with patient and/or via video/audio, and also in preparing for today's visit for MDM and documentation (Medical Decision Making, including consideration of possible diagnoses, management options, complex medical record review, review of diagnostic tests and information, consideration and discussion of significant complications based on comorbidities, and discussion with providers involved with the care of the patient) 40 minutes.\par

## 2022-04-06 NOTE — PHYSICAL EXAM
[Ambulatory and capable of all self care but unable to carry out any work activities] : Status 2- Ambulatory and capable of all self care but unable to carry out any work activities. Up and about more than 50% of waking hours [Normal] : affect appropriate [de-identified] : PEG tube insertion site healing. No redness, no discharge. [de-identified] : Diffuse wheezing

## 2022-04-06 NOTE — HISTORY OF PRESENT ILLNESS
[de-identified] : 77f with recently diagnosed NSCLC with squamous features, presenting for initial oncology evaluation. \par \par - Patient has had wheezing since late December 2021, found to have a RLL mass by Pulmonology\par - Discussed with Pulmonology, mass involves station 7 lymph node. It extends into the posterior mediastinum, subcarinal space, and right hilum with focal severe narrowing of the RUL bronchus, diffuse severe narrowing of the bronchus intermedius, and subtotal occlusion of the RLL bronchus. It is inseparable from the esophagus at the level of the juan m, but no esophageal dilation. Also as seen on CT, there are shoddy precarinal, right paratracheal, and anterior-posterior window LNs of inconclusive significance\par -No distant metastasis on CT a/p and MRI brain\par - S/p bronchoscopy 2/2/22 with endobronchial stent of the bronchus intermedius, tumor debulking, and biopsy of the lung mass\par - Biopsy results show non-small cell carcinoma with Squamous features\par - NGS and PDL-1 testing not back\par - PET/CT is pending and scheduled for 2/25\par \par On CT chest 1/28/22 there is a Superior segment right lower lobe posterior perihilar mass with direct extension to the right hilum, subcarinal space, and posterior mediastinum. Encasement and narrowing of the right lung airways. Mass involves station 7 lymph node. It extends into the posterior mediastinum, subcarinal space, and right hilum with focal severe narrowing of the RUL bronchus, diffuse severe narrowing of the bronchus intermedius, and subtotal occlusion of the RLL bronchus. It is inseparable from the esophagus at the level of the juan m, but no esophageal dilation. Also as seen on CT, there are shoddy precarinal, right paratracheal, and anterior-posterior window LNs of inconclusive significance.\par Repeat CT chest 2/5/2022 - Subcarinal mass measuring approximately 2.6 x 4.0 cm with mass effect on the bronchus intermedius. A stent is noted within the bronchus intermedius and appears patent. \par \par Today, patient presents with her  Buddy. She has 2 sons, one lives nearby in [facility] and one lives in [place]. \par She is on PEG tube feeds at goal\par She has a 40 pack year smoking history, she quit 7 years ago [de-identified] : S/p port placement. Area well healing. No issues\par Takes PEG feeds at goal. \par Wheezing has improved. \par Is losing her hair\par No other complaints.

## 2022-04-13 NOTE — HISTORY OF PRESENT ILLNESS
[de-identified] : 77f with recently diagnosed NSCLC with squamous features, presenting for initial oncology evaluation. \par \par - Patient has had wheezing since late December 2021, found to have a RLL mass by Pulmonology\par - Discussed with Pulmonology, mass involves station 7 lymph node. It extends into the posterior mediastinum, subcarinal space, and right hilum with focal severe narrowing of the RUL bronchus, diffuse severe narrowing of the bronchus intermedius, and subtotal occlusion of the RLL bronchus. It is inseparable from the esophagus at the level of the juan m, but no esophageal dilation. Also as seen on CT, there are shoddy precarinal, right paratracheal, and anterior-posterior window LNs of inconclusive significance\par -No distant metastasis on CT a/p and MRI brain\par - S/p bronchoscopy 2/2/22 with endobronchial stent of the bronchus intermedius, tumor debulking, and biopsy of the lung mass\par - Biopsy results show non-small cell carcinoma with Squamous features\par - NGS and PDL-1 testing not back\par - PET/CT is pending and scheduled for 2/25\par \par On CT chest 1/28/22 there is a Superior segment right lower lobe posterior perihilar mass with direct extension to the right hilum, subcarinal space, and posterior mediastinum. Encasement and narrowing of the right lung airways. Mass involves station 7 lymph node. It extends into the posterior mediastinum, subcarinal space, and right hilum with focal severe narrowing of the RUL bronchus, diffuse severe narrowing of the bronchus intermedius, and subtotal occlusion of the RLL bronchus. It is inseparable from the esophagus at the level of the juan m, but no esophageal dilation. Also as seen on CT, there are shoddy precarinal, right paratracheal, and anterior-posterior window LNs of inconclusive significance.\par Repeat CT chest 2/5/2022 - Subcarinal mass measuring approximately 2.6 x 4.0 cm with mass effect on the bronchus intermedius. A stent is noted within the bronchus intermedius and appears patent. \par \par Today, patient presents with her  Buddy. She has 2 sons, one lives nearby in [facility] and one lives in [place]. \par She is on PEG tube feeds at goal\par She has a 40 pack year smoking history, she quit 7 years ago [de-identified] : s/p 5 cycles of weekly carbo/taxol. \par Feels generally well. Weight is stable. Tolerating feeds at goal. \par Has pain around the PEG tube site.

## 2022-04-13 NOTE — ASSESSMENT
[FreeTextEntry1] : 77f with recently diagnosed NSCLC with squamous features, no distant mets, presenting for oncology evaluation.\par On CT chest 1/28/22 there is a Superior segment right lower lobe posterior perihilar mass with direct extension to the right hilum, subcarinal space, and posterior mediastinum. Encasement and narrowing of the right lung airways.\par Mass involves station 7 lymph node. It extends into the posterior mediastinum, subcarinal space, and right hilum with focal severe narrowing of the RUL bronchus, diffuse severe narrowing of the bronchus intermedius, and subtotal occlusion of the RLL bronchus. It is inseparable from the esophagus at the level of the juan m, but no esophageal dilation. Also as seen on CT, there are shoddy precarinal, right paratracheal, and anterior-posterior window LNs of inconclusive significance.\par She is s/p Bronchial stent placement. \par Repeat CT chest 2/5/2022 - Subcarinal mass measuring approximately 2.6 x 4.0 cm with mass effect on the bronchus intermedius. A stent is noted within the bronchus intermedius and appears patent. \par \par Biomarker Findings\par Microsatellite status - MS-Stable\par Tumor Mutational Sanborn - 4 Muts/Mb\par Genomic Findings\par EGFR amplification\par KRAS amplification\par MYC amplification\par CDKN2A/B CDKN2A loss exon 2\par KEAP1 R320G\par MLL2 splice site 6234+1G>T\par TP53 R249M\par \par PD-L1 10%\par \par Had visit with Dr Interiano, recommendation is chemotherapy followed by reassessment for RT. RT can be offered if no metastatic disease on follow up scans. \par Now s/p PEG placement and Port a cath placement. \par This weeks is C5 carbo/taxol\par \par -C6 carbo/taxol this week\par -CT c/a/p ordered\par -Monitor labs\par -Prescription provided for Wig\par -IR evaluation of the PEG tube. Dr Benjamin contacted. \par -Contact Cardiology re Amiodarone refill. Pt has 10 days left of the medication and will require a refill if she is to continue.\par -Monitor Hgb\par -F/u Nutrition consult\par -RTC 2 weeks after scans and rad onc eval\par \par Total time of this visit, including time spent face to face with patient and/or via video/audio, and also in preparing for today's visit for MDM and documentation (Medical Decision Making, including consideration of possible diagnoses, management options, complex medical record review, review of diagnostic tests and information, consideration and discussion of significant complications based on comorbidities, and discussion with providers involved with the care of the patient) 40 minutes.\par

## 2022-04-13 NOTE — PHYSICAL EXAM
[Restricted in physically strenuous activity but ambulatory and able to carry out work of a light or sedentary nature] : Status 1- Restricted in physically strenuous activity but ambulatory and able to carry out work of a light or sedentary nature, e.g., light house work, office work [Normal] : affect appropriate [de-identified] : Diffuse wheezing [de-identified] : PEG tube insertion site healing. No redness, no discharge.

## 2022-04-14 PROBLEM — I48.19 ATRIAL FIBRILLATION, PERSISTENT: Status: ACTIVE | Noted: 2022-01-01

## 2022-05-09 PROBLEM — Z01.818 PREOP TESTING: Status: ACTIVE | Noted: 2020-09-22

## 2022-05-09 PROBLEM — R06.2 WHEEZING: Status: ACTIVE | Noted: 2022-01-01

## 2022-05-09 PROBLEM — J98.09 BRONCHIAL STENOSIS, RIGHT: Status: ACTIVE | Noted: 2022-01-01

## 2022-05-09 PROBLEM — C34.90 ENDOBRONCHIAL CANCER: Status: ACTIVE | Noted: 2022-01-01

## 2022-05-09 PROBLEM — R05.9 COUGH: Status: ACTIVE | Noted: 2022-01-01

## 2022-05-11 NOTE — PHYSICAL EXAM
[No Acute Distress] : no acute distress [Normal Oropharynx] : normal oropharynx [Normal Appearance] : normal appearance [No Neck Mass] : no neck mass [Normal Rate/Rhythm] : normal rate/rhythm [Normal S1, S2] : normal s1, s2 [No Murmurs] : no murmurs [No Resp Distress] : no resp distress [No Acc Muscle Use] : no acc muscle use [Normal Palpation] : normal palpation [Normal Rhythm and Effort] : normal rhythm and effort [Clear to Auscultation Bilaterally] : clear to auscultation bilaterally [No Abnormalities] : no abnormalities [Normal Gait] : normal gait [No Clubbing] : no clubbing [No Edema] : no edema [No Rash] : no rash [Normal Turgor] : normal turgor [No Focal Deficits] : no focal deficits [Oriented x3] : oriented x3 [Normal Affect] : normal affect [TextBox_89] : +PEG tube [TextBox_125] : +Bruising on the arms

## 2022-05-11 NOTE — PHYSICAL THERAPY INITIAL EVALUATION ADULT - REHAB POTENTIAL, PT EVAL
Post-Care Instructions: I reviewed with the patient in detail post-care instructions. Patient is to avoid picking at any of the treated lesions. Pt may apply Vaseline to crusted or scabbing areas.  Follow up in 6 weeks if not resolved. Render Post-Care Instructions In Note?: yes Detail Level: Zone Duration Of Freeze Thaw-Cycle (Seconds): 1 Render In Bullet Format When Appropriate: No Number Of Freeze-Thaw Cycles: 2 freeze-thaw cycles Consent: The patient's consent was obtained including but not limited to risks of crusting, scabbing, blistering, scarring, darker or lighter pigmentary change, recurrence, incomplete removal and infection. Total Number Of Aks Treated: 12 good, to achieve stated therapy goals

## 2022-05-11 NOTE — HISTORY OF PRESENT ILLNESS
[TextBox_4] : Interventional Pulmonology Follow Up Note\par \par 78 y/o woman former smoker recently admitted for dysphagia, weight loss, wheezing and shortness of breath and found to have right hilar mass. Was noted to have significant narrowing of the bronchus intermedius. She underwent rigid bronchoscopy with balloon dilatation of 90% bronchial stenosis of bronchus intermedius, tumor debulking and bronchial stent placement (10 X 30mm) in the bronchus intermedius, as well as endobronchial ultrasound-guided biopsy of subcarinal mass. 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 She presents today to establish care for her central airway obstruction.\par \par In the interim patient was hospitalized and required PEG placement. She was also placed on Eliquis and Amiodarone for paroxysmal atrial fibrillation. Her breathing has improved, but she reports issues with PEG tube and anticoagulation. She is currently holding Eliquis in preparation for port placement for chemotherapy. She has received 3 administrations of chemo and is following with heme/onc.\par \par She is currently doing well clinically.  She continues to have significant improvement of her dyspnea.  Cough is minimal.  She continues to use saline nebulizers and albuterol nebulizer.  Will have independent episodes of wheezing but is able to mobilize the mucus.  She is concurrently finishing up systemic therapy with oncology.  She is going to be evaluated soon by radiation oncology for consideration of definitive RT.  In terms of dysphagia she continues to use PEG tube.  She has lost weight.  Denies any hemoptysis.  Denies any significant dyspnea on exertion apart from her baseline.  Was anxious at baseline.

## 2022-05-11 NOTE — REVIEW OF SYSTEMS
[Cough] : cough [SOB on Exertion] : sob on exertion [Negative] : Endocrine [Hemoptysis] : no hemoptysis [Sputum] : no sputum [Dyspnea] : no dyspnea [Pleuritic Pain] : no pleuritic pain [Chest Discomfort] : no chest discomfort [Raynaud] : no raynaud [Rash] : no rash [TextBox_69] : +PEG discomfort [TextBox_104] : +Bruising

## 2022-05-11 NOTE — ASSESSMENT
[FreeTextEntry1] : \par This a 77-year-old female with non-small cell lung cancer who is currently undergoing systemic therapy.  She has a bronchus intermedius stent in place.  On the previous CT when she was admitted a couple of months ago she was noted to have left mainstem narrowing.  On the most recent CT that seems to have improved.  With improvement in subcarinal adenopathy.  At this time she is stable from the respiratory standpoint.  She is currently planned for radiation therapy.  We will plan for surveillance bronchoscopy when radiation therapy is completed.  We will also assess to see if the stent could be removed after radiation therapy. \par \par \par Patient with non-small cell lung cancer with bronchus intermedius stent placed which is patent demonstrated by aeration of the right middle and right lower lobe. Also has left mainstem narrowing but is symptomatically doing okay. Has a peg tube placed by interventional radiology. She was advised to follow-up with interventional radiology given the issues with the PEG tube. From the bronchus intermedius stent perspective patient was advised to continue therapy as discussed above. Stent care instructions were provided. Contact information was provided in case of any emergencies related to the stent. The patient was advised to present to the ER in case of any respiratory distress or dyspnea or issues with the stent. The patient will continue disease directed therapy which is chemotherapy followed by radiation. We will continue clinical follow-up to assess if any endobronchial intervention is needed. We will also perform surveillance bronchoscopy in another month. \par \par We will plan for further bronchoscopy, flexible bronchoscopy, stent revision and evaluation of airways after radiation therapy is completed. Risks and benefits of therapeutic bronchoscopy (possible rigid bronchoscopy) using thermal ablation and cryobalation and associated tumor debulking, including but not limited to airway injury, bleeding, pneumothorax was discussed and patient demonstrated understanding.\par \par  In case there is any worsening of respiratory symptoms noted during radiation therapy we will plan for flexible bronchoscopy sooner to evaluate the stent patency.  In the interim the patient patient was advised to continue with the saline nebulizers.  The importance of continued use of saline nebulizer to keep the stent.  Was discussed.  The patient demonstrated understanding.  The patient was advised to call if there is any worsening or change in symptoms.  Stent care instructions were provided and card is been with the patient as above.\par \par Will discussed with radiation oncology team/Dr. Interiano regarding definitive plans for radiation.\par \par For her COPD she will continue as needed albuterol as well as Symbicort.\par \par \par

## 2022-05-11 NOTE — DISCUSSION/SUMMARY
[FreeTextEntry1] : The patient's most recent CT scan was reviewed independently.  Demonstrated patent bronchus intermedius stent.  The subcarinal adenopathy seems to be decreasing in size.  In the postobstructive findings in the right lower lobe also bit better.  Right hilar adenopathy stable.  While there is opacification distal to the stent the lower lobe in the middle lobe are patent.

## 2022-05-13 NOTE — PHYSICAL EXAM
[Restricted in physically strenuous activity but ambulatory and able to carry out work of a light or sedentary nature] : Status 1- Restricted in physically strenuous activity but ambulatory and able to carry out work of a light or sedentary nature, e.g., light house work, office work [Normal] : affect appropriate [de-identified] : Diffuse wheezing [de-identified] : PEG tube insertion site healing. No redness, no discharge.

## 2022-05-13 NOTE — HISTORY OF PRESENT ILLNESS
[de-identified] : 77f with recently diagnosed NSCLC with squamous features, presenting for initial oncology evaluation. \par \par - Patient has had wheezing since late December 2021, found to have a RLL mass by Pulmonology\par - Discussed with Pulmonology, mass involves station 7 lymph node. It extends into the posterior mediastinum, subcarinal space, and right hilum with focal severe narrowing of the RUL bronchus, diffuse severe narrowing of the bronchus intermedius, and subtotal occlusion of the RLL bronchus. It is inseparable from the esophagus at the level of the juan m, but no esophageal dilation. Also as seen on CT, there are shoddy precarinal, right paratracheal, and anterior-posterior window LNs of inconclusive significance\par -No distant metastasis on CT a/p and MRI brain\par - S/p bronchoscopy 2/2/22 with endobronchial stent of the bronchus intermedius, tumor debulking, and biopsy of the lung mass\par - Biopsy results show non-small cell carcinoma with Squamous features\par - NGS and PDL-1 testing not back\par - PET/CT is pending and scheduled for 2/25\par \par On CT chest 1/28/22 there is a Superior segment right lower lobe posterior perihilar mass with direct extension to the right hilum, subcarinal space, and posterior mediastinum. Encasement and narrowing of the right lung airways. Mass involves station 7 lymph node. It extends into the posterior mediastinum, subcarinal space, and right hilum with focal severe narrowing of the RUL bronchus, diffuse severe narrowing of the bronchus intermedius, and subtotal occlusion of the RLL bronchus. It is inseparable from the esophagus at the level of the juan m, but no esophageal dilation. Also as seen on CT, there are shoddy precarinal, right paratracheal, and anterior-posterior window LNs of inconclusive significance.\par Repeat CT chest 2/5/2022 - Subcarinal mass measuring approximately 2.6 x 4.0 cm with mass effect on the bronchus intermedius. A stent is noted within the bronchus intermedius and appears patent. \par \par Today, patient presents with her  Buddy. She has 2 sons, one lives nearby in [facility] and one lives in [place]. \par She is on PEG tube feeds at goal\par She has a 40 pack year smoking history, she quit 7 years ago [de-identified] : Feels well, has no new complaints. \par Due for C9 this week. \par no neuropathy. \par Feeds going OK. \par Has appointment with cardiologist this month.

## 2022-05-13 NOTE — PHYSICAL EXAM
[Restricted in physically strenuous activity but ambulatory and able to carry out work of a light or sedentary nature] : Status 1- Restricted in physically strenuous activity but ambulatory and able to carry out work of a light or sedentary nature, e.g., light house work, office work [Normal] : affect appropriate [de-identified] : Diffuse wheezing [de-identified] : PEG tube insertion site healing. No redness, no discharge.

## 2022-05-13 NOTE — ASSESSMENT
[FreeTextEntry1] : 77f with recently diagnosed NSCLC with squamous features, no distant mets, presenting for oncology evaluation.\par On CT chest 1/28/22 there is a Superior segment right lower lobe posterior perihilar mass with direct extension to the right hilum, subcarinal space, and posterior mediastinum. Encasement and narrowing of the right lung airways.\par Mass involves station 7 lymph node. It extends into the posterior mediastinum, subcarinal space, and right hilum with focal severe narrowing of the RUL bronchus, diffuse severe narrowing of the bronchus intermedius, and subtotal occlusion of the RLL bronchus. It is inseparable from the esophagus at the level of the juan m, but no esophageal dilation. Also as seen on CT, there are shoddy precarinal, right paratracheal, and anterior-posterior window LNs of inconclusive significance.\par She is s/p Bronchial stent placement. \par Repeat CT chest 2/5/2022 - Subcarinal mass measuring approximately 2.6 x 4.0 cm with mass effect on the bronchus intermedius. A stent is noted within the bronchus intermedius and appears patent. \par \par Biomarker Findings\par Microsatellite status - MS-Stable\par Tumor Mutational Morrill - 4 Muts/Mb\par Genomic Findings\par EGFR amplification\par KRAS amplification\par MYC amplification\par CDKN2A/B CDKN2A loss exon 2\par KEAP1 R320G\par MLL2 splice site 6234+1G>T\par TP53 R249M\par \par PD-L1 10%\par \par Had visit with Dr Interiano, recommendation is chemotherapy followed by reassessment for RT. RT can be offered if no metastatic disease on follow up scans. \par Now s/p PEG placement and Port a cath placement. \par Pt has received 8 cycles carbo/tax thus far, repeat imaging with response to treatment. \par Was evaluated by Dr Interiano and definitive radiation is offered. Case to be discussed in tumor board.\par Will continue with carbo/taxol to 12 cycles. Will stop Chemo during RT. \par Will need 1 year of durvalumab thereafter.\par \par -C9 carbo/taxol this week\par -Follow rad onc\par -Monitor labs\par -Monitor for neuropathy\par -Monitor Hgb\par -F/u Nutrition consult\par -RTC 1 week\par \par Total time of this visit, including time spent face to face with patient and/or via video/audio, and also in preparing for today's visit for MDM and documentation (Medical Decision Making, including consideration of possible diagnoses, management options, complex medical record review, review of diagnostic tests and information, consideration and discussion of significant complications based on comorbidities, and discussion with providers involved with the care of the patient) 40 minutes.\par

## 2022-05-13 NOTE — HISTORY OF PRESENT ILLNESS
[de-identified] : 77f with recently diagnosed NSCLC with squamous features, presenting for initial oncology evaluation. \par \par - Patient has had wheezing since late December 2021, found to have a RLL mass by Pulmonology\par - Discussed with Pulmonology, mass involves station 7 lymph node. It extends into the posterior mediastinum, subcarinal space, and right hilum with focal severe narrowing of the RUL bronchus, diffuse severe narrowing of the bronchus intermedius, and subtotal occlusion of the RLL bronchus. It is inseparable from the esophagus at the level of the juan m, but no esophageal dilation. Also as seen on CT, there are shoddy precarinal, right paratracheal, and anterior-posterior window LNs of inconclusive significance\par -No distant metastasis on CT a/p and MRI brain\par - S/p bronchoscopy 2/2/22 with endobronchial stent of the bronchus intermedius, tumor debulking, and biopsy of the lung mass\par - Biopsy results show non-small cell carcinoma with Squamous features\par - NGS and PDL-1 testing not back\par - PET/CT is pending and scheduled for 2/25\par \par On CT chest 1/28/22 there is a Superior segment right lower lobe posterior perihilar mass with direct extension to the right hilum, subcarinal space, and posterior mediastinum. Encasement and narrowing of the right lung airways. Mass involves station 7 lymph node. It extends into the posterior mediastinum, subcarinal space, and right hilum with focal severe narrowing of the RUL bronchus, diffuse severe narrowing of the bronchus intermedius, and subtotal occlusion of the RLL bronchus. It is inseparable from the esophagus at the level of the juan m, but no esophageal dilation. Also as seen on CT, there are shoddy precarinal, right paratracheal, and anterior-posterior window LNs of inconclusive significance.\par Repeat CT chest 2/5/2022 - Subcarinal mass measuring approximately 2.6 x 4.0 cm with mass effect on the bronchus intermedius. A stent is noted within the bronchus intermedius and appears patent. \par \par Today, patient presents with her  Buddy. She has 2 sons, one lives nearby in [facility] and one lives in [place]. \par She is on PEG tube feeds at goal\par She has a 40 pack year smoking history, she quit 7 years ago [de-identified] : No new complaints. \par Has wheezing. \par PEG feeds are going OK. stable weight. No SOB. Stable energy level. \par No paraesthesias\par

## 2022-05-13 NOTE — ASSESSMENT
[FreeTextEntry1] : 77f with recently diagnosed NSCLC with squamous features, no distant mets, presenting for oncology evaluation.\par On CT chest 1/28/22 there is a Superior segment right lower lobe posterior perihilar mass with direct extension to the right hilum, subcarinal space, and posterior mediastinum. Encasement and narrowing of the right lung airways.\par Mass involves station 7 lymph node. It extends into the posterior mediastinum, subcarinal space, and right hilum with focal severe narrowing of the RUL bronchus, diffuse severe narrowing of the bronchus intermedius, and subtotal occlusion of the RLL bronchus. It is inseparable from the esophagus at the level of the juan m, but no esophageal dilation. Also as seen on CT, there are shoddy precarinal, right paratracheal, and anterior-posterior window LNs of inconclusive significance.\par She is s/p Bronchial stent placement. \par Repeat CT chest 2/5/2022 - Subcarinal mass measuring approximately 2.6 x 4.0 cm with mass effect on the bronchus intermedius. A stent is noted within the bronchus intermedius and appears patent. \par \par Biomarker Findings\par Microsatellite status - MS-Stable\par Tumor Mutational [place] - 4 Muts/Mb\par Genomic Findings\par EGFR amplification\par KRAS amplification\par MYC amplification\par CDKN2A/B CDKN2A loss exon 2\par KEAP1 R320G\par MLL2 splice site 6234+1G>T\par TP53 R249M\par \par PD-L1 10%\par \par Had visit with Dr Interiano, recommendation is chemotherapy followed by reassessment for RT. RT can be offered if no metastatic disease on follow up scans. \par Now s/p PEG placement and Port a cath placement. \par Pt has received 9 cycles carbo/tax thus far, repeat imaging with response to treatment. \par Was evaluated by Dr Interiano and definitive radiation is offered. Case was discussed in tumor board.\par Will continue with carbo/taxol to 12 cycles, will start RT when planning completed. Will stop Chemo during RT. \par Will need 1 year of durvalumab thereafter.\par \par -C10 carbo/taxol this week\par -Follow rad onc\par -Monitor labs\par -Monitor for neuropathy\par -Monitor Hgb\par -F/u Nutrition consult\par -RTC after completion of RT\par \par Total time of this visit, including time spent face to face with patient and/or via video/audio, and also in preparing for today's visit for MDM and documentation (Medical Decision Making, including consideration of possible diagnoses, management options, complex medical record review, review of diagnostic tests and information, consideration and discussion of significant complications based on comorbidities, and discussion with providers involved with the care of the patient) 40 minutes.\par

## 2022-05-19 PROBLEM — R06.02 SHORTNESS OF BREATH: Status: ACTIVE | Noted: 2022-01-01

## 2022-05-19 PROBLEM — Z79.899 ON AMIODARONE THERAPY: Status: ACTIVE | Noted: 2022-01-01

## 2022-05-19 PROBLEM — I48.0 PAROXYSMAL ATRIAL FIBRILLATION: Status: ACTIVE | Noted: 2022-01-01

## 2022-05-19 PROBLEM — Z79.01 CURRENT USE OF LONG TERM ANTICOAGULATION: Status: ACTIVE | Noted: 2022-01-01

## 2022-05-20 NOTE — PHYSICAL EXAM
[Well Developed] : well developed [Well Nourished] : well nourished [No Acute Distress] : no acute distress [Normal Conjunctiva] : normal conjunctiva [Normal Venous Pressure] : normal venous pressure [No Carotid Bruit] : no carotid bruit [Normal S1, S2] : normal S1, S2 [No Murmur] : no murmur [No Rub] : no rub [No Gallop] : no gallop [Good Air Entry] : good air entry [No Respiratory Distress] : no respiratory distress  [Soft] : abdomen soft [Non Tender] : non-tender [No Masses/organomegaly] : no masses/organomegaly [Normal Bowel Sounds] : normal bowel sounds [Normal Gait] : normal gait [No Edema] : no edema [No Cyanosis] : no cyanosis [No Clubbing] : no clubbing [No Varicosities] : no varicosities [No Rash] : no rash [No Skin Lesions] : no skin lesions [Moves all extremities] : moves all extremities [No Focal Deficits] : no focal deficits [Normal Speech] : normal speech [Alert and Oriented] : alert and oriented [Normal memory] : normal memory [de-identified] : B/L coarse/rhonci

## 2022-05-20 NOTE — HISTORY OF PRESENT ILLNESS
[FreeTextEntry1] : Becka Heaton is a 78y/o woman with Hx of NSCLC, endobronchial ca, s/p debulking and R. bronchial stent and on chemotherapy (right chest wall port) and PEG in place, and afib with RVR s/p amiodarone load who presents today for initial evaluation. Back in March 2022 she was hospitalized for worsening dyspnea in setting of newly diagnosed NSCLC c/b afib with RVR. Started on Amiodarone and Eliquis. Since that time, struggling with persistent wheezing and dyspnea secondary to lung CA. Utilizes O2 via nasal cannula as needed. Remains on Amiodarone, concerned about long term use given already poor lung function and risk of pulmonary toxicity. On Eliquis without current s/s of bleeding. Denies chest pain, palpitations, syncope or near syncope.\par

## 2022-05-20 NOTE — CARDIOLOGY SUMMARY
[de-identified] : 3/15/2022: CONCLUSIONS:\par 1. Calcified trileaflet aortic valve with normal opening.\par 2. Low normal left ventricular systolic function. No\par segmental wall motion abnormalities. Endocardial\par visualization enhanced with intravenous injection of echo\par contrast (Definity).\par 3. Normal right ventricular size and function.

## 2022-05-20 NOTE — DISCUSSION/SUMMARY
[FreeTextEntry1] : Impression:\par \par 1. Paroxysmal afib: s/p Amiodarone load in hospital. EKG performed today to assess for presence of recurrent afib and reveals NSR. Review of ECHO with low normal LVEF. Recommend discontinuing Amiodarone at this time (risk of pulmonary toxicity in already poor lung function). Plan for 30 day CardioNet to assess for afib recurrence. Can consider ILR placement for long term monitoring in future. \par \par 2. NSCLC: SOB consistent with lung ca. Recommend remaining off Amiodarone given poor pulmonary function at present, will discontinue at this time. Resume f/u with HEME/ONC. \par \par Discontinue Amiodarone and plan for 30 day CardioNet, if no afib can consider discontinuing Eliquis in future.

## 2022-05-23 NOTE — REVIEW OF SYSTEMS
[Fatigue] : fatigue [Recent Change In Weight] : ~T recent weight change [Dysphagia] : dysphagia [Loss of Hearing] : loss of hearing [Wheezing] : wheezing [Cough] : cough [SOB on Exertion] : shortness of breath during exertion [Negative] : Allergic/Immunologic [Fever] : no fever [Chills] : no chills [Shortness Of Breath] : no shortness of breath [Difficulty Walking] : no difficulty walking [FreeTextEntry2] :  50 pound weight loss since Jan 2022 [FreeTextEntry7] : nausea  [FreeTextEntry9] : back pain

## 2022-05-23 NOTE — HISTORY OF PRESENT ILLNESS
[FreeTextEntry1] : 78 y/o F with 40 pack year smoking history (quit 2015), [place], A fib on Eliquis/Amiodarone, diagnosed with NSCLC s/p debulking and R bronchial stent 2/2022 started on Carboplatin/Taxol with Dr Willis 3/11/2022 s/p PEG for dysphagia due to mass effect presents today after 6 cycles of chemotherapy to discuss role of definitive radiation. \par \par Oncologic history:\par \par Patient developed SOB/wheezing in early Jan 2022, treated w/antibiotics/steroids/inhalers by PMD, COVID negative and CXR from 1/6/22 showed R hilar opacity.  She was seen to Dr Woodard (Pulmonary) 1/28/2022 when her symptoms continued and she developed ACKERMAN, dysphagia and admit to 25 pound weight loss over 3 months.  She was noted to have severe SOB at rest therefore sent to hospital same day. Her CT c/a/p from 1/28/22 showed superior segment RLL posterior perihilar mass with direct extension to the R hilum, subcarinal space and posterior mediastinum.  Encasement and narrowing of R lung airways but without evidence of distant mets.   On 2/2/2022, she underwent Flexible bronchoscopy, rigid bronchoscopy, tumor debulking, balloon dilation,  airway stent placement, endobronchial ultrasoundguided biopsy of the level 7/subcarinal mass with Dr JADE Dodd.  Pathology confirmed Non-small cell carcinoma with features of squamous cell carcinoma, TPS score 10%.  Her inpatient MRI BRAIN with no lesions.  She was advised on puree/full liquid diet given dysphagia which was secondary to mass effect.  She had PET-CT on 2/25/2022 which showed Smaller subcarinal/right lower lobe mass and decreased postobstructive changes of the right lower lobe.  Unchanged occlusion of distal bronchus intermedius stent. Stable right hilar lymphadenopathy.  New small opacities in the upper lobes and superior left lower lobe, favored to be benign. 3 month follow-up is recommended.  No evidence of metastasis in the abdomen/pelvis.  \par \par She initially consulted [facility] on 3/4/2022 for concurrent chemoradiation however RT was deferred given recent bronchial stent and that radiation will not immediately palliate her dysphagia but will cause odynophagia w/plan to reconsider definitive RT after chemotherapy.  \par \par She was hospitalized again due to dysphagia/worsening wheezing and underwent peg placement 3/16/2022 as well as started on Eliquis/Amiodarone for Paroxysmal A fib.  She was started on Carboplatin/Taxol during this hospitalization.  After 6 cycles of Carbo/Taxol, patient underwent CT c/a/p from 4/19/22 showed smaller subcarinal/right lower lobe mass and decreased postobstructive changes of the right lower lobe.  Unchanged occlusion of distal bronchus intermedius stent.  Stable right hilar lymphadenopathy.  New small opacities in the upper lobes and superior left lower lobe, favored to be benign. 3 month follow-up is recommended.  No evidence of metastasis in the abdomen/pelvis.\par \par She presents to discuss radiation today and c/o intermittent nausea QOD, usually the week following chemotherapy and also continues to have issues with dysphagia, increased mucus which she is unable to swallow and leads to nausea.  She coughs up clear mucus intermittently but denies any hemoptysis, fevers, chills.  She continues on PEG feeds.  She has ACKERMAN with stairs or long distant walks.  She has lost 50 pounds since Jan 2022 but since being on tube feeds, her weight is stable.  She c/o intermittent back pain.  She denies any CP, palpitations and remains in positive spirits. Since being on Eliquis, she note specks of blood in the morning.  Patient states her wheezing seems to be getting worse over the last couple of weeks.  She was seen by Dr Dodd yesterday and planned for surveillance bronchoscopy in about a month.

## 2022-05-23 NOTE — PHYSICAL EXAM
[Normal] : normal heart rate and rhythm, normal S1 and S2, and no murmurs present [de-identified] : grossly audible inspiratory wheezes on exam  [de-identified] : grossly audible inspiratory wheezes on exam; prominent inspiratory and mild expiratory wheezing appreciated in all lung fields on exam

## 2022-05-27 PROBLEM — C34.90 NON-SMALL CELL LUNG CANCER (NSCLC): Status: ACTIVE | Noted: 2022-01-01

## 2022-05-27 NOTE — REVIEW OF SYSTEMS
[Fatigue] : fatigue [Recent Change In Weight] : ~T recent weight change [Wheezing] : wheezing [Cough] : cough [Negative] : Allergic/Immunologic

## 2022-05-27 NOTE — HISTORY OF PRESENT ILLNESS
[de-identified] : 77f with recently diagnosed NSCLC with squamous features, presenting for initial oncology evaluation. \par \par - Patient has had wheezing since late December 2021, found to have a RLL mass by Pulmonology\par - Discussed with Pulmonology, mass involves station 7 lymph node. It extends into the posterior mediastinum, subcarinal space, and right hilum with focal severe narrowing of the RUL bronchus, diffuse severe narrowing of the bronchus intermedius, and subtotal occlusion of the RLL bronchus. It is inseparable from the esophagus at the level of the juan m, but no esophageal dilation. Also as seen on CT, there are shoddy precarinal, right paratracheal, and anterior-posterior window LNs of inconclusive significance\par -No distant metastasis on CT a/p and MRI brain\par - S/p bronchoscopy 2/2/22 with endobronchial stent of the bronchus intermedius, tumor debulking, and biopsy of the lung mass\par - Biopsy results show non-small cell carcinoma with Squamous features\par - NGS and PDL-1 testing not back\par - PET/CT is pending and scheduled for 2/25\par \par On CT chest 1/28/22 there is a Superior segment right lower lobe posterior perihilar mass with direct extension to the right hilum, subcarinal space, and posterior mediastinum. Encasement and narrowing of the right lung airways. Mass involves station 7 lymph node. It extends into the posterior mediastinum, subcarinal space, and right hilum with focal severe narrowing of the RUL bronchus, diffuse severe narrowing of the bronchus intermedius, and subtotal occlusion of the RLL bronchus. It is inseparable from the esophagus at the level of the juan m, but no esophageal dilation. Also as seen on CT, there are shoddy precarinal, right paratracheal, and anterior-posterior window LNs of inconclusive significance.\par Repeat CT chest 2/5/2022 - Subcarinal mass measuring approximately 2.6 x 4.0 cm with mass effect on the bronchus intermedius. A stent is noted within the bronchus intermedius and appears patent. \par \par Today, patient presents with her  Buddy. She has 2 sons, one lives nearby in [facility] and one lives in [place]. \par She is on PEG tube feeds at goal\par She has a 40 pack year smoking history, she quit 7 years ago [de-identified] : Reports fatigue, malaise, body aches, s/p 10 cycles of weekly carbo/taxol\par Takes 1 tylenol per day for pain, it helps but wears off. \par Reports Cough and mucus\par tolerating feeds ok, has some weight loss\par No SOB\par Saw cards and amiodarone was stopped\par plan is for bronch next week and to start RT the week after

## 2022-05-27 NOTE — PHYSICAL EXAM
[Ambulatory and capable of all self care but unable to carry out any work activities] : Status 2- Ambulatory and capable of all self care but unable to carry out any work activities. Up and about more than 50% of waking hours [Normal] : affect appropriate [de-identified] : Diffuse wheezing [de-identified] : PEG tube insertion site healing. No redness, no discharge.

## 2022-05-27 NOTE — ASSESSMENT
[FreeTextEntry1] : 77f with recently diagnosed NSCLC with squamous features, no distant mets, presenting for oncology evaluation.\par On CT chest 1/28/22 there is a Superior segment right lower lobe posterior perihilar mass with direct extension to the right hilum, subcarinal space, and posterior mediastinum. Encasement and narrowing of the right lung airways.\par Mass involves station 7 lymph node. It extends into the posterior mediastinum, subcarinal space, and right hilum with focal severe narrowing of the RUL bronchus, diffuse severe narrowing of the bronchus intermedius, and subtotal occlusion of the RLL bronchus. It is inseparable from the esophagus at the level of the juan m, but no esophageal dilation. Also as seen on CT, there are shoddy precarinal, right paratracheal, and anterior-posterior window LNs of inconclusive significance.\par She is s/p Bronchial stent placement. \par Repeat CT chest 2/5/2022 - Subcarinal mass measuring approximately 2.6 x 4.0 cm with mass effect on the bronchus intermedius. A stent is noted within the bronchus intermedius and appears patent. \par \par Biomarker Findings\par Microsatellite status - MS-Stable\par Tumor Mutational [place] - 4 Muts/Mb\par Genomic Findings\par EGFR amplification\par KRAS amplification\par MYC amplification\par CDKN2A/B CDKN2A loss exon 2\par KEAP1 R320G\par MLL2 splice site 6234+1G>T\par TP53 R249M\par \par PD-L1 10%\par \par Had visit with Dr Interiano, recommendation is chemotherapy followed by reassessment for RT. RT can be offered if no metastatic disease on follow up scans. \par Now s/p PEG placement and Port a cath placement. \par Repeat imaging with response to chemo.\par Pt has received 10 cycles carbo/tax thus far, she is feeling fatigued, with body aches and malaise and anemia. Will hold carbo/taxol for now and monitor.\par Was evaluated by Dr Interiano and definitive radiation is offered. Planned to start RT next week.\par Planned for bronchoscopy next week\par Will need 1 year of durvalumab after completion of RT.\par \par -Hold chemo given worsening fatigue, anemia, malaise, body aches\par -F/u Dr Dodd for bronch\par -Follow rad onc, to start RT 6/6\par -Monitor labs\par -Monitor for neuropathy\par -F/u Nutrition consult\par -RTC after RT, will need immunotherapy\par \par Total time of this visit, including time spent face to face with patient and/or via video/audio, and also in preparing for today's visit for MDM and documentation (Medical Decision Making, including consideration of possible diagnoses, management options, complex medical record review, review of diagnostic tests and information, consideration and discussion of significant complications based on comorbidities, and discussion with providers involved with the care of the patient) 40 minutes.\par

## 2022-05-31 NOTE — H&P PST ADULT - NSICDXFAMILYHX_GEN_ALL_CORE_FT
FAMILY HISTORY:  Father  Still living? No  FH: heart attack, Age at diagnosis: Age Unknown    Mother  Still living? No  FH: colon cancer, Age at diagnosis: Age Unknown

## 2022-05-31 NOTE — ASU PATIENT PROFILE, ADULT - CAREGIVER RELATION TO PATIENT
[address] [place] [address] Road    Name:  Jorge Soares  MR#:  [id number]  :  1942  ACCOUNT #:  [de-identified]  DATE OF SERVICE:  2019    REASON FOR CONSULTATION:  We are seeing the patient at the request of Dr. Abdifatah Bravo with regards to acute kidney injury and hypokalemia. HISTORY OF PRESENT ILLNESS:  The patient is a 60-year-old female who presented to [facility] on 2019 with a 2-week history of increasing shortness of breath and decreased p.o. intake during this time. Evaluation in the emergency room showed possible COPD with superimposed pneumonia as well as a creatinine of 3.6 and a potassium of 3.5. She has now been admitted to hospital for further evaluation and treatment. The patient is a very pleasant female but on questioning, she seems to have very poor insight into what her chronic medical problems are. Review of records indicate that she has had mildly elevated creatinine dating back to at least 2017 when it was 1.13, in 2017 it was down to 1.02 and in 2018 it was up to 1.69. Yesterday on admission, her BUN was 73, creatinine was 3.60 with a K of 2.5. Today, she has a sodium of 141, potassium 3.3, chloride 105, CO2 is 27, blood sugar 185, BUN is down at 69, and creatinine is down to 2.39. Urine chemistries done earlier today showed that she has a very low urine sodium of 11. Urine potassium was elevated at 48 millimoles per liter. She also has a history of having had an echocardiogram done in 2016. It showed grade 2 diastolic dysfunction at that time. PAST MEDICAL HISTORY:  Significant for hypothyroidism, history of TIA in the past, hyperlipidemia, rheumatoid arthritis, chronic hypertension, history of depression, and a remote history of breast cancer. ALLERGIES:  SHE HAS NO KNOWN DRUG ALLERGIES.     CURRENT MEDICATIONS:  Include aspirin 81 mg p.o. daily, Rocephin 2 g IV q.24 hours, doxycycline 100 mg IV O.99 hours, folic acid 772 mcg p.o. daily, heparin 5000 units subcu q.8 hours, Synthroid 25 mcg p.o. daily, Mag-Ox 400 mg p.o. daily, methotrexate 12.5 mg p.o. q. Sunday, K-Dur 20 mEq p.o. twice a day, Crestor 10 mg p.o. daily, and she is receiving lactated Ringer's at 25 mL/hour. SOCIAL HISTORY:  She is single. Does not smoke, does not drink any alcohol. FAMILY HISTORY:  Noncontributory. REVIEW OF SYSTEMS:  She denies any fevers or chills. No headaches, dizzy spells, or fainting spells. She has shortness of breath as stated above, especially on exertion. She denies any chest pain or palpitations. No nausea, vomiting, heartburn or abdominal pain. No constipation or diarrhea. No dysuria or polyuria. PHYSICAL EXAMINATION:  GENERAL:  Reveals an elderly white female in no acute distress. VITAL SIGNS:  She is afebrile. Blood pressure is 155/88, pulse is 83, respirations are 18, they are nonlabored. HEENT:  Her head is normocephalic. Eyes:  Pupils equal, reactive to light and accommodation. Extraocular muscles are intact. Fundi were not visualized. LUNGS:  Clear. HEART:  Regular rate and rhythm. ABDOMEN:  Soft. Bowel sounds are present. There is no hepatosplenomegaly. GENITAL AND RECTAL:  Deferred. EXTREMITIES:  She has no peripheral edema. IMPRESSION:  1. Acute kidney injury appears to be prerenal component with a low urine sodium and her elevated BUN-to-creatinine ratio, which is improving with intravenous hydration. It is not clear to me if she may have an underlying element of chronic kidney disease as shown by her moderately elevated creatinine over the past several years. 2.  Hypokalemia may be related to poor oral intake as well as the fact that her home medications prior to admission have included Lasix 40 mg p.o. daily as well as Hyzaar with 12.5 mg of hydrochlorothiazide. This may have predisposed her to developing the hypokalemia that she has had.   3.  Probable chronic obstructive pulmonary disease. 4.  Pneumonia. 5.  Rheumatoid arthritis. 6.  Hypothyroidism on replacement therapy. 7.  Possible grade II diastolic heart failure. PLANS AND RECOMMENDATIONS:  I would like to get a renal ultrasound on her. We will get a urinalysis and get a urine protein-to-creatinine ratio and will check serial labs. After renal function has returned back to baseline, we may reexamine her potassium metabolism and if necessary do other studies as indicated including a renin-aldosterone level as needed. Thank you very much for allowing us to see her and helping in her care.       Car Corcoran MD      [id number]  D:  04/23/2019 10:24  T:  04/23/2019 10:34  JOB #:  [id number]  CC:  [facility]       MD Shira River DO spouse

## 2022-05-31 NOTE — H&P PST ADULT - ATTENDING COMMENTS
Patient here for stent eval, stent revision, possible new stent placement, hot diathermy, cold diathermy, rigid/flex bronch.

## 2022-05-31 NOTE — ASU PATIENT PROFILE, ADULT - NSICDXPASTMEDICALHX_GEN_ALL_CORE_FT
PAST MEDICAL HISTORY:  Atrial fibrillation     Bronchial stenosis     H/O wheezing     [place] (hard of hearing)     Malignant neoplasm of unspecified part of unspecified bronchus or lung     PEG (percutaneous endoscopic gastrostomy) status     Requires oxygen therapy 2-3 liters when sleeping as needed

## 2022-05-31 NOTE — H&P PST ADULT - VENOUS THROMBOEMBOLISM CURRENT STATUS
(1) abnormal pulmonary function (COPD)/(1) serious lung disease, including pneumonia (within 1 month)/(1) other risk factor (includes escalating BMI, pack-years of smoking, diabetes requiring insulin, chemotherapy, female gender and length of surgery)/(2) central venous access/(2) malignancy (present or previous)

## 2022-05-31 NOTE — H&P PST ADULT - PROBLEM SELECTOR PLAN 1
Assessment and Plan: Patient scheduled for surgery on 6/1/22  Patient provided with verbal and written presurgical instructions; verbalized understanding  with teach back.    Patient provided with famotidine for GI prophylaxis; verbalized understanding.    Patient confirmed COVID appointment     Case discussed with Dr. Webb Assessment and Plan: Patient scheduled for surgery on 6/1/22  Patient provided with verbal and written presurgical instructions; verbalized understanding  with teach back.    Patient provided with famotidine for GI prophylaxis; verbalized understanding.    Patient confirmed COVID appointment     Case discussed with Dr. Webb for next day case

## 2022-05-31 NOTE — H&P PST ADULT - NSICDXPASTSURGICALHX_GEN_ALL_CORE_FT
PAST SURGICAL HISTORY:  PEG (percutaneous endoscopic gastrostomy) status     Port-A-Cath in place     S/P bronchoscopy     S/P cholecystectomy

## 2022-05-31 NOTE — H&P PST ADULT - NSANTHOSAYNRD_GEN_A_CORE
No. YAIMA screening performed.  STOP BANG Legend: 0-2 = LOW Risk; 3-4 = INTERMEDIATE Risk; 5-8 = HIGH Risk

## 2022-05-31 NOTE — H&P PST ADULT - ENMT COMMENTS
Dx malignant neoplasm unspecified part of unspecified bronchus/ lung loss 60 lbs in past 6 months- unable to swallow in past 3 months, mass pushing against the throat, s/p chemo (10 sessions) will start radiation

## 2022-05-31 NOTE — H&P PST ADULT - HISTORY OF PRESENT ILLNESS
77 yr old female presents with preop dx malignant neoplasm unspecified part, other specified diseases upper respiratory tract scheduled for flexible rigid bronchoscopy, tumor debulking, stent revision argon plasma coagulation, recently admitted for dysphagia, weight loss, wheezing and shortness of breath and found to have right hilar mass. Was noted to have significant narrowing of the bronchus intermedius. She underwent rigid bronchoscopy with balloon dilatation of 90% bronchial stenosis of bronchus intermedius, tumor debulking and bronchial stent placement (10 X 30mm) in the bronchus intermedius, as well as endobronchial ultrasound-guided biopsy of subcarinal mass. Pathology results consistent with squamous cell carcinoma 77 yr old female presents with preop dx malignant neoplasm unspecified part, other specified diseases upper respiratory tract scheduled for flexible rigid bronchoscopy, tumor debulking, stent revision argon plasma coagulation, recently admitted for dysphagia, weight loss, wheezing and shortness of breath and found to have right hilar mass. Was noted to have significant narrowing of the bronchus intermedius. She underwent rigid bronchoscopy with balloon dilatation of 90% bronchial stenosis of bronchus intermedius, tumor debulking and bronchial stent placement (10 X 30mm) in the bronchus intermedius, as well as endobronchial ultrasound-guided biopsy of subcarinal mass. Pathology results consistent with squamous cell carcinoma, reports increased concern stent may have displaced

## 2022-05-31 NOTE — H&P PST ADULT - BP NONINVASIVE MEAN (MM HG)
Ongoing / Assessment/Plan of Care Note     See / flowsheets for goals and other objective data.    Patient/Family discharge goal (s):  Goal #1: Communication facilitated  Goal #2: Discharge to other institution(s) arranged  Goal #3: Transportation arranged or issues addressed    PT Recommendation:  Recommendation for Discharge: PT: 24 Hour assist    OT Recommendation:  Recommendations for Discharge: OT: 24 Hour assist    SLP Recommendation:  Recommendations for Discharge: SLP: 24 hour supervision    Disposition:  Return to retirement    Progress note:   In summary:    Writer spoke with Tin Hernandez Nurse (p#[phone], fx#[phone]) regarding patient's status. She indicated that if patient can ambulate with a walker and complete self cares he is appropriate to return. Writer discussed this with / Manager-Tabitha, Unit Manager-Guanako, and Rehab Director-Tracee. Upon final eval of OT patient is confirmed to meet Mary's requirements and is appropriate for return to halfway. Mary shared that Lon does not need to be involved in the process and that patient is confirmed to be able to return. She spoke with their Transport Team ([phone]) who will  patient at 5PM today. Their pharmacy will fill medications. They are aware and in agreement with patient needing a walker with assistance.     Writer spoke with Sienna Alicea P#[phone], who is covering as guardian for Lisa as she is out of the office. Sienna is in agreement with return to halfway today. 2nd copies of Important Message from Medicare ([facility]) forms given and explained.  Copies to be scanned into medical record.  Patient / representative verbalized understanding. Verbal consent provided. Four hour window waived. Writer faxed d/c summary with [facility] to Lisa at #[phone] per Sienna's request. Writer also left a message for Lisa discussing case.     Writer alerted [facility]. She is in agreement with transfer. She will require  copy of Dr. Velázquez's assessment when available, fx#[phone].     Writer updated Dr. Romano, unit, and SW/CM managers. Writer updated bedside nurse and HUC. Information for report provided and fax provided. All in agreement with d/c today.    Writer updated patient on transfer. He was tearful at times, but thankful to move forward in the process. He requested his daughter, Jennifer #[phone], be contacted to  his belongings from his room. Writer called her at bedside and left a message. Patient will continue to try and reach her. Patient also called Jennifer's mother, Shabnam, to see if she can locate Jennifer as well. Writer was able to update Jennifer via voice message on patient's return to skilled nursing today with Sienna's permission.  All other family members are directed to call Lisa for updates. Patient pleased with this plan.     Writer faxed Mary d/c summary and Temporary Guardianship documents along with Lisa's contact information.     No additional needs at this time. SW remains available.          86

## 2022-05-31 NOTE — H&P PST ADULT - NSICDXPASTMEDICALHX_GEN_ALL_CORE_FT
PAST MEDICAL HISTORY:  Atrial fibrillation     Bronchial stenosis     H/O wheezing     [place] (hard of hearing)     Malignant neoplasm of unspecified part of unspecified bronchus or lung     PEG (percutaneous endoscopic gastrostomy) status     Requires oxygen therapy 2-3 liters when sleeping as needed

## 2022-05-31 NOTE — ASU PATIENT PROFILE, ADULT - FALL HARM RISK - HARM RISK INTERVENTIONS

## 2022-06-01 NOTE — BRIEF OPERATIVE NOTE - OPERATION/FINDINGS
Flexible bronchoscopy, tumor debulking, argon plasma coagulation Flexible bronchoscopy, tumor debulking, argon plasma coagulation, balloon dilation, bronchus intermedius stent revision, bronchoalveolar lavage.     #[id number]

## 2022-06-01 NOTE — ASU DISCHARGE PLAN (ADULT/PEDIATRIC) - ASU DC SPECIAL INSTRUCTIONSFT
Patients may experience fever on the night of the procedure.  If your fever is persistent, please contact your pulmonologist. Patient's may cough up specks of blood for 1-2 days after the procedure.  If the bleeding is large or persistent please or you have sudden chest pain or trouble breathing, please call 911 and have an ambulance take you to the nearest emergency room and contact your pulmonologist when you arrive.     Please do not take your apixaban until Saturday evening 06/04/2022    Please use your nasal cannula oxygen around the clock once you leave the hospital.    Please  the prednisone and Augmentin which have been sent to your pharmacy and take as prescribed for the full course.     Please take all of your other home medications and nebulizers as prescribed    Please call Dr Dodd's office with any questions or concerns at [phone]

## 2022-06-01 NOTE — ASU DISCHARGE PLAN (ADULT/PEDIATRIC) - CARE PROVIDER_API CALL
Cyrus Dodd)  Critical Care Medicine; Internal Medicine; Pulmonary Disease  [address] [place] [address]  Phone: [phone]  Fax: [phone]  Follow Up Time:

## 2022-06-04 PROBLEM — J98.09 OTHER DISEASES OF BRONCHUS, NOT ELSEWHERE CLASSIFIED: Chronic | Status: ACTIVE | Noted: 2022-01-01

## 2022-06-04 PROBLEM — Z93.1 GASTROSTOMY STATUS: Chronic | Status: ACTIVE | Noted: 2022-01-01

## 2022-06-04 PROBLEM — Z87.898 PERSONAL HISTORY OF OTHER SPECIFIED CONDITIONS: Chronic | Status: ACTIVE | Noted: 2022-01-01

## 2022-06-04 PROBLEM — Z99.81 DEPENDENCE ON SUPPLEMENTAL OXYGEN: Chronic | Status: ACTIVE | Noted: 2022-01-01

## 2022-06-04 PROBLEM — H91.90 UNSPECIFIED HEARING LOSS, UNSPECIFIED EAR: Chronic | Status: ACTIVE | Noted: 2022-01-01

## 2022-06-04 PROBLEM — I48.91 UNSPECIFIED ATRIAL FIBRILLATION: Chronic | Status: ACTIVE | Noted: 2022-01-01

## 2022-06-04 PROBLEM — C34.90 MALIGNANT NEOPLASM OF UNSPECIFIED PART OF UNSPECIFIED BRONCHUS OR LUNG: Chronic | Status: ACTIVE | Noted: 2022-01-01

## 2022-06-04 NOTE — ED PROVIDER NOTE - NSICDXPASTMEDICALHX_GEN_ALL_CORE_FT
PAST MEDICAL HISTORY:  Atrial fibrillation     Bronchial stenosis     H/O wheezing     [place] (hard of hearing)     Malignant neoplasm of unspecified part of unspecified bronchus or lung     PEG (percutaneous endoscopic gastrostomy) status     Requires oxygen therapy 2-3 liters when sleeping as needed

## 2022-06-04 NOTE — ED PROVIDER NOTE - ATTENDING CONTRIBUTION TO CARE
77F h/o lung CA mass was pressing on esophagus, started on chemo, got PEG tube, unable to pedro PO.  last chemo 2 wk ago, plan to start radiation soon.  Recent admission discharged on amox, pt has bronchial stent also.  Jun 1st had flex bronch, stent revision, BAL, d/c'ed on steroids and amox.  Diarrhea has been severe, not taking amox.  Still having watery diarrhea no blood.  Pt has PEG tube, not funcitoning, unable to flush it.   reports short of breath, reported hypoxia.  Not usually on oxygen.  Pt on eliquis for AC.  Pt on home O2 for baseline sat of 87% resting O2 sat per note mar 21st.  Abd nontender.  r/o c-diff.  Pt recently stopped AC for the bronchoscopy has not taken it for 5 days.  Concern for PE given rebound effect of stopping AC, recent procedure.  Pt more hypoxic than usual, would admit.  Diarrhea potentially from abx use, would rule out c-diff if pt produces diarrhea – pt says primarily when feeding.  Flush and or replace PEG tube as pt c/o it is nonfunctional.    VS:  mild tachycardia, tachypnea, hypoxia    GEN - NAD;   malaise;   A+O x3   HEAD - NC/AT     ENT - PEERL, EOMI, mucous membranes  dry, no discharge      NECK: Neck supple, non-tender without lymphadenopathy, no masses, no JVD  PULM - bilat crackles and wheezes R>L  COR -  normal heart sounds    ABD - , ND, NT, soft,  PEG site appears c/d/i.   BACK - no CVA tenderness, nontender spine     EXTREMS - no edema, no deformity, warm and well perfused    SKIN - no rash    or bruising      NEUROLOGIC - alert, face symmetric, speech fluent, sensation nl, motor no focal deficit.

## 2022-06-04 NOTE — ED PROVIDER NOTE - CLINICAL SUMMARY MEDICAL DECISION MAKING FREE TEXT BOX
76 yo F PMHx afib, on 2L NC O2 at home, NSCLC - impeding on right thoracic carinal structures, now PEG dependent and s/p bronchial stent placement - most recently revised on 6/1 w/ tumor debulking and balloon dilatation, presenting to ED for ongoing diarrhea after taking augmentin x2 days for bronchoscopy prophylaxis, and PEG tube malfunction - unable to flush. Also reporting cough and SOB. Denies CP. Possible subjective fevers. Pt previously on eliquis for afib but discontinued 5 days ago for procedure. Exam as above. Consider complication related to bronchoscopy, pna viral vs bacterial, PE, less likely CHF. Malfunctioning peg tube, likely obstructed. Diarrhea unclear etiology, consider infectious. Labs, imaging, will reassess.

## 2022-06-04 NOTE — ED ADULT NURSE NOTE - OBJECTIVE STATEMENT
pt is A&Ox3, ambulatory, able to make needs known and presents with C/O pt is A&Ox3, ambulatory, able to make needs known and presents with C/O worsening dyspnea x 4 days, diarrhea x 4 days, weakness x 3 days, and peg-tube clog, PT is a CA PT with a right chest wall port that has yet to be confirmed for use. Adventitious breath sounds heard of assessment with PT placed on 3L NC. PEG tube is clean dry and in place. Ab soft and nondistended. IV to right A/C #22, labs sent

## 2022-06-04 NOTE — ED PROVIDER NOTE - NS ED ROS FT
Gen: +fever.   HEENT: Denies headache. Denies congestion.  CV: Denies chest pain. Denies lightheadedness.  Skin: Denies rash.   Resp: +SOB. +cough.  GI: Denies abd pain. Denies nausea. Denies vomiting. +diarrhea. Denies melena. Denies hematochezia.  Msk: Denies extremity swelling. Denies extremity pain.  : Denies dysuria. Denies hematuria.  Neuro: Denies LOC. Denies dizziness. Denies new numbness/tingling. Denies new focal weakness.  Psych: Denies SI

## 2022-06-04 NOTE — ED ADULT TRIAGE NOTE - CHIEF COMPLAINT QUOTE
states " my feeding tube is blocked and my last feed was last night>" hoarseness to her voice with difficulty in breathing noted . room air sat is 82%. h/o lung ca . Dr Goode. patient placed on non breather and suturation went up to 99%. c/o back pain.

## 2022-06-04 NOTE — ED PROCEDURE NOTE - PROCEDURE ADDITIONAL DETAILS
peg tube exchanged. there is positive bowel sounds. there is bilious stomach contents coming out of the tube. Will confirm peg tube with CT scan after it is preformed. endorsed to night team.

## 2022-06-04 NOTE — ED PROVIDER NOTE - PHYSICAL EXAMINATION
Gen: A&Ox4   HEENT: Atraumatic. Mucous membranes dry, no scleral icterus.  CV: Tachy. No significant lower extremity edema.   Resp: tachypneic. Crackles bilat, no wheezing.  GI: Abdomen non tender to palpation, soft and non-distended. PEG tube in place w/o signs of infection.  Skin/MSK: No open wounds. No ecchymosis appreciated.  Neuro: Following commands. EOMI. Pupils ERRL.  Psych: Appropriate mood, cooperative

## 2022-06-04 NOTE — ED ADULT NURSE REASSESSMENT NOTE - NS ED NURSE REASSESS COMMENT FT1
Pt awake, alert and oriented x 4 awaiting CT; unable to use 22G IV inserted by break coverage RN.    Mediport accessed at CT request for CTA.    Mediport accessed with 22G 3/4" needle with positive blood return and flushing well.    Sterile dressing applied; clean, dry and intact.    Site unremarkable.    CT unable to use mediport for CT contrast.     PIV access attempt x 2 by RN without success.     Awaiting US IV for CTA.   Pt remains NPO.    Remains on 3L nasal cannula.    Respirations even and unlabored.    Denies n/v/d.

## 2022-06-05 NOTE — CONSULT NOTE ADULT - SUBJECTIVE AND OBJECTIVE BOX
HPI:  Name: Becka Heaton  MRN: [id number]  Location: [facility]  Reason: need for inpatient chemo    This patient is a 76yo lady with PMH of squamous cell lung CA, afib, dysphagia s/p PEG tube, who presents to [facility] with complaint of generalized weakness, dyspnea and diarrhea.   In Dec 2021, the patient was found to have a RLL mass, with extension to posterior mediastinum subcarinal space, right hilum with focal severe narrowing of RUL bronchus, diffuse severe narrowing of bronchus intermedius and subtotal occlusion of RLL bronchus. Lesion was inseparable from esophagus at the level of the juan m. No distant metastases were detected on CT AP and MRI brain. The patient had bronchoscopy on 2/2/22 with endobronchial stent of bronchus intermedius, with tumor debulking, biopsy of lung mass. Biopsy found NSCLC with squamous features, TPS score 10%.   She had 10 cycles of carbo/taxol, from 3/11/2022- 5/20/2022. After initial 6 cycles of carbo/taxol, patient had CT CAP on 4/19/22 which found smaller size of mass and decreased post-obstructive changes of RLL. No mets in A/P.   The patient underwent flexible bronchoscopy with tumor debulking, argon plasma  coagulation, baloon dilation, bronchus intermedius stent revision and BAL on 6/1/22. She was discharged on a 10 day course of augmentin and prednisone.  BAL culture grew out numerous Pseudomonas aeruginosa and providencia rettgeri.   She was planned for definitive RTx, with plan for 1 year of durvalumab after completion of RT.     The patient presented with cough and dyspnea. His home eliquis was disconued 5 days ago for procedure.     PAST MEDICAL & SURGICAL HISTORY:  Atrial fibrillation  [place] (hard of hearing)  Malignant neoplasm of unspecified part of unspecified bronchus or lung  Bronchial stenosi  H/O wheezing      Requires oxygen therapy  2-3 liters when sleeping as needed      PEG (percutaneous endoscopic gastrostomy) status      S/P cholecystectomy      PEG (percutaneous endoscopic gastrostomy) status      Port-A-Cath in place      S/P bronchoscopy        FAMILY HISTORY:  FH: colon cancer (Mother)    FH: heart attack (Father)      Social History:  Prior hx of smoking 1 ppd for 40 years. Quit 10 years ago (05 Jun 2022 07:46)      REVIEW OF SYSTEMS  CONSTITUTIONAL: No fever, no chills, no fatigue  EYES: No eye pain, no vision changes  ENMT:  No difficulty hearing, no throat pain  RESPIRATORY: No cough,  No shortness of breath  CARDIOVASCULAR: No chest pain, no palpitations  GASTROINTESTINAL: No abdominal pain, no nausea, no vomiting, no diarrhea, no constipation,  GENITOURINARY: No dysuria, no hematuria  NEUROLOGICAL: No numbness , no loss of strength  SKIN: No itching, no rashes,  HEME/LYMPH: No easy bruising, bleeding      >>> <<<>>> <<<  >>> <<<  Allergies  Allergies    No Known Allergies    Intolerances        Medications  MEDICATIONS  (STANDING):  piperacillin/tazobactam IVPB.. 3.375 Gram(s) IV Intermittent every 8 hours  vancomycin  IVPB 1000 milliGRAM(s) IV Intermittent every 12 hours    MEDICATIONS  (PRN):  albuterol/ipratropium for Nebulization 3 milliLiter(s) Nebulizer every 6 hours PRN Shortness of Breath and/or Wheezing      PHYSICAL EXAM:  GENERAL: NAD, well-groomed  HEAD:  Atraumatic, Normocephalic  EYES: EOMI, PERRLA, conjunctiva and sclera clear  ENMT: No oropharyngeal exudates, Moist mucous membranes  NECK: Supple, no cervical lymphadenopathy  NERVOUS SYSTEM:  alert and conversant, moving all extremities spontaneously   CHEST/LUNG: Clear to auscultation bilaterally; no rhonchi  HEART: Regular rate and rhythm; No murmurs  ABDOMEN: Soft, Nontender, Nondistended  EXTREMITIES:  2+ radial Pulses, No cyanosis or edema  SKIN: warm, dry    LABS:                        9.0    7.67  )-----------( 177      ( 05 Jun 2022 10:36 )             28.9     06-05    135  |  98  |  15  ----------------------------<  108<H>  3.3<L>   |  30  |  0.43<L>    Ca    8.9      05 Jun 2022 10:36  Mg     1.90     06-04    TPro  6.2  /  Alb  2.7<L>  /  TBili  0.8  /  DBili  x   /  AST  14  /  ALT  12  /  AlkPhos  59  06-05    PT/INR - ( 04 Jun 2022 13:13 )   PT: 13.8 sec;   INR: 1.19 ratio         PTT - ( 04 Jun 2022 13:13 )  PTT:29.3 sec      RADIOLOGY & ADDITIONAL STUDIES:  PATHOLOGY:     HPI:  This patient is a 78yo lady with PMH of squamous cell lung CA, afib, dysphagia s/p PEG tube, who presents to [facility] with complaint of generalized weakness, dyspnea and diarrhea.   In Dec 2021, the patient was found to have a RLL mass, with extension to posterior mediastinum subcarinal space, right hilum with focal severe narrowing of RUL bronchus, diffuse severe narrowing of bronchus intermedius and subtotal occlusion of RLL bronchus. Lesion was inseparable from esophagus at the level of the juan m. No distant metastases were detected on CT AP and MRI brain. The patient had bronchoscopy on 2/2/22 with endobronchial stent of bronchus intermedius, with tumor debulking, biopsy of lung mass. Biopsy found NSCLC with squamous features, TPS score 10%.   She had 10 cycles of carbo/taxol, from 3/11/2022- 5/20/2022. After initial 6 cycles of carbo/taxol, patient had CT CAP on 4/19/22 which found smaller size of mass and decreased post-obstructive changes of RLL. No mets in A/P.   The patient underwent flexible bronchoscopy with tumor debulking, argon plasma  coagulation, baloon dilation, bronchus intermedius stent revision and BAL on 6/1/22. She was discharged on a 10 day course of augmentin and prednisone.  BAL culture grew out numerous Pseudomonas aeruginosa and providencia rettgeri. She was planned for definitive RTx, with plan for 1 year of durvalumab after completion of RT.   The patient presented with cough, wheezing and worsening dyspnea. She is on 3L home O2, however has required 6L O2 here. She also endorsed diarrhea, with loose non-bloody stool. Last BM was 2 days ago, but she reports not really eating in the ED.     PAST MEDICAL & SURGICAL HISTORY:  Atrial fibrillation  [place] (hard of hearing)  Malignant neoplasm of unspecified part of unspecified bronchus or lung  Bronchial stenosi  H/O wheezing  Requires oxygen therapy 2-3 liters when sleeping as needed  PEG (percutaneous endoscopic gastrostomy) status  S/P cholecystectomy  PEG (percutaneous endoscopic gastrostomy) status  Port-A-Cath in place  S/P bronchoscopy    FAMILY HISTORY:  FH: colon cancer (Mother)  FH: heart attack (Father)    Social History:  Prior hx of smoking 1 ppd for 40 years. Quit 10 years ago (05 Jun 2022 07:46)      REVIEW OF SYSTEMS  CONSTITUTIONAL: No fever, no chills, +fatigue,  EYES: No eye pain, no vision changes  ENMT:  No difficulty hearing, no throat pain  RESPIRATORY: + wheezing, + cough, + shortness of breath  CARDIOVASCULAR: No chest pain, no palpitations  GASTROINTESTINAL: No abdominal pain, no nausea, no vomiting, + diarrhea  GENITOURINARY: No dysuria, no hematuria  NEUROLOGICAL: No numbness , no loss of strength  SKIN: No itching, no rashes,  HEME/LYMPH: No easy bruising, bleeding    >>> <<<>>> <<<  Allergies No Known Allergies  Intolerances    Medications  MEDICATIONS  (STANDING):  piperacillin/tazobactam IVPB.. 3.375 Gram(s) IV Intermittent every 8 hours  vancomycin  IVPB 1000 milliGRAM(s) IV Intermittent every 12 hours    MEDICATIONS  (PRN):  albuterol/ipratropium for Nebulization 3 milliLiter(s) Nebulizer every 6 hours PRN Shortness of Breath and/or Wheezing    PHYSICAL EXAM:  GENERAL: NAD, well-groomed  HEAD:  Atraumatic, Normocephalic  EYES: EOMI, PERRLA, conjunctiva and sclera clear  ENMT: No oropharyngeal exudates, Moist mucous membranes  NECK: Supple, no cervical lymphadenopathy  NERVOUS SYSTEM:  alert and conversant, moving all extremities spontaneously   CHEST/LUNG: +wet wheezing, + coughing, not tachypneic  HEART: Regular rate and rhythm; No murmurs  ABDOMEN: Soft, Nontender, Nondistended  EXTREMITIES:  2+ radial Pulses, No cyanosis or edema  SKIN: warm, dry    LABS:                        9.0    7.67  )-----------( 177      ( 05 Jun 2022 10:36 )             28.9     06-05    135  |  98  |  15  ----------------------------<  108<H>  3.3<L>   |  30  |  0.43<L>    Ca    8.9      05 Jun 2022 10:36  Mg     1.90     06-04    TPro  6.2  /  Alb  2.7<L>  /  TBili  0.8  /  DBili  x   /  AST  14  /  ALT  12  /  AlkPhos  59  06-05    PT/INR - ( 04 Jun 2022 13:13 )   PT: 13.8 sec;   INR: 1.19 ratio         PTT - ( 04 Jun 2022 13:13 )  PTT:29.3 sec      RADIOLOGY & ADDITIONAL STUDIES:  < from: CT Abdomen and Pelvis w/ IV Cont (06.04.22 @ 23:26) >  FINDINGS:  CHEST:  LUNGS AND LARGE AIRWAYS:  Complete collapse of the right upper lobe.   Right upper lobe bronchus is not visualized, likely occluded.  Unchanged   occlusion of the distal bronchus intermedius stent. Bilateral diffuse   groundglass opacities along with nodular tree-in-bud opacities.  PLEURA: No pleural effusion.  VESSELS: Right chest wall port with tip in the SVC. Poor opacification of   the pulmonary artery. No central PE. Calcifications of the aorta and   coronary arteries.  HEART: Heart size is normal. Mild cardiomediastinal shift from   left-to-right. No pericardial effusion.  MEDIASTINUM AND ROHNII: Interval increase in size of conglomerative   infiltrative adenopathy and soft tissue, measuring 6.2 x 4 cm in the   subcarinal region, previously4.5 x 2.3 cm.  Interval increase in right   posteriorparatracheal soft tissue resulting in anterior displacement of   the trachea.   Right hilar lymphadenopathy is again seen.  CHEST WALL AND LOWER NECK: Within normal limits.    ABDOMEN AND PELVIS:  LIVER: Within normal limits.  BILE DUCTS: Normal caliber.  GALLBLADDER: Cholecystectomy.  SPLEEN: Calcified granuloma.  PANCREAS: Within normal limits.  ADRENALS: Within normal limits.  KIDNEYS/URETERS: Subcentimeter hypodense lesions in the right, too small   to characterize. No hydronephrosis.    BLADDER: Contrast opacified bladder.  REPRODUCTIVE ORGANS: Uterus and adnexa within normal limits.    BOWEL: Gastrostomy tube with tip in the stomach. Colonic diverticulosis   without evidence of diverticulitis. No bowel obstruction. Appendix is   normal.  PERITONEUM: No ascites.  VESSELS: Atherosclerotic changes.  RETROPERITONEUM/LYMPH NODES: No lymphadenopathy.  ABDOMINAL WALL: Chest tube in place. Foci of soft tissue gas in the   abdominal wall adjacent to the gastrostomy tube without discrete focal   drainable collection.  BONES: Degenerative changes.    IMPRESSION:  Nondiagnostic exam for pulmonary embolism.  If clinical concern persists,   repeat CTA, leg doppler or VQ scan may be obtained.    Compared to the prior study of 4/19/22, intervalprogression of disease   with increased right hilar and subcarinal mass resulting in right upper   lobe occlusion and right upper lobe complete collapse. Mild left-to-right   cardiomediastinal shift.  Unchanged occlusion of the distal bronchus intermedius stent.    New diffuse groundglass opacities and tree-in-bud opacities, left greater   than right, likely multifocal pneumonia.    No acute pathology within the abdomen or pelvis.    Gastrostomy tube in place. Soft tissue gas around the gastrostomy   catheter without discrete focal drainable collection.    < end of copied text >

## 2022-06-05 NOTE — CONSULT NOTE ADULT - SUBJECTIVE AND OBJECTIVE BOX
CHIEF COMPLAINT:    HPI per H and P: HPI:  78 y/o F with 40 pack year smoking history, atrial fibrillation,, diagnosed with NSCLC (2L home O2) s/p debulking and R bronchial stent recently revised on 6/1 with further debulking and balloon dilatation started on Carboplatin/Taxol with Dr Willis 3/11/2022 s/p PEG for dysphagia due to mass effect presents today for weakness, shortness of breath, and diarrhea. Patient states she was discharged on Augmentin and Prednisone after tumor debulking and balloon dilatation. Over the past 3 days, she developed loose, watery brown stool. States she has 3-4 episodes per day. Last episode was yesterday. Along with the diarrhea, states she has felt increasing weak and reports increased shortness of breath. Reports ACKERMAN when walking from bed to bathroom. Reports increased cough and sputum production (brown/ yellow) over the past few days after the revision. No melena or hematochezia. Wheezing has been unchanged from previous. Pt notes she has not been fed since her PEG stopped working 3 days ago. She denies F/C/N/V, CP, abdominal pain, dysuria.    In the ED  T 99.5, HR 86, /54, RR 20. Sp02 95% on 6L NC  Labs:  WBC 6.10, Hgb 10.2, Procal 0.16. RVP negative  CT chest: Compared to the prior study of 4/19/22, interval progression of disease with increased right hilar and subcarinal mass resulting in RUL occlusion and RUL complete collapse. Mild left-to-right cardiomediastinal shift. Unchanged occlusion of the distal bronchus intermedius stent. New diffuse GGO and tree-in-bud opacities, left greater than right  S/p PEG tube exchange. vanc + Zosyn   (05 Jun 2022 07:46)      PAST MEDICAL & SURGICAL HISTORY:  Atrial fibrillation      [place] (hard of hearing)      Malignant neoplasm of unspecified part of unspecified bronchus or lung      Bronchial stenosis      H/O wheezing      Requires oxygen therapy  2-3 liters when sleeping as needed      PEG (percutaneous endoscopic gastrostomy) status      S/P cholecystectomy      PEG (percutaneous endoscopic gastrostomy) status      Port-A-Cath in place      S/P bronchoscopy          FAMILY HISTORY:  FH: colon cancer (Mother)    FH: heart attack (Father)        SOCIAL HISTORY:  Smoking: Prior hx of smoking 1 ppd for 40 years. Quit 10 years ago  Substance Use: [x ] Never Used [ ] Used ____  EtOH Use: not currently        Allergies    No Known Allergies    Intolerances        HOME MEDICATIONS:     ROS  Constitutional: denies fevers, chills, night sweats, weight loss  HEENT: denies visual changes, +cough  Cardiovascular: denies palpitations, chest pain, edema  Respiratory: + SOB, wheezing  Gastrointestinal: denies N/V/D, abdominal pain, hematochezia, melena  : denies dysuria, urinary urgency, increased frequency  MSK: denies muscle weakness, joint pain  Skin: denies new rashes or masses  Heme: denies bleeding, bruising  Neuro: denies headache, weakness    PHYSICAL EXAM:   GEN: Age appropriate, resting comfortably in bed, no acute distress, non toxic appearing, speaking in complete sentences.   HEENT: Conjunctiva and sclera normal  PULM: Lungs diffuse wheezing bilaterally  CV: RRR, S1S2, no MRG  MSK: no stiffness or joint effusions  Abdominal: Soft, nontender to palpation, non-distended, +BS  Extremities: No edema or cyanosis  NEURO: AAOx3  Psych: normal affect, normal behavior  Skin: No rashes, lesions      OBJECTIVE:  ICU Vital Signs Last 24 Hrs  T(C): 37.2 (05 Jun 2022 10:15), Max: 37.5 (04 Jun 2022 20:02)  T(F): 98.9 (05 Jun 2022 10:15), Max: 99.5 (04 Jun 2022 20:02)  HR: 86 (05 Jun 2022 10:15) (82 - 93)  BP: 125/58 (05 Jun 2022 10:15) (118/67 - 146/74)  BP(mean): --  ABP: --  ABP(mean): --  RR: 20 (05 Jun 2022 10:15) (17 - 22)  SpO2: 95% (05 Jun 2022 10:15) (90% - 97%)        CAPILLARY BLOOD GLUCOSE            HOSPITAL MEDICATIONS:    piperacillin/tazobactam IVPB.. 3.375 Gram(s) IV Intermittent every 8 hours  vancomycin  IVPB 1000 milliGRAM(s) IV Intermittent every 12 hours        albuterol/ipratropium for Nebulization 3 milliLiter(s) Nebulizer every 6 hours PRN                      LABS:                        9.0    7.67  )-----------( 177      ( 05 Jun 2022 10:36 )             28.9     Hgb Trend: 9.0<--, 10.2<--, 10.1<--  06-05    135  |  98  |  15  ----------------------------<  108<H>  3.3<L>   |  30  |  0.43<L>    Ca    8.9      05 Jun 2022 10:36  Mg     1.90     06-04    TPro  6.2  /  Alb  2.7<L>  /  TBili  0.8  /  DBili  x   /  AST  14  /  ALT  12  /  AlkPhos  59  06-05    Creatinine Trend: 0.43<--, 0.43<--, 0.51<--, 0.47<--  PT/INR - ( 04 Jun 2022 13:13 )   PT: 13.8 sec;   INR: 1.19 ratio         PTT - ( 04 Jun 2022 13:13 )  PTT:29.3 sec      Venous Blood Gas:  06-05 @ 10:36  7.42/54/43/35/71.3  VBG Lactate: --  Venous Blood Gas:  06-04 @ 13:13  7.40/51/44/32/70.4  VBG Lactate: 1.6

## 2022-06-05 NOTE — H&P ADULT - ATTENDING COMMENTS
76 y/o F with 40 pack year smoking history, afib, diagnosed with NSCLC (2L home O2) s/p debulking and R. bronchial stent revised on 6/1 has been on chemotherapy since 3/2022, PEG placement for dysphagia who presents for weakness/SOB and need for replacement of her PEG.     #PEG replacement, performed in the ED. Restart feeds    #SOB, progressively worsening SOB + increased sputum production. On 2L on O2 at home. Now requiring 6L. Hx of NSCLC with compression/mass effect on the airway. S/p debulking and bronchial stent + revision performed on 6/1. CT scan from today showing worsening of disease resulting in right upper lobe occlusion and right upper lobe complete collapse. Unchanged occlusion of the distal right bronchus intermedius stent. There is also diffuse ground glass opacities which are concerning for PNA. Culture data from the bronch - growing pseudomonas and providencia rettgeri. She received vanc/zosyn in the ED. CT surgery and interventional pulm are following - f/u recommendations. Continue with Zosyn for now (cultures are sensitive). F.u BCx and sputum cultures (pending).     #Diarrhea, no episodes since yesterday. C. dif, GI PCR, stool studies.  Loose watery brown stool. Likely 2/2 amoxicillin use - holding.

## 2022-06-05 NOTE — PATIENT PROFILE ADULT - FALL HARM RISK - HARM RISK INTERVENTIONS

## 2022-06-05 NOTE — H&P ADULT - PROBLEM SELECTOR PLAN 5
- diet: Jevity/ PEG  - DVT: Lovenox 40 - diet: Jevity/ PEG  - DVT: SCDs Newly diagnosed on previous March, 2022 admission. Initially, started on amiodarone and Eliquis    - per cards, d/c'ed amiodarone  - Now off Eliquis 5 days ago

## 2022-06-05 NOTE — CONSULT NOTE ADULT - ASSESSMENT
76 y/o F with 40 pack year smoking history, atrial fibrillation,, diagnosed with NSCLC (2L home O2) s/p debulking and R bronchial stent recently revised on 6/1 with further debulking and balloon dilatation started on Carboplatin/Taxol with Dr Willis 3/11/2022 s/p PEG for dysphagia due to mass effect presents to hospital with cough, wheezing, shortness of breath, and yeung that has been worsening since discharge after recent procedure on 6/1/2022.     Assessment:   C/w abx  C/w duonebs q6 standing  Start pulmicort nebulizer BID  Start HTS 4% inhaled q12 hours to be given at same time as duonebs  Please order airway clearance device with acapella  Please check MRSA nasal swab   76 y/o F with 40 pack year smoking history, atrial fibrillation,, diagnosed with NSCLC (2L home O2) s/p debulking and R bronchial stent recently revised on 6/1 with further debulking and balloon dilatation started on Carboplatin/Taxol with Dr Willis 3/11/2022 s/p PEG for dysphagia due to mass effect presents to hospital with cough, wheezing, shortness of breath, and yeung that has been worsening since discharge after recent procedure on 6/1/2022.     Assessment:   C/w abx  C/w duonebs q6 standing  Start pulmicort nebulizer BID  Start HTS 4% inhaled q12 hours to be given at same time as duonebs  Please order airway clearance device with acapella  Please check MRSA nasal swab  Make patient NPO after midnight for possible bronchoscopy on 6/6/2022 (time to be determined).  Please hold AC/DVT prophylaxis after midnight on 6/5/2022   76 y/o F with 40 pack year smoking history, atrial fibrillation, diagnosed with NSCLC (2L home O2) s/p debulking and R bronchial stent recently revised on 6/1 with further debulking and balloon dilatation started on Carboplatin/Taxol with Dr Willis 3/11/2022 s/p PEG for dysphagia due to mass effect presents to hospital with cough, wheezing, shortness of breath, and yeung that has been worsening since discharge after recent procedure on 6/1/2022.     Assessment:   C/w abx  C/w duonebs q6 standing  Start pulmicort nebulizer BID  Start 0.9% normal saline inhaled q8 hours to be given at same time as duonebs  Please order airway clearance device with acapella  Please check MRSA nasal swab  Make patient NPO after midnight for possible bronchoscopy on 6/6/2022 (time to be determined).  Please hold AC/DVT prophylaxis after midnight on 6/5/2022   76 y/o F with 40 pack year smoking history, atrial fibrillation, diagnosed with NSCLC (2L home O2) s/p debulking and R bronchial stent recently revised on 6/1 with further debulking and balloon dilatation started on Carboplatin/Taxol with Dr Willis 3/11/2022 s/p PEG for dysphagia due to mass effect presents to hospital with cough, wheezing, shortness of breath, and yeung that has been worsening since discharge after recent procedure on 6/1/2022.     Assessment:   C/w abx  Please start prednisone 30mg daily  C/w duonebs q6 standing  Start pulmicort nebulizer BID  Start 0.9% normal saline inhaled q8 hours to be given at same time as duonebs  Please order airway clearance device with acapella  Please check MRSA nasal swab  Make patient NPO after midnight for possible bronchoscopy on 6/6/2022 (time to be determined).  Please hold AC/DVT prophylaxis after midnight on 6/5/2022

## 2022-06-05 NOTE — CONSULT NOTE ADULT - ASSESSMENT
This patient is a 76yo lady with PMH of squamous cell lung CA, afib, dysphagia s/p PEG tube, who presents to [facility] with complaint of generalized weakness, dyspnea and diarrhea.     #Squamous cell lung CA  Stage IIIa, seen by Dr. Willis  - s/p 10 C carbo-taxol from 3/11/2022- 5/20/2022.   - CT CAP on 4/19/22 found smaller subcarinal/RLL mass and decreased post-obstructive changes of RLL. Unchanged occlusion of distal bronchus intermedius stent. Stable R hilar lymphadenopathy. New smaller opacities in upper lobes and superior LLL favored to be benign. No evidence of mets on A/P.   - s/p flexible bronchoscopy with tumor debulking, argon plasma  coagulation, balloon dilation, bronchus intermedius stent revision and BAL on 6/1/22  - Increased respiratory symptoms, rising supplemental O2 requirement requirement and CT imaging supportive of diagnosis of multifocal PNA. Agree with continuing antibiotics to target Pseudomonas aeruginosa and providencia rettgeri, and continuing supportive care. Follow up pulmonary recommendations.   - Most recent imaging from 6/4/22 found interval progression of disease with increased right hilar and subcarinal mass resulting in right upper lobe occlusion and right upper lobe complete collapse. Mild left-to-right cardiomediastinal shift.  Unchanged occlusion of the distal bronchus intermedius stent. No plan for systemic inpatient therapy. Patient to follow up with Dr. Willis at [facility] at time of discharge.     Please do not hesitate to page with questions.     Isabela Cabrera MD PGY4   [phone]  Hematology-Oncology Fellow

## 2022-06-05 NOTE — H&P ADULT - NSICDXPASTMEDICALHX_GEN_ALL_CORE_FT
PAST MEDICAL HISTORY:  Atrial fibrillation     Bronchial stenosis     H/O wheezing     [place] (hard of hearing)     Malignant neoplasm of unspecified part of unspecified bronchus or lung     PEG (percutaneous endoscopic gastrostomy) status     Requires oxygen therapy 2-3 liters when sleeping as needed

## 2022-06-05 NOTE — H&P ADULT - HISTORY OF PRESENT ILLNESS
76 y/o F with 40 pack year smoking history, atrial fibrillation,, diagnosed with NSCLC (2L home O2) s/p debulking and R bronchial stent recently revised on 6/1 with further debulking and balloon dilatation started on Carboplatin/Taxol with Dr Willis 3/11/2022 s/p PEG for dysphagia due to mass effect presents today for weakness, shortness of breath, and diarrhea. Patient states she was hospitalized 78 y/o F with 40 pack year smoking history, atrial fibrillation,, diagnosed with NSCLC (2L home O2) s/p debulking and R bronchial stent recently revised on 6/1 with further debulking and balloon dilatation started on Carboplatin/Taxol with Dr Willis 3/11/2022 s/p PEG for dysphagia due to mass effect presents today for weakness, shortness of breath, and diarrhea. Patient states she was discharged on Augmentin and Prednisone after tumor debulking and balloon dilatation. Over the past 3 days, she developed loose, watery brown stool. States she has 3-4 episodes per day. Last episode was yesterday. Along with the diarrhea, states she has felt increasing weak and reports increased shortness of breath. Reports ACKERMAN when walking from bed to bathroom. Reports increased cough and sputum production (brown/ yellow) over the past few days after the revision. Wheezing has been unchanged from previous. Pt notes she has not been fed since her PEG stopped working 3 days ago. She denies F/C/N/V, CP, abdominal pain, dysuria.    In the ED   76 y/o F with 40 pack year smoking history, atrial fibrillation,, diagnosed with NSCLC (2L home O2) s/p debulking and R bronchial stent recently revised on 6/1 with further debulking and balloon dilatation started on Carboplatin/Taxol with Dr Willis 3/11/2022 s/p PEG for dysphagia due to mass effect presents today for weakness, shortness of breath, and diarrhea. Patient states she was discharged on Augmentin and Prednisone after tumor debulking and balloon dilatation. Over the past 3 days, she developed loose, watery brown stool. States she has 3-4 episodes per day. Last episode was yesterday. Along with the diarrhea, states she has felt increasing weak and reports increased shortness of breath. Reports ACKERMAN when walking from bed to bathroom. Reports increased cough and sputum production (brown/ yellow) over the past few days after the revision. Wheezing has been unchanged from previous. Pt notes she has not been fed since her PEG stopped working 3 days ago. She denies F/C/N/V, CP, abdominal pain, dysuria.    In the ED  T 99.5, HR 86, /54, RR 20. Sp02 95% on 6L NC  Labs:  WBC 6.10, Hgb 10.2, Procal 0.16. RVP negative  CT chest: Compared to the prior study of 4/19/22, interval progression of disease with increased right hilar and subcarinal mass resulting in RUL occlusion and RUL complete collapse. Mild left-to-right cardiomediastinal shift. Unchanged occlusion of the distal bronchus intermedius stent. New diffuse GGO and tree-in-bud opacities, left greater than right  S/p PEG tube exchange. vanc + Zosyn   78 y/o F with 40 pack year smoking history, atrial fibrillation,, diagnosed with NSCLC (2L home O2) s/p debulking and R bronchial stent recently revised on 6/1 with further debulking and balloon dilatation started on Carboplatin/Taxol with Dr Willis 3/11/2022 s/p PEG for dysphagia due to mass effect presents today for weakness, shortness of breath, and diarrhea. Patient states she was discharged on Augmentin and Prednisone after tumor debulking and balloon dilatation. Over the past 3 days, she developed loose, watery brown stool. States she has 3-4 episodes per day. Last episode was yesterday. Along with the diarrhea, states she has felt increasing weak and reports increased shortness of breath. Reports ACKERMAN when walking from bed to bathroom. Reports increased cough and sputum production (brown/ yellow) over the past few days after the revision. No melena or hematochezia. Wheezing has been unchanged from previous. Pt notes she has not been fed since her PEG stopped working 3 days ago. She denies F/C/N/V, CP, abdominal pain, dysuria.    In the ED  T 99.5, HR 86, /54, RR 20. Sp02 95% on 6L NC  Labs:  WBC 6.10, Hgb 10.2, Procal 0.16. RVP negative  CT chest: Compared to the prior study of 4/19/22, interval progression of disease with increased right hilar and subcarinal mass resulting in RUL occlusion and RUL complete collapse. Mild left-to-right cardiomediastinal shift. Unchanged occlusion of the distal bronchus intermedius stent. New diffuse GGO and tree-in-bud opacities, left greater than right  S/p PEG tube exchange. vanc + Zosyn

## 2022-06-05 NOTE — CONSULT NOTE ADULT - TIME BILLING
risks and benefits of the procedure, alternative procedures as well as further management plan. Complex patient requiring services. Services provided were separate in additional from general pulmonary services due to critical nature of patient and interventions requires. Time spent separate from time spent doing any procedures.

## 2022-06-05 NOTE — H&P ADULT - PROBLEM SELECTOR PLAN 1
Pt s/p tumor debulking and balloon dilatation. Prescribed Amoxicillin and prednisone. Took for 2 days. Brown, loose stool noted over the past 3 days. Denies N/V/F/C. Last episode yesterday 6/4. No leukocytosis.   Ddx C diff vs gastroenteritis    Plan  - pending stool cultures  - pending c diff  - IVF, as needed Increased O2 requirements on admission 2 - -> 6L O2 with CT revealed b/l GGOs and tree in bud opacities, likely PNA. Increased cough and sputum production.    Plan  - c/w Vanc and Zosyn  - pending MRSA swab  - pending blood cultures  - CTM fever curve, CBC/CMP  - interventional pulmonology following Increased O2 requirements on admission 2 - -> 6L O2 with CT revealed b/l GGOs and tree in bud opacities, likely PNA. Increased cough and sputum production.    Plan  - c/w Vanc and Zosyn  - pending MRSA swab  - pending blood cultures  - pending sputum cultures  - CTM fever curve, CBC/CMP  - interventional pulmonology following 2/2   2/2 PNA i/s/o R sided NSCLC  Increased O2 requirements on admission 2 - -> 6L O2 with CT revealed b/l GGOs (L > R) and tree in bud opacities. Increased cough and sputum production.    Plan  - c/w Vanc and Zosyn  - pending MRSA swab  - pending blood cultures  - pending sputum cultures  - CTM fever curve, CBC/CMP  - interventional pulmonology following 2/2   2/2 PNA i/s/o R sided NSCLC  Increased O2 requirements on admission 2 - -> 6L O2 with CT revealed b/l GGOs (L > R) and tree in bud opacities. Increased cough and sputum production.  6/1 Bronch cx + Pseudomonas and Providencia    Plan  - c/w Vanc and Zosyn  - pending MRSA swab  - pending blood cultures  - pending sputum cultures  - CTM fever curve, CBC/CMP  - interventional pulmonology following

## 2022-06-05 NOTE — H&P ADULT - PROBLEM SELECTOR PLAN 4
Pt states unable to use PEG over the past 3 days. PEG exchanged in ED    Plan  - start feeds Pt states unable to use PEG over the past 3 days. PEG exchanged in ED    Plan  - start feeds- Jevity

## 2022-06-05 NOTE — CONSULT NOTE ADULT - NS ATTEND AMEND GEN_ALL_CORE FT
patient with recent debulking and BI stent placement by IP - now with atelectasis of RUL - possible stent migration.   will be seen and revised by interventional pulmonology as they had just placed it 4 days prior.     discussed with Dr. Dodd.
Yes

## 2022-06-05 NOTE — H&P ADULT - NSHPPHYSICALEXAM_GEN_ALL_CORE
Vital Signs Last 24 Hrs  T(C): 37.2 (05 Jun 2022 07:15), Max: 37.5 (04 Jun 2022 20:02)  T(F): 99 (05 Jun 2022 07:15), Max: 99.5 (04 Jun 2022 20:02)  HR: 86 (05 Jun 2022 07:15) (82 - 101)  BP: 129/54 (05 Jun 2022 07:15) (118/67 - 146/74)  BP(mean): --  RR: 20 (05 Jun 2022 07:15) (17 - 22)  SpO2: 95% (05 Jun 2022 07:15) (90% - 98%)    CONSTITUTIONAL: Well-groomed, in no apparent distress  EYES: No conjunctival or scleral injection, non-icteric; PERRLA and symmetric  ENMT: No external nasal lesions; nasal mucosa not inflamed; normal dentition; no pharyngeal injection or exudates, oral mucosa with moist membranes  NECK: Trachea midline without palpable neck mass; thyroid not enlarged and non-tender  RESPIRATORY: Breathing comfortably; no dullness to percussion; lungs CTA without wheeze/rhonchi/rales  CARDIOVASCULAR: +S1S2, RRR, no M/G/R; no carotid bruits; pedal pulses full and symmetric; no lower extremity edema  GASTROINTESTINAL: No palpable masses or tenderness, +BS throughout, no rebound/guarding; no hepatosplenomegaly; no hernia palpated  LYMPHATIC: No cervical LAD or tenderness; no axillary LAD or tenderness; no inguinal LAD or tenderness  MUSCULOSKELETAL: Normal gait and station; no digital clubbing or cyanosis; no paraspinal tenderness; examination of the  (head/neck, spine/ribs/pelvis, RUE, LUE, RLE, LLE) without misalignment, normal strength and tone of extremities  SKIN: No rashes or ulcers noted; no subcutaneous nodules or induration palpable  NEUROLOGIC: CN II-XII intact; normal reflexes in upper and lower extremities; sensation intact in LEs b/l to light touch  PSYCHIATRIC: A+O x 3; mood and affect appropriate; appropriate insight and judgment Vital Signs Last 24 Hrs  T(C): 37.2 (05 Jun 2022 07:15), Max: 37.5 (04 Jun 2022 20:02)  T(F): 99 (05 Jun 2022 07:15), Max: 99.5 (04 Jun 2022 20:02)  HR: 86 (05 Jun 2022 07:15) (82 - 101)  BP: 129/54 (05 Jun 2022 07:15) (118/67 - 146/74)  BP(mean): --  RR: 20 (05 Jun 2022 07:15) (17 - 22)  SpO2: 95% (05 Jun 2022 07:15) (90% - 98%)    CONSTITUTIONAL: NAD  EYES: No conjunctival or scleral injection, non-icteric; PERRLA and symmetric  ENMT: Oral mucosa with moist membranes  NECK: Supple  RESPIRATORY: Breathing comfortably on 6L NC; Wheezing present b/l   CARDIOVASCULAR: +S1S2, RRR, no M/G/R;  no lower extremity edema  GASTROINTESTINAL: Nondistended and nontender, +BS throughout, no rebound/guarding; PEG in place- bilious output  MUSCULOSKELETAL: Normal strength and tone of extremities  SKIN: No rashes or ulcers  NEUROLOGIC: No focal deficits  PSYCHIATRIC: A+O x 3; mood and affect appropriate; appropriate insight and judgment

## 2022-06-05 NOTE — H&P ADULT - NSHPLABSRESULTS_GEN_ALL_CORE
(06-04 @ 13:13)                      10.2  6.10 )-----------( 219                 33.0    Neutrophils = 5.36 (82.6%)  Lymphocytes = 0.26 (4.3%)  Eosinophils = 0.00 (0.0%)  Basophils = 0.05 (0.9%)  Monocytes = 0.43 (7.0%)  Bands = 5.2%    06-04    137  |  98  |  20  ----------------------------<  119<H>  4.6   |  28  |  0.43<L>    Ca    9.5      04 Jun 2022 13:13  Mg     1.90     06-04    TPro  7.0  /  Alb  3.3  /  TBili  0.7  /  DBili  x   /  AST  38<H>  /  ALT  14  /  AlkPhos  64  06-04    ( 04 Jun 2022 13:13 )   PT: 13.8 sec;   INR: 1.19 ratio;       PTT:29.3 sec      RVP:(06-04 @ 13:00)  [facility]      Venous Blood Gas:  06-04 @ 13:13  7.40/51/44/32/70.4  VBG Lactate: 1.6        Tox:

## 2022-06-05 NOTE — CONSULT NOTE ADULT - SUBJECTIVE AND OBJECTIVE BOX
HPI:    77 year old F with PMH NSCLC (SCC)- impinging on right thoracic carinal structures requiring a PEG and a bronchial stent.   She is s/p stent revision with tumor debulking and balloon dilation on 6/1/22.  She presented to the ED c/o PEG occlusion, diarrhea on Augmentin, cough and SOB/ ACKERMAN.  The ACKERMAN has been worsening.  Eliquis for AFib is still on hold after her procedure.  The PEG was exchanged by the ER.  Chemo in progress and RT to start this week    PAST MEDICAL & SURGICAL HISTORY:  Atrial fibrillation, Pala, R Lung cancer, Bronchial stenosis, s/p PEG, cholecystectomy, Port-A-Cath, bronchoscopy (see above)    REVIEW OF SYSTEMS  General: No h/a , dizziness, or weight change	  Skin/Breast: No Lesions  Ophthalmologic: No Blurry vision  ENMT: Pala  Respiratory and Thorax: + dry cough/ No Wheezing/ +SOB/ +ACKERMAN/ No Hemoptysis/ No Sputum production  Cardiovascular: No Chest pain, Palpitations, or Diaphoresis	  Gastrointestinal: No Nausea, no Vomiting, no Constipation, no Appetite Change, + Diarrhea, + PEG	  Genitourinary: No Hematuria	  Musculoskeletal: No Pain, Weakness, Claudication	  Neurological: No Seizures, no Paresthesias	  Psychiatric: No Dementia	  Hematology/Lymphatics: No hx of bleeding/, no Edema	  Endocrine: No Hyperglycemia/ Hypoglycemia  Allergic/Immunologic:	 No Anaphylaxis    MEDICATIONS  (Home):  Augmentin, Prednisone, Respiratory medications    ALLERGIES  No Known Allergies    SOCIAL HISTORY:  Lives with     FAMILY HISTORY:  FH: colon cancer (Mother)  FH: heart attack (Father)    Vital Signs Last 24 Hrs  T(C): 36.7 (05 Jun 2022 01:08), Max: 37.5 (04 Jun 2022 20:02)  T(F): 98.1 (05 Jun 2022 01:08), Max: 99.5 (04 Jun 2022 20:02)  HR: 82 (05 Jun 2022 05:25) (82 - 101)  BP: 118/67 (05 Jun 2022 05:25) (118/67 - 146/74)  RR: 17 (05 Jun 2022 05:25) (17 - 22)  SpO2: 97% (05 Jun 2022 05:25) (90% - 98%)    General: Thin, frail  Neurology: Awake, nonfocal, SANTANA x 4  Eyes: Scleras clear, PERRLA/ EOMI, Gross vision intact  ENT: Pala, grossly patent pharynx, no stridor  Neck: Neck supple, trachea midline, No JVD,   Respiratory: Decreased BS R lung fields, No wheezing, rales, rhonchi  CV: RRR, S1S2  Abdominal: Soft, NT, ND +BS, +PEG  Extremities: No edema, + peripheral pulses  Skin: No Rashes, Hematoma, Ecchymosis  Lymphatic: No Neck, axilla, groin LAD  Psych: Oriented x 3, normal affect    LABS:                        10.2   6.10  )-----------( 219      ( 04 Jun 2022 13:13 )             33.0     06-04    137  |  98  |  20  ----------------------------<  119<H>  4.6   |  28  |  0.43<L>    Ca    9.5      04 Jun 2022 13:13  Mg     1.90     06-04    TPro  7.0  /  Alb  3.3  /  TBili  0.7  /  DBili  x   /  AST  38<H>  /  ALT  14  /  AlkPhos  64  06-04    PT/INR - ( 04 Jun 2022 13:13 )   PT: 13.8 sec;   INR: 1.19 ratio    PTT - ( 04 Jun 2022 13:13 )  PTT:29.3 sec      RADIOLOGY & ADDITIONAL STUDIES:  CXR: RUL opacity unchanged  CTA chest: Nondiagnostic exam for pulmonary embolism.  If clinical concern persists,   repeat CTA, leg doppler or VQ scan may be obtained.    Compared to the prior study of 4/19/22, interval progression of disease   with increased right hilar and subcarinal mass resulting in right upper   lobe occlusion and right upper lobe complete collapse. Mild left-to-right   cardiomediastinal shift.  Unchanged occlusion of the distal bronchus intermedius stent.    New diffuse groundglass opacities and tree-in-bud opacities, left greater   than right, likely multifocal pneumonia.    No acute pathology within the abdomen or pelvis.    Gastrostomy tube in place. Soft tissue gas around the gastrostomy   catheter without discrete focal drainable collection.    ASSESSMENT:   R lung cancer progression; PNA    PLAN:  Admit to medicine;   No emergent thoracic surgery intervention at this time  Thoracic surgery to review the films again and follow up  Case discussed with Dr Martin

## 2022-06-05 NOTE — H&P ADULT - PROBLEM SELECTOR PLAN 2
Increased O2 requirements on admission 2 - -> 6L O2 with CT revealed b/l GGOs and tree in bud opacities, likely PNA. Increased cough and sputum production.    Plan  - c/w Vanc and Zosyn  - pending MRSA swab  - pending blood cultures  - CTM fever curve, CBC/CMP CT chest: Compared to the prior study of 4/19/22, interval progression of disease with increased right hilar and subcarinal mass resulting in RUL occlusion and RUL complete collapse. Mild left-to-right cardiomediastinal shift. Unchanged occlusion of the distal bronchus intermedius stent.  Scheduled for RT 6/6 CT chest: Compared to the prior study of 4/19/22, interval progression of disease with increased right hilar and subcarinal mass resulting in RUL occlusion and RUL complete collapse. Mild left-to-right cardiomediastinal shift. Unchanged occlusion of the distal bronchus intermedius stent.  Scheduled for RT 6/6    Plan  - hold prednisone and augmetin

## 2022-06-05 NOTE — H&P ADULT - PROBLEM SELECTOR PLAN 3
Pt s/p tumor debulking and balloon dilatation. Prescribed Amoxicillin and prednisone. Took for 2 days. Brown, loose stool noted over the past 3 days. Denies N/V/F/C. Last episode yesterday 6/4. No leukocytosis.   Ddx C diff vs gastroenteritis    Plan  - pending stool cultures  - pending c diff  - IVF, as needed Pt s/p tumor debulking and balloon dilatation. Prescribed Amoxicillin and prednisone. Took for 2 days. Brown, loose stool noted over the past 3 days. Denies N/V/F/C. Last episode yesterday 6/4. No leukocytosis.   Ddx C diff    Plan  - pending stool cultures  - pending c diff  - IVF, as needed 2/2 amoxicillin induced diarrhea vs C diff   Pt s/p tumor debulking and balloon dilatation. Prescribed Amoxicillin and prednisone. Took for 2 days. Brown, loose stool noted over the past 3 days. Denies N/V/F/C. Last episode yesterday 6/4. No leukocytosis.       Plan  - pending stool cultures  - pending c diff  - IVF, as needed

## 2022-06-05 NOTE — H&P ADULT - ASSESSMENT
76 y/o F with 40 pack year smoking history, atrial fibrillation,, diagnosed with NSCLC (2L home O2) s/p debulking and R bronchial stent recently revised on 6/1 with further debulking and balloon dilatation started on Carboplatin/Taxol with Dr Willis 3/11/2022 s/p PEG for dysphagia due to mass effect presents today for weakness, shortness of breath, and diarrhea.

## 2022-06-06 NOTE — CHART NOTE - NSCHARTNOTEFT_GEN_A_CORE
Defer management to interventional Pulmonology  Thoracic surgery to sign off at this time  Recall with any questions or concerns.

## 2022-06-06 NOTE — PROGRESS NOTE ADULT - PROBLEM SELECTOR PLAN 5
Newly diagnosed on previous March, 2022 admission. Initially, started on amiodarone and Eliquis    - per cards, d/c'ed amiodarone  - Now off Eliquis 5 days ago

## 2022-06-06 NOTE — PROGRESS NOTE ADULT - PROBLEM SELECTOR PLAN 1
2/2   2/2 PNA i/s/o R sided NSCLC  Increased O2 requirements on admission 2 - -> 6L O2 with CT revealed b/l GGOs (L > R) and tree in bud opacities. Increased cough and sputum production.  6/1 Bronch cx + Pseudomonas and Providencia    Plan  - c/w Vanc and Zosyn  - pending MRSA swab  - pending blood cultures  - pending sputum cultures  - CTM fever curve, CBC/CMP  - interventional pulmonology following 2/2   2/2 PNA i/s/o R sided NSCLC  Increased O2 requirements on admission 2 - -> 6L O2 with CT revealed b/l GGOs (L > R) and tree in bud opacities. Increased cough and sputum production.  6/1 Bronch cx + Pseudomonas and Providencia    Plan  - c/w Vanc (6/5- 6/6) and Zosyn (6/5- )  - MRSA swab negative- vanc d/c'ed  - blood cultures- NGTD  - pending sputum cultures  - CTM fever curve, CBC/CMP  - interventional pulmonology following- plan for bronchoscopy 6/7  - c/w prednisone 30 daily

## 2022-06-06 NOTE — PROGRESS NOTE ADULT - PROBLEM SELECTOR PLAN 3
2/2 amoxicillin induced diarrhea vs C diff   Pt s/p tumor debulking and balloon dilatation. Prescribed Amoxicillin and prednisone. Took for 2 days. Brown, loose stool noted over the past 3 days. Denies N/V/F/C. Last episode yesterday 6/4. No leukocytosis.       Plan  - pending stool cultures  - pending c diff  - IVF, as needed

## 2022-06-06 NOTE — DIETITIAN INITIAL EVALUATION ADULT - OTHER INFO
77 year old female with a PMH of NSCLC (10 cycles of carbo/taxol, from 3/11/2022- 5/20/2022) s/p PEG for dysphagia due to mass effect presents today for weakness, shortness of breath, and diarrhea, admitted for treatment of multifocal PNA per chart.    Patient is currently on continuous Glucerna 1.5 via PEG @ 54 mL/hr. x 24 hrs. for total volume 1,296 mL. Provides 1,944 kcal, 107 g pro and 985 mL of fluid (TF free water). Reports no GI distress at this time. Has no food allergies. Reports UBW was 159 lbs. x 3 months and has been gradually losing weight. Weight taken with RN was 145.5 lbs. (6/6) indicating a -8.7% weight loss x 3 months, likely due to increased nutrient needs in setting of catabolic illness. No edema or pressure injuries noted per RN flow sheet. All questions/concerns regarding tube feeds were addressed with patient. 77 year old female with a PMH of NSCLC (10 cycles of carbo/taxol, from 3/11/2022- 5/20/2022) s/p PEG for dysphagia due to mass effect presents today for weakness, shortness of breath, and diarrhea, admitted for treatment of multifocal PNA per chart.    Patient is currently on continuous Glucerna 1.5 via PEG @ 54 mL/hr. x 24 hrs. for total volume 1,296 mL. Provides 1,944 kcal, 107 g pro and 985 mL of fluid (TF free water). Reports no GI distress at this time. Has no food allergies.  reports UBW was 152-154 lbs. x 3 months and has been gradually losing weight since start of chemo. Weight taken with RN was 145.5 lbs. (6/6) indicating a -4.3-5.6% weight loss x 3 months, likely due to increased nutrient needs in setting of catabolic illness. No edema or pressure injuries noted per RN flow sheet. All questions/concerns regarding tube feeds were addressed with patient.

## 2022-06-06 NOTE — PROGRESS NOTE ADULT - ATTENDING COMMENTS
76 y/o F with 40 pack year smoking history, atrial fibrillation,, diagnosed with NSCLC (2L home O2) s/p debulking and R bronchial stent recently revised on 6/1 with further debulking and balloon dilatation started on Carboplatin/Taxol with Dr Willis 3/11/2022 s/p PEG for dysphagia due to mass effect presents today for weakness, shortness of breath, and diarrhea. Admitted for treatment of multifocal PNA.   Previous BAL Pseudomonas and Providencia , c/w Zosyn per prior cultures .  Pulmonary following, plan for rigid bronchoscopy with stent revision, APC and cryo tomorrow 6/7.  c/w nebs, steroids. f/w am labs .  Hold AC for tomorrow's procedure.

## 2022-06-06 NOTE — DIETITIAN INITIAL EVALUATION ADULT - ENTERAL
Consider adjusting tube feed to Jevity 1.2 via PEG @ 55 mL/hr. x 24 hrs. for total volume 1,320 mL. Provides 1,584 kcal (30.3 kcal/kg), 73.3 g pro (1.4 g/kg) and 1.065 mL of fluid (TF free water), defer additional free water flushes to team. Begin tube feeds @ 15 mL/hr. and increase by 10 mL/hr. every 6 hrs. to goal rate @ 55 mL/hr.  Consider adjusting tube feed to Jevity 1.2 via PEG @ 55 mL/hr. x 24 hrs. for total volume 1,320 mL. Provides 1,584 kcal (30.3 kcal/kg), 73.3 g pro (1.4 g/kg) and 1,065 mL of fluid (TF free water), defer additional free water flushes to team. Begin tube feeds @ 15 mL/hr. and increase by 10 mL/hr. every 6 hrs. to goal rate @ 55 mL/hr.

## 2022-06-06 NOTE — DIETITIAN INITIAL EVALUATION ADULT - ADD RECOMMEND
Obtain weekly weight to monitor trend When medically feasible for bolus feeds per team discretion, recommend Jevity 1.2 via PEG @ 330 mL every 6 hrs. (4x daily total) to provide same volume. Obtain weekly weight to monitor trend.

## 2022-06-06 NOTE — DIETITIAN INITIAL EVALUATION ADULT - ORAL INTAKE PTA/DIET HISTORY
Patient seen for assessment. Reports not being able to use PEG since procedure last Wednesday, noted PEG was exchanged per H&P. States being on Glucerna 1.5 at home. States taking 320 mL 4x daily for total volume 1,280 mL. Provides 1,920 kcal, 105.6 g pro and 973 mL of fluid (TF free water). Reports loose stools PTA and was noted to be on amoxicillin PTA per H&P. Patient seen for assessment with  at bed side. Reports not being able to use PEG since procedure last Wednesday, noted PEG was exchanged per H&P.  states patient was on Jevity 1.2 at home. States taking 300 mL 4x daily for total volume 1,200 mL. Provides 1,440 kcal, 66.6 g pro and 968 mL of fluid (TF free water). Reports loose stools PTA and was noted to be on amoxicillin PTA per H&P.

## 2022-06-06 NOTE — PROGRESS NOTE ADULT - SUBJECTIVE AND OBJECTIVE BOX
CHIEF COMPLAINT:    Interval Events:    REVIEW OF SYSTEMS:  Constitutional: [ ] negative [ ] fevers [ ] chills [ ] weight loss [ ] weight gain  HEENT: [ ] negative [ ] dry eyes [ ] eye irritation [ ] postnasal drip [ ] nasal congestion  CV: [ ] negative  [ ] chest pain [ ] orthopnea [ ] palpitations [ ] murmur  Resp: [ ] negative [ ] cough [ ] shortness of breath [ ] dyspnea [ ] wheezing [ ] sputum [ ] hemoptysis  GI: [ ] negative [ ] nausea [ ] vomiting [ ] diarrhea [ ] constipation [ ] abd pain [ ] dysphagia   : [ ] negative [ ] dysuria [ ] nocturia [ ] hematuria [ ] increased urinary frequency  Musculoskeletal: [ ] negative [ ] back pain [ ] myalgias [ ] arthralgias [ ] fracture  Skin: [ ] negative [ ] rash [ ] itch  Neurological: [ ] negative [ ] headache [ ] dizziness [ ] syncope [ ] weakness [ ] numbness  Psychiatric: [ ] negative [ ] anxiety [ ] depression  Endocrine: [ ] negative [ ] diabetes [ ] thyroid problem  Hematologic/Lymphatic: [ ] negative [ ] anemia [ ] bleeding problem  Allergic/Immunologic: [ ] negative [ ] itchy eyes [ ] nasal discharge [ ] hives [ ] angioedema  [ ] All other systems negative  [ ] Unable to assess ROS because ________    OBJECTIVE:  ICU Vital Signs Last 24 Hrs  T(C): 37 (06 Jun 2022 07:31), Max: 37.6 (05 Jun 2022 19:24)  T(F): 98.6 (06 Jun 2022 07:31), Max: 99.7 (05 Jun 2022 19:24)  HR: 90 (06 Jun 2022 07:31) (80 - 90)  BP: 109/55 (06 Jun 2022 07:31) (109/55 - 139/68)  BP(mean): --  ABP: --  ABP(mean): --  RR: 18 (06 Jun 2022 07:31) (18 - 20)  SpO2: 100% (06 Jun 2022 07:31) (92% - 100%)        06-05 @ 07:01  -  06-06 @ 07:00  --------------------------------------------------------  IN: 480 mL / OUT: 0 mL / NET: 480 mL      CAPILLARY BLOOD GLUCOSE          PHYSICAL EXAM:  General:   HEENT:   Lymph Nodes:  Neck:   Respiratory:   Cardiovascular:   Abdomen:   Extremities:   Skin:   Neurological:  Psychiatry:    HOSPITAL MEDICATIONS:  MEDICATIONS  (STANDING):  albuterol/ipratropium for Nebulization 3 milliLiter(s) Nebulizer every 6 hours  buDESOnide    Inhalation Suspension 0.25 milliGRAM(s) Inhalation two times a day  piperacillin/tazobactam IVPB.. 3.375 Gram(s) IV Intermittent every 8 hours  predniSONE   Tablet 30 milliGRAM(s) Oral daily  sodium chloride 0.9% for Nebulization 3 milliLiter(s) Nebulizer every 8 hours    MEDICATIONS  (PRN):      LABS:                        10.2   7.00  )-----------( 196      ( 06 Jun 2022 06:20 )             32.6     Hgb Trend: 10.2<--, 9.0<--, 10.2<--, 10.1<--  06-06    138  |  98  |  17  ----------------------------<  154<H>  3.9   |  27  |  0.52    Ca    9.8      06 Jun 2022 06:20  Phos  3.7     06-06  Mg     2.00     06-06    TPro  6.7  /  Alb  2.9<L>  /  TBili  0.6  /  DBili  x   /  AST  16  /  ALT  12  /  AlkPhos  66  06-06    Creatinine Trend: 0.52<--, 0.43<--, 0.43<--, 0.51<--  PT/INR - ( 04 Jun 2022 13:13 )   PT: 13.8 sec;   INR: 1.19 ratio         PTT - ( 04 Jun 2022 13:13 )  PTT:29.3 sec      Venous Blood Gas:  06-05 @ 10:36  7.42/54/43/35/71.3  VBG Lactate: --  Venous Blood Gas:  06-04 @ 13:13  7.40/51/44/32/70.4  VBG Lactate: 1.6      MICROBIOLOGY:     Culture - Blood (collected 04 Jun 2022 13:13)  Source: .Blood Blood  Preliminary Report (05 Jun 2022 19:02):    No growth to date.    Culture - Blood (collected 04 Jun 2022 13:03)  Source: .Blood Blood  Preliminary Report (05 Jun 2022 19:02):    No growth to date.        RADIOLOGY:  [ ] Reviewed and interpreted by me    PULMONARY FUNCTION TESTS:    EKG: CHIEF COMPLAINT: SOB    Interval Events:   Endorsed continued SOB    REVIEW OF SYSTEMS:  Constitutional: [ ] negative [ ] fevers [ ] chills [ ] weight loss [ ] weight gain  HEENT: [ ] negative [ ] dry eyes [ ] eye irritation [ ] postnasal drip [ ] nasal congestion  CV: [ ] negative  [ ] chest pain [ ] orthopnea [ ] palpitations [ ] murmur  Resp: [ ] negative [ ] cough [x ] shortness of breath [ ] dyspnea [ ] wheezing [ ] sputum [ ] hemoptysis  GI: [ ] negative [ ] nausea [ ] vomiting [ ] diarrhea [ ] constipation [ ] abd pain [ ] dysphagia   : [ ] negative [ ] dysuria [ ] nocturia [ ] hematuria [ ] increased urinary frequency  Musculoskeletal: [ ] negative [ ] back pain [ ] myalgias [ ] arthralgias [ ] fracture  Skin: [ ] negative [ ] rash [ ] itch  Neurological: [ ] negative [ ] headache [ ] dizziness [ ] syncope [ ] weakness [ ] numbness  Psychiatric: [ ] negative [ ] anxiety [ ] depression  Endocrine: [ ] negative [ ] diabetes [ ] thyroid problem  Hematologic/Lymphatic: [ ] negative [ ] anemia [ ] bleeding problem  Allergic/Immunologic: [ ] negative [ ] itchy eyes [ ] nasal discharge [ ] hives [ ] angioedema  [x ] All other systems negative  [ ] Unable to assess ROS because ________    OBJECTIVE:  ICU Vital Signs Last 24 Hrs  T(C): 37 (06 Jun 2022 07:31), Max: 37.6 (05 Jun 2022 19:24)  T(F): 98.6 (06 Jun 2022 07:31), Max: 99.7 (05 Jun 2022 19:24)  HR: 90 (06 Jun 2022 07:31) (80 - 90)  BP: 109/55 (06 Jun 2022 07:31) (109/55 - 139/68)  BP(mean): --  ABP: --  ABP(mean): --  RR: 18 (06 Jun 2022 07:31) (18 - 20)  SpO2: 100% (06 Jun 2022 07:31) (92% - 100%)        06-05 @ 07:01  -  06-06 @ 07:00  --------------------------------------------------------  IN: 480 mL / OUT: 0 mL / NET: 480 mL      CAPILLARY BLOOD GLUCOSE    PHYSICAL EXAM:  General: Frail female laying in bed in mild distress  HEENT: Nasal canula in place  Respiratory: Diminished breath sounds on the right  Cardiovascular: Regular rate and rhythm   Abdomen: Nontender nondistended  Extremities: No peripheral edema  Skin: No rashes  Neurological: No appreciable neurologic deficits  Psychiatry: Agitated    HOSPITAL MEDICATIONS:  MEDICATIONS  (STANDING):  albuterol/ipratropium for Nebulization 3 milliLiter(s) Nebulizer every 6 hours  buDESOnide    Inhalation Suspension 0.25 milliGRAM(s) Inhalation two times a day  piperacillin/tazobactam IVPB.. 3.375 Gram(s) IV Intermittent every 8 hours  predniSONE   Tablet 30 milliGRAM(s) Oral daily  sodium chloride 0.9% for Nebulization 3 milliLiter(s) Nebulizer every 8 hours    MEDICATIONS  (PRN):      LABS:                        10.2   7.00  )-----------( 196      ( 06 Jun 2022 06:20 )             32.6     Hgb Trend: 10.2<--, 9.0<--, 10.2<--, 10.1<--  06-06    138  |  98  |  17  ----------------------------<  154<H>  3.9   |  27  |  0.52    Ca    9.8      06 Jun 2022 06:20  Phos  3.7     06-06  Mg     2.00     06-06    TPro  6.7  /  Alb  2.9<L>  /  TBili  0.6  /  DBili  x   /  AST  16  /  ALT  12  /  AlkPhos  66  06-06    Creatinine Trend: 0.52<--, 0.43<--, 0.43<--, 0.51<--  PT/INR - ( 04 Jun 2022 13:13 )   PT: 13.8 sec;   INR: 1.19 ratio         PTT - ( 04 Jun 2022 13:13 )  PTT:29.3 sec      Venous Blood Gas:  06-05 @ 10:36  7.42/54/43/35/71.3  VBG Lactate: --  Venous Blood Gas:  06-04 @ 13:13  7.40/51/44/32/70.4  VBG Lactate: 1.6      MICROBIOLOGY:     Culture - Blood (collected 04 Jun 2022 13:13)  Source: .Blood Blood  Preliminary Report (05 Jun 2022 19:02):    No growth to date.    Culture - Blood (collected 04 Jun 2022 13:03)  Source: .Blood Blood  Preliminary Report (05 Jun 2022 19:02):    No growth to date.        RADIOLOGY:  [ ] Reviewed and interpreted by me    PULMONARY FUNCTION TESTS:    EKG:

## 2022-06-06 NOTE — PROGRESS NOTE ADULT - ASSESSMENT
76YO Female former smoker PMH Afib, w/ Squamous cell lung carcinoma (3L home O2) s/p debulking and R bronchial stent recently revised on 6/1 with further debulking and balloon dilatation started on Carboplatin/Taxol with Dr Willis 3/11/2022 s/p PEG for dysphagia due to mass effect presents to hospital with cough, wheezing, shortness of breath, and yeung that has been worsening since discharge after recent procedure on 6/1/2022.     #Squamous cell lung carcinoma  C/w abx  Please start prednisone 30mg daily  C/w duonebs q6 standing  Start pulmicort nebulizer BID  Start 0.9% normal saline inhaled q8 hours to be given at same time as duonebs  Please order airway clearance device with acapella  Please check MRSA nasal swab  Make patient NPO after midnight for possible bronchoscopy on 6/6/2022 (time to be determined).  Please hold AC/DVT prophylaxis after midnight on 6/5/2022   78YO Female former smoker PMH Afib, w/ Squamous cell lung carcinoma (3L home O2) s/p debulking and R bronchial stent recently revised on 6/1 with further debulking and balloon dilatation started on Carboplatin/Taxol with Dr Willis 3/11/2022 s/p PEG for dysphagia due to mass effect presents to hospital with cough, wheezing, shortness of breath, and yeung that has been worsening since discharge after recent procedure on 6/1/2022.     #Squamous cell lung carcinoma  - C/w Zosyn  - Previous BAL showing Pseudomonas and   - C/w prednisone 30mg daily  - C/w duonebs q6 standing  - C/w 0.9% normal saline inhaled q8 hours to be given at same time as duonebs  - Given MRSA swab is negative can discontinue vancomycin  - Pt to go for Rigid bronchoscopy with stent revision, APC and cryo tomorrow 6/7 at 11am  - Please continue to hold AC  - Please make NPO after midnight tonight. Can eat today  - COVID neg 6/4    76YO Female former smoker PMH Afib, w/ Squamous cell lung carcinoma (3L home O2) s/p debulking and R bronchial stent recently revised on 6/1 with further debulking and balloon dilatation started on Carboplatin/Taxol with Dr Willis 3/11/2022 s/p PEG for dysphagia due to mass effect presents to hospital with cough, wheezing, shortness of breath, and yeung that has been worsening since discharge after recent procedure on 6/1/2022.     #Squamous cell lung carcinoma  - C/w Zosyn  - Previous BAL showing Pseudomonas and providencia  - C/w prednisone 30mg daily  - C/w duonebs q6 standing  - C/w 0.9% normal saline inhaled q8 hours to be given at same time as duonebs  - Given MRSA swab is negative can discontinue vancomycin  - Pt to go for Rigid bronchoscopy with stent revision, APC and cryo tomorrow 6/7 at 11am  - Please continue to hold AC  - Please make NPO after midnight tonight. Can eat today  - COVID neg 6/4     Case discussed with Dr. Jose Green DO PGY-5  Pulmonary/Critical Care Fellow   Pager: [phone] ([place]), [phone] (NS)  Pulmonary Spectra #[id number] ([facility]) / [phone] ([place])

## 2022-06-06 NOTE — DIETITIAN INITIAL EVALUATION ADULT - NS FNS DIET ORDER
Diet, NPO after Midnight:      NPO Start Date: 06-Jun-2022,   NPO Start Time: 23:59  Except Medications (06-06-22 @ 09:27)  Diet, NPO with Tube Feed:   Tube Feeding Modality: Gastrostomy  Glucerna 1.5 Keith (GLUCERNA1.5RTH)  Total Volume for 24 Hours (mL): 1296  Continuous  Starting Tube Feed Rate {mL per Hour}: 20  Increase Tube Feed Rate by (mL): 10     Every 4 hours  Until Goal Tube Feed Rate (mL per Hour): 54  Tube Feed Duration (in Hours): 24  Tube Feed Start Time: 11:00 (06-05-22 @ 16:22)

## 2022-06-06 NOTE — PROGRESS NOTE ADULT - PROBLEM SELECTOR PLAN 2
CT chest: Compared to the prior study of 4/19/22, interval progression of disease with increased right hilar and subcarinal mass resulting in RUL occlusion and RUL complete collapse. Mild left-to-right cardiomediastinal shift. Unchanged occlusion of the distal bronchus intermedius stent.  Scheduled for RT 6/6    Plan  - hold prednisone and augmetin CT chest: Compared to the prior study of 4/19/22, interval progression of disease with increased right hilar and subcarinal mass resulting in RUL occlusion and RUL complete collapse. Mild left-to-right cardiomediastinal shift. Unchanged occlusion of the distal bronchus intermedius stent.  Scheduled for RT 6/6    Plan  - hold augmentin

## 2022-06-06 NOTE — DIETITIAN INITIAL EVALUATION ADULT - NSFNSGIIOFT_GEN_A_CORE
06-05-22 @ 07:01  -  06-06-22 @ 07:00  --------------------------------------------------------  OUT:  Total OUT: 0 mL    Total NET: 180 mL

## 2022-06-06 NOTE — PROGRESS NOTE ADULT - ATTENDING COMMENTS
Pt is a 77F with PMHx former smoker with non-resectable lung SCC on chronic home O2 supplementation (on Carboplatin/Taxol last given 5/20) c/w mass effect with dysphagia s/p PEG and airway narrowing c/b acute on chronic dyspnea. Pt initially underwent rigid bronchoscopy with balloon dilatation of 90% bronchial stenosis of bronchus intermedius, tumor debulking, and bronchial stent placement in the bronchus intermedius on 2/2/22 followed by repeat rigid bronchoscopy with tumor debulking (argon plasma coagulation and balloon dilation) and BI stent revision last week, d/c'ed home on 10 days of Augmentin with steroid taper. Given persistent respiratory sx and CT Chest evidence from 6/4 showing interval POD with increased right hilar and subcarinal mass resulting in RUL occlusion with lobar collapse as well as distal BI stent occlusion, pt now planned for repeat rigid bronchoscopy with stent revision and APC/cryo debulking tomorrow 6/7 at 11AM with IP team. Pt planned for local radiation therapy as O/P as well. Please hold A/C and make pt NPO after MN. In the meantime, can c/w airway clearance therapy as above. C/w Zosyn for post-obstructive Pseudomonas/Providencia PNA.

## 2022-06-06 NOTE — PROGRESS NOTE ADULT - PROBLEM SELECTOR PLAN 4
Pt states unable to use PEG over the past 3 days. PEG exchanged in ED    Plan  - start feeds- Jevity

## 2022-06-06 NOTE — DIETITIAN INITIAL EVALUATION ADULT - PERTINENT LABORATORY DATA
06-06    138  |  98  |  17  ----------------------------<  154<H>  3.9   |  27  |  0.52    Ca    9.8      06 Jun 2022 06:20  Phos  3.7     06-06  Mg     2.00     06-06    TPro  6.7  /  Alb  2.9<L>  /  TBili  0.6  /  DBili  x   /  AST  16  /  ALT  12  /  AlkPhos  66  06-06  A1C with Estimated Average Glucose Result: 5.7 % (01-31-22 @ 07:22)

## 2022-06-06 NOTE — PROGRESS NOTE ADULT - ASSESSMENT
78 y/o F with 40 pack year smoking history, atrial fibrillation,, diagnosed with NSCLC (2L home O2) s/p debulking and R bronchial stent recently revised on 6/1 with further debulking and balloon dilatation started on Carboplatin/Taxol with Dr Willis 3/11/2022 s/p PEG for dysphagia due to mass effect presents today for weakness, shortness of breath, and diarrhea. 76 y/o F with 40 pack year smoking history, atrial fibrillation,, diagnosed with NSCLC (2L home O2) s/p debulking and R bronchial stent recently revised on 6/1 with further debulking and balloon dilatation started on Carboplatin/Taxol with Dr Willis 3/11/2022 s/p PEG for dysphagia due to mass effect presents today for weakness, shortness of breath, and diarrhea. Admitted for treatment of multifocal PNA.

## 2022-06-06 NOTE — DIETITIAN INITIAL EVALUATION ADULT - PERTINENT MEDS FT
MEDICATIONS  (STANDING):  albuterol/ipratropium for Nebulization 3 milliLiter(s) Nebulizer every 6 hours  buDESOnide    Inhalation Suspension 0.25 milliGRAM(s) Inhalation two times a day  piperacillin/tazobactam IVPB.. 3.375 Gram(s) IV Intermittent every 8 hours  predniSONE   Tablet 30 milliGRAM(s) Oral daily  sodium chloride 0.9% for Nebulization 3 milliLiter(s) Nebulizer every 8 hours    MEDICATIONS  (PRN):

## 2022-06-06 NOTE — DIETITIAN INITIAL EVALUATION ADULT - NSICDXPASTMEDICALHX_GEN_ALL_CORE_FT
PAST MEDICAL HISTORY:  Atrial fibrillation     Bronchial stenosis     H/O wheezing     [place] (hard of hearing)     Malignant neoplasm of unspecified part of unspecified bronchus or lung     PEG (percutaneous endoscopic gastrostomy) status     Requires oxygen therapy 2-3 liters when sleeping as needed

## 2022-06-06 NOTE — PROGRESS NOTE ADULT - SUBJECTIVE AND OBJECTIVE BOX
PROGRESS NOTE:   Authored by DRAKE Grewal PGY1 Pager: [phone]    Patient is a 77y old  Female who presents with a chief complaint of shortness of breath and diarrhea (05 Jun 2022 13:59)      SUBJECTIVE / OVERNIGHT EVENTS:    ADDITIONAL REVIEW OF SYSTEMS:    MEDICATIONS  (STANDING):  albuterol/ipratropium for Nebulization 3 milliLiter(s) Nebulizer every 6 hours  buDESOnide    Inhalation Suspension 0.25 milliGRAM(s) Inhalation two times a day  piperacillin/tazobactam IVPB.. 3.375 Gram(s) IV Intermittent every 8 hours  predniSONE   Tablet 30 milliGRAM(s) Oral daily  sodium chloride 0.9% for Nebulization 3 milliLiter(s) Nebulizer every 8 hours  vancomycin  IVPB 1000 milliGRAM(s) IV Intermittent every 12 hours    MEDICATIONS  (PRN):      CAPILLARY BLOOD GLUCOSE        I&O's Summary      Vital Signs Last 24 Hrs  T(C): 37.2 (05 Jun 2022 21:16), Max: 37.6 (05 Jun 2022 19:24)  T(F): 99 (05 Jun 2022 21:16), Max: 99.7 (05 Jun 2022 19:24)  HR: 80 (06 Jun 2022 04:32) (80 - 88)  BP: 130/55 (05 Jun 2022 21:16) (125/58 - 139/68)  BP(mean): --  RR: 18 (05 Jun 2022 21:16) (18 - 20)  SpO2: 97% (06 Jun 2022 04:32) (92% - 99%)    CONSTITUTIONAL: NAD, well-developed, well-groomed  EYES: PERRLA; conjunctiva and sclera clear  ENMT: Moist oral mucosa, no pharyngeal injection or exudates; normal dentition  NECK: Supple, no palpable masses; no thyromegaly  RESPIRATORY: Normal respiratory effort; lungs are clear to auscultation bilaterally  CARDIOVASCULAR: Regular rate and rhythm, normal S1 and S2, no murmur/rub/gallop; No lower extremity edema; Peripheral pulses are 2+ bilaterally  ABDOMEN: Nontender to palpation, normoactive bowel sounds, no rebound/guarding; No hepatosplenomegaly  MUSCULOSKELETAL:  Normal gait; no clubbing or cyanosis of digits; no joint swelling or tenderness to palpation  PSYCH: A+O to person, place, and time; affect appropriate  NEUROLOGY: CN 2-12 are intact and symmetric; no gross sensory deficits   SKIN: No rashes; no palpable lesions  PHYSICAL EXAM:    LABS:                        10.2   7.00  )-----------( 196      ( 06 Jun 2022 06:20 )             32.6     06-06    138  |  98  |  17  ----------------------------<  154<H>  3.9   |  27  |  0.52    Ca    9.8      06 Jun 2022 06:20  Phos  3.7     06-06  Mg     2.00     06-06    TPro  6.7  /  Alb  2.9<L>  /  TBili  0.6  /  DBili  x   /  AST  16  /  ALT  12  /  AlkPhos  66  06-06    PT/INR - ( 04 Jun 2022 13:13 )   PT: 13.8 sec;   INR: 1.19 ratio         PTT - ( 04 Jun 2022 13:13 )  PTT:29.3 sec          Culture - Blood (collected 04 Jun 2022 13:13)  Source: .Blood Blood  Preliminary Report (05 Jun 2022 19:02):    No growth to date.    Culture - Blood (collected 04 Jun 2022 13:03)  Source: .Blood Blood  Preliminary Report (05 Jun 2022 19:02):    No growth to date.        RADIOLOGY & ADDITIONAL TESTS:  Results Reviewed:   Imaging Personally Reviewed:  Electrocardiogram Personally Reviewed:    COORDINATION OF CARE:  Care Discussed with Consultants/Other Providers [Y/N]:  Prior or Outpatient Records Reviewed [Y/N]:   PROGRESS NOTE:   Authored by DRAKE Grewal PGY1 Pager: [phone]    Patient is a 77y old  Female who presents with a chief complaint of shortness of breath and diarrhea (05 Jun 2022 13:59)      SUBJECTIVE / OVERNIGHT EVENTS:  No acute events overnight. Pt continues to report wheezing and shortness of breath, controlled on 6L NC.  Denies N/V/F/C. No episodes of diarrhea overnight.    MEDICATIONS  (STANDING):  albuterol/ipratropium for Nebulization 3 milliLiter(s) Nebulizer every 6 hours  buDESOnide    Inhalation Suspension 0.25 milliGRAM(s) Inhalation two times a day  piperacillin/tazobactam IVPB.. 3.375 Gram(s) IV Intermittent every 8 hours  predniSONE   Tablet 30 milliGRAM(s) Oral daily  sodium chloride 0.9% for Nebulization 3 milliLiter(s) Nebulizer every 8 hours  vancomycin  IVPB 1000 milliGRAM(s) IV Intermittent every 12 hours    MEDICATIONS  (PRN):      CAPILLARY BLOOD GLUCOSE        I&O's Summary      Vital Signs Last 24 Hrs  T(C): 37.2 (05 Jun 2022 21:16), Max: 37.6 (05 Jun 2022 19:24)  T(F): 99 (05 Jun 2022 21:16), Max: 99.7 (05 Jun 2022 19:24)  HR: 80 (06 Jun 2022 04:32) (80 - 88)  BP: 130/55 (05 Jun 2022 21:16) (125/58 - 139/68)  BP(mean): --  RR: 18 (05 Jun 2022 21:16) (18 - 20)  SpO2: 97% (06 Jun 2022 04:32) (92% - 99%)    CONSTITUTIONAL: NAD  EYES: No conjunctival or scleral injection, non-icteric; PERRLA and symmetric  ENMT: Oral mucosa with moist membranes  NECK: Supple  RESPIRATORY: Breathing comfortably on 6L NC; Significant wheezing present b/l   CARDIOVASCULAR: +S1S2, RRR, no M/G/R;  no lower extremity edema  GASTROINTESTINAL: Nondistended and nontender, +BS throughout, no rebound/guarding; PEG in place- bilious output  MUSCULOSKELETAL: Normal strength and tone of extremities  SKIN: No rashes or ulcers  NEUROLOGIC: No focal deficits  PSYCHIATRIC: A+O x 3; mood and affect appropriate; appropriate insight and judgment    LABS:                        10.2   7.00  )-----------( 196      ( 06 Jun 2022 06:20 )             32.6     06-06    138  |  98  |  17  ----------------------------<  154<H>  3.9   |  27  |  0.52    Ca    9.8      06 Jun 2022 06:20  Phos  3.7     06-06  Mg     2.00     06-06    TPro  6.7  /  Alb  2.9<L>  /  TBili  0.6  /  DBili  x   /  AST  16  /  ALT  12  /  AlkPhos  66  06-06    PT/INR - ( 04 Jun 2022 13:13 )   PT: 13.8 sec;   INR: 1.19 ratio         PTT - ( 04 Jun 2022 13:13 )  PTT:29.3 sec          Culture - Blood (collected 04 Jun 2022 13:13)  Source: .Blood Blood  Preliminary Report (05 Jun 2022 19:02):    No growth to date.    Culture - Blood (collected 04 Jun 2022 13:03)  Source: .Blood Blood  Preliminary Report (05 Jun 2022 19:02):    No growth to date.        RADIOLOGY & ADDITIONAL TESTS:  Results Reviewed:   Imaging Personally Reviewed:  Electrocardiogram Personally Reviewed:    COORDINATION OF CARE:  Care Discussed with Consultants/Other Providers [Y/N]:  Prior or Outpatient Records Reviewed [Y/N]:

## 2022-06-07 NOTE — PROGRESS NOTE ADULT - PROBLEM SELECTOR PLAN 3
2/2 amoxicillin induced diarrhea vs C diff   Pt s/p tumor debulking and balloon dilatation. Prescribed Amoxicillin and prednisone. Took for 2 days. [place], loose stool noted over the past 3 days. Denies N/V/F/C. Last episode yesterday 6/4. No leukocytosis.       Plan  - pending stool cultures  - pending c diff  - IVF, as needed RESOLVED  2/2 amoxicillin induced diarrhea vs C diff  Pt s/p tumor debulking and balloon dilatation. Prescribed Amoxicillin and prednisone. Took for 2 days. Brown, loose stool noted over the past 3 days. Denies N/V/F/C. Last episode yesterday 6/4. No leukocytosis.       Plan  - IVF, as needed

## 2022-06-07 NOTE — PROGRESS NOTE ADULT - ASSESSMENT
78YO Female former smoker PMH Afib, w/ Squamous cell lung carcinoma (3L home O2) s/p debulking and R bronchial stent recently revised on 6/1 with further debulking and balloon dilatation started on Carboplatin/Taxol with Dr Willis 3/11/2022 s/p PEG for dysphagia due to mass effect presents to hospital with cough, wheezing, shortness of breath, and yeung that has been worsening.    #Squamous cell lung carcinoma  #Post obstructive atelectasis  #Gram negative pneumonia  #Acute hypoxemic respiratory failure  - Marked increase in o2 requirements. On HFNC 50/100% and NRB (100%) w/ spo2 of 92%.   - Obtain Bcx x2 and ABG. Worsening likely related to her left lung opacity.   - Change Zosyn to meropenem.  - Off vanco; negative mrsa swab  - Previous BAL showing Pseudomonas and providencia  - Extensive GOC with patient at bedside. Patient agreeable to be admitted micu for trial of intubation. Very recently had good functional status. Also with potentially reversible pneumonia and mucus plug.   - We discussed that we will perform a bronchoscopy in the ICU to evaluate airways. Un likely that any airway disease explains her sudden hypoxemia, which is more likely related to her parenchymal process on the left. WE will clear any mucus plugs.   - If after appropriate treatment, no improvement is noted, patient has endorsed in front of  that she would not want to be connected to the ventilator long term.   - In case improvement is noted, then we may consider revising the bronchus intermedius stent with possible intervention for the right upper lobe. Patient given her hypoxemia will not tolerate rigid bronchoscopy at this time.   - Everything was discussed extensively with patient at length at bedside and .   - Please see separate bronchoscopy report.  -

## 2022-06-07 NOTE — PROGRESS NOTE ADULT - PROBLEM SELECTOR PROBLEM 4
Adjustment, replacement, or removal of percutaneous endoscopic gastrostomy (PEG) tube
Adjustment, replacement, or removal of percutaneous endoscopic gastrostomy (PEG) tube

## 2022-06-07 NOTE — CHART NOTE - NSCHARTNOTEFT_GEN_A_CORE
MICU Accept Note    CHIEF COMPLAINT:   shortness of breath    HPI / INTERVAL HISTORY:  76 y/o F with 40 pack year smoking history, atrial fibrillation,, diagnosed with NSCLC (2L home O2) s/p debulking and R bronchial stent recently revised on 6/1 with further debulking and balloon dilatation started on Carboplatin/Taxol with Dr Willis 3/11/2022 s/p PEG for dysphagia due to mass effect due to mass effect presents to hospital with cough, wheezing, shortness of breath, and yeung that has been worsening since discharge after recent procedure on 6/1/2022.     MICU consulted for hypoxic respiratory failure, episode of worsening oxygen desaturation this AM that required HFNC. Pt to be accepted to MICU for higher level of care and bronchoscopic evaluation.     PAST MEDICAL & SURGICAL HISTORY:  Atrial fibrillation    [place] (hard of hearing)    Malignant neoplasm of unspecified part of unspecified bronchus or lung    Bronchial stenosis    H/O wheezing    Requires oxygen therapy  2-3 liters when sleeping as needed    PEG (percutaneous endoscopic gastrostomy) status    S/P cholecystectomy    PEG (percutaneous endoscopic gastrostomy) status    Port-A-Cath in place    S/P bronchoscopy    FAMILY HISTORY:  FH: colon cancer (Mother)    FH: heart attack (Father)    SOCIAL HISTORY:  Former smoker, no drinking or illicit drugs    HOME MEDICATIONS:    Allergies    No Known Allergies    Intolerances    REVIEW OF SYSTEMS:  [Resp] Cough, dyspnea  All other ROS negative except per HPI    OBJECTIVE:  ICU Vital Signs Last 24 Hrs  T(C): 36.8 (07 Jun 2022 05:57), Max: 37.2 (06 Jun 2022 21:10)  T(F): 98.3 (07 Jun 2022 05:57), Max: 98.9 (06 Jun 2022 21:10)  HR: 90 (07 Jun 2022 08:10) (82 - 110)  BP: 128/63 (07 Jun 2022 05:57) (126/65 - 130/63)    RR: 20 (07 Jun 2022 11:03) (17 - 20)  SpO2: 93% (07 Jun 2022 11:03) (92% - 96%)    CAPILLARY BLOOD GLUCOSE      PHYSICAL EXAM:  No acute distress  HEENT:  Conjunctiva clear B/L.  Moist oral mucosa.   Cardiovascular: RRR with no murmurs. No JVD noted. No lower extremity edema B/L. Extremities are warm and well perfused.    Respiratory: Rhonchi throughout. Bibasilar dec in breath sounds. Intermittent wheezing appreciated. No accessory muscle use.   Gastrointestinal:  Soft, nontender. Non-distended. Non-rigid.    Neurologic:  Alert and awake. Moving all extremities. Following commands.    Skin:  No gross rashes notes.    HOSPITAL MEDICATIONS:  MEDICATIONS  (STANDING):  albuterol/ipratropium for Nebulization 3 milliLiter(s) Nebulizer every 6 hours  buDESOnide    Inhalation Suspension 0.25 milliGRAM(s) Inhalation two times a day  meropenem  IVPB      predniSONE   Tablet 30 milliGRAM(s) Oral daily  sodium chloride 0.9% for Nebulization 3 milliLiter(s) Nebulizer every 8 hours    MEDICATIONS  (PRN):  acetaminophen     Tablet .. 650 milliGRAM(s) Oral every 6 hours PRN Mild Pain (1 - 3), Moderate Pain (4 - 6)  sodium chloride 0.65% Nasal 1 Spray(s) Both Nostrils daily PRN Nasal Congestion    LABS:                        10.1   11.19 )-----------( 238      ( 07 Jun 2022 06:08 )             32.7     Hgb Trend: 10.1<--, 10.2<--, 9.0<--, 10.2<--, 10.1<--  06-07    135  |  95<L>  |  19  ----------------------------<  129<H>  3.7   |  30  |  0.59    Ca    9.8      07 Jun 2022 06:08  Phos  4.3     06-07  Mg     1.90     06-07    TPro  6.7  /  Alb  2.9<L>  /  TBili  0.6  /  DBili  x   /  AST  16  /  ALT  12  /  AlkPhos  66  06-06    Creatinine Trend: 0.59<--, 0.52<--, 0.43<--, 0.43<--, 0.51<--  PT/INR - ( 07 Jun 2022 06:08 )   PT: 15.3 sec;   INR: 1.32 ratio       PTT - ( 07 Jun 2022 06:08 )  PTT:24.1 sec    RADIOLOGY & ADDITIONAL TESTS:    ASSESSMENT AND PLAN:  78YO Female former smoker PMH Afib, w/ Squamous cell lung carcinoma (3L home O2) s/p debulking and R bronchial stent recently revised on 6/1 with further debulking and balloon dilatation started on Carboplatin/Taxol with Dr Willis 3/11/2022 s/p PEG for dysphagia due to mass effect presents to hospital with cough, wheezing, shortness of breath, and yeung that has been worsening since discharge after recent procedure on 6/1/2022.     #Neuro    #Respiratory  -Squamous cell lung carcinoma  -Post obstructive atelectasis  -Gram negative pneumonia  -Acute hypoxemic respiratory failure--inc in O2 requirements in AM. On HFNC 50/100% and % w/ SpO2 95%.   - Previous BAL showing Pseudomonas and providencia  - C/w prednisone 30mg daily; can increase to solumedrol 40 mg IVP BID for empiric management of inflammatory airway disease; wheezing appreciated on exam.   - C/w duonebs q6 standing  - Aerobika airway clearance device.    #CV  - TTE done recently with no cardiac limitation. No clear role for lasix at this time.     #GI/Nutrition    #/Renal/Fluids    #ID  - Change Zosyn to meropenem.  - Off vanco; negative mrsa swab    #Endocrine    #Heme  - DVT ppx:     Dispo: ICU MICU Accept Note    CHIEF COMPLAINT:   shortness of breath    HPI / INTERVAL HISTORY:  76 y/o F with 40 pack year smoking history, atrial fibrillation,, diagnosed with NSCLC (2L home O2) s/p debulking and R bronchial stent recently revised on 6/1 with further debulking and balloon dilatation started on Carboplatin/Taxol with Dr Willis 3/11/2022 s/p PEG for dysphagia due to mass effect due to mass effect presents to hospital with cough, wheezing, shortness of breath, and yeung that has been worsening since discharge after recent procedure on 6/1/2022.     MICU consulted for hypoxic respiratory failure, episode of worsening oxygen desaturation this AM that required HFNC. Pt to be accepted to MICU for higher level of care and bronchoscopic evaluation.     PAST MEDICAL & SURGICAL HISTORY:  Atrial fibrillation    [place] (hard of hearing)    Malignant neoplasm of unspecified part of unspecified bronchus or lung    Bronchial stenosis    H/O wheezing    Requires oxygen therapy  2-3 liters when sleeping as needed    PEG (percutaneous endoscopic gastrostomy) status    S/P cholecystectomy    PEG (percutaneous endoscopic gastrostomy) status    Port-A-Cath in place    S/P bronchoscopy    FAMILY HISTORY:  FH: colon cancer (Mother)    FH: heart attack (Father)    SOCIAL HISTORY:  Former smoker, no drinking or illicit drugs    HOME MEDICATIONS:    Allergies    No Known Allergies    Intolerances    REVIEW OF SYSTEMS:  [Resp] Cough, dyspnea  All other ROS negative except per HPI    OBJECTIVE:  ICU Vital Signs Last 24 Hrs  T(C): 36.8 (07 Jun 2022 05:57), Max: 37.2 (06 Jun 2022 21:10)  T(F): 98.3 (07 Jun 2022 05:57), Max: 98.9 (06 Jun 2022 21:10)  HR: 90 (07 Jun 2022 08:10) (82 - 110)  BP: 128/63 (07 Jun 2022 05:57) (126/65 - 130/63)    RR: 20 (07 Jun 2022 11:03) (17 - 20)  SpO2: 93% (07 Jun 2022 11:03) (92% - 96%)    CAPILLARY BLOOD GLUCOSE      PHYSICAL EXAM:  No acute distress  HEENT:  Conjunctiva clear B/L.  Moist oral mucosa.   Cardiovascular: RRR with no murmurs. No JVD noted. No lower extremity edema B/L. Extremities are warm and well perfused.    Respiratory: Rhonchi throughout. Bibasilar dec in breath sounds. Intermittent wheezing appreciated. No accessory muscle use.   Gastrointestinal:  Soft, nontender. Non-distended. Non-rigid.    Neurologic:  Alert and awake. Moving all extremities. Following commands.    Skin:  No gross rashes notes.    HOSPITAL MEDICATIONS:  MEDICATIONS  (STANDING):  albuterol/ipratropium for Nebulization 3 milliLiter(s) Nebulizer every 6 hours  buDESOnide    Inhalation Suspension 0.25 milliGRAM(s) Inhalation two times a day  meropenem  IVPB      predniSONE   Tablet 30 milliGRAM(s) Oral daily  sodium chloride 0.9% for Nebulization 3 milliLiter(s) Nebulizer every 8 hours    MEDICATIONS  (PRN):  acetaminophen     Tablet .. 650 milliGRAM(s) Oral every 6 hours PRN Mild Pain (1 - 3), Moderate Pain (4 - 6)  sodium chloride 0.65% Nasal 1 Spray(s) Both Nostrils daily PRN Nasal Congestion    LABS:                        10.1   11.19 )-----------( 238      ( 07 Jun 2022 06:08 )             32.7     Hgb Trend: 10.1<--, 10.2<--, 9.0<--, 10.2<--, 10.1<--  06-07    135  |  95<L>  |  19  ----------------------------<  129<H>  3.7   |  30  |  0.59    Ca    9.8      07 Jun 2022 06:08  Phos  4.3     06-07  Mg     1.90     06-07    TPro  6.7  /  Alb  2.9<L>  /  TBili  0.6  /  DBili  x   /  AST  16  /  ALT  12  /  AlkPhos  66  06-06    Creatinine Trend: 0.59<--, 0.52<--, 0.43<--, 0.43<--, 0.51<--  PT/INR - ( 07 Jun 2022 06:08 )   PT: 15.3 sec;   INR: 1.32 ratio       PTT - ( 07 Jun 2022 06:08 )  PTT:24.1 sec    RADIOLOGY & ADDITIONAL TESTS:    ASSESSMENT AND PLAN:  76YO Female former smoker PMH Afib, w/ Squamous cell lung carcinoma (3L home O2) s/p debulking and R bronchial stent recently revised on 6/1 with further debulking and balloon dilatation started on Carboplatin/Taxol with Dr Willis 3/11/2022 s/p PEG for dysphagia due to mass effect presents to hospital with cough, wheezing, shortness of breath, and yeung that has been worsening since discharge after recent procedure on 6/1/2022.     #Neuro  -A&Ox3  -No active issues    Cards  -Hx a-fib  -Per cards, d/c'ed amiodarone, off eliquis 7 days ago  -TTE done recently with no cardiac limitation, no clear role for lasix at this time    #Respiratory  -Squamous cell lung carcinoma  -Repeat rigid bronchoscopy today with stent revision and APC/cryo debulking with IP team  -Post obstructive atelectasis  -Gram negative pneumonia  -Acute hypoxemic respiratory failure--inc in O2 requirements in AM. On HFNC 50/100% and % w/ SpO2 95%.   - Previous BAL showing Pseudomonas and providencia  - C/w prednisone 30mg daily; can increase to solumedrol 40 mg IVP BID for empiric management of inflammatory airway disease; wheezing appreciated on exam.   - C/w duonebs q6 standing  - Aerobika airway clearance device.  -Eval by thoracic surgery, no intervention at this time  -6/4/22 found interval progression of disease with increased right hilar and subcarinal mass resulting in right upper lobe occlusion and right upper lobe complete collapse. Mild left-to-right cardiomediastinal shift.    #GI/Nutrition  -Acute diarrhea (2/2 amoxicillin vs c diff), last episode 6/4  -PEG tube in place     Heme-onc  -Eval by heme-onc, no acute interventions or systemic tx at this itme    #/Renal/Fluids  -Bun/Cr 19/0.59  -Trend I/O's  -Replete electrolytes PRN    #ID  - Change Zosyn to meropenem.  - Off vanco; negative mrsa swab  -Postobstructive atelectasis, Gram negative pneumonia, previous BAL showing pseudomonas/providencia]    #Endocrine  -Euglycemic, no active issues at this time    #Heme  - DVT ppx: AC's held in setting of tumor debulking today, SCD's ordered    Dispo: ICU

## 2022-06-07 NOTE — PROGRESS NOTE ADULT - SUBJECTIVE AND OBJECTIVE BOX
PULMONARY SERVICE FOLLOW UP CONSULT NOTE    SUBJECTIVE:  Cough and dyspnea.  No acute complaints.     REVIEW OF SYSTEMS:  Limited by acute illness.     MEDICATIONS:  Pulmonary:  albuterol/ipratropium for Nebulization 3 milliLiter(s) Nebulizer every 6 hours  buDESOnide    Inhalation Suspension 0.25 milliGRAM(s) Inhalation two times a day  sodium chloride 0.9% for Nebulization 3 milliLiter(s) Nebulizer every 8 hours    Antimicrobials:  piperacillin/tazobactam IVPB.. 3.375 Gram(s) IV Intermittent every 8 hours    Anticoagulants:    Onc:    GI/:    Endocrine:  predniSONE   Tablet 30 milliGRAM(s) Oral daily    Cardiac:    Other Medications:  acetaminophen     Tablet .. 650 milliGRAM(s) Oral every 6 hours PRN  sodium chloride 0.65% Nasal 1 Spray(s) Both Nostrils daily PRN      PHYSICAL EXAM  Vital Signs Last 24 Hrs  T(C): 36.8 (07 Jun 2022 05:57), Max: 37.2 (06 Jun 2022 21:10)  T(F): 98.3 (07 Jun 2022 05:57), Max: 98.9 (06 Jun 2022 21:10)  HR: 90 (07 Jun 2022 08:10) (79 - 110)  BP: 128/63 (07 Jun 2022 05:57) (123/60 - 130/63)  BP(mean): --  RR: 20 (07 Jun 2022 06:53) (17 - 20)  SpO2: 95% (07 Jun 2022 06:53) (92% - 96%)    CONSTITUTIONAL: No acute distress.   HEENT:  Conjunctiva clear B/L.  Moist oral mucosa.   Cardiovascular: RRR with no murmurs. No JVD noted. No lower extremity edema B/L. Extremities are warm and well perfused.    Respiratory: Rhonchi throughout. Bibasilar dec in breath sounds. Intermittent wheezing appreciated. No accessory muscle use.   Gastrointestinal:  Soft, nontender. Non-distended. Non-rigid.    Neurologic:  Alert and awake. Moving all extremities. Following commands.    Skin:  No gross rashes notes.    LABS:      CBC Full  -  ( 07 Jun 2022 06:08 )  WBC Count : 11.19 K/uL  RBC Count : 3.05 M/uL  Hemoglobin : 10.1 g/dL  Hematocrit : 32.7 %  Platelet Count - Automated : 238 K/uL  Mean Cell Volume : 107.2 fL  Mean Cell Hemoglobin : 33.1 pg  Mean Cell Hemoglobin Concentration : 30.9 gm/dL  Auto Neutrophil # : x  Auto Lymphocyte # : x  Auto Monocyte # : x  Auto Eosinophil # : x  Auto Basophil # : x  Auto Neutrophil % : x  Auto Lymphocyte % : x  Auto Monocyte % : x  Auto Eosinophil % : x  Auto Basophil % : x    06-07    135  |  95<L>  |  19  ----------------------------<  129<H>  3.7   |  30  |  0.59    Ca    9.8      07 Jun 2022 06:08  Phos  4.3     06-07  Mg     1.90     06-07    TPro  6.7  /  Alb  2.9<L>  /  TBili  0.6  /  DBili  x   /  AST  16  /  ALT  12  /  AlkPhos  66  06-06    PT/INR - ( 07 Jun 2022 06:08 )   PT: 15.3 sec;   INR: 1.32 ratio         PTT - ( 07 Jun 2022 06:08 )  PTT:24.1 sec                  RADIOLOGY & ADDITIONAL STUDIES: PULMONARY SERVICE FOLLOW UP CONSULT NOTE    SUBJECTIVE:  Cough and dyspnea.     REVIEW OF SYSTEMS:  Limited by acute illness.     MEDICATIONS:  Pulmonary:  albuterol/ipratropium for Nebulization 3 milliLiter(s) Nebulizer every 6 hours  buDESOnide    Inhalation Suspension 0.25 milliGRAM(s) Inhalation two times a day  sodium chloride 0.9% for Nebulization 3 milliLiter(s) Nebulizer every 8 hours    Antimicrobials:  piperacillin/tazobactam IVPB.. 3.375 Gram(s) IV Intermittent every 8 hours    Anticoagulants:    Onc:    GI/:    Endocrine:  predniSONE   Tablet 30 milliGRAM(s) Oral daily    Cardiac:    Other Medications:  acetaminophen     Tablet .. 650 milliGRAM(s) Oral every 6 hours PRN  sodium chloride 0.65% Nasal 1 Spray(s) Both Nostrils daily PRN      PHYSICAL EXAM  Vital Signs Last 24 Hrs  T(C): 36.8 (07 Jun 2022 05:57), Max: 37.2 (06 Jun 2022 21:10)  T(F): 98.3 (07 Jun 2022 05:57), Max: 98.9 (06 Jun 2022 21:10)  HR: 90 (07 Jun 2022 08:10) (79 - 110)  BP: 128/63 (07 Jun 2022 05:57) (123/60 - 130/63)  BP(mean): --  RR: 20 (07 Jun 2022 06:53) (17 - 20)  SpO2: 95% (07 Jun 2022 06:53) (92% - 96%)    CONSTITUTIONAL: No acute distress.   HEENT:  Conjunctiva clear B/L.  Moist oral mucosa.   Cardiovascular: RRR with no murmurs. No JVD noted. No lower extremity edema B/L. Extremities are warm and well perfused.    Respiratory: Rhonchi throughout. Bibasilar dec in breath sounds. Intermittent wheezing appreciated. No accessory muscle use.   Gastrointestinal:  Soft, nontender. Non-distended. Non-rigid.    Neurologic:  Alert and awake. Moving all extremities. Following commands.    Skin:  No gross rashes notes.    LABS:      CBC Full  -  ( 07 Jun 2022 06:08 )  WBC Count : 11.19 K/uL  RBC Count : 3.05 M/uL  Hemoglobin : 10.1 g/dL  Hematocrit : 32.7 %  Platelet Count - Automated : 238 K/uL  Mean Cell Volume : 107.2 fL  Mean Cell Hemoglobin : 33.1 pg  Mean Cell Hemoglobin Concentration : 30.9 gm/dL  Auto Neutrophil # : x  Auto Lymphocyte # : x  Auto Monocyte # : x  Auto Eosinophil # : x  Auto Basophil # : x  Auto Neutrophil % : x  Auto Lymphocyte % : x  Auto Monocyte % : x  Auto Eosinophil % : x  Auto Basophil % : x    06-07    135  |  95<L>  |  19  ----------------------------<  129<H>  3.7   |  30  |  0.59    Ca    9.8      07 Jun 2022 06:08  Phos  4.3     06-07  Mg     1.90     06-07    TPro  6.7  /  Alb  2.9<L>  /  TBili  0.6  /  DBili  x   /  AST  16  /  ALT  12  /  AlkPhos  66  06-06    PT/INR - ( 07 Jun 2022 06:08 )   PT: 15.3 sec;   INR: 1.32 ratio         PTT - ( 07 Jun 2022 06:08 )  PTT:24.1 sec                  RADIOLOGY & ADDITIONAL STUDIES:

## 2022-06-07 NOTE — PROGRESS NOTE ADULT - ASSESSMENT
78 y/o F with 40 pack year smoking history, atrial fibrillation,, diagnosed with NSCLC (2L home O2) s/p debulking and R bronchial stent recently revised on 6/1 with further debulking and balloon dilatation started on Carboplatin/Taxol with Dr Willis 3/11/2022 s/p PEG for dysphagia due to mass effect presents today for weakness, shortness of breath, and diarrhea. Admitted for treatment of multifocal PNA.

## 2022-06-07 NOTE — PROCEDURE NOTE - NSPRE-BRON/TUBRISKASSES_GEN_ALL_CORE
I evaluated the patient prior to bronchoscopy procedure for active pulmonary/laryngeal M. tuberculosis disease and the risk and actions taken:    Low risk with routine standard of care measures followed.
General

## 2022-06-07 NOTE — PROGRESS NOTE ADULT - ATTENDING COMMENTS
78 y/o F with 40 pack year smoking history, atrial fibrillation,, diagnosed with NSCLC (2L home O2) s/p debulking and R bronchial stent recently revised on 6/1 with further debulking and balloon dilatation started on Carboplatin/Taxol with Dr Willis 3/11/2022 s/p PEG for dysphagia due to mass effect presents today for weakness, shortness of breath, and diarrhea. Admitted for treatment of multifocal PNA.   Plan for bronchoscopy today   Patient with acute hypoxic respiratory failure on HFNC, she is accepted to ICU.  C/w atb, f/w cultures.

## 2022-06-07 NOTE — PROGRESS NOTE ADULT - PROBLEM SELECTOR PLAN 5
Newly diagnosed on previous March, 2022 admission. Initially, started on amiodarone and Eliquis    - per cards, d/c'ed amiodarone  - Now off Eliquis 5 days ago Newly diagnosed on previous March, 2022 admission. Initially, started on amiodarone and Eliquis    - per cards, d/c'ed amiodarone  - Now off Eliquis 7 days ago

## 2022-06-07 NOTE — CHART NOTE - NSCHARTNOTEFT_GEN_A_CORE
The nurse notified me that she placed the patient on a nonrebreather mask because the patient was maintaining an O2 sat of 81-85% on 6L NC. While on the nonrebreather, the patient's O2 sat did not show much improvement as the patient continued to maintain an O2 sat of 82-88%. The nurse notified me of the patient's current condition. She also informed me that the patient complained of stomach pain and back pain. The nurse reported that the patient was very anxious and she kept pulling of the non-rebreather face mask.     I examined the patient at the bedside. At the time of my examination she appeared calm while on the non rebreather. Bilateral rhonchi was present. Patient was all noted to belly breathing and using accessory muscles with each breath. There was no TTP of the abdomen and bowel sounds were present. The patient expressed that she had abdominal pain whenever she inhaled or exhaled. The patient was given Tylenol and switched to High flow. On High flow the patient is satting ~92% and is now on continuous pulse ox. She appears more comfortable.    Dandre Pinto MD  Internal Medicine PGY-1

## 2022-06-07 NOTE — PROCEDURE NOTE - NSBRONCHPROCDETAILS_GEN_A_CORE_FT
Bronchoscope inserted through ETT. ETT noted to be in good position. Entire tracheobronchial tree examined through to the subsegments. Tracheal deviation noted. Significant copious thick yellow secretions noted in distal airways, L > R. BAL performed of LLL. Stent noted in place in BI with patent distal airways. Unable to visualize RUL bronchus, likely trapped behind stent. 20cc Epi given. No bleeding noted. Bronchoscope then withdrawn from ETT.       Bronchoscope inserted through ETT. ETT noted to be in good position. Entire tracheobronchial tree examined through to the subsegments. Tracheal deviation noted, without any significant stenosis. Significant copious thick yellow secretions noted in distal airways, L > R. BAL performed of LLL. Stent noted in place in BI with patent distal airways. The tumor treated last week necrotic, removed/debulking performed.. Unable to visualize RUL bronchus, likely stenotic and now located posterior to prox end of stent. 20cc Epi given. No bleeding noted. Bronchoscope then withdrawn from ETT.

## 2022-06-07 NOTE — PROGRESS NOTE ADULT - ATTENDING COMMENTS
78 yo F with PMH of Stage III squamous cell lung CA, afib, dysphagia s/p PEG tube, who presents to [facility] with complaint of generalized weakness, dyspnea and diarrhea admitted for acute respiratory failure requiring supplemental oxygen.   She is s/p 10 C carbo-taxol from 3/11/2022- 5/20/2022.   6/1/22 Flexible bronchoscopy with tumor debulking, argon plasma  coagulation, balloon dilation, bronchus intermedius stent revision and BAL  Now admitted with Increased respiratory symptoms, rising supplemental O2 requirement and CT imaging supportive of diagnosis of multifocal PNA with Pseudomonas aeruginosa and Providencia rettgeri.  Imaging this admission from 6/4/22 found interval progression of disease with increased right hilar and subcarinal mass resulting in right upper lobe occlusion and right upper lobe complete collapse. Mild left-to-right cardiomediastinal shift.  Patient acutely decompensated overnight and is being offered palliative admission to ICU to intubate and attempt rigid bronchoscopy and stent revision, with APC/cryo debulking. She is very clear she does not want to be intubated long term and if they are unable to improve her clinical status the agreement is that she would be weaned and kept comfortable. Discussed at length with patient,  and ICU team at bedside.  No role for inpatient chemotherapy. She will continue follow up with Dr. Willis

## 2022-06-07 NOTE — PROGRESS NOTE ADULT - ATTENDING COMMENTS
Pt is a 77F with PMHx former smoker with non-resectable lung SCC on chronic home O2 supplementation (on Carboplatin/Taxol last given 5/20) c/w mass effect with dysphagia s/p PEG and airway narrowing c/b acute on chronic dyspnea. Pt initially underwent rigid bronchoscopy with balloon dilatation of 90% bronchial stenosis of bronchus intermedius, tumor debulking, and bronchial stent placement in the bronchus intermedius on 2/2/22 followed by repeat rigid bronchoscopy with tumor debulking (argon plasma coagulation and balloon dilation) and BI stent revision last week, d/c'ed home on 10 days of Augmentin with steroid taper. Given persistent respiratory sx and CT Chest evidence from 6/4 showing interval POD with increased right hilar and subcarinal mass resulting in RUL occlusion with lobar collapse as well as distal BI stent occlusion, pt was planned for repeat rigid bronchoscopy with stent revision and APC/cryo debulking this morning with IP team followed by consolidative radiation therapy as O/P. However, overnight pt developed worsening hypoxemic respiratory failure now requiring 100% NRB with CXR findings concerning for worsening left lung infiltration of unclear etiology. After extensive bedside discussion with pulmonary, interventional pulmonary, and hematology team with pt and , it was decided for pt to be transferred to MICU for elective intubation and bronchoscopic intervention for attempt to clear out mucous plugs and evaluate for worsening airway stenosis or lung collapse. Pt expressed that she would not want to be on prolonged ventilation and so if pt does not recover over a short period of time, she would like to be electively extubated. Pt to be transferred to MICU for further management. Pt is a 77F with PMHx former smoker with non-resectable lung SCC on chronic home O2 supplementation (on Carboplatin/Taxol last given 5/20) c/w mass effect with dysphagia s/p PEG and airway narrowing c/b acute on chronic dyspnea. Pt initially underwent rigid bronchoscopy with balloon dilatation of 90% bronchial stenosis of bronchus intermedius, tumor debulking, and bronchial stent placement in the bronchus intermedius on 2/2/22 followed by repeat rigid bronchoscopy with tumor debulking (argon plasma coagulation and balloon dilation) and BI stent revision last week, d/c'ed home on 10 days of Augmentin with steroid taper. Given persistent respiratory sx and CT Chest evidence from 6/4 showing interval POD with increased right hilar and subcarinal mass resulting in RUL occlusion with lobar collapse as well as distal BI stent occlusion, pt was planned for repeat rigid bronchoscopy with stent revision and APC/cryo debulking this morning with IP team followed by consolidative radiation therapy as O/P. However, overnight pt developed worsening hypoxemic respiratory failure now requiring 100% NRB with CXR findings concerning for worsening left lung infiltration of unclear etiology. After extensive bedside discussion with pulmonary, interventional pulmonary, and hematology team with pt and , it was decided for pt to be transferred to MICU for elective intubation and bronchoscopic intervention for attempt to clear out mucous plugs and evaluate for worsening airway stenosis or lung collapse. Pt expressed that she would not want to be on prolonged ventilation and so if pt does not recover over a short period of time, she would like to be electively extubated. She deferred all medical decision making to her  if she is unable to make decisions. Pt to be transferred to MICU for further management. Will continue all medical management including broad-spectrum Abx and steroids, would also trial diuresis given concern for component of pulmonary edema.

## 2022-06-07 NOTE — PROGRESS NOTE ADULT - CONVERSATION DETAILS
code status : full code, patient verbalized that she wants chest compressions , intubation, if not able to get ventilator will reconsider.

## 2022-06-07 NOTE — PROGRESS NOTE ADULT - ASSESSMENT
This patient is a 76yo lady with PMH of squamous cell lung CA, afib, dysphagia s/p PEG tube, who presents to [facility] with complaint of generalized weakness, dyspnea and diarrhea.     #Squamous cell lung CA  Stage IIIa, seen by Dr. Willis  - s/p 10 C carbo-taxol from 3/11/2022- 5/20/2022.   - CT CAP on 4/19/22 found smaller subcarinal/RLL mass and decreased post-obstructive changes of RLL. Unchanged occlusion of distal bronchus intermedius stent. Stable R hilar lymphadenopathy. New smaller opacities in upper lobes and superior LLL favored to be benign. No evidence of mets on A/P.   - s/p flexible bronchoscopy with tumor debulking, argon plasma  coagulation, balloon dilation, bronchus intermedius stent revision and BAL on 6/1/22  - Increased respiratory symptoms, rising supplemental O2 requirement requirement and CT imaging supportive of diagnosis of multifocal PNA. Agree with continuing antibiotics to target Pseudomonas aeruginosa and providencia rettgeri, and continuing supportive care. Follow up pulmonary recommendations.   - Most recent imaging from 6/4/22 found interval progression of disease with increased right hilar and subcarinal mass resulting in right upper lobe occlusion and right upper lobe complete collapse. Mild left-to-right cardiomediastinal shift.  Unchanged occlusion of the distal bronchus intermedius stent. No plan for systemic inpatient therapy. Patient to follow up with Dr. Willis at [facility] at time of discharge.     Please do not hesitate to page with questions.     Isabela Cabrera MD PGY4   [phone]  Hematology-Oncology Fellow   This patient is a 76yo lady with PMH of squamous cell lung CA, afib, dysphagia s/p PEG tube, who presents to [facility] with complaint of generalized weakness, dyspnea and diarrhea admitted for acute respiratory failure requiring supplemental oxygen.     #Squamous cell lung CA  Stage IIIa, seen by Dr. Willis  - s/p 10 C carbo-taxol from 3/11/2022- 5/20/2022.   - CT CAP on 4/19/22 found smaller subcarinal/RLL mass and decreased post-obstructive changes of RLL. Unchanged occlusion of distal bronchus intermedius stent. Stable R hilar lymphadenopathy. New smaller opacities in upper lobes and superior LLL favored to be benign. No evidence of mets on A/P.   - s/p flexible bronchoscopy with tumor debulking, argon plasma  coagulation, balloon dilation, bronchus intermedius stent revision and BAL on 6/1/22  - during this admission, Increased respiratory symptoms, rising supplemental O2 requirement requirement and CT imaging supportive of diagnosis of multifocal PNA.  - Most recent imaging this admission from 6/4/22 found interval progression of disease with increased right hilar and subcarinal mass resulting in right upper lobe occlusion and right upper lobe complete collapse. Mild left-to-right cardiomediastinal shift.  -  Agree with continuing antibiotics to target Pseudomonas aeruginosa and providencia rettgeri,   - At time of encounter, Pulmonary and ICU also with patient. GOC discussion. Pulmonary offering intubation with tracheal stent to alleive the obstruction as well as continued treatment for fluid overload and infection. Discussed at length that no guarantee patient will be extubated successfully. Patient made wishes clear that she would like to attempt intubation with  rigid bronchoscopy and stent revision, with APC/cryo debulking with the understanding that she does not want to be on ventilator long term. Patient to be transferred to the MICU for intubation and procedure.   - At time time, no place for inpatient antineoplastic treatment.     Carmita Vazquez MD   PGY4 heme onc  - Dr Nguyen to resume care tomorrow        This patient is a 78 yo F with PMH of squamous cell lung CA, afib, dysphagia s/p PEG tube, who presents to [facility] with complaint of generalized weakness, dyspnea and diarrhea admitted for acute respiratory failure requiring supplemental oxygen.     #Squamous cell lung CA  Stage IIIa, followed by Dr. Willis  - s/p 10 C carbo-taxol from 3/11/2022- 5/20/2022.   - CT CAP on 4/19/22 found smaller subcarinal/RLL mass and decreased post-obstructive changes of RLL. Unchanged occlusion of distal bronchus intermedius stent. Stable R hilar lymphadenopathy. New smaller opacities in upper lobes and superior LLL favored to be benign. No evidence of mets on A/P.   - s/p flexible bronchoscopy with tumor debulking, argon plasma  coagulation, balloon dilation, bronchus intermedius stent revision and BAL on 6/1/22  - during this admission, Increased respiratory symptoms, rising supplemental O2 requirement requirement and CT imaging supportive of diagnosis of multifocal PNA.  - Most recent imaging this admission from 6/4/22 found interval progression of disease with increased right hilar and subcarinal mass resulting in right upper lobe occlusion and right upper lobe complete collapse. Mild left-to-right cardiomediastinal shift.  -  Agree with continuing antibiotics to target Pseudomonas aeruginosa and providencia rettgeri,   - At time of encounter, Pulmonary and ICU also with patient. GOC discussion. Pulmonary offering intubation with tracheal stent to alleviate the obstruction as well as continued treatment for fluid overload and infection. Discussed at length that no guarantee patient will be extubated successfully. Patient made wishes clear that she would like to attempt intubation with  rigid bronchoscopy and stent revision, with APC/cryo debulking with the understanding that she does not want to be on ventilator long term. Patient to be transferred to the MICU for intubation and procedure.   - At this time, no role for inpatient antineoplastic treatment.     Carmita Vazquez MD   PGY4 heme onc  - Dr Nguyen to resume care tomorrow

## 2022-06-07 NOTE — PROGRESS NOTE ADULT - PROBLEM SELECTOR PLAN 2
CT chest: Compared to the prior study of 4/19/22, interval progression of disease with increased right hilar and subcarinal mass resulting in RUL occlusion and RUL complete collapse. Mild left-to-right cardiomediastinal shift. Unchanged occlusion of the distal bronchus intermedius stent.  Scheduled for RT 6/6    Plan  - hold augmentin

## 2022-06-07 NOTE — PROGRESS NOTE ADULT - PROBLEM SELECTOR PLAN 1
2/2   2/2 PNA i/s/o R sided NSCLC  Increased O2 requirements on admission 2 - -> 6L O2 with CT revealed b/l GGOs (L > R) and tree in bud opacities. Increased cough and sputum production.  6/1 Bronch cx + Pseudomonas and Providencia    Plan  - c/w Vanc (6/5- 6/6) and Zosyn (6/5- )  - MRSA swab negative- vanc d/c'ed  - blood cultures- NGTD  - pending sputum cultures  - CTM fever curve, CBC/CMP  - interventional pulmonology following- plan for bronchoscopy 6/7  - c/w prednisone 30 daily 2/2   2/2 PNA i/s/o R sided NSCLC  Increased O2 requirements on admission 2 - -> 6L O2 with CT revealed b/l GGOs (L > R) and tree in bud opacities. Increased cough and sputum production.  6/1 Bronch cx + Pseudomonas and Providencia    Plan  - c/w Vanc (6/5- 6/6) and Zosyn (6/5- 7)  - started Meropenem 6/7-   - MRSA swab negative- vanc d/c'ed  - blood cultures- NGTD  - sputum cultures + pseudomonas  - CTM fever curve, CBC/CMP  - interventional pulmonology following- plan for bronchoscopy 6/7  - c/w prednisone 30 daily 2/2   2/2 PNA i/s/o R sided NSCLC  Increased O2 requirements on admission 2 - -> 6L O2 with CT revealed b/l GGOs (L > R) and tree in bud opacities. Increased cough and sputum production.  6/1 Bronch cx + Pseudomonas and Providencia    Plan  - c/w Vanc (6/5- 6/6) and Zosyn (6/5- 7)  - started Meropenem 6/7-   - MRSA swab negative- vanc d/c'ed  - blood cultures- NGTD  - sputum cultures + pseudomonas  - CTM fever curve, CBC/CMP  - interventional pulmonology following- plan for bronchoscopy 6/7  - c/w prednisone 30 daily  - 6/7 - repeat blood cultures

## 2022-06-07 NOTE — PROGRESS NOTE ADULT - SUBJECTIVE AND OBJECTIVE BOX
Interventional Pulm Follow up Note    Chief Complaint: Dyspnea    Interval events: Worsening symptoms overnight as well as oxygen requirements. Now currently on high flow NC and NRB on top.    SUBJECTIVE:  Cough and dyspnea.     REVIEW OF SYSTEMS:  Limited by acute illness.     MEDICATIONS:  Pulmonary:  albuterol/ipratropium for Nebulization 3 milliLiter(s) Nebulizer every 6 hours  buDESOnide    Inhalation Suspension 0.25 milliGRAM(s) Inhalation two times a day  sodium chloride 0.9% for Nebulization 3 milliLiter(s) Nebulizer every 8 hours    Antimicrobials:  piperacillin/tazobactam IVPB.. 3.375 Gram(s) IV Intermittent every 8 hours    Anticoagulants:    Onc:    GI/:    Endocrine:  predniSONE   Tablet 30 milliGRAM(s) Oral daily    Cardiac:    Other Medications:  acetaminophen     Tablet .. 650 milliGRAM(s) Oral every 6 hours PRN  sodium chloride 0.65% Nasal 1 Spray(s) Both Nostrils daily PRN      PHYSICAL EXAM  Vital Signs Last 24 Hrs  T(C): 36.8 (07 Jun 2022 05:57), Max: 37.2 (06 Jun 2022 21:10)  T(F): 98.3 (07 Jun 2022 05:57), Max: 98.9 (06 Jun 2022 21:10)  HR: 90 (07 Jun 2022 08:10) (79 - 110)  BP: 128/63 (07 Jun 2022 05:57) (123/60 - 130/63)  BP(mean): --  RR: 20 (07 Jun 2022 06:53) (17 - 20)  SpO2: 95% (07 Jun 2022 06:53) (92% - 96%)    CONSTITUTIONAL: No acute distress.   HEENT:  Conjunctiva clear B/L.  Moist oral mucosa.   Cardiovascular: RRR with no murmurs. No JVD noted. No lower extremity edema B/L. Extremities are warm and well perfused.    Respiratory: Rhonchi throughout. Bibasilar dec in breath sounds. Intermittent wheezing appreciated. No accessory muscle use.   Gastrointestinal:  Soft, nontender. Non-distended. Non-rigid.    Neurologic:  Alert and awake. Moving all extremities. Following commands.    Skin:  No gross rashes notes.    LABS:      CBC Full  -  ( 07 Jun 2022 06:08 )  WBC Count : 11.19 K/uL  RBC Count : 3.05 M/uL  Hemoglobin : 10.1 g/dL  Hematocrit : 32.7 %  Platelet Count - Automated : 238 K/uL  Mean Cell Volume : 107.2 fL  Mean Cell Hemoglobin : 33.1 pg  Mean Cell Hemoglobin Concentration : 30.9 gm/dL  Auto Neutrophil # : x  Auto Lymphocyte # : x  Auto Monocyte # : x  Auto Eosinophil # : x  Auto Basophil # : x  Auto Neutrophil % : x  Auto Lymphocyte % : x  Auto Monocyte % : x  Auto Eosinophil % : x  Auto Basophil % : x    06-07    135  |  95<L>  |  19  ----------------------------<  129<H>  3.7   |  30  |  0.59    Ca    9.8      07 Jun 2022 06:08  Phos  4.3     06-07  Mg     1.90     06-07    TPro  6.7  /  Alb  2.9<L>  /  TBili  0.6  /  DBili  x   /  AST  16  /  ALT  12  /  AlkPhos  66  06-06    PT/INR - ( 07 Jun 2022 06:08 )   PT: 15.3 sec;   INR: 1.32 ratio         PTT - ( 07 Jun 2022 06:08 )  PTT:24.1 sec                  RADIOLOGY & ADDITIONAL STUDIES: Imaging reviewed. Worsening left sided diffuse opacities, likely fluid or infection or non cardiogenic pulmonary edema.

## 2022-06-07 NOTE — PROGRESS NOTE ADULT - ASSESSMENT
78YO Female former smoker PMH Afib, w/ Squamous cell lung carcinoma (3L home O2) s/p debulking and R bronchial stent recently revised on 6/1 with further debulking and balloon dilatation started on Carboplatin/Taxol with Dr Willis 3/11/2022 s/p PEG for dysphagia due to mass effect presents to hospital with cough, wheezing, shortness of breath, and yeung that has been worsening since discharge after recent procedure on 6/1/2022.     #Squamous cell lung carcinoma  #Post obstructive atelectasis  #Gram negative pneumonia  #Acute hypoxemic respiratory failure  - Marked increase in o2 requirements. On HFNC 50/100% and NRB (100%) w/ spo2 of 92%.   - C/w Zosyn.  - Off vanco; negative mrsa swab  - Previous BAL showing Pseudomonas and providencia  - C/w prednisone 30mg daily; can increase to solumedrol 40 mg IVP BID for empiric management of inflammatory airway disease; wheezing appreciated on exam.   - C/w duonebs q6 standing  - Aerobika airway clearance device.   - TTE done recently with no cardiac limitation. No clear role for lasix at this time.   - Discussed GOC with patient and . Patient reports reluctancy to be placed on ventilator and states "she wants to go home." Patient and  to discuss and finalize GOC.       Dr. Dawood Welch,   Pulmonary and Critical Care Medicine Fellow   Available via Microsoft Teams - **Preferred**  Pulmonary Spectra #[id number] ([facility]) / [id number] ([facility])  Pager:  [phone] 76YO Female former smoker PMH Afib, w/ Squamous cell lung carcinoma (3L home O2) s/p debulking and R bronchial stent recently revised on 6/1 with further debulking and balloon dilatation started on Carboplatin/Taxol with Dr Willis 3/11/2022 s/p PEG for dysphagia due to mass effect presents to hospital with cough, wheezing, shortness of breath, and yeung that has been worsening since discharge after recent procedure on 6/1/2022.     #Squamous cell lung carcinoma  #Post obstructive atelectasis  #Gram negative pneumonia  #Acute hypoxemic respiratory failure  - Marked increase in o2 requirements. On HFNC 50/100% and NRB (100%) w/ spo2 of 92%.   - Obtain Bcx x2 and ABG.  - Change Zosyn to meropenem.  - Off vanco; negative mrsa swab  - Previous BAL showing Pseudomonas and providencia  - C/w prednisone 30mg daily; can increase to solumedrol 40 mg IVP BID for empiric management of inflammatory airway disease; wheezing appreciated on exam.   - C/w duonebs q6 standing  - Aerobika airway clearance device.   - TTE done recently with no cardiac limitation. No clear role for lasix at this time.   - Discussed GOC with patient and . Patient reports reluctancy to be placed on ventilator and states "she wants to go home." Patient and  to discuss and finalize GOC.    - Rec pall care consult.    Dr. Dawood Welch DO  Pulmonary and Critical Care Medicine Fellow   Available via Microsoft Teams - **Preferred**  Pulmonary Spectra #[id number] ([facility]) / [id number] ([facility])  Pager:  [phone] 78YO Female former smoker PMH Afib, w/ Squamous cell lung carcinoma (3L home O2) s/p debulking and R bronchial stent recently revised on 6/1 with further debulking and balloon dilatation started on Carboplatin/Taxol with Dr Willis 3/11/2022 s/p PEG for dysphagia due to mass effect presents to hospital with cough, wheezing, shortness of breath, and yeung that has been worsening since discharge after recent procedure on 6/1/2022.     #Squamous cell lung carcinoma  #Post obstructive atelectasis  #Gram negative pneumonia  #Acute hypoxemic respiratory failure  - Marked increase in o2 requirements. On HFNC 50/100% and NRB (100%) w/ spo2 of 92%.   - Obtain Bcx x2 and ABG.  - Change Zosyn to meropenem.  - Off vanco; negative mrsa swab  - Previous BAL showing Pseudomonas and providencia  - C/w prednisone 30mg daily; can increase to solumedrol 40 mg IVP BID for empiric management of inflammatory airway disease; wheezing appreciated on exam.   - C/w duonebs q6 standing  - Aerobika airway clearance device.   - TTE done recently with no cardiac limitation. No clear role for lasix at this time.   - Discussed GOC with patient. Patient agreeable to upgrade to micu for trial of intubation. Very recently had good functional status. Also with potentially reversible pneumonia and mucus plug. Understands that long term prognosis remains the same with or without these aggressive measures.   - MICU excellent care greatly appreciated.     Dr. Dawood eWlch,   Pulmonary and Critical Care Medicine Fellow   Available via Microsoft Teams - **Preferred**  Pulmonary Spectra #[id number] ([facility]) / [id number] ([facility])  Pager:  [phone]

## 2022-06-07 NOTE — PROGRESS NOTE ADULT - SUBJECTIVE AND OBJECTIVE BOX
Hematology Oncology Follow-up    INTERVAL HPI/OVERNIGHT EVENTS:  Overnight, patient desaturating, requiring increased supplemental oxygen. At time of encounter, requiring HFNC 100%. Pulmonary and ICU attending at bedside.     VITAL SIGNS:  T(F): 97.5 (06-07-22 @ 11:49)  HR: 91 (06-07-22 @ 11:49)  BP: 154/73 (06-07-22 @ 11:49)  RR: 16 (06-07-22 @ 11:49)  SpO2: 96% (06-07-22 @ 11:49)  Wt(kg): --      PHYSICAL EXAM:    Constitutional: AAOx3,   Eyes: PERRL, EOMI, sclera non-icteric  Neck: supple, no masses, no JVD, no lymphadenopathy  Respiratory: on HFNC with use of accessory muscles   Cardiovascular: normal sinus  Gastrointestinal: soft, NTND, no masses palpable, BS normal in all four quadrants, no HSM  Extremities:  no edema  MSK: no obvious abnormalities, normal ROM, no lymphadenopathy  Neurological: Grossly intact  Psych: appropriately anxious     MEDICATIONS  (STANDING):  albuterol/ipratropium for Nebulization 3 milliLiter(s) Nebulizer every 6 hours  buDESOnide    Inhalation Suspension 0.25 milliGRAM(s) Inhalation two times a day  chlorhexidine 4% Liquid 1 Application(s) Topical <User Schedule>  meropenem  IVPB      meropenem  IVPB 1000 milliGRAM(s) IV Intermittent once  meropenem  IVPB 1000 milliGRAM(s) IV Intermittent every 8 hours  predniSONE   Tablet 30 milliGRAM(s) Oral daily  sodium chloride 0.9% for Nebulization 3 milliLiter(s) Nebulizer every 8 hours    MEDICATIONS  (PRN):  acetaminophen     Tablet .. 650 milliGRAM(s) Oral every 6 hours PRN Mild Pain (1 - 3), Moderate Pain (4 - 6)  sodium chloride 0.65% Nasal 1 Spray(s) Both Nostrils daily PRN Nasal Congestion      No Known Allergies      LABS:                        10.1   11.19 )-----------( 238      ( 07 Jun 2022 06:08 )             32.7     06-07    135  |  95<L>  |  19  ----------------------------<  129<H>  3.7   |  30  |  0.59    Ca    9.8      07 Jun 2022 06:08  Phos  4.3     06-07  Mg     1.90     06-07    TPro  6.7  /  Alb  2.9<L>  /  TBili  0.6  /  DBili  x   /  AST  16  /  ALT  12  /  AlkPhos  66  06-06    PT/INR - ( 07 Jun 2022 06:08 )   PT: 15.3 sec;   INR: 1.32 ratio         PTT - ( 07 Jun 2022 06:08 )  PTT:24.1 sec                 RADIOLOGY & ADDITIONAL TESTS:  Studies reviewed.

## 2022-06-07 NOTE — PROGRESS NOTE ADULT - SUBJECTIVE AND OBJECTIVE BOX
PROGRESS NOTE:   Authored by DRAKE Grewal PGY1 Pager: [phone]    Patient is a 77y old  Female who presents with a chief complaint of Pneumonia due to organism     (06 Jun 2022 11:55)      SUBJECTIVE / OVERNIGHT EVENTS:    ADDITIONAL REVIEW OF SYSTEMS:    MEDICATIONS  (STANDING):  albuterol/ipratropium for Nebulization 3 milliLiter(s) Nebulizer every 6 hours  buDESOnide    Inhalation Suspension 0.25 milliGRAM(s) Inhalation two times a day  piperacillin/tazobactam IVPB.. 3.375 Gram(s) IV Intermittent every 8 hours  predniSONE   Tablet 30 milliGRAM(s) Oral daily  sodium chloride 0.9% for Nebulization 3 milliLiter(s) Nebulizer every 8 hours    MEDICATIONS  (PRN):  acetaminophen     Tablet .. 650 milliGRAM(s) Oral every 6 hours PRN Mild Pain (1 - 3), Moderate Pain (4 - 6)  sodium chloride 0.65% Nasal 1 Spray(s) Both Nostrils daily PRN Nasal Congestion      CAPILLARY BLOOD GLUCOSE        I&O's Summary      Vital Signs Last 24 Hrs  T(C): 36.8 (07 Jun 2022 05:57), Max: 37.2 (06 Jun 2022 21:10)  T(F): 98.3 (07 Jun 2022 05:57), Max: 98.9 (06 Jun 2022 21:10)  HR: 90 (07 Jun 2022 08:10) (79 - 110)  BP: 128/63 (07 Jun 2022 05:57) (123/60 - 130/63)  BP(mean): --  RR: 20 (07 Jun 2022 06:53) (17 - 20)  SpO2: 95% (07 Jun 2022 06:53) (92% - 96%)    CONSTITUTIONAL: NAD, well-developed, well-groomed  EYES: PERRLA; conjunctiva and sclera clear  ENMT: Moist oral mucosa, no pharyngeal injection or exudates; normal dentition  NECK: Supple, no palpable masses; no thyromegaly  RESPIRATORY: Normal respiratory effort; lungs are clear to auscultation bilaterally  CARDIOVASCULAR: Regular rate and rhythm, normal S1 and S2, no murmur/rub/gallop; No lower extremity edema; Peripheral pulses are 2+ bilaterally  ABDOMEN: Nontender to palpation, normoactive bowel sounds, no rebound/guarding; No hepatosplenomegaly  MUSCULOSKELETAL:  Normal gait; no clubbing or cyanosis of digits; no joint swelling or tenderness to palpation  PSYCH: A+O to person, place, and time; affect appropriate  NEUROLOGY: CN 2-12 are intact and symmetric; no gross sensory deficits   SKIN: No rashes; no palpable lesions  PHYSICAL EXAM:    LABS:                        10.1   11.19 )-----------( 238      ( 07 Jun 2022 06:08 )             32.7     06-07    135  |  95<L>  |  19  ----------------------------<  129<H>  3.7   |  30  |  0.59    Ca    9.8      07 Jun 2022 06:08  Phos  4.3     06-07  Mg     1.90     06-07    TPro  6.7  /  Alb  2.9<L>  /  TBili  0.6  /  DBili  x   /  AST  16  /  ALT  12  /  AlkPhos  66  06-06    PT/INR - ( 07 Jun 2022 06:08 )   PT: 15.3 sec;   INR: 1.32 ratio         PTT - ( 07 Jun 2022 06:08 )  PTT:24.1 sec          Culture - Sputum (collected 06 Jun 2022 00:20)  Source: .Sputum Sputum  Gram Stain (06 Jun 2022 12:36):    Numerous polymorphonuclear leukocytes per low power field    Numerous Squamous epithelial cells per low power field    Results consistent with oropharyngeal contamination    Numerous Gram Negative Rods per oil power field    Rare Gram positive cocci in pairs per oil power field    Culture - Blood (collected 04 Jun 2022 13:13)  Source: .Blood Blood  Preliminary Report (05 Jun 2022 19:02):    No growth to date.    Culture - Blood (collected 04 Jun 2022 13:03)  Source: .Blood Blood  Preliminary Report (05 Jun 2022 19:02):    No growth to date.        RADIOLOGY & ADDITIONAL TESTS:  Results Reviewed:   Imaging Personally Reviewed:  Electrocardiogram Personally Reviewed:    COORDINATION OF CARE:  Care Discussed with Consultants/Other Providers [Y/N]:  Prior or Outpatient Records Reviewed [Y/N]:   PROGRESS NOTE:   Authored by DRAKE Grewal PGY1 Pager: [phone]    Patient is a 77y old  Female who presents with a chief complaint of Pneumonia due to organism     (06 Jun 2022 11:55)      SUBJECTIVE / OVERNIGHT EVENTS:  Hypoxic to 80s overnight started on high cy NC and NRB. Cont to endorse shortness of breath and wheezing. Pt would like to cont with bronchoscopy. Will be moved to MICU for further management.     ADDITIONAL REVIEW OF SYSTEMS:    MEDICATIONS  (STANDING):  albuterol/ipratropium for Nebulization 3 milliLiter(s) Nebulizer every 6 hours  buDESOnide    Inhalation Suspension 0.25 milliGRAM(s) Inhalation two times a day  piperacillin/tazobactam IVPB.. 3.375 Gram(s) IV Intermittent every 8 hours  predniSONE   Tablet 30 milliGRAM(s) Oral daily  sodium chloride 0.9% for Nebulization 3 milliLiter(s) Nebulizer every 8 hours    MEDICATIONS  (PRN):  acetaminophen     Tablet .. 650 milliGRAM(s) Oral every 6 hours PRN Mild Pain (1 - 3), Moderate Pain (4 - 6)  sodium chloride 0.65% Nasal 1 Spray(s) Both Nostrils daily PRN Nasal Congestion      CAPILLARY BLOOD GLUCOSE        I&O's Summary      Vital Signs Last 24 Hrs  T(C): 36.8 (07 Jun 2022 05:57), Max: 37.2 (06 Jun 2022 21:10)  T(F): 98.3 (07 Jun 2022 05:57), Max: 98.9 (06 Jun 2022 21:10)  HR: 90 (07 Jun 2022 08:10) (79 - 110)  BP: 128/63 (07 Jun 2022 05:57) (123/60 - 130/63)  BP(mean): --  RR: 20 (07 Jun 2022 06:53) (17 - 20)  SpO2: 95% (07 Jun 2022 06:53) (92% - 96%)    CONSTITUTIONAL: NAD  EYES: No conjunctival or scleral injection, non-icteric; PERRLA and symmetric  ENMT: Oral mucosa with moist membranes  NECK: Supple  RESPIRATORY: Significant wheezing present b/l; High cy and NRB  CARDIOVASCULAR: +S1S2, RRR, no M/G/R;  no lower extremity edema  GASTROINTESTINAL: Nondistended and nontender, +BS throughout, no rebound/guarding; PEG in place- bilious output  MUSCULOSKELETAL: Normal strength and tone of extremities  SKIN: No rashes or ulcers  NEUROLOGIC: No focal deficits  PSYCHIATRIC: A+O x 3; mood and affect appropriate; appropriate insight and judgment    LABS:                        10.1   11.19 )-----------( 238      ( 07 Jun 2022 06:08 )             32.7     06-07    135  |  95<L>  |  19  ----------------------------<  129<H>  3.7   |  30  |  0.59    Ca    9.8      07 Jun 2022 06:08  Phos  4.3     06-07  Mg     1.90     06-07    TPro  6.7  /  Alb  2.9<L>  /  TBili  0.6  /  DBili  x   /  AST  16  /  ALT  12  /  AlkPhos  66  06-06    PT/INR - ( 07 Jun 2022 06:08 )   PT: 15.3 sec;   INR: 1.32 ratio         PTT - ( 07 Jun 2022 06:08 )  PTT:24.1 sec          Culture - Sputum (collected 06 Jun 2022 00:20)  Source: .Sputum Sputum  Gram Stain (06 Jun 2022 12:36):    Numerous polymorphonuclear leukocytes per low power field    Numerous Squamous epithelial cells per low power field    Results consistent with oropharyngeal contamination    Numerous Gram Negative Rods per oil power field    Rare Gram positive cocci in pairs per oil power field    Culture - Blood (collected 04 Jun 2022 13:13)  Source: .Blood Blood  Preliminary Report (05 Jun 2022 19:02):    No growth to date.    Culture - Blood (collected 04 Jun 2022 13:03)  Source: .Blood Blood  Preliminary Report (05 Jun 2022 19:02):    No growth to date.        RADIOLOGY & ADDITIONAL TESTS:  Results Reviewed:   Imaging Personally Reviewed:  Electrocardiogram Personally Reviewed:    COORDINATION OF CARE:  Care Discussed with Consultants/Other Providers [Y/N]:  Prior or Outpatient Records Reviewed [Y/N]:   PROGRESS NOTE:   Authored by DRAKE Grewal PGY1 Pager: [phone]    Patient is a 77y old  Female who presents with a chief complaint of Pneumonia due to organism     (06 Jun 2022 11:55)      SUBJECTIVE / OVERNIGHT EVENTS:  Hypoxic to 80s overnight started on high cy NC and NRB. Cont to endorse shortness of breath and wheezing. Pt would like to cont with bronchoscopy. Will be moved to MICU for further management. Per GOC discussion, FULL code at this time.     ADDITIONAL REVIEW OF SYSTEMS:    MEDICATIONS  (STANDING):  albuterol/ipratropium for Nebulization 3 milliLiter(s) Nebulizer every 6 hours  buDESOnide    Inhalation Suspension 0.25 milliGRAM(s) Inhalation two times a day  piperacillin/tazobactam IVPB.. 3.375 Gram(s) IV Intermittent every 8 hours  predniSONE   Tablet 30 milliGRAM(s) Oral daily  sodium chloride 0.9% for Nebulization 3 milliLiter(s) Nebulizer every 8 hours    MEDICATIONS  (PRN):  acetaminophen     Tablet .. 650 milliGRAM(s) Oral every 6 hours PRN Mild Pain (1 - 3), Moderate Pain (4 - 6)  sodium chloride 0.65% Nasal 1 Spray(s) Both Nostrils daily PRN Nasal Congestion      CAPILLARY BLOOD GLUCOSE        I&O's Summary      Vital Signs Last 24 Hrs  T(C): 36.8 (07 Jun 2022 05:57), Max: 37.2 (06 Jun 2022 21:10)  T(F): 98.3 (07 Jun 2022 05:57), Max: 98.9 (06 Jun 2022 21:10)  HR: 90 (07 Jun 2022 08:10) (79 - 110)  BP: 128/63 (07 Jun 2022 05:57) (123/60 - 130/63)  BP(mean): --  RR: 20 (07 Jun 2022 06:53) (17 - 20)  SpO2: 95% (07 Jun 2022 06:53) (92% - 96%)    CONSTITUTIONAL: NAD  EYES: No conjunctival or scleral injection, non-icteric; PERRLA and symmetric  ENMT: Oral mucosa with moist membranes  NECK: Supple  RESPIRATORY: Significant wheezing present b/l; High cy and NRB  CARDIOVASCULAR: +S1S2, RRR, no M/G/R;  no lower extremity edema  GASTROINTESTINAL: Nondistended and nontender, +BS throughout, no rebound/guarding; PEG in place- bilious output  MUSCULOSKELETAL: Normal strength and tone of extremities  SKIN: No rashes or ulcers  NEUROLOGIC: No focal deficits  PSYCHIATRIC: A+O x 3; mood and affect appropriate; appropriate insight and judgment    LABS:                        10.1   11.19 )-----------( 238      ( 07 Jun 2022 06:08 )             32.7     06-07    135  |  95<L>  |  19  ----------------------------<  129<H>  3.7   |  30  |  0.59    Ca    9.8      07 Jun 2022 06:08  Phos  4.3     06-07  Mg     1.90     06-07    TPro  6.7  /  Alb  2.9<L>  /  TBili  0.6  /  DBili  x   /  AST  16  /  ALT  12  /  AlkPhos  66  06-06    PT/INR - ( 07 Jun 2022 06:08 )   PT: 15.3 sec;   INR: 1.32 ratio         PTT - ( 07 Jun 2022 06:08 )  PTT:24.1 sec          Culture - Sputum (collected 06 Jun 2022 00:20)  Source: .Sputum Sputum  Gram Stain (06 Jun 2022 12:36):    Numerous polymorphonuclear leukocytes per low power field    Numerous Squamous epithelial cells per low power field    Results consistent with oropharyngeal contamination    Numerous Gram Negative Rods per oil power field    Rare Gram positive cocci in pairs per oil power field    Culture - Blood (collected 04 Jun 2022 13:13)  Source: .Blood Blood  Preliminary Report (05 Jun 2022 19:02):    No growth to date.    Culture - Blood (collected 04 Jun 2022 13:03)  Source: .Blood Blood  Preliminary Report (05 Jun 2022 19:02):    No growth to date.        RADIOLOGY & ADDITIONAL TESTS:  Results Reviewed:   Imaging Personally Reviewed:  Electrocardiogram Personally Reviewed:    COORDINATION OF CARE:  Care Discussed with Consultants/Other Providers [Y/N]:  Prior or Outpatient Records Reviewed [Y/N]:

## 2022-06-08 NOTE — PROGRESS NOTE ADULT - SUBJECTIVE AND OBJECTIVE BOX
INTERVAL HPI/OVERNIGHT EVENTS:    SUBJECTIVE: Patient seen and examined at bedside. Intubated, sedated  Unable to obtain ROS    OBJECTIVE:    VITAL SIGNS:  ICU Vital Signs Last 24 Hrs  T(C): 37.2 (08 Jun 2022 04:00), Max: 38.2 (08 Jun 2022 03:10)  T(F): 99 (08 Jun 2022 04:00), Max: 100.7 (08 Jun 2022 03:10)  HR: 78 (08 Jun 2022 07:00) (78 - 110)  BP: 99/56 (08 Jun 2022 06:00) (82/42 - 154/73)  BP(mean): 68 (08 Jun 2022 06:00) (52 - 777)  ABP: --  ABP(mean): --  RR: 20 (08 Jun 2022 07:00) (14 - 35)  SpO2: 99% (08 Jun 2022 07:00) (90% - 99%)    Mode: AC/ CMV (Assist Control/ Continuous Mandatory Ventilation), RR (machine): 20, TV (machine): 300, FiO2: 90, PEEP: 5, ITime: 0.82, MAP: 14, PIP: 44    06-07 @ 07:01  -  06-08 @ 07:00  --------------------------------------------------------  IN: 812 mL / OUT: 775 mL / NET: 37 mL      CAPILLARY BLOOD GLUCOSE          PHYSICAL EXAM:    General: NAD  HEENT: NC/AT; clear conjunctiva  Neck: supple  Respiratory: CTA b/l  Cardiovascular: +S1/S2; RRR  Abdomen: soft, NT/ND; +BS x4  Extremities: WWP, 2+ peripheral pulses b/l; no LE edema  Skin: normal color and turgor; no rash  Neurological: sedated/nonfocal    MEDICATIONS:  MEDICATIONS  (STANDING):  ALBUTerol    90 MICROgram(s) HFA Inhaler 2 Puff(s) Inhalation every 6 hours  buDESOnide    Inhalation Suspension 0.25 milliGRAM(s) Inhalation two times a day  chlorhexidine 0.12% Liquid 15 milliLiter(s) Oral Mucosa every 12 hours  chlorhexidine 4% Liquid 1 Application(s) Topical <User Schedule>  fentaNYL   Infusion. 0.5 MICROgram(s)/kG/Hr (3.84 mL/Hr) IV Continuous <Continuous>  ipratropium 17 MICROgram(s) HFA Inhaler 1 Puff(s) Inhalation every 6 hours  meropenem  IVPB      meropenem  IVPB 1000 milliGRAM(s) IV Intermittent every 8 hours  norepinephrine Infusion 0.05 MICROgram(s)/kG/Min (7.2 mL/Hr) IV Continuous <Continuous>  predniSONE   Tablet 30 milliGRAM(s) Oral daily  propofol Infusion 10 MICROgram(s)/kG/Min (4.61 mL/Hr) IV Continuous <Continuous>    MEDICATIONS  (PRN):  acetaminophen     Tablet .. 650 milliGRAM(s) Oral every 6 hours PRN Mild Pain (1 - 3), Moderate Pain (4 - 6)  sodium chloride 0.65% Nasal 1 Spray(s) Both Nostrils daily PRN Nasal Congestion      ALLERGIES:  Allergies    No Known Allergies    Intolerances        LABS:                        10.4   13.81 )-----------( 262      ( 08 Jun 2022 00:39 )             33.3     06-08    138  |  96<L>  |  20  ----------------------------<  122<H>  3.7   |  30  |  0.50    Ca    10.2      08 Jun 2022 00:39  Phos  2.8     06-08  Mg     1.80     06-08    TPro  6.6  /  Alb  2.7<L>  /  TBili  0.6  /  DBili  x   /  AST  17  /  ALT  13  /  AlkPhos  71  06-08    PT/INR - ( 07 Jun 2022 06:08 )   PT: 15.3 sec;   INR: 1.32 ratio         PTT - ( 07 Jun 2022 06:08 )  PTT:24.1 sec      RADIOLOGY & ADDITIONAL TESTS: Reviewed.

## 2022-06-08 NOTE — PROGRESS NOTE ADULT - ASSESSMENT
78YO Female former smoker PMH Afib, w/ Squamous cell lung carcinoma (3L home O2) s/p debulking and R bronchial stent recently revised on 6/1 with further debulking and balloon dilatation started on Carboplatin/Taxol with Dr Willis 3/11/2022 s/p PEG for dysphagia due to mass effect presents to hospital with cough, wheezing, shortness of breath, and yeung that has been worsening since discharge after recent procedure on 6/1/2022 admitted to MICU for intubation and bronch for attempt to clear out mucous plugs and evaluate for worsening airway stenosis or lung collapse. GOC addressed, she had expressed that she would not want to be on prolonged ventilation and would want to be electively extubated.  to make decisions for her if she is unable.     #Neuro  -A&Ox3  -No active issues    Cards  -Hx a-fib  -Per cards, d/c'ed amiodarone, off eliquis 7 days ago  -TTE EF 50%, low normal    #Respiratory  -Squamous cell lung carcinoma  -Repeat rigid bronchoscopy 6/7 for mucous clearing, if improvement, will consider stent revision and APC/cryo debulking with IP team  -Post obstructive atelectasis  -Gram negative pneumonia  -Acute hypoxemic respiratory failure, previous BAL showing Pseudomonas and providencia  - C/w prednisone 30mg daily; can increase to solumedrol 40 mg IVP BID for empiric management of inflammatory airway disease; wheezing appreciated on exam.   - C/w duonebs q6 standing  - Aerobika airway clearance device.  -Eval by thoracic surgery, no intervention at this time  -6/4/22 found interval progression of disease with increased right hilar and subcarinal mass resulting in right upper lobe occlusion and right upper lobe complete collapse. Mild left-to-right cardiomediastinal shift.    #GI/Nutrition  -Acute diarrhea (2/2 amoxicillin vs c diff), last episode 6/4  -PEG tube in place, POCUS no gastric distention    Heme-onc  -Eval by heme-onc, no acute interventions or systemic tx at this itme    #/Renal/Fluids  -Bun/Cr 19/0.59  -Trend I/O's  -Replete electrolytes PRN    #ID  - Change Zosyn to meropenem.  - Off vanco; negative mrsa swab  -Postobstructive atelectasis, Gram negative pneumonia, previous BAL showing pseudomonas/providencia    #Endocrine  -Euglycemic, no active issues at this time    #Heme  - DVT ppx: AC's held in setting of tumor debulking, SCD's ordered, consider heparin gtt given Afib?    Dispo: ICU.

## 2022-06-08 NOTE — PROGRESS NOTE ADULT - ATTENDING COMMENTS
77 F with NSCLC s/p R bronchial stent on chemo, s/p peg here with cough/wheezing, found to have acute hypoxemic respiratory failure and multifocal pneumonia requriing intubation.    Patient sedated on vent    - c/w full vent support for acute hypoxemic respiratory failure requiring vent  - keep sedated for now given high fio2 requirements, wean as tolerated  - c/w broad abx, f/u cultures  - discussed with IP, s/p bronch 6/7, no role for IP intervention at this time  - s/p PEG for oropharyngeal dysphagia, but peg fell out, will consult GI/IR    Critically ill patient requiring frequent bedside visits with therapy changes.

## 2022-06-09 NOTE — PROGRESS NOTE ADULT - ATTENDING COMMENTS
77 F with NSCLC s/p R bronchial stent on chemo, s/p peg here with cough/wheezing, found to have acute hypoxemic respiratory failure and multifocal pneumonia requiring intubation.    Patient sedated on vent.    - c/w full vent support for acute hypoxemic respiratory failure requiring vent  - will start weaning sedation  - c/w broad abx, f/u cultures, likely pseudomonas pna  - discussed with IP, s/p bronch 6/7, no role for IP intervention at this time  - s/p PEG for oropharyngeal dysphagia, but peg fell out, f/u GI/IR reccs re peg    Critically ill patient requiring frequent bedside visits with therapy changes.

## 2022-06-09 NOTE — CONSULT NOTE ADULT - ASSESSMENT
----------------------------------------------------------  Interventional Radiology Brief Consult Note  -----------------------------------------------------------    Reason for Referral:   G-tube replacement     Clinical Summary: 77y Female former smoker PMH Afib, w/ Squamous cell lung carcinoma (3L home O2) s/p debulking and R bronchial stent recently revised on 6/1 with further debulking and balloon dilatation started on Carboplatin/Taxol with Dr Willis 3/11/2022 s/p PEG for dysphagia due to mass effect presents to hospital with cough, wheezing, shortness of breath, and yeung that has been worsening since discharge after recent procedure on 6/1/2022 admitted to MICU for intubation and bronch for attempt to clear out mucous plugs and evaluate for worsening airway stenosis or lung collapse. GOC addressed, she had expressed that she would not want to be on prolonged ventilation and would want to be electively extubated.  to make decisions for her if she is unable.   IR is consulted for G tube re-placement after patient's G-tube fell out.       Vitals:  T(C): 38.2 (06-09-22 @ 17:00), Max: 38.3 (06-09-22 @ 16:00)  HR: 131 (06-09-22 @ 18:00) (82 - 191)  BP: 96/75 (06-09-22 @ 18:00) (65/31 - 135/88)  RR: 21 (06-09-22 @ 17:00) (4 - 23)  SpO2: 98% (06-09-22 @ 18:00) (89% - 100%)    Labs:           10.9  13.10)-----(223     (06-09-22 @ 00:22)         35.2     141 | 98 | 20  --------------------< 121     (06-09-22 @ 00:22)  3.7 | 31 | 0.47       PT: 15.3<H> 06-07-22 @ 06:08  aPTT: 24.1<L> 06-07-22 @ 06:08   INR: 1.32<H> 06-07-22 @ 06:08      Assessment: 77y Female with G-tube that fell out. IR is consulted for replacement.     Recommendations:  Please place orellana catheter tube in the tract. Can plan for replacement of G tube tomorrow.   Place IR procedure order under Dr. Croft.   NPO at midnight.   Updated cbc, bmp and coags.   Covid swab within 72 hours.

## 2022-06-09 NOTE — PROGRESS NOTE ADULT - SUBJECTIVE AND OBJECTIVE BOX
INTERVAL HPI/OVERNIGHT EVENTS:    SUBJECTIVE: Patient seen and examined at bedside. Intubated, sedated  Unable to obtain ROS    OBJECTIVE:    VITAL SIGNS:  ICU Vital Signs Last 24 Hrs  T(C): 37.1 (09 Jun 2022 04:00), Max: 37.7 (09 Jun 2022 00:00)  T(F): 98.7 (09 Jun 2022 04:00), Max: 99.8 (09 Jun 2022 00:00)  HR: 100 (09 Jun 2022 07:42) (76 - 104)  BP: 135/88 (09 Jun 2022 07:01) (98/57 - 135/88)  BP(mean): 68 (09 Jun 2022 06:00) (66 - 79)  ABP: --  ABP(mean): --  RR: 0 (09 Jun 2022 07:01) (0 - 20)  SpO2: 96% (09 Jun 2022 07:42) (91% - 99%)    Mode: AC/ CMV (Assist Control/ Continuous Mandatory Ventilation), RR (machine): 20, TV (machine): 300, FiO2: 70, PEEP: 5, MAP: 12, PIP: 42    06-08 @ 07:01  -  06-09 @ 07:00  --------------------------------------------------------  IN: 1758.1 mL / OUT: 850 mL / NET: 908.1 mL      CAPILLARY BLOOD GLUCOSE          PHYSICAL EXAM:    General: NAD  HEENT: NC/AT; clear conjunctiva  Neck: supple  Respiratory: CTA b/l  Cardiovascular: +S1/S2; RRR  Abdomen: soft, NT/ND; +BS x4  Extremities: WWP, 2+ peripheral pulses b/l; no LE edema  Skin: normal color and turgor; no rash  Neurological: sedated/nonfocal    MEDICATIONS:  MEDICATIONS  (STANDING):  ALBUTerol    90 MICROgram(s) HFA Inhaler 2 Puff(s) Inhalation every 6 hours  budesonide 160 MICROgram(s)/formoterol 4.5 MICROgram(s) Inhaler 2 Puff(s) Inhalation two times a day  chlorhexidine 0.12% Liquid 15 milliLiter(s) Oral Mucosa every 12 hours  chlorhexidine 4% Liquid 1 Application(s) Topical <User Schedule>  enoxaparin Injectable 80 milliGRAM(s) SubCutaneous every 12 hours  fentaNYL   Infusion. 0.5 MICROgram(s)/kG/Hr (3.84 mL/Hr) IV Continuous <Continuous>  ipratropium 17 MICROgram(s) HFA Inhaler 1 Puff(s) Inhalation every 6 hours  meropenem  IVPB      meropenem  IVPB 1000 milliGRAM(s) IV Intermittent every 8 hours  norepinephrine Infusion 0.05 MICROgram(s)/kG/Min (7.2 mL/Hr) IV Continuous <Continuous>  petrolatum Ophthalmic Ointment 1 Application(s) Both EYES two times a day  predniSONE   Tablet 30 milliGRAM(s) Oral daily  propofol Infusion 10 MICROgram(s)/kG/Min (4.61 mL/Hr) IV Continuous <Continuous>    MEDICATIONS  (PRN):  acetaminophen     Tablet .. 650 milliGRAM(s) Oral every 6 hours PRN Mild Pain (1 - 3), Moderate Pain (4 - 6)  sodium chloride 0.65% Nasal 1 Spray(s) Both Nostrils daily PRN Nasal Congestion      ALLERGIES:  Allergies    No Known Allergies    Intolerances        LABS:                        10.9   13.10 )-----------( 223      ( 09 Jun 2022 00:22 )             35.2     06-09    141  |  98  |  20  ----------------------------<  121<H>  3.7   |  31  |  0.47<L>    Ca    10.2      09 Jun 2022 00:22  Phos  2.6     06-09  Mg     2.10     06-09    TPro  6.3  /  Alb  2.7<L>  /  TBili  0.4  /  DBili  x   /  AST  12  /  ALT  9   /  AlkPhos  66  06-09          RADIOLOGY & ADDITIONAL TESTS: Reviewed.

## 2022-06-09 NOTE — PROGRESS NOTE ADULT - ASSESSMENT
78YO Female former smoker PMH Afib, w/ Squamous cell lung carcinoma (3L home O2) s/p debulking and R bronchial stent recently revised on 6/1 with further debulking and balloon dilatation started on Carboplatin/Taxol with Dr Willis 3/11/2022 s/p PEG for dysphagia due to mass effect presents to hospital with cough, wheezing, shortness of breath, and yeung that has been worsening since discharge after recent procedure on 6/1/2022 admitted to MICU for intubation and bronch for attempt to clear out mucous plugs and evaluate for worsening airway stenosis or lung collapse. GOC addressed, she had expressed that she would not want to be on prolonged ventilation and would want to be electively extubated.  to make decisions for her if she is unable.     #Neuro  -intubated sedated      Cards  -Hx a-fib  -Per cards, d/c'ed amiodarone, off eliquis 7 days ago  -TTE EF 50%, low normal    #Respiratory  -Squamous cell lung carcinoma  -Repeat rigid bronchoscopy 6/7 for mucous clearing, if improvement, will consider stent revision and APC/cryo debulking with IP team  -Post obstructive atelectasis  -Gram negative pneumonia  -Acute hypoxemic respiratory failure, previous BAL showing Pseudomonas and providencia  - C/w prednisone 30mg daily; can increase to solumedrol 40 mg IVP BID for empiric management of inflammatory airway disease; wheezing appreciated on exam.   - C/w duonebs q6 standing  - Aerobika airway clearance device.  -Eval by thoracic surgery, no intervention at this time  -6/4/22 found interval progression of disease with increased right hilar and subcarinal mass resulting in right upper lobe occlusion and right upper lobe complete collapse. Mild left-to-right cardiomediastinal shift.    #GI/Nutrition  -Acute diarrhea (2/2 amoxicillin vs c diff), last episode 6/4  -PEG tube in place, POCUS no gastric distention    Heme-onc  -Eval by heme-onc, no acute interventions or systemic tx at this itme    #/Renal/Fluids  -Bun/Cr 19/0.59  -Trend I/O's  -Replete electrolytes PRN    #ID  - Change Zosyn to meropenem.  - Off vanco; negative mrsa swab  -Postobstructive atelectasis, Gram negative pneumonia, previous BAL showing pseudomonas/providencia    #Endocrine  -Euglycemic, no active issues at this time    #Heme  - DVT ppx: AC's held in setting of tumor debulking, SCD's ordered, consider heparin gtt given Afib?    Dispo: ICU.   76YO Female former smoker PMH Afib, w/ Squamous cell lung carcinoma (3L home O2) s/p debulking and R bronchial stent recently revised on 6/1 with further debulking and balloon dilatation started on Carboplatin/Taxol with Dr Willis 3/11/2022 s/p PEG for dysphagia due to mass effect presents to hospital with cough, wheezing, shortness of breath, and yeung that has been worsening since discharge after recent procedure on 6/1/2022 admitted to MICU for intubation and bronch for attempt to clear out mucous plugs and evaluate for worsening airway stenosis or lung collapse. GOC addressed, she had expressed that she would not want to be on prolonged ventilation and would want to be electively extubated.  to make decisions for her if she is unable.     #Neuro  -intubated sedated      Cards  -Hx a-fib  -Per cards, d/c'ed amiodarone, off eliquis 7 days ago  -TTE EF 50%, low normal    #Respiratory  -Squamous cell lung carcinoma  -Repeat rigid bronchoscopy 6/7 for mucous clearing, if improvement, will consider stent revision and APC/cryo debulking with IP team  -Post obstructive atelectasis  -Gram negative pneumonia  -Acute hypoxemic respiratory failure, previous BAL showing Pseudomonas and providencia  - C/w prednisone 30mg daily; can increase to solumedrol 40 mg IVP BID for empiric management of inflammatory airway disease; wheezing appreciated on exam.   - C/w duonebs q6 standing  - Aerobika airway clearance device.  -Eval by thoracic surgery, no intervention at this time  -6/4/22 found interval progression of disease with increased right hilar and subcarinal mass resulting in right upper lobe occlusion and right upper lobe complete collapse. Mild left-to-right cardiomediastinal shift.    #GI/Nutrition  -Acute diarrhea (2/2 amoxicillin vs c diff), last episode 6/4  -PEG tube in place, POCUS no gastric distention    Heme-onc  -Eval by heme-onc, no acute interventions or systemic tx at this itme    #/Renal/Fluids  -Bun/Cr 19/0.59  -Trend I/O's  -Replete electrolytes PRN    #ID  - Change Zosyn to meropenem.  - Off vanco; negative mrsa swab  -Postobstructive atelectasis, Gram negative pneumonia, previous BAL showing pseudomonas/providencia    #Endocrine  -Euglycemic, no active issues at this time    #Heme  -lovenox    Dispo: ICU.   78YO Female former smoker PMH Afib, w/ Squamous cell lung carcinoma (3L home O2) s/p debulking and R bronchial stent recently revised on 6/1 with further debulking and balloon dilatation started on Carboplatin/Taxol with Dr Willis 3/11/2022 s/p PEG for dysphagia due to mass effect presents to hospital with cough, wheezing, shortness of breath, and yeung that has been worsening since discharge after recent procedure on 6/1/2022 admitted to MICU for intubation and bronch for attempt to clear out mucous plugs and evaluate for worsening airway stenosis or lung collapse. GOC addressed, she had expressed that she would not want to be on prolonged ventilation and would want to be electively extubated.  to make decisions for her if she is unable.     #Neuro  -intubated sedated    Cards  -Hx a-fib  -Per cards, d/c'ed amiodarone, off eliquis 7 days ago  -TTE EF 50%, low normal    #Respiratory  -Squamous cell lung carcinoma  -Repeat rigid bronchoscopy 6/7 for mucous clearing, if improvement, will consider stent revision and APC/cryo debulking with IP team  -Post obstructive atelectasis  -Gram negative pneumonia  -Acute hypoxemic respiratory failure, previous BAL showing Pseudomonas and providencia  - C/w prednisone 30mg daily; can increase to solumedrol 40 mg IVP BID for empiric management of inflammatory airway disease; wheezing appreciated on exam.   - C/w duonebs q6 standing  - Aerobika airway clearance device.  -Eval by thoracic surgery, no intervention at this time  -6/4/22 found interval progression of disease with increased right hilar and subcarinal mass resulting in right upper lobe occlusion and right upper lobe complete collapse. Mild left-to-right cardiomediastinal shift.    #GI/Nutrition  -Acute diarrhea (2/2 amoxicillin vs c diff), last episode 6/4  -PEG replacement per IR    Heme-onc  -Eval by heme-onc, no acute interventions or systemic tx at this itme    #/Renal/Fluids  -Bun/Cr 19/0.59  -Trend I/O's  -Replete electrolytes PRN    #ID  - Change Zosyn to meropenem back on zosyn based on pseudomonas sensitivities  - Off vanco; negative mrsa swab  -Postobstructive atelectasis, Gram negative pneumonia, previous BAL showing pseudomonas/providencia    #Endocrine  -Euglycemic, no active issues at this time    #Heme  -lovenox    Dispo: ICU.

## 2022-06-10 NOTE — PROGRESS NOTE ADULT - CONVERSATION DETAILS
Updated  regarding pt condition and her slow improvement in oxygenation.  states that while she would have liked to be extubated by Friday, it is not a hard deadline given her improvement and that he would continue to monitor to see if she can come off the ventilator until next Monday if needed. Updated  regarding pt condition and her slow improvement in oxygenation.  states that while she would have liked to be extubated by Friday, it is not a hard deadline given her improvement and that he would continue to monitor to see if she can come off the ventilator until next Monday if needed and if not extubated will continue discussion from there

## 2022-06-10 NOTE — PROGRESS NOTE ADULT - ASSESSMENT
76 yo F with PMH of stage IIIA squamous cell lung CA, c/b esophageal compression of tumor s/p PEG tube, s/p 10 weekly cycles of chemotherapy with carboplatin/taxol, with plan to start RT to lung mass, who presents to [facility] with complaint of generalized weakness, dyspnea and diarrhea admitted for acute respiratory failure and PNA requiring supplemental oxygen.     Most recent imaging this admission from 6/4/22 found interval progression of disease with increased right hilar and subcarinal mass resulting in right upper lobe occlusion and right upper lobe complete collapse. Mild left-to-right cardiomediastinal shift.    Now intubated and sedated in the MICU.  S/p Repeat rigid bronchoscopy 6/7 for mucous clearing    -MICU care appreciated  -No inpatient oncology interventions, plan was for the patient to have definitive RT starting 6/6.  -To have PEG replacement today  -[facility] ongoing  -Supportive care, pain control, Nutrition, PT, DVT ppx  -Outpatient oncology f/u    Will follow. Please do not hesitate to call back with questions.     Cintia Willis MD  Medical Oncology Attending  C: [phone]

## 2022-06-10 NOTE — PROGRESS NOTE ADULT - SUBJECTIVE AND OBJECTIVE BOX
INTERVAL HPI/OVERNIGHT EVENTS:  Patient seen at bedside.      VITAL SIGNS:  T(F): 101.9 (06-10-22 @ 12:00)  HR: 69 (06-10-22 @ 12:00)  BP: 112/58 (06-10-22 @ 12:00)  RR: 22 (06-10-22 @ 12:00)  SpO2: 91% (06-10-22 @ 12:00)  Wt(kg): --    PHYSICAL EXAM:    Constitutional: NAD, resting in bed comfortably  Eyes: EOMI, sclera non-icteric  Neck: supple, no LAP  Respiratory: CTA b/l, good air entry b/l, no wheezing, rhonchi or crackels  Cardiovascular: RRR, normal S1S2, no M/R/G  Gastrointestinal: soft, NTND  Extremities: no edema  Neurological: AAOx3, non focal  Skin: Normal temperature    MEDICATIONS  (STANDING):  ALBUTerol    90 MICROgram(s) HFA Inhaler 2 Puff(s) Inhalation every 6 hours  aMIOdarone Infusion 0.501 mG/Min (16.7 mL/Hr) IV Continuous <Continuous>  budesonide 160 MICROgram(s)/formoterol 4.5 MICROgram(s) Inhaler 2 Puff(s) Inhalation two times a day  chlorhexidine 0.12% Liquid 15 milliLiter(s) Oral Mucosa every 12 hours  chlorhexidine 4% Liquid 1 Application(s) Topical <User Schedule>  dexMEDEtomidine Infusion 0.2 MICROgram(s)/kG/Hr (3.84 mL/Hr) IV Continuous <Continuous>  dextrose 50% Injectable 25 Gram(s) IV Push once  dextrose 50% Injectable 12.5 Gram(s) IV Push once  dextrose 50% Injectable 25 Gram(s) IV Push once  dextrose Oral Gel 15 Gram(s) Oral once  enoxaparin Injectable 70 milliGRAM(s) SubCutaneous every 12 hours  fentaNYL   Infusion. 0.5 MICROgram(s)/kG/Hr (3.84 mL/Hr) IV Continuous <Continuous>  glucagon  Injectable 1 milliGRAM(s) IntraMuscular once  insulin lispro (ADMELOG) corrective regimen sliding scale   SubCutaneous every 6 hours  ipratropium 17 MICROgram(s) HFA Inhaler 1 Puff(s) Inhalation every 6 hours  methylPREDNISolone sodium succinate Injectable 30 milliGRAM(s) IV Push daily  norepinephrine Infusion 0.05 MICROgram(s)/kG/Min (7.2 mL/Hr) IV Continuous <Continuous>  petrolatum Ophthalmic Ointment 1 Application(s) Both EYES two times a day  piperacillin/tazobactam IVPB.. 4.5 Gram(s) IV Intermittent every 8 hours    MEDICATIONS  (PRN):  acetaminophen     Tablet .. 650 milliGRAM(s) Oral every 6 hours PRN Mild Pain (1 - 3), Moderate Pain (4 - 6)  sodium chloride 0.65% Nasal 1 Spray(s) Both Nostrils daily PRN Nasal Congestion      Allergies    No Known Allergies    Intolerances        LABS:                        11.7   11.66 )-----------( 226      ( 10 Yahir 2022 01:26 )             37.0     06-10    137  |  99  |  28<H>  ----------------------------<  195<H>  5.4<H>   |  28  |  0.49<L>    Ca    9.8      10 Yahir 2022 01:26  Phos  2.6     06-10  Mg     2.10     06-10    TPro  6.5  /  Alb  2.2<L>  /  TBili  0.5  /  DBili  x   /  AST  22  /  ALT  13  /  AlkPhos  76  06-10    PT/INR - ( 10 Yahir 2022 08:51 )   PT: 14.6 sec;   INR: 1.26 ratio         PTT - ( 10 Yahir 2022 08:51 )  PTT:32.2 sec  Urinalysis Basic - ( 10 Yahir 2022 06:10 )    Color: Yellow / Appearance: Clear / S.035 / pH: x  Gluc: x / Ketone: Negative  / Bili: Negative / Urobili: <2 mg/dL   Blood: x / Protein: Trace / Nitrite: Negative   Leuk Esterase: Negative / RBC: 19 /HPF / WBC 3 /HPF   Sq Epi: x / Non Sq Epi: 1 /HPF / Bacteria: Negative        RADIOLOGY & ADDITIONAL TESTS:  Studies reviewed.

## 2022-06-10 NOTE — PROGRESS NOTE ADULT - SUBJECTIVE AND OBJECTIVE BOX
INTERVAL HPI/OVERNIGHT EVENTS:    SUBJECTIVE: Patient seen and examined at bedside. Intubated, sedated  Unable to obtain ROS    OBJECTIVE:    VITAL SIGNS:  ICU Vital Signs Last 24 Hrs  T(C): 39.1 (10 Yahir 2022 05:25), Max: 39.1 (10 Yahir 2022 05:25)  T(F): 102.3 (10 Yahir 2022 05:25), Max: 102.3 (10 Yahir 2022 05:25)  HR: 83 (10 Yaihr 2022 06:58) (80 - 191)  BP: 133/82 (10 Yahir 2022 05:25) (65/31 - 158/80)  BP(mean): 94 (10 Yahir 2022 05:25) (40 - 102)  ABP: --  ABP(mean): --  RR: 16 (10 Yahir 2022 06:00) (16 - 23)  SpO2: 93% (10 Yahir 2022 06:58) (89% - 100%)    Mode: AC/ CMV (Assist Control/ Continuous Mandatory Ventilation), RR (machine): 20, TV (machine): 300, FiO2: 50, PEEP: 5, ITime: 0.53, MAP: 11, PIP: 34    06-09 @ 07:01  -  06-10 @ 07:00  --------------------------------------------------------  IN: 1827.8 mL / OUT: 860 mL / NET: 967.8 mL      CAPILLARY BLOOD GLUCOSE      POCT Blood Glucose.: 130 mg/dL (09 Jun 2022 11:39)      PHYSICAL EXAM:    General: NAD  HEENT: NC/AT; clear conjunctiva  Neck: supple  Respiratory: CTA b/l  Cardiovascular: +S1/S2; RRR  Abdomen: soft, NT/ND; +BS x4  Extremities: WWP, 2+ peripheral pulses b/l; no LE edema  Skin: normal color and turgor; no rash  Neurological: sedated/nonfocal    MEDICATIONS:  MEDICATIONS  (STANDING):  ALBUTerol    90 MICROgram(s) HFA Inhaler 2 Puff(s) Inhalation every 6 hours  aMIOdarone Infusion 0.501 mG/Min (16.7 mL/Hr) IV Continuous <Continuous>  budesonide 160 MICROgram(s)/formoterol 4.5 MICROgram(s) Inhaler 2 Puff(s) Inhalation two times a day  chlorhexidine 0.12% Liquid 15 milliLiter(s) Oral Mucosa every 12 hours  chlorhexidine 4% Liquid 1 Application(s) Topical <User Schedule>  dexMEDEtomidine Infusion 0.2 MICROgram(s)/kG/Hr (3.84 mL/Hr) IV Continuous <Continuous>  enoxaparin Injectable 80 milliGRAM(s) SubCutaneous every 12 hours  fentaNYL   Infusion. 0.5 MICROgram(s)/kG/Hr (3.84 mL/Hr) IV Continuous <Continuous>  ipratropium 17 MICROgram(s) HFA Inhaler 1 Puff(s) Inhalation every 6 hours  methylPREDNISolone sodium succinate Injectable 30 milliGRAM(s) IV Push daily  norepinephrine Infusion 0.05 MICROgram(s)/kG/Min (7.2 mL/Hr) IV Continuous <Continuous>  petrolatum Ophthalmic Ointment 1 Application(s) Both EYES two times a day  piperacillin/tazobactam IVPB.. 3.375 Gram(s) IV Intermittent every 8 hours  propofol Infusion 10 MICROgram(s)/kG/Min (4.61 mL/Hr) IV Continuous <Continuous>    MEDICATIONS  (PRN):  acetaminophen     Tablet .. 650 milliGRAM(s) Oral every 6 hours PRN Mild Pain (1 - 3), Moderate Pain (4 - 6)  sodium chloride 0.65% Nasal 1 Spray(s) Both Nostrils daily PRN Nasal Congestion      ALLERGIES:  Allergies    No Known Allergies    Intolerances        LABS:                        11.7   11.66 )-----------( 226      ( 10 Yahir 2022 01:26 )             37.0     06-10    137  |  99  |  28<H>  ----------------------------<  195<H>  5.4<H>   |  28  |  0.49<L>    Ca    9.8      10 Yahir 2022 01:26  Phos  2.6     06-10  Mg     2.10     06-10    TPro  6.5  /  Alb  2.2<L>  /  TBili  0.5  /  DBili  x   /  AST  22  /  ALT  13  /  AlkPhos  76  06-10          RADIOLOGY & ADDITIONAL TESTS: Reviewed.

## 2022-06-10 NOTE — PROGRESS NOTE ADULT - ASSESSMENT
76YO Female former smoker PMH Afib, w/ Squamous cell lung carcinoma (3L home O2) s/p debulking and R bronchial stent recently revised on 6/1 with further debulking and balloon dilatation started on Carboplatin/Taxol with Dr Willis 3/11/2022 s/p PEG for dysphagia due to mass effect presents to hospital with cough, wheezing, shortness of breath, and yeung that has been worsening since discharge after recent procedure on 6/1/2022 admitted to MICU for intubation and bronch for attempt to clear out mucous plugs and evaluate for worsening airway stenosis or lung collapse. GOC addressed, she had expressed that she would not want to be on prolonged ventilation and would want to be electively extubated.  to make decisions for her if she is unable.     #Neuro  -intubated sedated    Cards  -Hx a-fib  -Per cards, d/c'ed amiodarone, off eliquis 7 days ago  -TTE EF 50%, low normal    #Respiratory  -Squamous cell lung carcinoma  -Repeat rigid bronchoscopy 6/7 for mucous clearing, if improvement, will consider stent revision and APC/cryo debulking with IP team  -Post obstructive atelectasis  -Gram negative pneumonia  -Acute hypoxemic respiratory failure, previous BAL showing Pseudomonas and providencia  - C/w prednisone 30mg daily; can increase to solumedrol 40 mg IVP BID for empiric management of inflammatory airway disease; wheezing appreciated on exam.   - C/w duonebs q6 standing  - Aerobika airway clearance device.  -Eval by thoracic surgery, no intervention at this time  -6/4/22 found interval progression of disease with increased right hilar and subcarinal mass resulting in right upper lobe occlusion and right upper lobe complete collapse. Mild left-to-right cardiomediastinal shift.    #GI/Nutrition  -Acute diarrhea (2/2 amoxicillin vs c diff), last episode 6/4  -PEG replacement per IR    Heme-onc  -Eval by heme-onc, no acute interventions or systemic tx at this itme    #/Renal/Fluids  -Bun/Cr 19/0.59  -Trend I/O's  -Replete electrolytes PRN    #ID  - Change Zosyn to meropenem back on zosyn based on pseudomonas sensitivities  - Off vanco; negative mrsa swab  -Postobstructive atelectasis, Gram negative pneumonia, previous BAL showing pseudomonas/providencia    #Endocrine  -Euglycemic, no active issues at this time    #Heme  -lovenox    Dispo: ICU.   78YO Female former smoker PMH Afib, w/ Squamous cell lung carcinoma (3L home O2) s/p debulking and R bronchial stent recently revised on 6/1 with further debulking and balloon dilatation started on Carboplatin/Taxol with Dr Willis 3/11/2022 s/p PEG for dysphagia due to mass effect presents to hospital with cough, wheezing, shortness of breath, and yeung that has been worsening since discharge after recent procedure on 6/1/2022 admitted to MICU for intubation and bronch for attempt to clear out mucous plugs and evaluate for worsening airway stenosis or lung collapse. GOC addressed, she had expressed that she would not want to be on prolonged ventilation and would want to be electively extubated.  to make decisions for her if she is unable.     #Neuro  -intubated sedated    Cards  -Hx a-fib  -Per cards, d/c'ed amiodarone, off eliquis 7 days ago  -TTE EF 50%, low normal  -Afib w/ RVR 6/9-> amio bolus with amio IV gtt w/ spontaneous conversion to sinus    #Respiratory  -Squamous cell lung carcinoma  -Repeat rigid bronchoscopy 6/7 for mucous clearing, if improvement, will consider stent revision and APC/cryo debulking with IP team  -Post obstructive atelectasis  -Gram negative pneumonia  -Acute hypoxemic respiratory failure, previous BAL showing Pseudomonas and providencia  - C/w prednisone 30mg daily; can increase to solumedrol 40 mg IVP BID for empiric management of inflammatory airway disease; wheezing appreciated on exam.   - C/w duonebs q6 standing  - Aerobika airway clearance device.  -Eval by thoracic surgery, no intervention at this time  -6/4/22 found interval progression of disease with increased right hilar and subcarinal mass resulting in right upper lobe occlusion and right upper lobe complete collapse. Mild left-to-right cardiomediastinal shift.    #GI/Nutrition  -Acute diarrhea (2/2 amoxicillin vs c diff), last episode 6/4  -PEG replacement per IR    Heme-onc  -Eval by heme-onc, no acute interventions or systemic tx at this itme    #/Renal/Fluids  -Bun/Cr 19/0.59  -Trend I/O's  -Replete electrolytes PRN    #ID  - Change Zosyn to meropenem back on zosyn based on pseudomonas sensitivities  - Off vanco; negative mrsa swab  -Postobstructive atelectasis, Gram negative pneumonia, previous BAL showing pseudomonas/providencia    #Endocrine  -Euglycemic, no active issues at this time    #Heme  -lovenox    Dispo: ICU.   76YO Female former smoker PMH Afib, w/ Squamous cell lung carcinoma (3L home O2) s/p debulking and R bronchial stent recently revised on 6/1 with further debulking and balloon dilatation started on Carboplatin/Taxol with Dr Willis 3/11/2022 s/p PEG for dysphagia due to mass effect presents to hospital with cough, wheezing, shortness of breath, and yeung that has been worsening since discharge after recent procedure on 6/1/2022 admitted to MICU for intubation and bronch for attempt to clear out mucous plugs and evaluate for worsening airway stenosis or lung collapse. GOC addressed, she had expressed that she would not want to be on prolonged ventilation and would want to be electively extubated.  to make decisions for her if she is unable.     #Neuro  -intubated sedated    Cards  -Hx a-fib  -Per cards, d/c'ed amiodarone, off eliquis 7 days ago  -TTE EF 50%, low normal  -Afib w/ RVR 6/9-> amio bolus with amio IV gtt w/ spontaneous conversion to sinus    #Respiratory  -Squamous cell lung carcinoma  -Repeat rigid bronchoscopy 6/7 for mucous clearing, if improvement, will consider stent revision and APC/cryo debulking with IP team  -Post obstructive atelectasis  -Gram negative pneumonia  -Acute hypoxemic respiratory failure, previous BAL showing Pseudomonas and providencia  - C/w prednisone 30mg daily; on solumedrol 30 IVP qd  - C/w duonebs q6 standing  - Aerobika airway clearance device.  -Eval by thoracic surgery, no intervention at this time  -6/4/22 found interval progression of disease with increased right hilar and subcarinal mass resulting in right upper lobe occlusion and right upper lobe complete collapse. Mild left-to-right cardiomediastinal shift.    #GI/Nutrition  -Acute diarrhea (2/2 amoxicillin vs c diff), last episode 6/4  -PEG replacement per IR    Heme-onc  -Eval by heme-onc, no acute interventions or systemic tx at this itme    #/Renal/Fluids  -Bun/Cr wnl  -Trend I/O's, Call in place  -Replete electrolytes PRN    #ID  - Change Zosyn to meropenem back on zosyn based on pseudomonas sensitivities  - Off vanco; negative mrsa swab  -Postobstructive atelectasis, Gram negative pneumonia, previous BAL showing pseudomonas/providencia  - increase dose of zosyn to 4.5 given high fever, UA negative, COVID/flu negative  - will escalate back to meropenem if repeat fever     #Endocrine  -low SSI  -on solumedrol    #Heme  -lovenox    Dispo: ICU.

## 2022-06-10 NOTE — CHART NOTE - NSCHARTNOTEFT_GEN_A_CORE
PRE-INTERVENTIONAL RADIOLOGY PROCEDURE NOTE  ============================  Patient Name: DESIRAE POLLOCK    Patient Age: 77y    Patient Gender: Female    Procedure: PEG placement    Diagnosis/Indication:    Interventional Radiology Attending Physician: Dr. Croft    Ordering Attending Physician: Dr. Priest    Pertinent Medical History:    Pertinent labs:                      11.7   11.66 )-----------( 226      ( 10 Yahir 2022 01:26 )             37.0       06-10    137  |  99  |  28<H>  ----------------------------<  195<H>  5.4<H>   |  28  |  0.49<L>    Ca    9.8      10 Yahir 2022 01:26  Phos  2.6     06-10  Mg     2.10     06-10    TPro  6.5  /  Alb  2.2<L>  /  TBili  0.5  /  DBili  x   /  AST  22  /  ALT  13  /  AlkPhos  76  06-10      PT/INR - ( 10 Yahir 2022 08:51 )   PT: 14.6 sec;   INR: 1.26 ratio         PTT - ( 10 Yahir 2022 08:51 )  PTT:32.2 sec        Patient and Family Aware ? Yes

## 2022-06-10 NOTE — PROGRESS NOTE ADULT - ATTENDING COMMENTS
77 F with NSCLC s/p R bronchial stent on chemo, s/p peg here with cough/wheezing, found to have acute hypoxemic respiratory failure and multifocal pneumonia requiring intubation.    Patient sedated on vent.    - c/w full vent support for acute hypoxemic respiratory failure requiring vent, wean fio2 as tolerated  - continue to wean sedation  - c/w broad abx, f/u cultures, pseudomonas pna, change to higher dose zosyn  - discussed with IP, s/p bronch 6/7, no role for IP intervention at this time  - s/p PEG for oropharyngeal dysphagia, but peg fell out, IR Peg today  - adjust lovenox dosing based on correct weight    Critically ill patient requiring frequent bedside visits with therapy changes.

## 2022-06-11 NOTE — CONSULT NOTE ADULT - ATTENDING COMMENTS
She is admitted for evaluation of prior squamous cell carcinoma of the lung. Management of current condition discussed with Dr Ochoa and the patient. Treatment for relief of wheezing was dicussed
Agree with above. Lung cancer with central airway obstruction, now worsening. Will need bronch, eval, revision. Overall prognosis guarded.
PCR doesn't differentiate colonization from infection.   She's not the typical patient for PCP infection.   Her chemotherapy regimen does not pose high risk.   She's not neutropenic.   Appears to have received a tapering course of Prednisone on 6/1 after her last bronch, starting at 40mg then 30mg after five days then 20mg after another 5 days. This is not a  high dose.   The new groundglass opacities are pretty asymmetric. PCP is usually symmetric.   Beta-D glucan from BAL is negative also. It's not a great test though and would check serum. https://www.ncbi.nlm.nih.gov/pmc/articles/BCJ8646405/  Given critical illness, ok to try Bactrim but I think this is mostly driven by Pseudomonas and progression of disease.   Poor prognosis.     Discussed with SHARONDA Lau MD   Infectious Disease   Available on TEAMS. After 5PM and on weekends please page fellow on call or call [phone]

## 2022-06-11 NOTE — PROGRESS NOTE ADULT - ATTENDING COMMENTS
77 year old woman with NSCLC s/p R bronchial stent on chemo, s/p peg here with acute hypoxemic respiratory failure and multifocal pneumonia requiring intubation.    on minimal sedation responsive   still requiring high FiO2 wean as tolerated  continue Abx patient with pseudomonas and PJP in sputum   PEG tube replaced by IR     prognosis guarded

## 2022-06-11 NOTE — PROGRESS NOTE ADULT - ASSESSMENT
76YO Female former smoker PMH Afib, w/ Squamous cell lung carcinoma (3L home O2) s/p debulking and R bronchial stent recently revised on 6/1 with further debulking and balloon dilatation started on Carboplatin/Taxol with Dr Willis 3/11/2022 s/p PEG for dysphagia due to mass effect presents to hospital with cough, wheezing, shortness of breath, and yeung that has been worsening since discharge after recent procedure on 6/1/2022 admitted to MICU for intubation and bronch for attempt to clear out mucous plugs and evaluate for worsening airway stenosis or lung collapse. GOC addressed, she had expressed that she would not want to be on prolonged ventilation and would want to be electively extubated.  to make decisions for her if she is unable.     #Neuro  -intubated sedated    Cards  -Hx a-fib  -Per cards, d/c'ed amiodarone, off eliquis 7 days ago  -TTE EF 50%, low normal  -Afib w/ RVR 6/9-> amio bolus with amio IV gtt w/ spontaneous conversion to sinus    #Respiratory  -Squamous cell lung carcinoma  -Repeat rigid bronchoscopy 6/7 for mucous clearing, if improvement, will consider stent revision and APC/cryo debulking with IP team  -Post obstructive atelectasis  -Gram negative pneumonia  -Acute hypoxemic respiratory failure, previous BAL showing Pseudomonas and providencia  - C/w prednisone 30mg daily; on solumedrol 30 IVP qd  - C/w duonebs q6 standing  - Aerobika airway clearance device.  -Eval by thoracic surgery, no intervention at this time  -6/4/22 found interval progression of disease with increased right hilar and subcarinal mass resulting in right upper lobe occlusion and right upper lobe complete collapse. Mild left-to-right cardiomediastinal shift.    #GI/Nutrition  -Acute diarrhea (2/2 amoxicillin vs c diff), last episode 6/4  -PEG replacement per IR    Heme-onc  -Eval by heme-onc, no acute interventions or systemic tx at this itme    #/Renal/Fluids  -Bun/Cr wnl  -Trend I/O's, Call in place  -Replete electrolytes PRN    #ID  - Change Zosyn to meropenem back on zosyn based on pseudomonas sensitivities  - Off vanco; negative mrsa swab  -Postobstructive atelectasis, Gram negative pneumonia, previous BAL showing pseudomonas/providencia  - increase dose of zosyn to 4.5 given high fever, UA negative, COVID/flu negative  - will escalate back to meropenem if repeat fever     #Endocrine  -low SSI  -on solumedrol    #Heme  -lovenox    Dispo: ICU.   76YO Female former smoker PMH Afib, w/ Squamous cell lung carcinoma (3L home O2) s/p debulking and R bronchial stent recently revised on 6/1 with further debulking and balloon dilatation started on Carboplatin/Taxol with Dr Willis 3/11/2022 s/p PEG for dysphagia due to mass effect presents to hospital with cough, wheezing, shortness of breath, and yeung that has been worsening since discharge after recent procedure on 6/1/2022 admitted to MICU for intubation and bronch for attempt to clear out mucous plugs and evaluate for worsening airway stenosis or lung collapse. GOC addressed, she had expressed that she would not want to be on prolonged ventilation and would want to be electively extubated.  to make decisions for her if she is unable.     #Neuro  -intubated/sedated with precedex and fent gtts for vent synchrony     Cards  Hx a-fib  -TTE EF 50%, low normal  -Afib w/ RVR 6/9-> amio bolus with amio IV gtt w/ spontaneous conversion to sinus  -Now on po amio     #Respiratory  Squamous cell lung carcinoma  -Repeat rigid bronchoscopy 6/7 for mucous clearing, if improvement, will consider stent revision and APC/cryo debulking with IP team  -Post obstructive atelectasis  -Recently started on prednisone 30 on 6/4 per interventional pulm for worsening lung disease     Acute hypoxemic respiratory failure, previous BAL showing Pseudomonas and providencia  -Intubated 6/7, now with increasing O2 requirements   -Initial ABG with hypercarbia, will repeat 6/11   -6/4/22 found interval progression of disease with increased right hilar and subcarinal mass resulting in right upper lobe occlusion and right upper lobe complete collapse. Mild left-to-right cardiomediastinal shift.  -Eval by thoracic surgery, no intervention at this time  -PCP+, started on bactrim on 6/11 and steroids increased to prednisone 40mg BID - ID c/s low suspicion for PCP, could be colonized. Will continue with tx for now.     #GI/Nutrition  -Acute diarrhea (2/2 amoxicillin vs c diff), last episode 6/4  -PEG replacement per IR 6/10, TF's restarted     Heme-onc  -Eval by heme-onc, no acute interventions or systemic tx at this time     #/Renal/Fluids  -Bun/Cr wnl  -Trend I/O's, Call in place  -Replete electrolytes PRN    #ID  - zosyn (6/5-) based on pseudomonas sensitivities in sputum   - Off vanco; negative mrsa swab  - Postobstructive atelectasis  - increase dose of zosyn to 4.5 given high fever, UA negative, COVID/flu negative  - PCP+, started on bactrim 6/11 and pred 40 BID   - ID consulted, recs appreciated - low likelihood of PCP, likely colonized   - Serum fungitell 6/11 + repeat bcx     #Endocrine  -low SSI i/s/o steroid use     Dispo: ICU.

## 2022-06-11 NOTE — CONSULT NOTE ADULT - ASSESSMENT
Assessment:  The patient is a 77 year old female with 40 pack year smoking history, atrial fibrillation,, diagnosed with NSCLC (2L home O2) s/p debulking and R bronchial stent recently revised on 6/1/22 with further debulking and balloon dilatation started on Carboplatin/Taxol since 3/11/2022 w/ plan for radiation therapy s/p PEG replacement on 6/10/22 for dysphagia due to mass effect who initially presented to [facility] for weakness, shortness of breath, and diarrhea. Patient states she was discharged on Augmentin and Prednisone after tumor debulking and balloon dilatation. Over the past several days, she developed loose, watery brown stool associated with 3-4 episodes per day. Along with the diarrhea, she states she has felt increasing weak and reports increased shortness of breath. Reports ACKERMAN when walking from bed to bathroom associated wth cough and sputum production (brown/ yellow) over the past few days after the revision. She denies fever, chills, chest pain, palpitations, abdominal pain, N/V/D/C, or urinary changes. She was placed on 6 LPM on NC in the ED. She was transferred to the MICU for clearance of mucus plugging and closer monitoring. Hospital course was complicated by intubation on 6/7/22 s/p bronchoscopy. Previous BAL showed Pseudomonas w/ Providencia. SCx on 6/5/22 showed Pseudomonas aeruginosa (pansensitive). Bronchial culture showed rare Pseudomonas aerugonisa. PCP PCR was positive. Bronchial AFB and fungal culture are in process. MRSA PCR negative. CXR showed an interval placement of endotracheal tube with tip above the juan m w/ right-sided access Mediport with tip in the superior vena cava w right bronchial stent. CT chest/abdomen/pelvis w/ IV contrast compared to the prior study of 4/19/22, interval progression of disease with increased right hilar and subcarinal mass resulting in right upper lobe occlusion and right upper lobe complete collapse w/ mild left-to-right cardiomediastinal shift w/ unchanged occlusion of the distal bronchus intermedius stent w/ new diffuse ground-glass opacities and tree-in-bud opacities, left greater than right, likely multifocal pneumonia. There was a gastrostomy tube in place w/ soft tissue gas around the gastrostomy catheter without discrete focal drainable collection. She received IV vancomycin (6/5 - 6/6) and IV meropenem (6/7 - 6/9), currently on IV zosyn (6/5 - 6/7 and 6/9 to present). She was started on PO bactrim on (6/10 - ) for PCP infection. ID was consulted for PCP infection.     Plan:  # Sepsis due to post-obstructive pneumonia w/ PCP infection  - c/w IV zosyn and PO bactrim for now  - f/u bronchial AFB, fungal, and final sputum cultures w/ sensitivities  - check repeat BCx x 2   - monitor fever curve  - follow white count and renal function daily  - ID will continue to follow    Case not yet discussed w/ attending, note not final    Rigoberto Conroy MD PGY-5  Fellow, Infectious Diseases   Pager: [phone]  If no response, after 5pm and on weekends: Call [phone]      Assessment:  The patient is a 77 year old female with 40 pack year smoking history, atrial fibrillation,, diagnosed with NSCLC (2L home O2) s/p debulking and R bronchial stent recently revised on 6/1/22 with further debulking and balloon dilatation started on Carboplatin/Taxol since 3/11/2022 w/ plan for radiation therapy s/p PEG replacement on 6/10/22 for dysphagia due to mass effect who initially presented to [facility] for weakness, shortness of breath, and diarrhea. Patient states she was discharged on Augmentin and Prednisone after tumor debulking and balloon dilatation. Over the past several days, she developed loose, watery brown stool associated with 3-4 episodes per day. Along with the diarrhea, she states she has felt increasing weak and reports increased shortness of breath. Reports ACKERMAN when walking from bed to bathroom associated wth cough and sputum production (brown/ yellow) over the past few days after the revision. She denies fever, chills, chest pain, palpitations, abdominal pain, N/V/D/C, or urinary changes. She was placed on 6 LPM on NC in the ED. She was transferred to the MICU for clearance of mucus plugging and closer monitoring. Hospital course was complicated by intubation on 6/7/22 s/p bronchoscopy. Previous BAL showed Pseudomonas w/ Providencia. SCx on 6/5/22 showed Pseudomonas aeruginosa (pansensitive). Bronchial culture showed rare Pseudomonas aerugonisa. PCP PCR was positive. Bronchial AFB and fungal culture are in process. MRSA PCR negative. CXR showed an interval placement of endotracheal tube with tip above the juan m w/ right-sided access Mediport with tip in the superior vena cava w right bronchial stent. CT chest/abdomen/pelvis w/ IV contrast compared to the prior study of 4/19/22, interval progression of disease with increased right hilar and subcarinal mass resulting in right upper lobe occlusion and right upper lobe complete collapse w/ mild left-to-right cardiomediastinal shift w/ unchanged occlusion of the distal bronchus intermedius stent w/ new diffuse ground-glass opacities and tree-in-bud opacities, left greater than right, likely multifocal pneumonia. There was a gastrostomy tube in place w/ soft tissue gas around the gastrostomy catheter without discrete focal drainable collection. She received IV vancomycin (6/5 - 6/6) and IV meropenem (6/7 - 6/9), currently on IV zosyn (6/5 - 6/7 and 6/9 to present). She was started on PO bactrim on (6/10 - ) for PCP infection. ID was consulted for PCP infection.     Plan:  # Sepsis due to post-obstructive pneumonia due to progression of disease  - c/w IV zosyn and PO bactrim for now  - check serum fungitell  - f/u bronchial AFB, fungal, and final sputum cultures w/ sensitivities  - check repeat BCx x 2   - monitor fever curve  - follow white count and renal function daily  - ID will continue to follow  - plan of care discussed w/ primary team    Patient seen w/ attending MD Rigoberto Wyatt MD PGY-5  Fellow, Infectious Diseases   Pager: [phone]  If no response, after 5pm and on weekends: Call [phone]      77F former smoker, NSCLC s/p debulking and R bronchial stent in Feb and again 6/1.   Carboplatin/Taxol since 3/11/2022 w/ plan for radiation therapy   s/p PEG for dysphagia   Here 6/5 for weakness, dyspnea, diarrhea.   Hypoxic respiratory failure, transferred to MICU 6/7.   Bronch 6/7 with thick secretions, grew pan-sensitive Pseudomonas.   Fevers since 6/8.   Received IV vancomycin (6/5 - 6/6) and IV meropenem (6/7 - 6/9), currently on IV zosyn (6/5 - 6/7 and 6/9 to present).   PCP PCR positive, bactrim started 6/10 and we were consulted.   Imaging also with progression of disease.     Rigoberto Conroy MD PGY-5  Fellow, Infectious Diseases   Pager: [phone]  If no response, after 5pm and on weekends: Call [phone]

## 2022-06-11 NOTE — CONSULT NOTE ADULT - REASON FOR ADMISSION
shortness of breath and diarrhea

## 2022-06-11 NOTE — CONSULT NOTE ADULT - SUBJECTIVE AND OBJECTIVE BOX
Patient is a 77 year old female who presents with a chief complaint of shortness of breath and diarrhea. (2022 06:34)    HPI:  The patient is a 77 year old female with 40 pack year smoking history, atrial fibrillation,, diagnosed with NSCLC (2L home O2) s/p debulking and R bronchial stent recently revised on 22 with further debulking and balloon dilatation started on Carboplatin/Taxol since 3/11/2022 s/p PEG for dysphagia due to mass effect who presented to [facility] for weakness, shortness of breath, and diarrhea. Patient states she was discharged on Augmentin and Prednisone after tumor debulking and balloon dilatation. Over the past several days, she developed loose, watery brown stool associated with 3-4 episodes per day. Along with the diarrhea, she states she has felt increasing weak and reports increased shortness of breath. Reports ACKERMAN when walking from bed to bathroom associated wth cough and sputum production (brown/ yellow) over the past few days after the revision. She denies fever, chills, chest pain, palpitations, abdominal pain, N/V/D/C, or urinary changes. She was placed on 6 LPM on NC in the ED.     Labs showed WBC 13.04K, Hgb 11.0, BUN 33 w/ normal LFTs, procalcitonin 0.16, normal fungitell, and . Aspergillus GM negative. UA negative. BCx x 2 on 22 showed no growth. RVP negative. SCx on 22 showed Pseudomonas aeruginosa (pansensitive). Bronchial culture showed rare Pseudomonas aerugonisa. PCP PCR was positive. Bronchial AFB and fungal culture are in process. MRSA PCR negative. CXR showed an interval placement of endotracheal tube with tip above the juan m w/ right-sided access Mediport with tip in the superior vena cava w right bronchial stent. CT chest/abdomen/pelvis w/ IV contrast compared to the prior study of 22, interval progression of disease with increased right hilar and subcarinal mass resulting in right upper lobe occlusion and right upper lobe complete collapse. Mild left-to-right cardiomediastinal shift w/ unchanged occlusion of the distal bronchus intermedius stent w/ new diffuse ground-glass opacities and tree-in-bud opacities, left greater than right, likely multifocal pneumonia. There was a gastrostomy tube in place w/ soft tissue gas around the gastrostomy catheter without discrete focal drainable collection. She received IV vancomycin and IV meropenem, currently on IV zosyn. She was started on PO bactrim for PCP infection.        prior hospital charts reviewed [  ]  primary team notes reviewed [  ]  other consultant notes reviewed [  ]    PAST MEDICAL & SURGICAL HISTORY:  Atrial fibrillation      [place] (hard of hearing)      Malignant neoplasm of unspecified part of unspecified bronchus or lung      Bronchial stenosis      H/O wheezing      Requires oxygen therapy  2-3 liters when sleeping as needed      PEG (percutaneous endoscopic gastrostomy) status      S/P cholecystectomy      PEG (percutaneous endoscopic gastrostomy) status      Port-A-Cath in place      S/P bronchoscopy          Allergies  No Known Allergies    ANTIMICROBIALS (past 90 days)  MEDICATIONS  (STANDING):    meropenem  IVPB   100 mL/Hr IV Intermittent (22 @ 15:56)    meropenem  IVPB   100 mL/Hr IV Intermittent (22 @ 05:17)   100 mL/Hr IV Intermittent (22 @ 21:12)   100 mL/Hr IV Intermittent (22 @ 14:48)   100 mL/Hr IV Intermittent (22 @ 05:16)   100 mL/Hr IV Intermittent (22 @ 21:16)    piperacillin/tazobactam IVPB.   200 mL/Hr IV Intermittent (22 @ 15:54)    piperacillin/tazobactam IVPB..   25 mL/Hr IV Intermittent (06-10-22 @ 05:14)   25 mL/Hr IV Intermittent (22 @ 21:16)    piperacillin/tazobactam IVPB..   25 mL/Hr IV Intermittent (22 @ 06:00)   25 mL/Hr IV Intermittent (22 @ 21:03)   25 mL/Hr IV Intermittent (22 @ 14:30)   25 mL/Hr IV Intermittent (22 @ 07:30)   25 mL/Hr IV Intermittent (22 @ 22:35)   25 mL/Hr IV Intermittent (22 @ 12:18)    piperacillin/tazobactam IVPB..   25 mL/Hr IV Intermittent (22 @ 06:12)   25 mL/Hr IV Intermittent (06-10-22 @ 21:28)   25 mL/Hr IV Intermittent (06-10-22 @ 14:38)    piperacillin/tazobactam IVPB...   200 mL/Hr IV Intermittent (22 @ 03:00)    trimethoprim  160 mG/sulfamethoxazole 800 mG   2 Tablet(s) Oral (22 @ 05:21)   2 Tablet(s) Oral (06-10-22 @ 23:52)    vancomycin  IVPB   250 mL/Hr IV Intermittent (22 @ 06:21)   250 mL/Hr IV Intermittent (22 @ 17:05)    vancomycin  IVPB.   250 mL/Hr IV Intermittent (22 @ 03:50)        piperacillin/tazobactam IVPB.. 4.5 every 8 hours  trimethoprim  160 mG/sulfamethoxazole 800 mG 2 three times a day    OTHER MEDS: MEDICATIONS  (STANDING):  acetaminophen     Tablet .. 650 every 6 hours PRN  ALBUTerol    90 MICROgram(s) HFA Inhaler 2 every 6 hours  aMIOdarone    Tablet 200 daily  budesonide 160 MICROgram(s)/formoterol 4.5 MICROgram(s) Inhaler 2 two times a day  dexMEDEtomidine Infusion 0.2 <Continuous>  dextrose 50% Injectable 25 once  dextrose 50% Injectable 12.5 once  dextrose 50% Injectable 25 once  dextrose Oral Gel 15 once  enoxaparin Injectable 70 every 12 hours  fentaNYL   Infusion. 0.5 <Continuous>  glucagon  Injectable 1 once  insulin lispro (ADMELOG) corrective regimen sliding scale  every 6 hours  ipratropium 17 MICROgram(s) HFA Inhaler 1 every 6 hours  methylPREDNISolone sodium succinate Injectable 30 daily  norepinephrine Infusion 0.05 <Continuous>    SOCIAL HISTORY:       FAMILY HISTORY:  FH: colon cancer (Mother)    FH: heart attack (Father)      REVIEW OF SYSTEMS  [  ] ROS unobtainable because:    [  ] All other systems negative except as noted below:	    Constitutional:  [ ] fever [ ] chills  [ ] weight loss  [ ] weakness  Skin:  [ ] rash [ ] phlebitis	  Eyes: [ ] icterus [ ] pain  [ ] discharge	  ENMT: [ ] sore throat  [ ] thrush [ ] ulcers [ ] exudates  Respiratory: [ ] dyspnea [ ] hemoptysis [ ] cough [ ] sputum	  Cardiovascular:  [ ] chest pain [ ] palpitations [ ] edema	  Gastrointestinal:  [ ] nausea [ ] vomiting [ ] diarrhea [ ] constipation [ ] pain	  Genitourinary:  [ ] dysuria [ ] frequency [ ] hematuria [ ] discharge [ ] flank pain  [ ] incontinence  Musculoskeletal:  [ ] myalgias [ ] arthralgias [ ] arthritis  [ ] back pain  Neurological:  [ ] headache [ ] seizures  [ ] confusion/altered mental status  Psychiatric:  [ ] anxiety [ ] depression	  Hematology/Lymphatics:  [ ] lymphadenopathy  Endocrine:  [ ] adrenal [ ] thyroid  Allergic/Immunologic:	 [ ] transplant [ ] seasonal    Vital Signs Last 24 Hrs  T(F): 99.6 (22 @ 00:00), Max: 102.6 (06-10-22 @ 08:00)  Vital Signs Last 24 Hrs  HR: 83 (22 @ 07:43) (67 - 89)  BP: 104/59 (22 @ 07:00) (95/59 - 146/70)  RR: 14 (22 @ 07:00)  SpO2: 97% (22 @ 07:43) (89% - 100%)  Wt(kg): --    PHYSICAL EXAM:  Constitutional: non-toxic, no distress  HEAD/EYES: anicteric, no conjunctival injection  ENT:  supple, no thrush  Cardiovascular:   normal S1, S2, no murmur, no edema  Respiratory:  clear BS bilaterally, no wheezes, no rales  GI:  soft, non-tender, normal bowel sounds  :  no orellana, no CVA tenderness  Musculoskeletal:  no synovitis, normal ROM  Neurologic: awake and alert, normal strength, no focal findings  Skin:  no rash, no erythema, no phlebitis  Heme/Onc: no lymphadenopathy   Psychiatric:  awake, alert, appropriate mood                            11.0   13.04 )-----------( 178      ( 2022 04:50 )             35.2   06-    139  |  100  |  33<H>  ----------------------------<  136<H>  4.4   |  32<H>  |  0.56    Ca    9.6      2022 04:50  Phos  2.7       Mg     2.00     -    TPro  6.3  /  Alb  2.4<L>  /  TBili  0.2  /  DBili  x   /  AST  16  /  ALT  13  /  AlkPhos  65  -    Urinalysis Basic - ( 10 Yahir 2022 06:10 )    Color: Yellow / Appearance: Clear / S.035 / pH: x  Gluc: x / Ketone: Negative  / Bili: Negative / Urobili: <2 mg/dL   Blood: x / Protein: Trace / Nitrite: Negative   Leuk Esterase: Negative / RBC: 19 /HPF / WBC 3 /HPF   Sq Epi: x / Non Sq Epi: 1 /HPF / Bacteria: Negative    MICROBIOLOGY:  Culture - Fungal, Bronchial (collected 2022 15:42)  Source: .Bronchial Bronchial Lavage  Preliminary Report (2022 06:44):    Testing in progress    Culture - Acid Fast - Bronchial w/Smear (collected 2022 15:42)  Source: .Bronchial Bronchial Lavage    Culture - Bronchial (collected 2022 15:42)  Source: .Bronchial Bronchial Lavage  Gram Stain (2022 23:17):    Moderate polymorphonuclear leukocytes per low power field    No Squamous epithelial cells per low power field    Few Gram Positive Cocci in Pairs and Chains per oil power field  Final Report (10 Yahir 2022 18:10):    Rare Pseudomonas aeruginosa    Normal Respiratory Chantell absent  Organism: Pseudomonas aeruginosa (10 Yahir 2022 18:10)  Organism: Pseudomonas aeruginosa (10 Yahir 2022 18:10)      -  Amikacin: S <=16      -  Aztreonam: S <=4      -  Cefepime: S 4      -  Ceftazidime: S <=1      -  Ciprofloxacin: S <=0.25      -  Gentamicin: S 4      -  Imipenem: S 2      -  Levofloxacin: S 1      -  Meropenem: S <=1      -  Piperacillin/Tazobactam: S <=8      -  Tobramycin: S <=2      Method Type: JALEN              Rapid RVP Result: NotDetec ( @ 13:00)      RADIOLOGY:  imaging below personally reviewed and agree with findings Patient is a 77 year old female who presents with a chief complaint of shortness of breath and diarrhea. (2022 06:34)    HPI:  The patient is a 77 year old female with 40 pack year smoking history, atrial fibrillation,, diagnosed with NSCLC (2L home O2) s/p debulking and R bronchial stent recently revised on 22 with further debulking and balloon dilatation started on Carboplatin/Taxol since 3/11/2022 w/ plan for radiation therapy s/p PEG replacement on 6/10/22 for dysphagia due to mass effect who initially presented to [facility] for weakness, shortness of breath, and diarrhea. Patient states she was discharged on Augmentin and Prednisone after tumor debulking and balloon dilatation. Over the past several days, she developed loose, watery brown stool associated with 3-4 episodes per day. Along with the diarrhea, she states she has felt increasing weak and reports increased shortness of breath. Reports ACKERMAN when walking from bed to bathroom associated wth cough and sputum production (brown/ yellow) over the past few days after the revision. She denies fever, chills, chest pain, palpitations, abdominal pain, N/V/D/C, or urinary changes. She was placed on 6 LPM on NC in the ED. She was transferred to the MICU for clearance of mucus plugging and closer monitoring. Hospital course was complicated by intubation on 22 s/p bronchoscopy. Previous BAL showed Pseudomonas w/ Providencia.    Labs showed WBC 13.04K, Hgb 11.0, BUN 33 w/ normal LFTs, procalcitonin 0.16, normal fungitell, and . Aspergillus GM negative. UA negative. BCx x 2 on 22 showed no growth. RVP negative. SCx on 22 showed Pseudomonas aeruginosa (pansensitive). Bronchial culture showed rare Pseudomonas aerugonisa. PCP PCR was positive. Bronchial AFB and fungal culture are in process. MRSA PCR negative. CXR showed an interval placement of endotracheal tube with tip above the juan m w/ right-sided access Mediport with tip in the superior vena cava w right bronchial stent. CT chest/abdomen/pelvis w/ IV contrast compared to the prior study of 22, interval progression of disease with increased right hilar and subcarinal mass resulting in right upper lobe occlusion and right upper lobe complete collapse. Mild left-to-right cardiomediastinal shift w/ unchanged occlusion of the distal bronchus intermedius stent w/ new diffuse ground-glass opacities and tree-in-bud opacities, left greater than right, likely multifocal pneumonia. There was a gastrostomy tube in place w/ soft tissue gas around the gastrostomy catheter without discrete focal drainable collection. She received IV vancomycin ( - ) and IV meropenem ( - ), currently on IV zosyn ( -  and  to present). She was started on PO bactrim on (6/10 - ) for PCP infection. ID was consulted for PCP infection.        prior hospital charts reviewed [x]  primary team notes reviewed [x]  other consultant notes reviewed [x]    PAST MEDICAL & SURGICAL HISTORY:  Atrial fibrillation  [place] (hard of hearing)  Malignant neoplasm of unspecified part of unspecified bronchus or lung  Bronchial stenosis  H/O wheezing  Requires oxygen therapy 2-3 liters when sleeping as needed  PEG (percutaneous endoscopic gastrostomy) status  S/P cholecystectomy  PEG (percutaneous endoscopic gastrostomy) status  Port-A-Cath in place  S/P bronchoscopy          Allergies  No Known Allergies    ANTIMICROBIALS (past 90 days)  MEDICATIONS  (STANDING):    meropenem  IVPB   100 mL/Hr IV Intermittent (22 @ 15:56)    meropenem  IVPB   100 mL/Hr IV Intermittent (22 @ 05:17)   100 mL/Hr IV Intermittent (22 @ 21:12)   100 mL/Hr IV Intermittent (22 @ 14:48)   100 mL/Hr IV Intermittent (22 @ 05:16)   100 mL/Hr IV Intermittent (22 @ 21:16)    piperacillin/tazobactam IVPB.   200 mL/Hr IV Intermittent (22 @ 15:54)    piperacillin/tazobactam IVPB..   25 mL/Hr IV Intermittent (06-10-22 @ 05:14)   25 mL/Hr IV Intermittent (22 @ 21:16)    piperacillin/tazobactam IVPB..   25 mL/Hr IV Intermittent (22 @ 06:00)   25 mL/Hr IV Intermittent (22 @ 21:03)   25 mL/Hr IV Intermittent (22 @ 14:30)   25 mL/Hr IV Intermittent (22 @ 07:30)   25 mL/Hr IV Intermittent (22 @ 22:35)   25 mL/Hr IV Intermittent (22 @ 12:18)    piperacillin/tazobactam IVPB..   25 mL/Hr IV Intermittent (22 @ 06:12)   25 mL/Hr IV Intermittent (06-10-22 @ 21:28)   25 mL/Hr IV Intermittent (06-10-22 @ 14:38)    piperacillin/tazobactam IVPB...   200 mL/Hr IV Intermittent (22 @ 03:00)    trimethoprim  160 mG/sulfamethoxazole 800 mG   2 Tablet(s) Oral (22 @ 05:21)   2 Tablet(s) Oral (06-10-22 @ 23:52)    vancomycin  IVPB   250 mL/Hr IV Intermittent (22 @ 06:21)   250 mL/Hr IV Intermittent (22 @ 17:05)    vancomycin  IVPB.   250 mL/Hr IV Intermittent (22 @ 03:50)        piperacillin/tazobactam IVPB.. 4.5 every 8 hours  trimethoprim  160 mG/sulfamethoxazole 800 mG 2 three times a day    OTHER MEDS: MEDICATIONS  (STANDING):  acetaminophen     Tablet .. 650 every 6 hours PRN  ALBUTerol    90 MICROgram(s) HFA Inhaler 2 every 6 hours  aMIOdarone    Tablet 200 daily  budesonide 160 MICROgram(s)/formoterol 4.5 MICROgram(s) Inhaler 2 two times a day  dexMEDEtomidine Infusion 0.2 <Continuous>  dextrose 50% Injectable 25 once  dextrose 50% Injectable 12.5 once  dextrose 50% Injectable 25 once  dextrose Oral Gel 15 once  enoxaparin Injectable 70 every 12 hours  fentaNYL   Infusion. 0.5 <Continuous>  glucagon  Injectable 1 once  insulin lispro (ADMELOG) corrective regimen sliding scale  every 6 hours  ipratropium 17 MICROgram(s) HFA Inhaler 1 every 6 hours  methylPREDNISolone sodium succinate Injectable 30 daily  norepinephrine Infusion 0.05 <Continuous>    SOCIAL HISTORY: unable to obtain      FAMILY HISTORY:  FH: colon cancer (Mother)  FH: heart attack (Father)      REVIEW OF SYSTEMS  [x] ROS unobtainable because:  intubated and sedated  [  ] All other systems negative except as noted below:	    Constitutional:  [ ] fever [ ] chills  [ ] weight loss  [ ] weakness  Skin:  [ ] rash [ ] phlebitis	  Eyes: [ ] icterus [ ] pain  [ ] discharge	  ENMT: [ ] sore throat  [ ] thrush [ ] ulcers [ ] exudates  Respiratory: [x] dyspnea [ ] hemoptysis [x] cough [x] sputum	  Cardiovascular:  [ ] chest pain [ ] palpitations [ ] edema	  Gastrointestinal:  [ ] nausea [ ] vomiting [x] diarrhea [ ] constipation [ ] pain	  Genitourinary:  [ ] dysuria [ ] frequency [ ] hematuria [ ] discharge [ ] flank pain  [ ] incontinence  Musculoskeletal:  [ ] myalgias [ ] arthralgias [ ] arthritis  [ ] back pain  Neurological:  [ ] headache [ ] seizures  [ ] confusion/altered mental status  Psychiatric:  [ ] anxiety [ ] depression	  Hematology/Lymphatics:  [ ] lymphadenopathy  Endocrine:  [ ] adrenal [ ] thyroid  Allergic/Immunologic:	 [ ] transplant [ ] seasonal    Vital Signs Last 24 Hrs  T(F): 99.6 (22 @ 00:00), Max: 102.6 (06-10-22 @ 08:00)  Vital Signs Last 24 Hrs  HR: 83 (22 @ 07:43) (67 - 89)  BP: 104/59 (22 @ 07:00) (95/59 - 146/70)  RR: 14 (22 @ 07:00)  SpO2: 97% (22 @ 07:43) (89% - 100%)  Wt(kg): --    PHYSICAL EXAM:  Constitutional: non-toxic, no distress  HEAD/EYES: anicteric, no conjunctival injection  ENT:  supple, + ETT in place, + OGT in place  Cardiovascular:   normal S1, S2, no murmur, + chest wall chemoport in place  Respiratory:  + decreased breath sounds bilaterally  GI:  soft, non-tender, normal bowel sounds  :  + orellana in place  Musculoskeletal: + decreased ROM d/t weakness  Neurologic: sedated, + decreased strength d/t weakness  Skin:  no rashes  Heme/Onc: no lymphadenopathy   Psychiatric:  sedated                            11.0   13.04 )-----------( 178      ( 2022 04:50 )             35.2   06    139  |  100  |  33<H>  ----------------------------<  136<H>  4.4   |  32<H>  |  0.56    Ca    9.6      2022 04:50  Phos  2.7       Mg     2.00         TPro  6.3  /  Alb  2.4<L>  /  TBili  0.2  /  DBili  x   /  AST  16  /  ALT  13  /  AlkPhos  65      Urinalysis Basic - ( 10 Yahir 2022 06:10 )    Color: Yellow / Appearance: Clear / S.035 / pH: x  Gluc: x / Ketone: Negative  / Bili: Negative / Urobili: <2 mg/dL   Blood: x / Protein: Trace / Nitrite: Negative   Leuk Esterase: Negative / RBC: 19 /HPF / WBC 3 /HPF   Sq Epi: x / Non Sq Epi: 1 /HPF / Bacteria: Negative    MICROBIOLOGY:  Culture - Fungal, Bronchial (collected 2022 15:42)  Source: .Bronchial Bronchial Lavage  Preliminary Report (2022 06:44):    Testing in progress    Culture - Acid Fast - Bronchial w/Smear (collected 2022 15:42)  Source: .Bronchial Bronchial Lavage    Culture - Bronchial (collected 2022 15:42)  Source: .Bronchial Bronchial Lavage  Gram Stain (2022 23:17):    Moderate polymorphonuclear leukocytes per low power field    No Squamous epithelial cells per low power field    Few Gram Positive Cocci in Pairs and Chains per oil power field  Final Report (10 Yahir 2022 18:10):    Rare Pseudomonas aeruginosa    Normal Respiratory Chantell absent  Organism: Pseudomonas aeruginosa (10 Yahir 2022 18:10)  Organism: Pseudomonas aeruginosa (10 Yahir 2022 18:10)      -  Amikacin: S <=16      -  Aztreonam: S <=4      -  Cefepime: S 4      -  Ceftazidime: S <=1      -  Ciprofloxacin: S <=0.25      -  Gentamicin: S 4      -  Imipenem: S 2      -  Levofloxacin: S 1      -  Meropenem: S <=1      -  Piperacillin/Tazobactam: S <=8      -  Tobramycin: S <=2      Method Type: JALEN        Rapid RVP Result: Nae ( @ 13:00)    Prior microbiology:    22: BAL Cx Pseudomonas aeruginosa, Providencia rettgeri      RADIOLOGY:    < from: Xray Chest 1 View- PORTABLE-Urgent (Xray Chest 1 View- PORTABLE-Urgent .) (22 @ 10:50) >  IMPRESSION:  Status post intubation.  Right upper lobe atelectasis.    --- End of Report ---    < end of copied text >    < from: CT Abdomen and Pelvis w/ IV Cont (22 @ 23:26) >  IMPRESSION:  Nondiagnostic exam for pulmonary embolism.  If clinical concern persists,   repeat CTA, leg doppler or VQ scan may be obtained.    Compared to the prior study of 22, intervalprogression of disease   with increased right hilar and subcarinal mass resulting in right upper   lobe occlusion and right upper lobe complete collapse. Mild left-to-right   cardiomediastinal shift.  Unchanged occlusion of the distal bronchus intermedius stent.    New diffuse groundglass opacities and tree-in-bud opacities, left greater   than right, likely multifocal pneumonia.    No acute pathology within the abdomen or pelvis.    Gastrostomy tube in place. Soft tissue gas around the gastrostomy   catheter without discrete focal drainable collection.    --- End of Report ---      < end of copied text >   Patient is a 77 year old female who presents with a chief complaint of shortness of breath and diarrhea. (2022 06:34)    HPI:  The patient is a 77 year old female with 40 pack year smoking history, atrial fibrillation,, diagnosed with NSCLC (2L home O2) s/p debulking and R bronchial stent recently revised on 22 with further debulking and balloon dilatation started on Carboplatin/Taxol since 3/11/2022 w/ plan for radiation therapy s/p PEG replacement on 6/10/22 for dysphagia due to mass effect who initially presented to [facility] for weakness, shortness of breath, and diarrhea. Patient states she was discharged on Augmentin and Prednisone after tumor debulking and balloon dilatation. Over the past several days, she developed loose, watery brown stool associated with 3-4 episodes per day. Along with the diarrhea, she states she has felt increasing weak and reports increased shortness of breath. Reports ACKERMAN when walking from bed to bathroom associated wth cough and sputum production (brown/ yellow) over the past few days after the revision. She denies fever, chills, chest pain, palpitations, abdominal pain, N/V/D/C, or urinary changes. She was placed on 6 LPM on NC in the ED. She was transferred to the MICU for clearance of mucus plugging and closer monitoring. Hospital course was complicated by intubation on 22 s/p bronchoscopy. Previous BAL showed Pseudomonas w/ Providencia. She has been febrile since 22.     Labs showed WBC 13.04K, Hgb 11.0, BUN 33 w/ normal LFTs, procalcitonin 0.16, normal fungitell, and . Aspergillus GM negative. UA negative. BCx x 2 on 22 showed no growth. RVP negative. SCx on 22 showed Pseudomonas aeruginosa (pansensitive). Bronchial culture showed rare Pseudomonas aerugonisa. PCP PCR was positive. Bronchial AFB and fungal culture are in process. MRSA PCR negative. CXR showed an interval placement of endotracheal tube with tip above the juan m w/ right-sided access Mediport with tip in the superior vena cava w right bronchial stent. CT chest/abdomen/pelvis w/ IV contrast compared to the prior study of 22, interval progression of disease with increased right hilar and subcarinal mass resulting in right upper lobe occlusion and right upper lobe complete collapse. Mild left-to-right cardiomediastinal shift w/ unchanged occlusion of the distal bronchus intermedius stent w/ new diffuse ground-glass opacities and tree-in-bud opacities, left greater than right, likely multifocal pneumonia. There was a gastrostomy tube in place w/ soft tissue gas around the gastrostomy catheter without discrete focal drainable collection. She received IV vancomycin ( - ) and IV meropenem ( - ), currently on IV zosyn ( -  and  to present). She was started on PO bactrim on (6/10 - ) for PCP infection. ID was consulted for PCP infection.        prior hospital charts reviewed [x]  primary team notes reviewed [x]  other consultant notes reviewed [x]    PAST MEDICAL & SURGICAL HISTORY:  Atrial fibrillation  [place] (hard of hearing)  Malignant neoplasm of unspecified part of unspecified bronchus or lung  Bronchial stenosis  H/O wheezing  Requires oxygen therapy 2-3 liters when sleeping as needed  PEG (percutaneous endoscopic gastrostomy) status  S/P cholecystectomy  PEG (percutaneous endoscopic gastrostomy) status  Port-A-Cath in place  S/P bronchoscopy          Allergies  No Known Allergies    ANTIMICROBIALS (past 90 days)  MEDICATIONS  (STANDING):    meropenem  IVPB   100 mL/Hr IV Intermittent (22 @ 15:56)    meropenem  IVPB   100 mL/Hr IV Intermittent (22 @ 05:17)   100 mL/Hr IV Intermittent (22 @ 21:12)   100 mL/Hr IV Intermittent (22 @ 14:48)   100 mL/Hr IV Intermittent (22 @ 05:16)   100 mL/Hr IV Intermittent (22 @ 21:16)    piperacillin/tazobactam IVPB.   200 mL/Hr IV Intermittent (22 @ 15:54)    piperacillin/tazobactam IVPB..   25 mL/Hr IV Intermittent (06-10-22 @ 05:14)   25 mL/Hr IV Intermittent (22 @ 21:16)    piperacillin/tazobactam IVPB..   25 mL/Hr IV Intermittent (22 @ 06:00)   25 mL/Hr IV Intermittent (22 @ 21:03)   25 mL/Hr IV Intermittent (22 @ 14:30)   25 mL/Hr IV Intermittent (22 @ 07:30)   25 mL/Hr IV Intermittent (22 @ 22:35)   25 mL/Hr IV Intermittent (22 @ 12:18)    piperacillin/tazobactam IVPB..   25 mL/Hr IV Intermittent (22 @ 06:12)   25 mL/Hr IV Intermittent (06-10-22 @ 21:28)   25 mL/Hr IV Intermittent (06-10-22 @ 14:38)    piperacillin/tazobactam IVPB...   200 mL/Hr IV Intermittent (22 @ 03:00)    trimethoprim  160 mG/sulfamethoxazole 800 mG   2 Tablet(s) Oral (22 @ 05:21)   2 Tablet(s) Oral (06-10-22 @ 23:52)    vancomycin  IVPB   250 mL/Hr IV Intermittent (22 @ 06:21)   250 mL/Hr IV Intermittent (22 @ 17:05)    vancomycin  IVPB.   250 mL/Hr IV Intermittent (22 @ 03:50)        piperacillin/tazobactam IVPB.. 4.5 every 8 hours  trimethoprim  160 mG/sulfamethoxazole 800 mG 2 three times a day    OTHER MEDS: MEDICATIONS  (STANDING):  acetaminophen     Tablet .. 650 every 6 hours PRN  ALBUTerol    90 MICROgram(s) HFA Inhaler 2 every 6 hours  aMIOdarone    Tablet 200 daily  budesonide 160 MICROgram(s)/formoterol 4.5 MICROgram(s) Inhaler 2 two times a day  dexMEDEtomidine Infusion 0.2 <Continuous>  dextrose 50% Injectable 25 once  dextrose 50% Injectable 12.5 once  dextrose 50% Injectable 25 once  dextrose Oral Gel 15 once  enoxaparin Injectable 70 every 12 hours  fentaNYL   Infusion. 0.5 <Continuous>  glucagon  Injectable 1 once  insulin lispro (ADMELOG) corrective regimen sliding scale  every 6 hours  ipratropium 17 MICROgram(s) HFA Inhaler 1 every 6 hours  methylPREDNISolone sodium succinate Injectable 30 daily  norepinephrine Infusion 0.05 <Continuous>    SOCIAL HISTORY: unable to obtain      FAMILY HISTORY:  FH: colon cancer (Mother)  FH: heart attack (Father)      REVIEW OF SYSTEMS  [x] ROS unobtainable because:  intubated and sedated  [  ] All other systems negative except as noted below:	    Constitutional:  [x] fever [ ] chills  [ ] weight loss  [ ] weakness  Skin:  [ ] rash [ ] phlebitis	  Eyes: [ ] icterus [ ] pain  [ ] discharge	  ENMT: [ ] sore throat  [ ] thrush [ ] ulcers [ ] exudates  Respiratory: [x] dyspnea [ ] hemoptysis [x] cough [x] sputum	  Cardiovascular:  [ ] chest pain [ ] palpitations [ ] edema	  Gastrointestinal:  [ ] nausea [ ] vomiting [x] diarrhea [ ] constipation [ ] pain	  Genitourinary:  [ ] dysuria [ ] frequency [ ] hematuria [ ] discharge [ ] flank pain  [ ] incontinence  Musculoskeletal:  [ ] myalgias [ ] arthralgias [ ] arthritis  [ ] back pain  Neurological:  [ ] headache [ ] seizures  [ ] confusion/altered mental status  Psychiatric:  [ ] anxiety [ ] depression	  Hematology/Lymphatics:  [ ] lymphadenopathy  Endocrine:  [ ] adrenal [ ] thyroid  Allergic/Immunologic:	 [ ] transplant [ ] seasonal    Vital Signs Last 24 Hrs  T(F): 99.6 (22 @ 00:00), Max: 102.6 (06-10-22 @ 08:00)  Vital Signs Last 24 Hrs  HR: 83 (22 @ 07:43) (67 - 89)  BP: 104/59 (22 @ 07:00) (95/59 - 146/70)  RR: 14 (22 @ 07:00)  SpO2: 97% (22 @ 07:43) (89% - 100%)  Wt(kg): --    PHYSICAL EXAM:  Constitutional: non-toxic, no distress  HEAD/EYES: anicteric, no conjunctival injection  ENT:  supple, + ETT in place, + OGT in place  Cardiovascular:   normal S1, S2, no murmur, + chest wall chemoport in place  Respiratory:  + decreased breath sounds bilaterally  GI:  soft, non-tender, normal bowel sounds  :  + orellana in place  Musculoskeletal: + decreased ROM d/t weakness  Neurologic: sedated, + decreased strength d/t weakness  Skin:  no rashes  Heme/Onc: no lymphadenopathy   Psychiatric:  sedated                            11.0   13.04 )-----------( 178      ( 2022 04:50 )             35.2   06-11    139  |  100  |  33<H>  ----------------------------<  136<H>  4.4   |  32<H>  |  0.56    Ca    9.6      2022 04:50  Phos  2.7       Mg     2.00         TPro  6.3  /  Alb  2.4<L>  /  TBili  0.2  /  DBili  x   /  AST  16  /  ALT  13  /  AlkPhos  65  06-    Urinalysis Basic - ( 10 Yahir 2022 06:10 )    Color: Yellow / Appearance: Clear / S.035 / pH: x  Gluc: x / Ketone: Negative  / Bili: Negative / Urobili: <2 mg/dL   Blood: x / Protein: Trace / Nitrite: Negative   Leuk Esterase: Negative / RBC: 19 /HPF / WBC 3 /HPF   Sq Epi: x / Non Sq Epi: 1 /HPF / Bacteria: Negative    MICROBIOLOGY:  Culture - Fungal, Bronchial (collected 2022 15:42)  Source: .Bronchial Bronchial Lavage  Preliminary Report (2022 06:44):    Testing in progress    Culture - Acid Fast - Bronchial w/Smear (collected 2022 15:42)  Source: .Bronchial Bronchial Lavage    Culture - Bronchial (collected 2022 15:42)  Source: .Bronchial Bronchial Lavage  Gram Stain (2022 23:17):    Moderate polymorphonuclear leukocytes per low power field    No Squamous epithelial cells per low power field    Few Gram Positive Cocci in Pairs and Chains per oil power field  Final Report (10 Yahir 2022 18:10):    Rare Pseudomonas aeruginosa    Normal Respiratory Chantell absent  Organism: Pseudomonas aeruginosa (10 Yahir 2022 18:10)  Organism: Pseudomonas aeruginosa (10 Yahir 2022 18:10)      -  Amikacin: S <=16      -  Aztreonam: S <=4      -  Cefepime: S 4      -  Ceftazidime: S <=1      -  Ciprofloxacin: S <=0.25      -  Gentamicin: S 4      -  Imipenem: S 2      -  Levofloxacin: S 1      -  Meropenem: S <=1      -  Piperacillin/Tazobactam: S <=8      -  Tobramycin: S <=2      Method Type: JALEN        Rapid RVP Result: [facility] ( @ 13:00)    Prior microbiology:    22: BAL Cx Pseudomonas aeruginosa, Providencia rettgeri      RADIOLOGY:    < from: Xray Chest 1 View- PORTABLE-Urgent (Xray Chest 1 View- PORTABLE-Urgent .) (22 @ 10:50) >  IMPRESSION:  Status post intubation.  Right upper lobe atelectasis.    --- End of Report ---    < end of copied text >    < from: CT Abdomen and Pelvis w/ IV Cont (22 @ 23:26) >  IMPRESSION:  Nondiagnostic exam for pulmonary embolism.  If clinical concern persists,   repeat CTA, leg doppler or VQ scan may be obtained.    Compared to the prior study of 22, intervalprogression of disease   with increased right hilar and subcarinal mass resulting in right upper   lobe occlusion and right upper lobe complete collapse. Mild left-to-right   cardiomediastinal shift.  Unchanged occlusion of the distal bronchus intermedius stent.    New diffuse groundglass opacities and tree-in-bud opacities, left greater   than right, likely multifocal pneumonia.    No acute pathology within the abdomen or pelvis.    Gastrostomy tube in place. Soft tissue gas around the gastrostomy   catheter without discrete focal drainable collection.    --- End of Report ---      < end of copied text >   Patient is a 77 year old female who presents with a chief complaint of shortness of breath and diarrhea. (2022 06:34)    HPI:  The patient is a 77 year old female with 40 pack year smoking history, atrial fibrillation,, diagnosed with NSCLC (2L home O2) s/p debulking and R bronchial stent recently revised on 22 with further debulking and balloon dilatation started on Carboplatin/Taxol since 3/11/2022 w/ plan for radiation therapy s/p PEG replacement on 6/10/22 for dysphagia due to mass effect     initially presented to [facility] for weakness, shortness of breath, and diarrhea.   Patient states she was discharged on Augmentin and Prednisone after tumor debulking and balloon dilatation.   Over the past several days, she developed loose, watery brown stool associated with 3-4 episodes per day. Along with the diarrhea, she states she has felt increasing weak and reports increased shortness of breath. Reports ACKERMAN when walking from bed to bathroom associated wth cough and sputum production (brown/ yellow) over the past few days after the revision. She denies fever, chills, chest pain, palpitations, abdominal pain, N/V/D/C, or urinary changes. She was placed on 6 LPM on NC in the ED.     She was transferred to the MICU for clearance of mucus plugging and closer monitoring. Hospital course was complicated by intubation on 22 s/p bronchoscopy. Previous BAL showed Pseudomonas w/ Providencia. She has been febrile since 22.     Labs showed WBC 13.04K, Hgb 11.0, BUN 33 w/ normal LFTs,   procalcitonin 0.16,   normal fungitell, Aspergillus GM negative.   UA negative.   BCx x 2 on 22 showed no growth.   RVP negative.   SCx on 22 showed Pseudomonas aeruginosa (pansensitive). Bronchial culture showed rare Pseudomonas aerugonisa.   PCP PCR was positive.   Bronchial AFB and fungal culture are in process.   MRSA PCR negative.   She received IV vancomycin ( - ) and IV meropenem ( - ), currently on IV zosyn ( -  and  to present). She was started on PO bactrim on (6/10 - ) for PCP infection. ID was consulted for PCP infection.      Remains intubated.     prior hospital charts reviewed [x]  primary team notes reviewed [x]  other consultant notes reviewed [x]    PAST MEDICAL & SURGICAL HISTORY:  Atrial fibrillation  [place] (hard of hearing)  Malignant neoplasm of unspecified part of unspecified bronchus or lung  Bronchial stenosis  H/O wheezing  Requires oxygen therapy 2-3 liters when sleeping as needed  PEG (percutaneous endoscopic gastrostomy) status  S/P cholecystectomy  PEG (percutaneous endoscopic gastrostomy) status  Port-A-Cath in place  S/P bronchoscopy          Allergies  No Known Allergies    ANTIMICROBIALS (past 90 days)  MEDICATIONS  (STANDING):    meropenem  IVPB   100 mL/Hr IV Intermittent (22 @ 15:56)    meropenem  IVPB   100 mL/Hr IV Intermittent (22 @ 05:17)   100 mL/Hr IV Intermittent (22 @ 21:12)   100 mL/Hr IV Intermittent (22 @ 14:48)   100 mL/Hr IV Intermittent (22 @ 05:16)   100 mL/Hr IV Intermittent (22 @ 21:16)    piperacillin/tazobactam IVPB.   200 mL/Hr IV Intermittent (22 @ 15:54)    piperacillin/tazobactam IVPB..   25 mL/Hr IV Intermittent (06-10-22 @ 05:14)   25 mL/Hr IV Intermittent (22 @ 21:16)    piperacillin/tazobactam IVPB..   25 mL/Hr IV Intermittent (22 @ 06:00)   25 mL/Hr IV Intermittent (22 @ 21:03)   25 mL/Hr IV Intermittent (22 @ 14:30)   25 mL/Hr IV Intermittent (22 @ 07:30)   25 mL/Hr IV Intermittent (22 @ 22:35)   25 mL/Hr IV Intermittent (22 @ 12:18)    piperacillin/tazobactam IVPB..   25 mL/Hr IV Intermittent (22 @ 06:12)   25 mL/Hr IV Intermittent (06-10-22 @ 21:28)   25 mL/Hr IV Intermittent (06-10-22 @ 14:38)    piperacillin/tazobactam IVPB...   200 mL/Hr IV Intermittent (22 @ 03:00)    trimethoprim  160 mG/sulfamethoxazole 800 mG   2 Tablet(s) Oral (22 @ 05:21)   2 Tablet(s) Oral (06-10-22 @ 23:52)    vancomycin  IVPB   250 mL/Hr IV Intermittent (22 @ 06:21)   250 mL/Hr IV Intermittent (22 @ 17:05)    vancomycin  IVPB.   250 mL/Hr IV Intermittent (22 @ 03:50)        piperacillin/tazobactam IVPB.. 4.5 every 8 hours  trimethoprim  160 mG/sulfamethoxazole 800 mG 2 three times a day    OTHER MEDS: MEDICATIONS  (STANDING):  acetaminophen     Tablet .. 650 every 6 hours PRN  ALBUTerol    90 MICROgram(s) HFA Inhaler 2 every 6 hours  aMIOdarone    Tablet 200 daily  budesonide 160 MICROgram(s)/formoterol 4.5 MICROgram(s) Inhaler 2 two times a day  dexMEDEtomidine Infusion 0.2 <Continuous>  dextrose 50% Injectable 25 once  dextrose 50% Injectable 12.5 once  dextrose 50% Injectable 25 once  dextrose Oral Gel 15 once  enoxaparin Injectable 70 every 12 hours  fentaNYL   Infusion. 0.5 <Continuous>  glucagon  Injectable 1 once  insulin lispro (ADMELOG) corrective regimen sliding scale  every 6 hours  ipratropium 17 MICROgram(s) HFA Inhaler 1 every 6 hours  methylPREDNISolone sodium succinate Injectable 30 daily  norepinephrine Infusion 0.05 <Continuous>    SOCIAL HISTORY: unable to obtain      FAMILY HISTORY:  FH: colon cancer (Mother)  FH: heart attack (Father)      REVIEW OF SYSTEMS  [x] ROS unobtainable because:  intubated and sedated      Vital Signs Last 24 Hrs  T(F): 99.6 (22 @ 00:00), Max: 102.6 (06-10-22 @ 08:00)  Vital Signs Last 24 Hrs  HR: 83 (22 @ 07:43) (67 - 89)  BP: 104/59 (22 @ 07:00) (95/59 - 146/70)  RR: 14 (22 @ 07:00)  SpO2: 97% (22 @ 07:43) (89% - 100%)  Wt(kg): --    PHYSICAL EXAM:  Constitutional: not awake   HEAD/EYES: atraumatic   ENT:  + ETT in place, + OGT in place  Cardiovascular:   regular rate. right chest wall chemoport   Respiratory:  mechanically ventilated. + decreased breath sounds bilaterally  GI:  soft, non-tender, normal bowel sounds  :  + orellana in place  Musculoskeletal: atraumatic   Neurologic: sedated  Skin:  no rashes  Heme/Onc: no lymphadenopathy   Psychiatric:  sedated                            11.0   13.04 )-----------( 178      ( 2022 04:50 )             35.2   06-11    139  |  100  |  33<H>  ----------------------------<  136<H>  4.4   |  32<H>  |  0.56    Ca    9.6      2022 04:50  Phos  2.7       Mg     2.00     11    TPro  6.3  /  Alb  2.4<L>  /  TBili  0.2  /  DBili  x   /  AST  16  /  ALT  13  /  AlkPhos  65  06-11    Urinalysis Basic - ( 10 Yahir 2022 06:10 )    Color: Yellow / Appearance: Clear / S.035 / pH: x  Gluc: x / Ketone: Negative  / Bili: Negative / Urobili: <2 mg/dL   Blood: x / Protein: Trace / Nitrite: Negative   Leuk Esterase: Negative / RBC: 19 /HPF / WBC 3 /HPF   Sq Epi: x / Non Sq Epi: 1 /HPF / Bacteria: Negative    MICROBIOLOGY:  Culture - Fungal, Bronchial (collected 2022 15:42)  Source: .Bronchial Bronchial Lavage  Preliminary Report (2022 06:44):    Testing in progress    Culture - Acid Fast - Bronchial w/Smear (collected 2022 15:42)  Source: .Bronchial Bronchial Lavage    Culture - Bronchial (collected 2022 15:42)  Source: .Bronchial Bronchial Lavage  Gram Stain (2022 23:17):    Moderate polymorphonuclear leukocytes per low power field    No Squamous epithelial cells per low power field    Few Gram Positive Cocci in Pairs and Chains per oil power field  Final Report (10 Yahir 2022 18:10):    Rare Pseudomonas aeruginosa    Normal Respiratory Chantell absent  Organism: Pseudomonas aeruginosa (10 Yahir 2022 18:10)  Organism: Pseudomonas aeruginosa (10 Yahir 2022 18:10)      -  Amikacin: S <=16      -  Aztreonam: S <=4      -  Cefepime: S 4      -  Ceftazidime: S <=1      -  Ciprofloxacin: S <=0.25      -  Gentamicin: S 4      -  Imipenem: S 2      -  Levofloxacin: S 1      -  Meropenem: S <=1      -  Piperacillin/Tazobactam: S <=8      -  Tobramycin: S <=2      Method Type: JALEN    Rapid RVP Result: [facility] ( @ 13:00)    Prior microbiology:    22: BAL Cx Pseudomonas aeruginosa, Providencia rettgeri      RADIOLOGY:  Personally reviewed:   Xray Chest 1 View- PORTABLE-Urgent (Xray Chest 1 View- PORTABLE-Urgent .) (22 @ 10:50)   Status post intubation.  Right upper lobe atelectasis.    CT Abdomen and Pelvis w/ IV Cont (22 @ 23:26)   Nondiagnostic exam for pulmonary embolism.  If clinical concern persists,   repeat CTA, leg doppler or VQ scan may be obtained.  Compared to the prior study of 22, intervalprogression of disease   with increased right hilar and subcarinal mass resulting in right upper   lobe occlusion and right upper lobe complete collapse. Mild left-to-right   cardiomediastinal shift.  Unchanged occlusion of the distal bronchus intermedius stent.  New diffuse groundglass opacities and tree-in-bud opacities, left greater   than right, likely multifocal pneumonia.  No acute pathology within the abdomen or pelvis.  Gastrostomy tube in place. Soft tissue gas around the gastrostomy   catheter without discrete focal drainable collection.

## 2022-06-11 NOTE — PROGRESS NOTE ADULT - SUBJECTIVE AND OBJECTIVE BOX
Patient is a 77y old  Female who presents with a chief complaint of shortness of breath and diarrhea (10 Yahir 2022 12:46)      INTERVAL HPI/OVERNIGHT EVENTS:    SUBJECTIVE: Patient seen and examined at bedside.       VITAL SIGNS:  ICU Vital Signs Last 24 Hrs  T(C): 37.6 (2022 00:00), Max: 39.2 (10 Yahir 2022 08:00)  T(F): 99.6 (2022 00:00), Max: 102.6 (10 Yahir 2022 08:00)  HR: 82 (2022 04:33) (67 - 84)  BP: 108/68 (2022 04:00) (95/59 - 163/84)  BP(mean): 72 (2022 04:00) (65 - 104)  ABP: --  ABP(mean): --  RR: 20 (2022 04:00) (13 - 22)  SpO2: 96% (2022 04:33) (90% - 100%)    Mode: AC/ CMV (Assist Control/ Continuous Mandatory Ventilation), RR (machine): 20, TV (machine): 300, FiO2: 70, PEEP: 5, ITime: 0.71, MAP: 10, PIP: 32  Plateau pressure:   P/F ratio:     06-09 @ 07:  -  06-10 @ 07:00  --------------------------------------------------------  IN: 1827.8 mL / OUT: 860 mL / NET: 967.8 mL    06-10 @ 07:  -  11 @ 06:34  --------------------------------------------------------  IN: 1227.8 mL / OUT: 735 mL / NET: 492.8 mL      CAPILLARY BLOOD GLUCOSE      POCT Blood Glucose.: 132 mg/dL (2022 05:26)    ECG:    PHYSICAL EXAM:        MEDICATIONS:  MEDICATIONS  (STANDING):  ALBUTerol    90 MICROgram(s) HFA Inhaler 2 Puff(s) Inhalation every 6 hours  aMIOdarone    Tablet 200 milliGRAM(s) Oral daily  budesonide 160 MICROgram(s)/formoterol 4.5 MICROgram(s) Inhaler 2 Puff(s) Inhalation two times a day  chlorhexidine 0.12% Liquid 15 milliLiter(s) Oral Mucosa every 12 hours  chlorhexidine 4% Liquid 1 Application(s) Topical <User Schedule>  dexMEDEtomidine Infusion 0.2 MICROgram(s)/kG/Hr (3.84 mL/Hr) IV Continuous <Continuous>  dextrose 50% Injectable 25 Gram(s) IV Push once  dextrose 50% Injectable 12.5 Gram(s) IV Push once  dextrose 50% Injectable 25 Gram(s) IV Push once  dextrose Oral Gel 15 Gram(s) Oral once  enoxaparin Injectable 70 milliGRAM(s) SubCutaneous every 12 hours  fentaNYL   Infusion. 0.5 MICROgram(s)/kG/Hr (3.84 mL/Hr) IV Continuous <Continuous>  glucagon  Injectable 1 milliGRAM(s) IntraMuscular once  insulin lispro (ADMELOG) corrective regimen sliding scale   SubCutaneous every 6 hours  ipratropium 17 MICROgram(s) HFA Inhaler 1 Puff(s) Inhalation every 6 hours  methylPREDNISolone sodium succinate Injectable 30 milliGRAM(s) IV Push daily  norepinephrine Infusion 0.05 MICROgram(s)/kG/Min (7.2 mL/Hr) IV Continuous <Continuous>  petrolatum Ophthalmic Ointment 1 Application(s) Both EYES two times a day  piperacillin/tazobactam IVPB.. 4.5 Gram(s) IV Intermittent every 8 hours  trimethoprim  160 mG/sulfamethoxazole 800 mG 2 Tablet(s) Oral three times a day    MEDICATIONS  (PRN):  acetaminophen     Tablet .. 650 milliGRAM(s) Oral every 6 hours PRN Mild Pain (1 - 3), Moderate Pain (4 - 6)      ALLERGIES:  Allergies    No Known Allergies    Intolerances        LABS:                        11.0   13.04 )-----------( 178      ( 2022 04:50 )             35.2     06-11    139  |  100  |  33<H>  ----------------------------<  136<H>  4.4   |  32<H>  |  0.56    Ca    9.6      2022 04:50  Phos  2.7     -  Mg     2.00     -    TPro  6.3  /  Alb  2.4<L>  /  TBili  0.2  /  DBili  x   /  AST  16  /  ALT  13  /  AlkPhos  65  06-11    PT/INR - ( 10 Yahir 2022 08:51 )   PT: 14.6 sec;   INR: 1.26 ratio         PTT - ( 10 Yahir 2022 08:51 )  PTT:32.2 sec  Urinalysis Basic - ( 10 Yahir 2022 06:10 )    Color: Yellow / Appearance: Clear / S.035 / pH: x  Gluc: x / Ketone: Negative  / Bili: Negative / Urobili: <2 mg/dL   Blood: x / Protein: Trace / Nitrite: Negative   Leuk Esterase: Negative / RBC: 19 /HPF / WBC 3 /HPF   Sq Epi: x / Non Sq Epi: 1 /HPF / Bacteria: Negative        RADIOLOGY & ADDITIONAL TESTS: Reviewed. Patient is a 77y old  Female who presents with a chief complaint of shortness of breath and diarrhea (10 Yahir 2022 12:46)      INTERVAL HPI/OVERNIGHT EVENTS: Overnight, patient started on bactrim for positive PCP. Increasing O2 requirements.     SUBJECTIVE: Patient seen and examined at bedside. Awake and following commands. Denies CP, palpitations, SOB.       VITAL SIGNS:  ICU Vital Signs Last 24 Hrs  T(C): 37.6 (2022 00:00), Max: 39.2 (10 Yahir 2022 08:00)  T(F): 99.6 (2022 00:00), Max: 102.6 (10 Yahri 2022 08:00)  HR: 82 (2022 04:33) (67 - 84)  BP: 108/68 (2022 04:00) (95/59 - 163/84)  BP(mean): 72 (2022 04:00) (65 - 104)  ABP: --  ABP(mean): --  RR: 20 (2022 04:00) (13 - 22)  SpO2: 96% (2022 04:33) (90% - 100%)    Mode: AC/ CMV (Assist Control/ Continuous Mandatory Ventilation), RR (machine): 20, TV (machine): 300, FiO2: 70, PEEP: 5, ITime: 0.71, MAP: 10, PIP: 32  Plateau pressure:   P/F ratio:     06-09 @ 07:  -  06-10 @ 07:00  --------------------------------------------------------  IN: 1827.8 mL / OUT: 860 mL / NET: 967.8 mL    10 @ 07:  -  11 @ 06:34  --------------------------------------------------------  IN: 1227.8 mL / OUT: 735 mL / NET: 492.8 mL      CAPILLARY BLOOD GLUCOSE  POCT Blood Glucose.: 132 mg/dL (2022 05:26)    Physical Exam:     General: No acute distress, well-appearing    Eyes: PERRL, EOMI     ENT: MMM, no oropharyngeal lesions or erythema appreciated     Pulm: No increased WOB. No wheezing. Course breath sounds.     CV: RRR. S1&S2+. No M/R/G appreciated.     Abdomen: +BS. Soft, NTND. No organomegaly.     MSK: Nml ROM    Extremities: No peripheral edema or cyanosis.     Neuro: Awake, alert, following commands, no focal deficits     Skin: Warm and dry. No visible rash.         MEDICATIONS:  MEDICATIONS  (STANDING):  ALBUTerol    90 MICROgram(s) HFA Inhaler 2 Puff(s) Inhalation every 6 hours  aMIOdarone    Tablet 200 milliGRAM(s) Oral daily  budesonide 160 MICROgram(s)/formoterol 4.5 MICROgram(s) Inhaler 2 Puff(s) Inhalation two times a day  chlorhexidine 0.12% Liquid 15 milliLiter(s) Oral Mucosa every 12 hours  chlorhexidine 4% Liquid 1 Application(s) Topical <User Schedule>  dexMEDEtomidine Infusion 0.2 MICROgram(s)/kG/Hr (3.84 mL/Hr) IV Continuous <Continuous>  dextrose 50% Injectable 25 Gram(s) IV Push once  dextrose 50% Injectable 12.5 Gram(s) IV Push once  dextrose 50% Injectable 25 Gram(s) IV Push once  dextrose Oral Gel 15 Gram(s) Oral once  enoxaparin Injectable 70 milliGRAM(s) SubCutaneous every 12 hours  fentaNYL   Infusion. 0.5 MICROgram(s)/kG/Hr (3.84 mL/Hr) IV Continuous <Continuous>  glucagon  Injectable 1 milliGRAM(s) IntraMuscular once  insulin lispro (ADMELOG) corrective regimen sliding scale   SubCutaneous every 6 hours  ipratropium 17 MICROgram(s) HFA Inhaler 1 Puff(s) Inhalation every 6 hours  methylPREDNISolone sodium succinate Injectable 30 milliGRAM(s) IV Push daily  norepinephrine Infusion 0.05 MICROgram(s)/kG/Min (7.2 mL/Hr) IV Continuous <Continuous>  petrolatum Ophthalmic Ointment 1 Application(s) Both EYES two times a day  piperacillin/tazobactam IVPB.. 4.5 Gram(s) IV Intermittent every 8 hours  trimethoprim  160 mG/sulfamethoxazole 800 mG 2 Tablet(s) Oral three times a day    MEDICATIONS  (PRN):  acetaminophen     Tablet .. 650 milliGRAM(s) Oral every 6 hours PRN Mild Pain (1 - 3), Moderate Pain (4 - 6)      ALLERGIES:  Allergies    No Known Allergies    Intolerances        LABS:                        11.0   13.04 )-----------( 178      ( 2022 04:50 )             35.2     06-11    139  |  100  |  33<H>  ----------------------------<  136<H>  4.4   |  32<H>  |  0.56    Ca    9.6      2022 04:50  Phos  2.7     -11  Mg     2.00     11    TPro  6.3  /  Alb  2.4<L>  /  TBili  0.2  /  DBili  x   /  AST  16  /  ALT  13  /  AlkPhos  65  06-11    PT/INR - ( 10 Yahir 2022 08:51 )   PT: 14.6 sec;   INR: 1.26 ratio         PTT - ( 10 Yahir 2022 08:51 )  PTT:32.2 sec  Urinalysis Basic - ( 10 Yahir 2022 06:10 )    Color: Yellow / Appearance: Clear / S.035 / pH: x  Gluc: x / Ketone: Negative  / Bili: Negative / Urobili: <2 mg/dL   Blood: x / Protein: Trace / Nitrite: Negative   Leuk Esterase: Negative / RBC: 19 /HPF / WBC 3 /HPF   Sq Epi: x / Non Sq Epi: 1 /HPF / Bacteria: Negative        RADIOLOGY & ADDITIONAL TESTS: Reviewed.

## 2022-06-12 NOTE — PROGRESS NOTE ADULT - ATTENDING COMMENTS
77 year old woman with NSCLC s/p R bronchial stent on chemo, s/p peg here with acute hypoxemic respiratory failure and multifocal pneumonia requiring intubation.    on minimal sedation responsive   on high FiO2 wean as tolerated  SBT as tolerated  continue Abx patient with pseudomonas and PJP in sputum     prognosis guarded

## 2022-06-12 NOTE — PROGRESS NOTE ADULT - SUBJECTIVE AND OBJECTIVE BOX
INTERVAL HPI/OVERNIGHT EVENTS:    SUBJECTIVE: Patient seen and examined at bedside. Cpap trial overnight. Intubated, sedated.     OBJECTIVE:    VITAL SIGNS:  ICU Vital Signs Last 24 Hrs  T(C): 38.2 (12 Jun 2022 04:00), Max: 38.2 (12 Jun 2022 04:00)  T(F): 100.8 (12 Jun 2022 04:00), Max: 100.8 (12 Jun 2022 04:00)  HR: 87 (12 Jun 2022 07:00) (67 - 107)  BP: 104/48 (12 Jun 2022 07:00) (93/60 - 137/64)  BP(mean): 62 (12 Jun 2022 07:00) (62 - 83)  ABP: --  ABP(mean): --  RR: 9 (12 Jun 2022 06:00) (8 - 21)  SpO2: 92% (12 Jun 2022 07:00) (89% - 100%)    Mode: CPAP with PS, FiO2: 80, PEEP: 8, PS: 10, MAP: 10, PIP: 18    06-11 @ 07:01  -  06-12 @ 07:00  --------------------------------------------------------  IN: 2589 mL / OUT: 1140 mL / NET: 1449 mL      CAPILLARY BLOOD GLUCOSE      POCT Blood Glucose.: 198 mg/dL (12 Jun 2022 05:06)      PHYSICAL EXAM:    General: NAD  HEENT: NC/AT; clear conjunctiva  Neck: supple  Respiratory: CTA b/l  Cardiovascular: +S1/S2; RRR  Abdomen: soft, NT/ND; +BS x4  Extremities: WWP, 2+ peripheral pulses b/l; no LE edema  Skin: normal color and turgor; no rash  Neurological: sedated, nonfocal    MEDICATIONS:  MEDICATIONS  (STANDING):  ALBUTerol    90 MICROgram(s) HFA Inhaler 2 Puff(s) Inhalation every 6 hours  aMIOdarone    Tablet 200 milliGRAM(s) Oral daily  budesonide 160 MICROgram(s)/formoterol 4.5 MICROgram(s) Inhaler 2 Puff(s) Inhalation two times a day  chlorhexidine 0.12% Liquid 15 milliLiter(s) Oral Mucosa every 12 hours  chlorhexidine 4% Liquid 1 Application(s) Topical <User Schedule>  dexMEDEtomidine Infusion 0.2 MICROgram(s)/kG/Hr (3.84 mL/Hr) IV Continuous <Continuous>  dextrose 50% Injectable 25 Gram(s) IV Push once  dextrose 50% Injectable 12.5 Gram(s) IV Push once  dextrose 50% Injectable 25 Gram(s) IV Push once  dextrose Oral Gel 15 Gram(s) Oral once  enoxaparin Injectable 70 milliGRAM(s) SubCutaneous every 12 hours  fentaNYL   Infusion. 0.5 MICROgram(s)/kG/Hr (3.84 mL/Hr) IV Continuous <Continuous>  glucagon  Injectable 1 milliGRAM(s) IntraMuscular once  insulin lispro (ADMELOG) corrective regimen sliding scale   SubCutaneous every 6 hours  ipratropium 17 MICROgram(s) HFA Inhaler 1 Puff(s) Inhalation every 6 hours  norepinephrine Infusion 0.05 MICROgram(s)/kG/Min (7.2 mL/Hr) IV Continuous <Continuous>  petrolatum Ophthalmic Ointment 1 Application(s) Both EYES two times a day  piperacillin/tazobactam IVPB.. 4.5 Gram(s) IV Intermittent every 8 hours  predniSONE   Tablet 40 milliGRAM(s) Oral two times a day  trimethoprim  160 mG/sulfamethoxazole 800 mG 2 Tablet(s) Oral three times a day    MEDICATIONS  (PRN):  acetaminophen     Tablet .. 650 milliGRAM(s) Oral every 6 hours PRN Mild Pain (1 - 3), Moderate Pain (4 - 6)      ALLERGIES:  Allergies    No Known Allergies    Intolerances        LABS:                        10.0   12.50 )-----------( 158      ( 12 Jun 2022 03:05 )             33.0     06-12    142  |  101  |  31<H>  ----------------------------<  186<H>  4.7   |  31  |  0.52    Ca    9.1      12 Jun 2022 03:05  Phos  3.0     06-12  Mg     2.00     06-12    TPro  6.1  /  Alb  2.4<L>  /  TBili  <0.2  /  DBili  x   /  AST  16  /  ALT  12  /  AlkPhos  57  06-12    PT/INR - ( 10 Yahir 2022 08:51 )   PT: 14.6 sec;   INR: 1.26 ratio         PTT - ( 10 Yahir 2022 08:51 )  PTT:32.2 sec      RADIOLOGY & ADDITIONAL TESTS: Reviewed.

## 2022-06-12 NOTE — PROGRESS NOTE ADULT - ASSESSMENT
78YO Female former smoker PMH Afib, w/ Squamous cell lung carcinoma (3L home O2) s/p debulking and R bronchial stent recently revised on 6/1 with further debulking and balloon dilatation started on Carboplatin/Taxol with Dr Willis 3/11/2022 s/p PEG for dysphagia due to mass effect presents to hospital with cough, wheezing, shortness of breath, and yeung that has been worsening since discharge after recent procedure on 6/1/2022 admitted to MICU for intubation and bronch for attempt to clear out mucous plugs and evaluate for worsening airway stenosis or lung collapse. GOC addressed, she had expressed that she would not want to be on prolonged ventilation and would want to be electively extubated.  to make decisions for her if she is unable.     #Neuro  -intubated/sedated with precedex and fent gtts    Cards  Hx a-fib  -TTE EF 50%, low normal  -Afib w/ RVR 6/9-> amio bolus with amio IV gtt w/ spontaneous conversion to sinus  -Now on po amio     #Respiratory  Squamous cell lung carcinoma  -Repeat rigid bronchoscopy 6/7 for mucous clearing, if improvement, will consider stent revision and APC/cryo debulking with IP team  -Post obstructive atelectasis  -Recently started on prednisone 30 on 6/4 per interventional pulm for worsening lung disease     Acute hypoxemic respiratory failure, previous BAL showing Pseudomonas and providencia  -Intubated 6/7, now with increasing O2 requirements   -Initial ABG with hypercarbia, will repeat 6/11   -6/4/22 found interval progression of disease with increased right hilar and subcarinal mass resulting in right upper lobe occlusion and right upper lobe complete collapse. Mild left-to-right cardiomediastinal shift.  -Eval by thoracic surgery, no intervention at this time  -PCP+, started on bactrim on 6/11 and steroids increased to prednisone 40mg BID - ID c/s low suspicion for PCP, could be colonized. Will continue with tx for now.     #GI/Nutrition  -Acute diarrhea (2/2 amoxicillin vs c diff), last episode 6/4  -PEG replacement per IR 6/10, TF's restarted     Heme-onc  -Eval by heme-onc, no acute interventions or systemic tx at this time     #/Renal/Fluids  -Bun/Cr wnl  -Trend I/O's, Call in place  -Replete electrolytes PRN    #ID  - zosyn (6/5-) based on pseudomonas sensitivities in sputum   - Off vanco; negative mrsa swab  - Postobstructive atelectasis  - increase dose of zosyn to 4.5 given high fever, UA negative, COVID/flu negative  - PCP+, started on bactrim 6/11 and pred 40 BID   - ID consulted, recs appreciated - low likelihood of PCP, likely colonized   - Serum fungitell 6/11 + repeat bcx pending    #Endocrine  -low SSI i/s/o steroid use     Dispo: ICU.

## 2022-06-13 NOTE — PROGRESS NOTE ADULT - ASSESSMENT
77F NSCLC debulking and bronchial stent 2/2022 and again on 6/1.   Here 6/5 for diarrhea, cough and dyspnea.   Hypoxic respiratory failure, transferred to MICU 6/7.   Progression of disease but also Pseudomonas pneumonia.   Bronch 6/7 with thick secretions   Fevers since 6/8 and remains critically ill, intubated, despite antibiotics.   Suspect positive PCP PCR represents colonization based on her history and CT changes.   Legionella antigen negative.   Poor prognosis.     Suggest  -continue Zosyn   -monitor cultures  -f/u serum Fungitell - if negative would stop Bactrim   -check sputum Legionella culture and PCR     Discussed with MICU     Parveen Lau MD   Infectious Disease   Available on TEAMS. After 5PM and on weekends please page fellow on call or call [phone]

## 2022-06-13 NOTE — PROGRESS NOTE ADULT - SUBJECTIVE AND OBJECTIVE BOX
Follow Up: PNA    Interval History/ROS: Remains intubated, critically ill, febrile.     Allergies  No Known Allergies        ANTIMICROBIALS:  piperacillin/tazobactam IVPB.. 4.5 every 8 hours  trimethoprim  160 mG/sulfamethoxazole 800 mG 2 three times a day      OTHER MEDS:  acetaminophen     Tablet .. 650 milliGRAM(s) Oral every 6 hours PRN  ALBUTerol    90 MICROgram(s) HFA Inhaler 2 Puff(s) Inhalation every 6 hours  aMIOdarone    Tablet 200 milliGRAM(s) Oral daily  budesonide 160 MICROgram(s)/formoterol 4.5 MICROgram(s) Inhaler 2 Puff(s) Inhalation two times a day  chlorhexidine 0.12% Liquid 15 milliLiter(s) Oral Mucosa every 12 hours  chlorhexidine 4% Liquid 1 Application(s) Topical <User Schedule>  dexMEDEtomidine Infusion 0.2 MICROgram(s)/kG/Hr IV Continuous <Continuous>  dextrose 50% Injectable 25 Gram(s) IV Push once  dextrose 50% Injectable 12.5 Gram(s) IV Push once  dextrose 50% Injectable 25 Gram(s) IV Push once  dextrose Oral Gel 15 Gram(s) Oral once  enoxaparin Injectable 70 milliGRAM(s) SubCutaneous every 12 hours  fentaNYL   Infusion. 0.5 MICROgram(s)/kG/Hr IV Continuous <Continuous>  glucagon  Injectable 1 milliGRAM(s) IntraMuscular once  insulin lispro (ADMELOG) corrective regimen sliding scale   SubCutaneous every 6 hours  ipratropium 17 MICROgram(s) HFA Inhaler 1 Puff(s) Inhalation every 6 hours  norepinephrine Infusion 0.05 MICROgram(s)/kG/Min IV Continuous <Continuous>  petrolatum Ophthalmic Ointment 1 Application(s) Both EYES two times a day  potassium phosphate IVPB 15 milliMole(s) IV Intermittent once  predniSONE   Tablet 40 milliGRAM(s) Oral two times a day      Vital Signs Last 24 Hrs  T(C): 38.1 (13 Jun 2022 18:00), Max: 38.3 (13 Jun 2022 00:00)  T(F): 100.5 (13 Jun 2022 18:00), Max: 101 (13 Jun 2022 00:00)  HR: 90 (13 Jun 2022 18:00) (79 - 101)  BP: 123/55 (13 Jun 2022 18:00) (102/48 - 137/72)  BP(mean): 72 (13 Jun 2022 18:00) (59 - 87)  RR: 19 (13 Jun 2022 18:00) (11 - 22)  SpO2: 95% (13 Jun 2022 18:00) (92% - 100%)    Physical Exam:  General: frail   Cardio: tachycardic   Respiratory: mechanically ventilated, fair air entry bilaterally   abd: nondistended, soft. PEG site not inflamed   Musculoskeletal: no focal joint swelling   vascular: no phlebitis. right upper chest port  Skin: no rash                          9.5    11.69 )-----------( 164      ( 13 Jun 2022 03:30 )             31.6       06-13    145  |  104  |  30<H>  ----------------------------<  194<H>  4.6   |  31  |  0.55    Ca    9.1      13 Jun 2022 03:30  Phos  2.4     06-13  Mg     2.10     06-13    TPro  5.8<L>  /  Alb  2.3<L>  /  TBili  <0.2  /  DBili  x   /  AST  13  /  ALT  12  /  AlkPhos  56  06-13          MICROBIOLOGY:  Culture - Blood (collected 06-11-22 @ 14:00)  Source: .Blood Blood-Peripheral  Preliminary Report (06-12-22 @ 17:01):    No growth to date.    Culture - Blood (collected 06-11-22 @ 14:00)  Source: .Blood Blood-Peripheral  Preliminary Report (06-12-22 @ 17:01):    No growth to date.    Culture - Fungal, Bronchial (collected 06-07-22 @ 15:42)  Source: .Bronchial Bronchial Lavage  Preliminary Report (06-08-22 @ 06:44):    Testing in progress    Culture - Acid Fast - Bronchial w/Smear (collected 06-07-22 @ 15:42)  Source: .Bronchial Bronchial Lavage  Preliminary Report (06-11-22 @ 15:04):    Culture is being performed.    Culture - Bronchial (collected 06-07-22 @ 15:42)  Source: .Bronchial Bronchial Lavage  Gram Stain (06-07-22 @ 23:17):    Moderate polymorphonuclear leukocytes per low power field    No Squamous epithelial cells per low power field    Few Gram Positive Cocci in Pairs and Chains per oil power field  Final Report (06-10-22 @ 18:10):    Rare Pseudomonas aeruginosa    Normal Respiratory Chantell absent  Organism: Pseudomonas aeruginosa (06-10-22 @ 18:10)  Organism: Pseudomonas aeruginosa (06-10-22 @ 18:10)      -  Amikacin: S <=16      -  Aztreonam: S <=4      -  Cefepime: S 4      -  Ceftazidime: S <=1      -  Ciprofloxacin: S <=0.25      -  Gentamicin: S 4      -  Imipenem: S 2      -  Levofloxacin: S 1      -  Meropenem: S <=1      -  Piperacillin/Tazobactam: S <=8      -  Tobramycin: S <=2      Method Type: JALEN    RADIOLOGY:  Images below reviewed personally  Xray Chest 1 View AP/PA (06.11.22 @ 21:15)   ET tube as above.  Persistent mid and lower left lung haziness due to asymmetric pulmonary   edema or to multifocal pneumonia.    CT Chest Abdomen and Pelvis w/ IV Cont (06.04.22 @ 23:26)   Nondiagnostic exam for pulmonary embolism.  If clinical concern persists,   repeat CTA, leg doppler or VQ scan may be obtained.  Compared to the prior study of 4/19/22, intervalprogression of disease   with increased right hilar and subcarinal mass resulting in right upper   lobe occlusion and right upper lobe complete collapse. Mild left-to-right   cardiomediastinal shift.  Unchanged occlusion of the distal bronchus intermedius stent.  New diffuse groundglass opacities and tree-in-bud opacities, left greater   than right, likely multifocal pneumonia.  No acute pathology within the abdomen or pelvis.  Gastrostomy tube in place. Soft tissue gas around the gastrostomy   catheter without discrete focal drainable collection.   Ftsg Text: The defect edges were debeveled with a #15 scalpel blade.  Given the location of the defect, shape of the defect and the proximity to free margins a full thickness skin graft was deemed most appropriate.  Using a sterile surgical marker, the primary defect shape was transferred to the donor site. The area thus outlined was incised deep to adipose tissue with a #15 scalpel blade.  The harvested graft was then trimmed of adipose tissue until only dermis and epidermis was left.  The skin margins of the secondary defect were undermined to an appropriate distance in all directions utilizing iris scissors.  The secondary defect was closed with interrupted buried subcutaneous sutures.  The skin edges were then re-apposed with running  sutures.  The skin graft was then placed in the primary defect and oriented appropriately.

## 2022-06-13 NOTE — CHART NOTE - NSCHARTNOTEFT_GEN_A_CORE
NUTRITION FOLLOW-UP  76yo F w Hx of Afib & NSCLC on 2L home O2, s/p PEG for dysphagia.  Admitted to MICU for intubation and bronch for attempt to clear out mucous plugs and evaluate for worsening airway stenosis or lung collapse. Possible palliative extubation at home.     Pt. prescribed for Glucerna 1.2 which is not available on hospital formulary... must be changed to Glucerna 1.5.  Current enteral regimen likely excessively meets estimated energy needs.  Advise adjusting goal rate with Glucerna 1.5 to goal of 40mL/hr which will provide ~25kcals/kg Ideal Body Weight & ~1.7g protein/kg Ideal Body Weight.      TF held since this morning 2/2 diarrhea, per RN.  Noted with liquid rectal tube output of 1500mL over past 24hrs.  TF since reinitiated.  Per chart, may possibly be related to recent Abx use.  No noted/reported vomiting or abdominal distention.    Can consider Banatrol to help bulk stool.... suggest 1 packet 3x daily via PEG.      Admission weight questionably accurate.  Per previous RD and nutrition documentation, on (6/6) weight was 145.5 lbs /66.1kg.   Pt. with increasing edema.  Suspect likely fluid related weight increase @ this time.                   _________________Diet___________________  Diet, NPO with Tube Feed:   Tube Feeding Modality: Gastrostomy  Glucerna 1.2 Keith (GLUCERNARTH)  Total Volume for 24 Hours (mL): 1296  Continuous  Starting Tube Feed Rate {mL per Hour}: 20  Increase Tube Feed Rate by (mL): 10     Every 4 hours  Until Goal Tube Feed Rate (mL per Hour): 54  Tube Feed Duration (in Hours): 24  Tube Feed Start Time: 18:00 (06-10-22 @ 17:14)            Weight:                                         Height: 61"                    Ideal Body Weight:  105lbs / 47.7kg  70.6kg (6/13)  66.1kg (6/6)  76.8kg (6/7 on admit ? accuracy)      _________Estimated Energy Needs________  25-30 kcals/kg Ideal Body Weight [id number] kcals/d  1.6-1.8g protein/kg Ideal Body Weight = 76-86g protein/d      Edema:  2+ generalized    Skin:  No pressure injuries      ________________________Pertinent Medications____________   MEDICATIONS  (STANDING):  ALBUTerol    90 MICROgram(s) HFA Inhaler 2 Puff(s) Inhalation every 6 hours  aMIOdarone    Tablet 200 milliGRAM(s) Oral daily  budesonide 160 MICROgram(s)/formoterol 4.5 MICROgram(s) Inhaler 2 Puff(s) Inhalation two times a day  chlorhexidine 0.12% Liquid 15 milliLiter(s) Oral Mucosa every 12 hours  chlorhexidine 4% Liquid 1 Application(s) Topical <User Schedule>  dexMEDEtomidine Infusion 0.2 MICROgram(s)/kG/Hr (3.84 mL/Hr) IV Continuous <Continuous>  dextrose 50% Injectable 25 Gram(s) IV Push once  dextrose 50% Injectable 12.5 Gram(s) IV Push once  dextrose 50% Injectable 25 Gram(s) IV Push once  dextrose Oral Gel 15 Gram(s) Oral once  enoxaparin Injectable 70 milliGRAM(s) SubCutaneous every 12 hours  fentaNYL   Infusion. 0.5 MICROgram(s)/kG/Hr (3.84 mL/Hr) IV Continuous <Continuous>  glucagon  Injectable 1 milliGRAM(s) IntraMuscular once  insulin lispro (ADMELOG) corrective regimen sliding scale   SubCutaneous every 6 hours  ipratropium 17 MICROgram(s) HFA Inhaler 1 Puff(s) Inhalation every 6 hours  norepinephrine Infusion 0.05 MICROgram(s)/kG/Min (7.2 mL/Hr) IV Continuous <Continuous>  petrolatum Ophthalmic Ointment 1 Application(s) Both EYES two times a day  piperacillin/tazobactam IVPB.. 4.5 Gram(s) IV Intermittent every 8 hours  predniSONE   Tablet 40 milliGRAM(s) Oral two times a day  trimethoprim  160 mG/sulfamethoxazole 800 mG 2 Tablet(s) Oral three times a day    MEDICATIONS  (PRN):  acetaminophen     Tablet .. 650 milliGRAM(s) Oral every 6 hours PRN Temp greater or equal to 38C (100.4F), Mild Pain (1 - 3), Moderate Pain (4 - 6)          _________________________Pertinent Labs____________________     06-13    145  |  104  |  30<H>  ----------------------------<  194<H>  4.6   |  31  |  0.55    Ca    9.1      13 Jun 2022 03:30  Phos  2.4     06-13  Mg     2.10     06-13    TPro  5.8<L>  /  Alb  2.3<L>  /  TBili  <0.2  /  DBili  x   /  AST  13  /  ALT  12  /  AlkPhos  56  06-13                                                                   9.5    11.69 )-----------( 164      ( 13 Jun 2022 03:30 )             31.6         CAPILLARY BLOOD GLUCOSE      POCT Blood Glucose.: 144 mg/dL (13 Jun 2022 06:35)    POCT Blood Glucose.: 144 mg/dL (06-13-22 @ 06:35)  POCT Blood Glucose.: 169 mg/dL (06-13-22 @ 00:05)  POCT Blood Glucose.: 229 mg/dL (06-12-22 @ 17:45)  POCT Blood Glucose.: 181 mg/dL (06-12-22 @ 12:15)        ________NUTRITION Dx_________    Pt. remains moderately malnourished         PLAN/RECOMMENDATIONS:    1)  D/C order for Glucerna 1.2.  Change to Glucerna 1.5 with goal of 40mL/hr      Provides:        960mL total volume     1440 kcals     79g protein     729mL free water     2) Obtain daily weights  3) Consider Banatrol supplement 1 packet 3x daily   4) Monitor tolerance to TF (GI status, electrolytes, glucose)         RDN remains available and will f/u PRN.          Judy Ziegler, LJ, CDN pager [phone]

## 2022-06-13 NOTE — CHART NOTE - NSCHARTNOTEFT_GEN_A_CORE
: Arnav Smith MD, Todd Soni MD    INDICATION: Hypoxemic Respiratory Failure    PROCEDURE:  [x] LIMITED ECHO  [x] LIMITED CHEST  [ ] LIMITED RETROPERITONEAL  [ ] LIMITED ABDOMINAL  [ ] LIMITED DVT  [ ] NEEDLE GUIDANCE VASCULAR  [ ] NEEDLE GUIDANCE THORACENTESIS  [ ] NEEDLE GUIDANCE PARACENTESIS  [ ] NEEDLE GUIDANCE PERICARDIOCENTESIS  [ ] OTHER    FINDINGS:    Normal LV systolic function  EPSS 0.7 cm  No MR, AI, or TR  Pulmonary artery acceleration time 167 ms  Normal LV to RV size ratio  E 67 cm/s, A 65 cm/s E/A 1.03  Lateral e' 9 cm/s, E/e' 7.4  LVOT VTI 15.2  TAPSE 2.4 cm   IVC 1.4 cm  Diffuse anterior b-lines b/l with irregular pleura      INTERPRETATION:  Normal LV systolic and diastolic function. No evidence of elevated left atrial pressure. Normal RV function. [Care Plan reviewed and provided to patient/caregiver] : Care plan reviewed and provided to patient/caregiver : Arnav Smith MD, Todd Soni MD    INDICATION: Hypoxemic Respiratory Failure    PROCEDURE:  [x] LIMITED ECHO  [x] LIMITED CHEST  [ ] LIMITED RETROPERITONEAL  [ ] LIMITED ABDOMINAL  [ ] LIMITED DVT  [ ] NEEDLE GUIDANCE VASCULAR  [ ] NEEDLE GUIDANCE THORACENTESIS  [ ] NEEDLE GUIDANCE PARACENTESIS  [ ] NEEDLE GUIDANCE PERICARDIOCENTESIS  [ ] OTHER    FINDINGS:    Normal LV systolic function  EPSS 0.7 cm  No MR, AI, or TR  Pulmonary artery acceleration time 167 ms  Normal LV to RV size ratio  E 67 cm/s, A 65 cm/s E/A 1.03  Lateral e' 9 cm/s, E/e' 7.4  LVOT VTI 15.2  TAPSE 2.4 cm   IVC 1.4 cm  Diffuse anterior b-lines b/l with irregular pleura      INTERPRETATION:  Normal LV systolic and diastolic function. No evidence of elevated left atrial pressure. Normal RV function.    Attending Note: Agree with above. I was present through out the scan. [Understands and communicates without difficulty] : Patient/Caregiver understands and communicates without difficulty

## 2022-06-13 NOTE — PROGRESS NOTE ADULT - SUBJECTIVE AND OBJECTIVE BOX
INTERVAL HPI/OVERNIGHT EVENTS:    SUBJECTIVE: Patient seen and examined at bedside.       VITAL SIGNS:  ICU Vital Signs Last 24 Hrs  T(C): 38.3 (13 Jun 2022 00:00), Max: 38.3 (12 Jun 2022 08:00)  T(F): 101 (13 Jun 2022 00:00), Max: 101 (13 Jun 2022 00:00)  HR: 79 (13 Jun 2022 06:00) (79 - 101)  BP: 129/62 (13 Jun 2022 06:00) (103/63 - 139/69)  BP(mean): 79 (13 Jun 2022 06:00) (61 - 86)  ABP: --  ABP(mean): --  RR: 18 (13 Jun 2022 00:00) (9 - 18)  SpO2: 100% (13 Jun 2022 06:00) (89% - 100%)    Mode: CPAP with PS, FiO2: 60, PEEP: 8, PS: 8, MAP: 11, PIP: 18  Plateau pressure:   P/F ratio:     06-11 @ 07:01 - 06-12 @ 07:00  --------------------------------------------------------  IN: 2589 mL / OUT: 1140 mL / NET: 1449 mL    06-12 @ 07:01  - 06-13 @ 06:45  --------------------------------------------------------  IN: 2135.2 mL / OUT: 2970 mL / NET: -834.8 mL      CAPILLARY BLOOD GLUCOSE      POCT Blood Glucose.: 144 mg/dL (13 Jun 2022 06:35)    ECG:    PHYSICAL EXAM:    General:   HEENT:   Neck:   Respiratory:   Cardiovascular:   Abdomen:   Extremities:  Neurological:    MEDICATIONS:  MEDICATIONS  (STANDING):  ALBUTerol    90 MICROgram(s) HFA Inhaler 2 Puff(s) Inhalation every 6 hours  aMIOdarone    Tablet 200 milliGRAM(s) Oral daily  budesonide 160 MICROgram(s)/formoterol 4.5 MICROgram(s) Inhaler 2 Puff(s) Inhalation two times a day  chlorhexidine 0.12% Liquid 15 milliLiter(s) Oral Mucosa every 12 hours  chlorhexidine 4% Liquid 1 Application(s) Topical <User Schedule>  dexMEDEtomidine Infusion 0.2 MICROgram(s)/kG/Hr (3.84 mL/Hr) IV Continuous <Continuous>  dextrose 50% Injectable 12.5 Gram(s) IV Push once  dextrose 50% Injectable 25 Gram(s) IV Push once  dextrose 50% Injectable 25 Gram(s) IV Push once  dextrose Oral Gel 15 Gram(s) Oral once  enoxaparin Injectable 70 milliGRAM(s) SubCutaneous every 12 hours  fentaNYL   Infusion. 0.5 MICROgram(s)/kG/Hr (3.84 mL/Hr) IV Continuous <Continuous>  glucagon  Injectable 1 milliGRAM(s) IntraMuscular once  insulin lispro (ADMELOG) corrective regimen sliding scale   SubCutaneous every 6 hours  ipratropium 17 MICROgram(s) HFA Inhaler 1 Puff(s) Inhalation every 6 hours  norepinephrine Infusion 0.05 MICROgram(s)/kG/Min (7.2 mL/Hr) IV Continuous <Continuous>  petrolatum Ophthalmic Ointment 1 Application(s) Both EYES two times a day  piperacillin/tazobactam IVPB.. 4.5 Gram(s) IV Intermittent every 8 hours  predniSONE   Tablet 40 milliGRAM(s) Oral two times a day  trimethoprim  160 mG/sulfamethoxazole 800 mG 2 Tablet(s) Oral three times a day    MEDICATIONS  (PRN):  acetaminophen     Tablet .. 650 milliGRAM(s) Oral every 6 hours PRN Temp greater or equal to 38C (100.4F), Mild Pain (1 - 3), Moderate Pain (4 - 6)      ALLERGIES:  Allergies    No Known Allergies    Intolerances        LABS:                        9.5    11.69 )-----------( 164      ( 13 Jun 2022 03:30 )             31.6     06-13    145  |  104  |  30<H>  ----------------------------<  194<H>  4.6   |  31  |  0.55    Ca    9.1      13 Jun 2022 03:30  Phos  2.4     06-13  Mg     2.10     06-13    TPro  5.8<L>  /  Alb  2.3<L>  /  TBili  <0.2  /  DBili  x   /  AST  13  /  ALT  12  /  AlkPhos  56  06-13         INTERVAL HPI/OVERNIGHT EVENTS: FIO2 lowered this morning, pt discontinued off precedex to assess mental status.    SUBJECTIVE: Patient seen and examined at bedside.       VITAL SIGNS:  ICU Vital Signs Last 24 Hrs  T(C): 38.3 (13 Jun 2022 00:00), Max: 38.3 (12 Jun 2022 08:00)  T(F): 101 (13 Jun 2022 00:00), Max: 101 (13 Jun 2022 00:00)  HR: 79 (13 Jun 2022 06:00) (79 - 101)  BP: 129/62 (13 Jun 2022 06:00) (103/63 - 139/69)  BP(mean): 79 (13 Jun 2022 06:00) (61 - 86)  ABP: --  ABP(mean): --  RR: 18 (13 Jun 2022 00:00) (9 - 18)  SpO2: 100% (13 Jun 2022 06:00) (89% - 100%)    Mode: CPAP with PS, FiO2: 60, PEEP: 8, PS: 8, MAP: 11, PIP: 18  Plateau pressure:   P/F ratio:     06-11 @ 07:01  -  06-12 @ 07:00  --------------------------------------------------------  IN: 2589 mL / OUT: 1140 mL / NET: 1449 mL    06-12 @ 07:01  -  06-13 @ 06:45  --------------------------------------------------------  IN: 2135.2 mL / OUT: 2970 mL / NET: -834.8 mL      CAPILLARY BLOOD GLUCOSE      POCT Blood Glucose.: 144 mg/dL (13 Jun 2022 06:35)    PHYSICAL EXAM:  T(C): 38.1 (06-13-22 @ 11:00), Max: 38.3 (06-13-22 @ 00:00)  HR: 93 (06-13-22 @ 11:00) (79 - 101)  BP: 124/64 (06-13-22 @ 11:00) (102/48 - 137/72)  RR: 22 (06-13-22 @ 11:00) (9 - 22)  SpO2: 92% (06-13-22 @ 11:00) (92% - 100%)  GENERAL: Sedated and intubated  EYES: EOMI, conjunctiva and sclera clear  NECK: Supple   CHEST/LUNG: Clear to auscultation bilaterally; No wheezes or crackles  HEART: Regular rate and rhythm; No murmurs, rubs, or gallops  ABDOMEN: Soft, mildly tender, unable to assess if tender on palpation  EXTREMITIES:  2+ Peripheral Pulses, No clubbing, cyanosis, or edema  PSYCH: AAOx3  NEUROLOGY: previously sedated, moving upper extremities  SKIN: No rashes or lesions      MEDICATIONS:  MEDICATIONS  (STANDING):  ALBUTerol    90 MICROgram(s) HFA Inhaler 2 Puff(s) Inhalation every 6 hours  aMIOdarone    Tablet 200 milliGRAM(s) Oral daily  budesonide 160 MICROgram(s)/formoterol 4.5 MICROgram(s) Inhaler 2 Puff(s) Inhalation two times a day  chlorhexidine 0.12% Liquid 15 milliLiter(s) Oral Mucosa every 12 hours  chlorhexidine 4% Liquid 1 Application(s) Topical <User Schedule>  dexMEDEtomidine Infusion 0.2 MICROgram(s)/kG/Hr (3.84 mL/Hr) IV Continuous <Continuous>  dextrose 50% Injectable 12.5 Gram(s) IV Push once  dextrose 50% Injectable 25 Gram(s) IV Push once  dextrose 50% Injectable 25 Gram(s) IV Push once  dextrose Oral Gel 15 Gram(s) Oral once  enoxaparin Injectable 70 milliGRAM(s) SubCutaneous every 12 hours  fentaNYL   Infusion. 0.5 MICROgram(s)/kG/Hr (3.84 mL/Hr) IV Continuous <Continuous>  glucagon  Injectable 1 milliGRAM(s) IntraMuscular once  insulin lispro (ADMELOG) corrective regimen sliding scale   SubCutaneous every 6 hours  ipratropium 17 MICROgram(s) HFA Inhaler 1 Puff(s) Inhalation every 6 hours  norepinephrine Infusion 0.05 MICROgram(s)/kG/Min (7.2 mL/Hr) IV Continuous <Continuous>  petrolatum Ophthalmic Ointment 1 Application(s) Both EYES two times a day  piperacillin/tazobactam IVPB.. 4.5 Gram(s) IV Intermittent every 8 hours  predniSONE   Tablet 40 milliGRAM(s) Oral two times a day  trimethoprim  160 mG/sulfamethoxazole 800 mG 2 Tablet(s) Oral three times a day    MEDICATIONS  (PRN):  acetaminophen     Tablet .. 650 milliGRAM(s) Oral every 6 hours PRN Temp greater or equal to 38C (100.4F), Mild Pain (1 - 3), Moderate Pain (4 - 6)      ALLERGIES:  Allergies    No Known Allergies    Intolerances        LABS:                        9.5    11.69 )-----------( 164      ( 13 Jun 2022 03:30 )             31.6     06-13    145  |  104  |  30<H>  ----------------------------<  194<H>  4.6   |  31  |  0.55    Ca    9.1      13 Jun 2022 03:30  Phos  2.4     06-13  Mg     2.10     06-13    TPro  5.8<L>  /  Alb  2.3<L>  /  TBili  <0.2  /  DBili  x   /  AST  13  /  ALT  12  /  AlkPhos  56  06-13         INTERVAL HPI/OVERNIGHT EVENTS: FIO2 lowered this morning, precedex discontinued previously to assess mental status.    SUBJECTIVE: Patient seen and examined at bedside.       VITAL SIGNS:  ICU Vital Signs Last 24 Hrs  T(C): 38.3 (13 Jun 2022 00:00), Max: 38.3 (12 Jun 2022 08:00)  T(F): 101 (13 Jun 2022 00:00), Max: 101 (13 Jun 2022 00:00)  HR: 79 (13 Jun 2022 06:00) (79 - 101)  BP: 129/62 (13 Jun 2022 06:00) (103/63 - 139/69)  BP(mean): 79 (13 Jun 2022 06:00) (61 - 86)  ABP: --  ABP(mean): --  RR: 18 (13 Jun 2022 00:00) (9 - 18)  SpO2: 100% (13 Jun 2022 06:00) (89% - 100%)    Mode: CPAP with PS, FiO2: 60, PEEP: 8, PS: 8, MAP: 11, PIP: 18  Plateau pressure:   P/F ratio:     06-11 @ 07:01  -  06-12 @ 07:00  --------------------------------------------------------  IN: 2589 mL / OUT: 1140 mL / NET: 1449 mL    06-12 @ 07:01  -  06-13 @ 06:45  --------------------------------------------------------  IN: 2135.2 mL / OUT: 2970 mL / NET: -834.8 mL      CAPILLARY BLOOD GLUCOSE      POCT Blood Glucose.: 144 mg/dL (13 Jun 2022 06:35)    PHYSICAL EXAM:  T(C): 38.1 (06-13-22 @ 11:00), Max: 38.3 (06-13-22 @ 00:00)  HR: 93 (06-13-22 @ 11:00) (79 - 101)  BP: 124/64 (06-13-22 @ 11:00) (102/48 - 137/72)  RR: 22 (06-13-22 @ 11:00) (9 - 22)  SpO2: 92% (06-13-22 @ 11:00) (92% - 100%)  GENERAL: Sedated and intubated  EYES: EOMI, conjunctiva and sclera clear  NECK: Supple   CHEST/LUNG: Clear to auscultation bilaterally; No wheezes or crackles  HEART: Regular rate and rhythm; No murmurs, rubs, or gallops  ABDOMEN: Soft, mildly tender, unable to assess if tender on palpation  EXTREMITIES:  2+ Peripheral Pulses, No clubbing, cyanosis, or edema  PSYCH: AAOx3  NEUROLOGY: previously sedated, moving upper extremities  SKIN: No rashes or lesions      MEDICATIONS:  MEDICATIONS  (STANDING):  ALBUTerol    90 MICROgram(s) HFA Inhaler 2 Puff(s) Inhalation every 6 hours  aMIOdarone    Tablet 200 milliGRAM(s) Oral daily  budesonide 160 MICROgram(s)/formoterol 4.5 MICROgram(s) Inhaler 2 Puff(s) Inhalation two times a day  chlorhexidine 0.12% Liquid 15 milliLiter(s) Oral Mucosa every 12 hours  chlorhexidine 4% Liquid 1 Application(s) Topical <User Schedule>  dexMEDEtomidine Infusion 0.2 MICROgram(s)/kG/Hr (3.84 mL/Hr) IV Continuous <Continuous>  dextrose 50% Injectable 12.5 Gram(s) IV Push once  dextrose 50% Injectable 25 Gram(s) IV Push once  dextrose 50% Injectable 25 Gram(s) IV Push once  dextrose Oral Gel 15 Gram(s) Oral once  enoxaparin Injectable 70 milliGRAM(s) SubCutaneous every 12 hours  fentaNYL   Infusion. 0.5 MICROgram(s)/kG/Hr (3.84 mL/Hr) IV Continuous <Continuous>  glucagon  Injectable 1 milliGRAM(s) IntraMuscular once  insulin lispro (ADMELOG) corrective regimen sliding scale   SubCutaneous every 6 hours  ipratropium 17 MICROgram(s) HFA Inhaler 1 Puff(s) Inhalation every 6 hours  norepinephrine Infusion 0.05 MICROgram(s)/kG/Min (7.2 mL/Hr) IV Continuous <Continuous>  petrolatum Ophthalmic Ointment 1 Application(s) Both EYES two times a day  piperacillin/tazobactam IVPB.. 4.5 Gram(s) IV Intermittent every 8 hours  predniSONE   Tablet 40 milliGRAM(s) Oral two times a day  trimethoprim  160 mG/sulfamethoxazole 800 mG 2 Tablet(s) Oral three times a day    MEDICATIONS  (PRN):  acetaminophen     Tablet .. 650 milliGRAM(s) Oral every 6 hours PRN Temp greater or equal to 38C (100.4F), Mild Pain (1 - 3), Moderate Pain (4 - 6)      ALLERGIES:  Allergies    No Known Allergies    Intolerances        LABS:                        9.5    11.69 )-----------( 164      ( 13 Jun 2022 03:30 )             31.6     06-13    145  |  104  |  30<H>  ----------------------------<  194<H>  4.6   |  31  |  0.55    Ca    9.1      13 Jun 2022 03:30  Phos  2.4     06-13  Mg     2.10     06-13    TPro  5.8<L>  /  Alb  2.3<L>  /  TBili  <0.2  /  DBili  x   /  AST  13  /  ALT  12  /  AlkPhos  56  06-13

## 2022-06-13 NOTE — PROGRESS NOTE ADULT - ASSESSMENT
78YO Female former smoker PMH Afib, w/ Squamous cell lung carcinoma (3L home O2) s/p debulking and R bronchial stent recently revised on 6/1 with further debulking and balloon dilatation started on Carboplatin/Taxol with Dr Willis 3/11/2022 s/p PEG for dysphagia due to mass effect presents to hospital with cough, wheezing, shortness of breath, and yeung that has been worsening since discharge after recent procedure on 6/1/2022 admitted to MICU for intubation and bronch for attempt to clear out mucous plugs and evaluate for worsening airway stenosis or lung collapse. GOC addressed, she had expressed that she would not want to be on prolonged ventilation and would want to be electively extubated.  to make decisions for her if she is unable.     #Neuro  -intubated/sedated with precedex and fent gtts    Cards  Hx a-fib  -TTE EF 50%, low normal  -Afib w/ RVR 6/9-> amio bolus with amio IV gtt w/ spontaneous conversion to sinus  -Now on po amio     #Respiratory  Squamous cell lung carcinoma  -Repeat rigid bronchoscopy 6/7 for mucous clearing, if improvement, will consider stent revision and APC/cryo debulking with IP team  -Post obstructive atelectasis  -Recently started on prednisone 30 on 6/4 per interventional pulm for worsening lung disease     Acute hypoxemic respiratory failure, previous BAL showing Pseudomonas and providencia  -Intubated 6/7, now with increasing O2 requirements   -Initial ABG with hypercarbia, will repeat 6/11   -6/4/22 found interval progression of disease with increased right hilar and subcarinal mass resulting in right upper lobe occlusion and right upper lobe complete collapse. Mild left-to-right cardiomediastinal shift.  -Eval by thoracic surgery, no intervention at this time  -PCP+, started on bactrim on 6/11 and steroids increased to prednisone 40mg BID - ID c/s low suspicion for PCP, could be colonized. Will continue with tx for now.     #GI/Nutrition  -Acute diarrhea (2/2 amoxicillin vs c diff), last episode 6/4  -PEG replacement per IR 6/10, TF's restarted     Heme-onc  -Eval by heme-onc, no acute interventions or systemic tx at this time     #/Renal/Fluids  -Bun/Cr wnl  -Trend I/O's, Call in place  -Replete electrolytes PRN    #ID  - zosyn (6/5-) based on pseudomonas sensitivities in sputum   - Off vanco; negative mrsa swab  - Postobstructive atelectasis  - increase dose of zosyn to 4.5 given high fever, UA negative, COVID/flu negative  - PCP+, started on bactrim 6/11 and pred 40 BID   - ID consulted, recs appreciated - low likelihood of PCP, likely colonized   - Serum fungitell 6/11 + repeat bcx pending    #Endocrine  -low SSI i/s/o steroid use     Dispo: ICU.   78YO Female former smoker PMH Afib, w/ Squamous cell lung carcinoma (3L home O2) s/p debulking and R bronchial stent recently revised on 6/1 with further debulking and balloon dilatation started on Carboplatin/Taxol with Dr Willis 3/11/2022 s/p PEG for dysphagia due to mass effect presents to hospital with cough, wheezing, shortness of breath, and yeung that has been worsening since discharge after recent procedure on 6/1/2022 admitted to MICU for intubation and bronch for attempt to clear out mucous plugs and evaluate for worsening airway stenosis or lung collapse. GOC addressed, she had expressed that she would not want to be on prolonged ventilation and would want to be electively extubated.  to make decisions for her if she is unable.     #Neuro  - extubated on 6/13, on BIPAP  - off precedex, would assess for mental status    Cards  Hx a-fib  -TTE EF 50%, low normal  -Afib w/ RVR 6/9-> amio bolus with amio IV gtt w/ spontaneous conversion to sinus  -Now on PO amio     #Respiratory  Squamous cell lung carcinoma  -Repeat rigid bronchoscopy 6/7 for mucous clearing, if improvement, will consider stent revision and APC/cryo debulking with IP team  -Post obstructive atelectasis  -Recently started on prednisone 30 on 6/4 per interventional pulm for worsening lung disease     Acute hypoxemic respiratory failure, previous BAL showing Pseudomonas and providencia  -Intubated 6/7, now with increasing O2 requirements   -Initial ABG with hypercarbia  -6/4/22 found interval progression of disease with increased right hilar and subcarinal mass resulting in right upper lobe occlusion and right upper lobe complete collapse. Mild left-to-right cardiomediastinal shift.  -Eval by thoracic surgery, no intervention at this time  -PCP+, started on bactrim on 6/11 and steroids increased to prednisone 40mg BID - ID c/s low suspicion for PCP, could be colonized. Will continue with tx for now.   ABG today with appropriate acid-base status, pt extubated to BIPAP    #GI/Nutrition  -Acute diarrhea (2/2 amoxicillin vs c diff), last episode 6/4  -PEG replacement per IR 6/10, TF's restarted     Heme-onc  -Eval by heme-onc, no acute interventions or systemic tx at this time     #/Renal/Fluids  -Bun/Cr wnl  -Trend I/O's, Call in place  -Replete electrolytes PRN    #ID  Multifocal PNA  Pseudomonas sensitivities in sputum and bronchial culture  UA negative, COVID/flu negative, blood cxs negative  Off vanco; negative mrsa swab  Postobstructive atelectasis  Currently on zosyn   PCP+, started on bactrim 6/11 and pred 40 BID   Serum fungitell 6/11 pending  ID consulted, recs appreciated - low likelihood of PCP, likely colonized, but pending fungitell results      #Endocrine  -low SSI i/s/o steroid use     # Dispo: Pending clinical improvement   76YO Female former smoker PMH Afib, w/ Squamous cell lung carcinoma (3L home O2) s/p debulking and R bronchial stent recently revised on 6/1 with further debulking and balloon dilatation started on Carboplatin/Taxol with Dr Willis 3/11/2022 s/p PEG for dysphagia due to mass effect presents to hospital with cough, wheezing, shortness of breath, and yeung that has been worsening since discharge after recent procedure on 6/1/2022 admitted to MICU for intubation and bronch for attempt to clear out mucous plugs and evaluate for worsening airway stenosis or lung collapse. GOC addressed, she had expressed that she would not want to be on prolonged ventilation and would want to be electively extubated.  to make decisions for her if she is unable.     #Neuro  - intubated  - weaned off precedex to assess for mental status, placed back on as pt became agitated    Cards  Hx a-fib  -TTE EF 50%, low normal  -Afib w/ RVR 6/9-> amio bolus with amio IV gtt w/ spontaneous conversion to sinus  -Now on PO amio     #Respiratory  Squamous cell lung carcinoma  -Repeat rigid bronchoscopy 6/7 for mucous clearing, if improvement, will consider stent revision and APC/cryo debulking with IP team  -Post obstructive atelectasis  -Recently started on prednisone 30 on 6/4 per interventional pulm for worsening lung disease     Acute hypoxemic respiratory failure, previous BAL showing Pseudomonas and providencia  -Intubated 6/7, now with increasing O2 requirements   -Initial ABG with hypercarbia  -6/4/22 found interval progression of disease with increased right hilar and subcarinal mass resulting in right upper lobe occlusion and right upper lobe complete collapse. Mild left-to-right cardiomediastinal shift.  -Eval by thoracic surgery, no intervention at this time  -PCP+, started on bactrim on 6/11 and steroids increased to prednisone 40mg BID - ID c/s low suspicion for PCP, could be colonized. Will continue with tx for now.   ABG today with appropriate acid-base status, plan for palliative extubation tomorrow at 3 pm    #GI/Nutrition  -Acute diarrhea (2/2 amoxicillin vs c diff), last episode 6/4  -PEG replacement per IR 6/10, TF's restarted     Heme-onc  -Eval by heme-onc, no acute interventions or systemic tx at this time     #/Renal/Fluids  -Bun/Cr wnl  -Trend I/O's, Call in place  -Replete electrolytes PRN    #ID  Multifocal PNA  Pseudomonas sensitivities in sputum and bronchial culture  UA negative, COVID/flu negative, blood cxs negative  Off vanco; negative mrsa swab  Postobstructive atelectasis  Currently on zosyn   PCP+, started on bactrim 6/11 and pred 40 BID   Serum fungitell 6/11 pending  ID consulted, recs appreciated - low likelihood of PCP, likely colonized, but pending fungitell results      #Endocrine  -low SSI i/s/o steroid use     # Dispo: Pending clinical improvement

## 2022-06-14 NOTE — PROGRESS NOTE ADULT - ASSESSMENT
76YO Female former smoker PMH Afib, w/ Squamous cell lung carcinoma (3L home O2) s/p debulking and R bronchial stent recently revised on 6/1 with further debulking and balloon dilatation started on Carboplatin/Taxol with Dr Willis 3/11/2022 s/p PEG for dysphagia due to mass effect presents to hospital with cough, wheezing, shortness of breath, and yeung that has been worsening since discharge after recent procedure on 6/1/2022 admitted to MICU for intubation and bronch for attempt to clear out mucous plugs and evaluate for worsening airway stenosis or lung collapse. GOC addressed, she had expressed that she would not want to be on prolonged ventilation and would want to be electively extubated.  to make decisions for her if she is unable.     #Neuro  - intubated  - weaned off precedex to assess for mental status, placed back on as pt became agitated    Cards  Hx a-fib  -TTE EF 50%, low normal  -Afib w/ RVR 6/9-> amio bolus with amio IV gtt w/ spontaneous conversion to sinus  -Now on PO amio     #Respiratory  Squamous cell lung carcinoma  -Repeat rigid bronchoscopy 6/7 for mucous clearing, if improvement, will consider stent revision and APC/cryo debulking with IP team  -Post obstructive atelectasis  -Recently started on prednisone 30 on 6/4 per interventional pulm for worsening lung disease     Acute hypoxemic respiratory failure, previous BAL showing Pseudomonas and providencia  -Intubated 6/7, now with increasing O2 requirements   -Initial ABG with hypercarbia  -6/4/22 found interval progression of disease with increased right hilar and subcarinal mass resulting in right upper lobe occlusion and right upper lobe complete collapse. Mild left-to-right cardiomediastinal shift.  -Eval by thoracic surgery, no intervention at this time  -PCP+, started on bactrim on 6/11 and steroids increased to prednisone 40mg BID - ID c/s low suspicion for PCP, could be colonized. Will continue with tx for now.   ABG today with appropriate acid-base status, plan for palliative extubation tomorrow at 3 pm    #GI/Nutrition  -Acute diarrhea (2/2 amoxicillin vs c diff), last episode 6/4  -PEG replacement per IR 6/10, TF's restarted     Heme-onc  -Eval by heme-onc, no acute interventions or systemic tx at this time     #/Renal/Fluids  -Bun/Cr wnl  -Trend I/O's, Call in place  -Replete electrolytes PRN    #ID  Multifocal PNA  Pseudomonas sensitivities in sputum and bronchial culture  UA negative, COVID/flu negative, blood cxs negative  Off vanco; negative mrsa swab  Postobstructive atelectasis  Currently on zosyn   PCP+, started on bactrim 6/11 and pred 40 BID   Serum fungitell 6/11 pending  ID consulted, recs appreciated - low likelihood of PCP, likely colonized, but pending fungitell results      #Endocrine  -low SSI i/s/o steroid use     # Dispo: Pending clinical improvement   78YO Female former smoker PMH Afib, w/ Squamous cell lung carcinoma (3L home O2) s/p debulking and R bronchial stent recently revised on 6/1 with further debulking and balloon dilatation started on Carboplatin/Taxol with Dr Willis 3/11/2022 s/p PEG for dysphagia due to mass effect presents to hospital with cough, wheezing, shortness of breath, and yeung that has been worsening since discharge after recent procedure on 6/1/2022 admitted to MICU for intubation and bronch for attempt to clear out mucous plugs and evaluate for worsening airway stenosis or lung collapse. GOC addressed, she had expressed that she would not want to be on prolonged ventilation and would want to be electively extubated.  to make decisions for her if she is unable.     #Neuro  - intubated  - weaned off precedex to assess for mental status, placed back on as pt became agitated    Cards  Hx a-fib  -TTE EF 50%, low normal  -Afib w/ RVR 6/9-> amio bolus with amio IV gtt w/ spontaneous conversion to sinus  -Now on PO amio     #Respiratory  Squamous cell lung carcinoma  -Repeat rigid bronchoscopy 6/7 for mucous clearing, if improvement, will consider stent revision and APC/cryo debulking with IP team  -Post obstructive atelectasis  -Recently started on prednisone 30 on 6/4 per interventional pulm for worsening lung disease     Acute hypoxemic respiratory failure, previous BAL showing Pseudomonas and providencia  -Intubated 6/7, now with increasing O2 requirements   -Initial ABG with hypercarbia  -6/4/22 found interval progression of disease with increased right hilar and subcarinal mass resulting in right upper lobe occlusion and right upper lobe complete collapse. Mild left-to-right cardiomediastinal shift.  -Eval by thoracic surgery, no intervention at this time  -PCP+, started on bactrim on 6/11 and steroids increased to prednisone 40mg BID - ID c/s low suspicion for PCP, could be colonized. Will continue with tx for now.   Palliative extubation today, on morphine prn and ativan prn     #GI/Nutrition  -Acute diarrhea (2/2 amoxicillin vs c diff), last episode 6/4  -PEG replacement per IR 6/10, TF's restarted     Heme-onc  -Eval by heme-onc, no acute interventions or systemic tx at this time     #/Renal/Fluids  -Bun/Cr wnl  -Trend I/O's, Call in place  -Replete electrolytes PRN    #ID  Multifocal PNA  Pseudomonas sensitivities in sputum and bronchial culture  UA negative, COVID/flu negative, blood cxs negative  Off vanco; negative mrsa swab  Postobstructive atelectasis  Currently on zosyn   PCP+, started on bactrim 6/11 and pred 40 BID   Serum fungitell 6/11 pending  ID consulted, recs appreciated - low likelihood of PCP, likely colonized, but pending fungitell results      #Endocrine  -low SSI i/s/o steroid use     # Dispo  MOLST form: DNR/DNI

## 2022-06-14 NOTE — PROGRESS NOTE ADULT - SUBJECTIVE AND OBJECTIVE BOX
INTERVAL HPI/OVERNIGHT EVENTS:    SUBJECTIVE: Patient seen and examined at bedside.       VITAL SIGNS:  ICU Vital Signs Last 24 Hrs  T(C): 38.3 (14 Jun 2022 06:00), Max: 38.3 (14 Jun 2022 06:00)  T(F): 101 (14 Jun 2022 06:00), Max: 101 (14 Jun 2022 06:00)  HR: 70 (14 Jun 2022 07:00) (61 - 99)  BP: 109/50 (14 Jun 2022 06:00) (87/44 - 141/62)  BP(mean): 63 (14 Jun 2022 06:00) (55 - 87)  ABP: --  ABP(mean): --  RR: 23 (14 Jun 2022 07:00) (17 - 25)  SpO2: 92% (14 Jun 2022 07:00) (90% - 100%)    Mode: AC/ CMV (Assist Control/ Continuous Mandatory Ventilation), RR (machine): 18, TV (machine): 350, FiO2: 50, PEEP: 8, ITime: 0.47, MAP: 16, PIP: 48  Plateau pressure:   P/F ratio:     06-13 @ 07:01  -  06-14 @ 07:00  --------------------------------------------------------  IN: 989.9 mL / OUT: 1675 mL / NET: -685.1 mL      CAPILLARY BLOOD GLUCOSE      POCT Blood Glucose.: 168 mg/dL (14 Jun 2022 06:30)    ECG:    PHYSICAL EXAM:    General:   HEENT:   Neck:   Respiratory:   Cardiovascular:   Abdomen:   Extremities:  Neurological:    MEDICATIONS:  MEDICATIONS  (STANDING):  ALBUTerol    90 MICROgram(s) HFA Inhaler 2 Puff(s) Inhalation every 6 hours  aMIOdarone    Tablet 200 milliGRAM(s) Oral daily  budesonide 160 MICROgram(s)/formoterol 4.5 MICROgram(s) Inhaler 2 Puff(s) Inhalation two times a day  chlorhexidine 0.12% Liquid 15 milliLiter(s) Oral Mucosa every 12 hours  chlorhexidine 4% Liquid 1 Application(s) Topical <User Schedule>  dexMEDEtomidine Infusion 0.2 MICROgram(s)/kG/Hr (3.84 mL/Hr) IV Continuous <Continuous>  dextrose 50% Injectable 25 Gram(s) IV Push once  dextrose 50% Injectable 12.5 Gram(s) IV Push once  dextrose 50% Injectable 25 Gram(s) IV Push once  dextrose Oral Gel 15 Gram(s) Oral once  enoxaparin Injectable 70 milliGRAM(s) SubCutaneous every 12 hours  glucagon  Injectable 1 milliGRAM(s) IntraMuscular once  insulin lispro (ADMELOG) corrective regimen sliding scale   SubCutaneous every 6 hours  ipratropium 17 MICROgram(s) HFA Inhaler 1 Puff(s) Inhalation every 6 hours  midodrine 20 milliGRAM(s) Oral every 8 hours  norepinephrine Infusion 0.05 MICROgram(s)/kG/Min (7.2 mL/Hr) IV Continuous <Continuous>  petrolatum Ophthalmic Ointment 1 Application(s) Both EYES two times a day  piperacillin/tazobactam IVPB.. 4.5 Gram(s) IV Intermittent every 8 hours  predniSONE   Tablet 40 milliGRAM(s) Oral two times a day  trimethoprim  160 mG/sulfamethoxazole 800 mG 2 Tablet(s) Oral three times a day    MEDICATIONS  (PRN):  acetaminophen     Tablet .. 650 milliGRAM(s) Oral every 6 hours PRN Temp greater or equal to 38C (100.4F), Mild Pain (1 - 3), Moderate Pain (4 - 6)      ALLERGIES:  Allergies    No Known Allergies    Intolerances        LABS:                        9.7    10.55 )-----------( 177      ( 14 Jun 2022 02:20 )             31.3     06-14    147<H>  |  106  |  30<H>  ----------------------------<  188<H>  4.6   |  31  |  0.60    Ca    9.9      14 Jun 2022 02:20  Phos  2.9     06-14  Mg     2.20     06-14    TPro  5.9<L>  /  Alb  2.5<L>  /  TBili  0.2  /  DBili  x   /  AST  15  /  ALT  13  /  AlkPhos  60  06-14             INTERVAL HPI/OVERNIGHT EVENTS: Pt noted to be febrile overnight and more hypotensive, requiring levophed and started on midodrine.    SUBJECTIVE: Patient seen and examined at bedside.       VITAL SIGNS:  ICU Vital Signs Last 24 Hrs  T(C): 38.3 (14 Jun 2022 06:00), Max: 38.3 (14 Jun 2022 06:00)  T(F): 101 (14 Jun 2022 06:00), Max: 101 (14 Jun 2022 06:00)  HR: 70 (14 Jun 2022 07:00) (61 - 99)  BP: 109/50 (14 Jun 2022 06:00) (87/44 - 141/62)  BP(mean): 63 (14 Jun 2022 06:00) (55 - 87)  ABP: --  ABP(mean): --  RR: 23 (14 Jun 2022 07:00) (17 - 25)  SpO2: 92% (14 Jun 2022 07:00) (90% - 100%)    Mode: AC/ CMV (Assist Control/ Continuous Mandatory Ventilation), RR (machine): 18, TV (machine): 350, FiO2: 50, PEEP: 8, ITime: 0.47, MAP: 16, PIP: 48  Plateau pressure:   P/F ratio:     06-13 @ 07:01  -  06-14 @ 07:00  --------------------------------------------------------  IN: 989.9 mL / OUT: 1675 mL / NET: -685.1 mL      CAPILLARY BLOOD GLUCOSE      POCT Blood Glucose.: 168 mg/dL (14 Jun 2022 06:30)      PHYSICAL EXAM:    T(C): 37.2 (06-14-22 @ 08:00), Max: 38.3 (06-14-22 @ 06:00)  HR: 77 (06-14-22 @ 13:00) (61 - 97)  BP: 140/107 (06-14-22 @ 13:00) (87/44 - 141/62)  RR: 23 (06-14-22 @ 13:00) (17 - 36)  SpO2: 82% (06-14-22 @ 13:00) (82% - 100%)  GENERAL: NAD, well-developed  HEAD:  Atraumatic, Normocephalic  EYES: EOMI, PERRLA, conjunctiva and sclera clear  NECK: Supple, No JVD  CHEST/LUNG: Clear to auscultation bilaterally   HEART: Regular rate and rhythm; No murmurs, rubs, or gallops  ABDOMEN: Soft, Nontender, Nondistended; Bowel sounds present  EXTREMITIES:  2+ Peripheral Pulses, No clubbing, cyanosis, or edema  PSYCH: AAOx3  NEUROLOGY: non-focal  SKIN: No rashes or lesions  Psych: Not depressed or anxious.    MEDICATIONS:  MEDICATIONS  (STANDING):  ALBUTerol    90 MICROgram(s) HFA Inhaler 2 Puff(s) Inhalation every 6 hours  aMIOdarone    Tablet 200 milliGRAM(s) Oral daily  budesonide 160 MICROgram(s)/formoterol 4.5 MICROgram(s) Inhaler 2 Puff(s) Inhalation two times a day  chlorhexidine 0.12% Liquid 15 milliLiter(s) Oral Mucosa every 12 hours  chlorhexidine 4% Liquid 1 Application(s) Topical <User Schedule>  dexMEDEtomidine Infusion 0.2 MICROgram(s)/kG/Hr (3.84 mL/Hr) IV Continuous <Continuous>  dextrose 50% Injectable 25 Gram(s) IV Push once  dextrose 50% Injectable 12.5 Gram(s) IV Push once  dextrose 50% Injectable 25 Gram(s) IV Push once  dextrose Oral Gel 15 Gram(s) Oral once  enoxaparin Injectable 70 milliGRAM(s) SubCutaneous every 12 hours  glucagon  Injectable 1 milliGRAM(s) IntraMuscular once  insulin lispro (ADMELOG) corrective regimen sliding scale   SubCutaneous every 6 hours  ipratropium 17 MICROgram(s) HFA Inhaler 1 Puff(s) Inhalation every 6 hours  midodrine 20 milliGRAM(s) Oral every 8 hours  norepinephrine Infusion 0.05 MICROgram(s)/kG/Min (7.2 mL/Hr) IV Continuous <Continuous>  petrolatum Ophthalmic Ointment 1 Application(s) Both EYES two times a day  piperacillin/tazobactam IVPB.. 4.5 Gram(s) IV Intermittent every 8 hours  predniSONE   Tablet 40 milliGRAM(s) Oral two times a day  trimethoprim  160 mG/sulfamethoxazole 800 mG 2 Tablet(s) Oral three times a day    MEDICATIONS  (PRN):  acetaminophen     Tablet .. 650 milliGRAM(s) Oral every 6 hours PRN Temp greater or equal to 38C (100.4F), Mild Pain (1 - 3), Moderate Pain (4 - 6)      ALLERGIES:  Allergies    No Known Allergies    Intolerances        LABS:                        9.7    10.55 )-----------( 177      ( 14 Jun 2022 02:20 )             31.3     06-14    147<H>  |  106  |  30<H>  ----------------------------<  188<H>  4.6   |  31  |  0.60    Ca    9.9      14 Jun 2022 02:20  Phos  2.9     06-14  Mg     2.20     06-14    TPro  5.9<L>  /  Alb  2.5<L>  /  TBili  0.2  /  DBili  x   /  AST  15  /  ALT  13  /  AlkPhos  60  06-14             INTERVAL HPI/OVERNIGHT EVENTS: Pt noted to be febrile overnight and more hypotensive, requiring levophed and started on midodrine.    SUBJECTIVE: Patient seen and examined at bedside.       VITAL SIGNS:  ICU Vital Signs Last 24 Hrs  T(C): 38.3 (14 Jun 2022 06:00), Max: 38.3 (14 Jun 2022 06:00)  T(F): 101 (14 Jun 2022 06:00), Max: 101 (14 Jun 2022 06:00)  HR: 70 (14 Jun 2022 07:00) (61 - 99)  BP: 109/50 (14 Jun 2022 06:00) (87/44 - 141/62)  BP(mean): 63 (14 Jun 2022 06:00) (55 - 87)  ABP: --  ABP(mean): --  RR: 23 (14 Jun 2022 07:00) (17 - 25)  SpO2: 92% (14 Jun 2022 07:00) (90% - 100%)    Mode: AC/ CMV (Assist Control/ Continuous Mandatory Ventilation), RR (machine): 18, TV (machine): 350, FiO2: 50, PEEP: 8, ITime: 0.47, MAP: 16, PIP: 48  Plateau pressure:   P/F ratio:     06-13 @ 07:01  -  06-14 @ 07:00  --------------------------------------------------------  IN: 989.9 mL / OUT: 1675 mL / NET: -685.1 mL      CAPILLARY BLOOD GLUCOSE      POCT Blood Glucose.: 168 mg/dL (14 Jun 2022 06:30)      PHYSICAL EXAM:    T(C): 37.2 (06-14-22 @ 08:00), Max: 38.3 (06-14-22 @ 06:00)  HR: 77 (06-14-22 @ 13:00) (61 - 97)  BP: 140/107 (06-14-22 @ 13:00) (87/44 - 141/62)  RR: 23 (06-14-22 @ 13:00) (17 - 36)  SpO2: 82% (06-14-22 @ 13:00) (82% - 100%)  GENERAL: Extubated  HEAD:  Atraumatic, Normocephalic  EYES: EOMI, conjunctiva and sclera clear  NECK: Supple   CHEST/LUNG: Clear to auscultation bilaterally   HEART: Regular rate and rhythm. No murmurs, rubs, or gallops  ABDOMEN: Soft, Nondistended; Bowel sounds present  EXTREMITIES:  2+ Peripheral Pulses, No clubbing, cyanosis, or edema  NEUROLOGY: unable to assess  SKIN: No rashes or lesions    MEDICATIONS:  MEDICATIONS  (STANDING):  ALBUTerol    90 MICROgram(s) HFA Inhaler 2 Puff(s) Inhalation every 6 hours  aMIOdarone    Tablet 200 milliGRAM(s) Oral daily  budesonide 160 MICROgram(s)/formoterol 4.5 MICROgram(s) Inhaler 2 Puff(s) Inhalation two times a day  chlorhexidine 0.12% Liquid 15 milliLiter(s) Oral Mucosa every 12 hours  chlorhexidine 4% Liquid 1 Application(s) Topical <User Schedule>  dexMEDEtomidine Infusion 0.2 MICROgram(s)/kG/Hr (3.84 mL/Hr) IV Continuous <Continuous>  dextrose 50% Injectable 25 Gram(s) IV Push once  dextrose 50% Injectable 12.5 Gram(s) IV Push once  dextrose 50% Injectable 25 Gram(s) IV Push once  dextrose Oral Gel 15 Gram(s) Oral once  enoxaparin Injectable 70 milliGRAM(s) SubCutaneous every 12 hours  glucagon  Injectable 1 milliGRAM(s) IntraMuscular once  insulin lispro (ADMELOG) corrective regimen sliding scale   SubCutaneous every 6 hours  ipratropium 17 MICROgram(s) HFA Inhaler 1 Puff(s) Inhalation every 6 hours  midodrine 20 milliGRAM(s) Oral every 8 hours  norepinephrine Infusion 0.05 MICROgram(s)/kG/Min (7.2 mL/Hr) IV Continuous <Continuous>  petrolatum Ophthalmic Ointment 1 Application(s) Both EYES two times a day  piperacillin/tazobactam IVPB.. 4.5 Gram(s) IV Intermittent every 8 hours  predniSONE   Tablet 40 milliGRAM(s) Oral two times a day  trimethoprim  160 mG/sulfamethoxazole 800 mG 2 Tablet(s) Oral three times a day    MEDICATIONS  (PRN):  acetaminophen     Tablet .. 650 milliGRAM(s) Oral every 6 hours PRN Temp greater or equal to 38C (100.4F), Mild Pain (1 - 3), Moderate Pain (4 - 6)      ALLERGIES:  Allergies    No Known Allergies    Intolerances        LABS:                        9.7    10.55 )-----------( 177      ( 14 Jun 2022 02:20 )             31.3     06-14    147<H>  |  106  |  30<H>  ----------------------------<  188<H>  4.6   |  31  |  0.60    Ca    9.9      14 Jun 2022 02:20  Phos  2.9     06-14  Mg     2.20     06-14    TPro  5.9<L>  /  Alb  2.5<L>  /  TBili  0.2  /  DBili  x   /  AST  15  /  ALT  13  /  AlkPhos  60  06-14

## 2022-06-14 NOTE — CHART NOTE - NSCHARTNOTEFT_GEN_A_CORE
: Shakila Gray MD    INDICATION: Hypoxic Respiratory Failure    PROCEDURE:  [X ] LIMITED ECHO  [X ] LIMITED CHEST  [ ] LIMITED RETROPERITONEAL  [ ] LIMITED ABDOMINAL  [ ] LIMITED DVT  [ ] NEEDLE GUIDANCE VASCULAR  [ ] NEEDLE GUIDANCE THORACENTESIS  [ ] NEEDLE GUIDANCE PARACENTESIS  [ ] NEEDLE GUIDANCE PERICARDIOCENTESIS  [ ] OTHER    FINDINGS:  B lines noted in bilateral apices and mid-lung  Intact LV systolic formation without wall motion abnormalities  No pericardial effusion noted      INTERPRETATION:  B lines noted in bilateral apices and mid-lung  Intact LV systolic formation without wall motion abnormalities      Supervised by Harley Collins and Tariq Covarrubias    Should not be considered final until signed by attending. : Shakila Gray MD    INDICATION: Hypoxic Respiratory Failure    PROCEDURE:  [X ] LIMITED ECHO  [X ] LIMITED CHEST  [ ] LIMITED RETROPERITONEAL  [ ] LIMITED ABDOMINAL  [ ] LIMITED DVT  [ ] NEEDLE GUIDANCE VASCULAR  [ ] NEEDLE GUIDANCE THORACENTESIS  [ ] NEEDLE GUIDANCE PARACENTESIS  [ ] NEEDLE GUIDANCE PERICARDIOCENTESIS  [ ] OTHER    FINDINGS:  B lines noted in bilateral apices and mid-lung  Intact LV systolic formation without wall motion abnormalities  No pericardial effusion noted      INTERPRETATION:  B lines noted in bilateral apices and mid-lung  Intact LV systolic formation without wall motion abnormalities      Supervised by Harley Collins and Tariq Covarrubias    Should not be considered final until signed by attending.    I was present during the key portions of the procedure and immediately available during the entire procedure.  Satish MATTSON  Attending

## 2022-06-14 NOTE — PROGRESS NOTE ADULT - ATTENDING COMMENTS
Patient examined and case reviewed in detail on bedside rounds  Critically ill and unstable on vent with advanced lung ca For pallitive extubation today  Frequent bedside visits with therapy change today.   I have personally provided 35+ minutes of critical care time concurrently with the resident/fellow; this excludes time spent on separate procedures.

## 2022-06-15 NOTE — PROGRESS NOTE ADULT - NS ATTEND AMEND GEN_ALL_CORE FT
Patient seen and examined. Patient critically ill and actively dying. Patient is a 77F with extensive medical history including chronic hypoxemic respiratory failure on O2 with advanced squamous cell carcinoma with endobronchial stent who was admitted to MICU with progressive hypoxemic respiratory failure. She underwent further bronchoscopic procedures but had persistent hypoxemic respiratory failure and ultimately the decision was made for compassionate extubation.    Patient was extubated on 6/14 and comfort medications started. Patient's  has been involved and had discussions with ICU team. Patient does not have requirements for ICU level care at this time based on her goals of care. We will continue to ensure that patient is comfortable and in no distress. Comfort measures.

## 2022-06-15 NOTE — CHART NOTE - NSCHARTNOTEFT_GEN_A_CORE
MAR Accept Note  Transfer to:  Medicine  Accepting Attending Physician: Dr. Ribeiro     Assigned Room:  862B    HPI/MICU COURSE:   Please refer to MICU transfer note for full details. Briefly, this is a 77F with PMHx former smoker with non-resectable lung SCC on chronic home O2 supplementation (on Carboplatin/Taxol last given 5/20) c/w mass effect with dysphagia s/p PEG and airway narrowing c/b acute on chronic dyspnea. Pt initially underwent rigid bronchoscopy with balloon dilatation of 90% bronchial stenosis of bronchus intermedius, tumor debulking, and bronchial stent placement in the bronchus intermedius on 2/2/22 followed by repeat flex bronchoscopy with tumor debulking (argon plasma coagulation and balloon dilation) and BI stent revision presents to hospital with cough, wheezing, shortness of breath, and yeung that has been worsening since discharge after recent procedure on 6/1/2022 admitted to MICU for intubation and bronch for attempt to clear out mucous plugs and evaluate for worsening airway stenosis or lung collapse. S/p repeat bronch at bedside 6/15- significant copious thick yellow secretions noted in distal airways, L > R. BAL performed of LLL. Stent noted in place in BI with patent distal airways. The tumor treated last week necrotic, removed/debulking performed.. Unable to visualize RUL bronchus, likely stenotic and now located posterior to prox end of stent. C addressed, she had expressed that she would not want to be on prolonged ventilation and would want to be electively extubated now s/p palliative extubation 6/14.       FOR FOLLOW UP:  -pain control   -comfort measures    Carmita Roger MD  PGY-3, Internal Medicine  MAR Spectra: [id number]  MICU Transfer Note

## 2022-06-15 NOTE — PROGRESS NOTE ADULT - TIME BILLING
Review of patient records, including history, laboratory data, and imaging. Patient assessment and evaluation. Coordination of care. risks and benefits of the procedure, alternative procedures as well as further management plan. Complex patient requiring services. Services provided were separate in additional from general pulmonary services due to critical nature of patient and interventions requires. Time spent separate from time spent doing any procedures.
Review of patient records, including history, laboratory data, and imaging. Patient assessment and evaluation. Coordination of care.
review of records/results, medical evaluation and management, and discussion with medical providers.
Review of patient records, including history, laboratory data, and imaging. Patient assessment and evaluation. Coordination of care.

## 2022-06-15 NOTE — PROVIDER CONTACT NOTE (OTHER) - ASSESSMENT
patient with PEG tube
patient on nonrebreather mask spo2 81%
Patient O2 maintaining in 81-85% on 6L NC. Non-rebreather mask applied. Patient is very anxious and states her stomach is in a lot of pain, chest and back hurts. Emotional support provided, deep breathing exercise performed. Tube feedings paused.

## 2022-06-15 NOTE — PROVIDER CONTACT NOTE (OTHER) - SITUATION
Incorrect tube feeding order as per dietary. Patient ordered for Glucerna 1.2 chidi, as per dietary this is a pediatric order. Adult version of the order is 1.5 chidi
Patient O2 maintaining in 81-85% on 6L NC. Non-rebreather mask applied. Patient is very anxious and states her stomach is in a lot of pain, chest and back hurts.
patient on nonrebreather mask spo2 81%

## 2022-06-15 NOTE — PROGRESS NOTE ADULT - REASON FOR ADMISSION
shortness of breath and diarrhea

## 2022-06-15 NOTE — PROVIDER CONTACT NOTE (OTHER) - BACKGROUND
patient admitted for diarrhea and shortness of breath 2/2 pneumonia
admitted for PNA
s/p pneumonia,extubated 6/14

## 2022-06-15 NOTE — PROGRESS NOTE ADULT - ASSESSMENT
77F former smoker with non-resectable lung SCC on chronic home O2 supplementation (on Carboplatin/Taxol last given 5/20) c/w mass effect with dysphagia s/p PEG and airway narrowing c/b acute on chronic dyspnea. Pt initially underwent rigid bronchoscopy with balloon dilatation of 90% bronchial stenosis of bronchus intermedius, tumor debulking, and bronchial stent placement in the bronchus intermedius on 2/2/22 followed by repeat flex bronchoscopy with tumor debulking (argon plasma coagulation and balloon dilation) and BI stent revision presents to hospital with cough, wheezing, shortness of breath, and yeung that has been worsening since discharge after recent procedure on 6/1/2022.   Admitted to MICU for intubation and bronch for attempt to clear out mucous plugs and evaluate for worsening airway stenosis or lung collapse s/p repeat bronch at bedside. GOC addressed - she had expressed that she would not want to be on prolonged ventilation and would want to be electively extubated.     Acute hypoxemic respiratory failure, previous BAL showing Pseudomonas and providencia  -Intubated 6/7- palliatively extubated 6/14 not on NRB  -6/4/22 found interval progression of disease with increased right hilar and subcarinal mass resulting in right upper lobe occlusion and right upper lobe complete collapse. Mild left-to-right cardiomediastinal shift.  -Eval by thoracic surgery, no intervention at this time  -PCP+, started on bactrim on 6/11 and steroids increased to prednisone 40mg BID  dyspnea - s/p palliative extubation - titrate dilaudid for comfort    #GI/Nutrition  -Acute diarrhea (2/2 amoxicillin vs c diff), last episode 6/4  -PEG replacement per IR 6/10, on tube feeds     Multifocal PNA  Pseudomonas sensitivities in sputum and bronchial culture  UA negative, COVID/flu negative, blood cxs negative  Off vanco; negative mrsa swab  Postobstructive atelectasis  Currently on zosyn   PCP+, started on bactrim 6/11   Serum fungitell 6/11 negative Bactrim D/Cd 6/15    MOLST form: DNR/DNI s/p palliatively extubated 6/14  77F former smoker with non-resectable lung SCC on chronic home O2 supplementation (on Carboplatin/Taxol last given 5/20) c/w mass effect with dysphagia s/p PEG and airway narrowing c/b acute on chronic dyspnea. Pt initially underwent rigid bronchoscopy with balloon dilatation of 90% bronchial stenosis of bronchus intermedius, tumor debulking, and bronchial stent placement in the bronchus intermedius on 2/2/22 followed by repeat flex bronchoscopy with tumor debulking (argon plasma coagulation and balloon dilation) and BI stent revision presents to hospital with cough, wheezing, shortness of breath, and yeung that has been worsening since discharge after recent procedure on 6/1/2022.   Admitted to MICU for intubation and bronch for attempt to clear out mucous plugs and evaluate for worsening airway stenosis or lung collapse s/p repeat bronch at bedside. GOC addressed - she had expressed that she would not want to be on prolonged ventilation and would want to be electively extubated.     Acute hypoxemic respiratory failure, previous BAL showing Pseudomonas and providencia  -Intubated 6/7- palliatively extubated 6/14 not on NRB  -6/4/22 found interval progression of disease with increased right hilar and subcarinal mass resulting in right upper lobe occlusion and right upper lobe complete collapse. Mild left-to-right cardiomediastinal shift.  -Eval by thoracic surgery, no intervention at this time  -PCP+, started on bactrim on 6/11 and steroids increased to prednisone 40mg BID  dyspnea - s/p palliative extubation - titrate dilaudid for comfort  Pt actively dying. D/w pt's  and son at bedside.    MOLST form: DNR/DNI s/p palliatively extubated 6/14  77F former smoker with non-resectable lung SCC on chronic home O2 supplementation (on Carboplatin/Taxol last given 5/20) c/w mass effect with dysphagia s/p PEG and airway narrowing c/b acute on chronic dyspnea. Pt initially underwent rigid bronchoscopy with balloon dilatation of 90% bronchial stenosis of bronchus intermedius, tumor debulking, and bronchial stent placement in the bronchus intermedius on 2/2/22 followed by repeat flex bronchoscopy with tumor debulking (argon plasma coagulation and balloon dilation) and BI stent revision presents to hospital with cough, wheezing, shortness of breath, and yeung that has been worsening since discharge after recent procedure on 6/1/2022.   Admitted to MICU for intubation and bronch for attempt to clear out mucous plugs and evaluate for worsening airway stenosis or lung collapse s/p repeat bronch at bedside. Acute hypoxemic respiratory failure, previous BAL showing Pseudomonas and providencia    Intubated 6/7-  -6/4/22 found interval progression of disease with increased right hilar and subcarinal mass resulting in right upper lobe occlusion and right upper lobe complete collapse. Mild left-to-right cardiomediastinal shift.  -Eval by thoracic surgery, no intervention  -PCP+, started on bactrim on 6/11 and steroids increased to prednisone 40mg BID    GOC addressed - she had expressed that she would not want to be on prolonged ventilation. Terminally extubated 6/14    ****dyspnea - s/p palliative extubation - titrate dilaudid for comfort****  Pt actively dying. D/w pt's  and son at bedside.    MOLST form: DNR/DNI s/p palliatively extubated 6/14

## 2022-06-15 NOTE — PROGRESS NOTE ADULT - SUBJECTIVE AND OBJECTIVE BOX
INTERVAL HPI/OVERNIGHT EVENTS:    SUBJECTIVE: Patient seen and examined at bedside.     CONSTITUTIONAL: No weakness, fevers or chills  EYES/ENT: No visual changes;  No vertigo or throat pain   NECK: No pain or stiffness  RESPIRATORY: No cough, wheezing, hemoptysis; No shortness of breath  CARDIOVASCULAR: No chest pain or palpitations  GASTROINTESTINAL: No abdominal or epigastric pain. No nausea, vomiting, or hematemesis; No diarrhea or constipation. No melena or hematochezia.  GENITOURINARY: No dysuria, frequency or hematuria  NEUROLOGICAL: No numbness or weakness  SKIN: No itching, rashes    OBJECTIVE:    VITAL SIGNS:  ICU Vital Signs Last 24 Hrs  T(C): 37.4 (15 Yahir 2022 04:00), Max: 37.4 (2022 12:00)  T(F): 99.3 (15 Yahir 2022 04:00), Max: 99.4 (2022 12:00)  HR: 87 (15 Yahir 2022 04:00) (63 - 87)  BP: 74/43 (15 Yahir 2022 04:00) (74/43 - 140/107)  BP(mean): 49 (15 Yahir 2022 04:00) (48 - 114)  ABP: --  ABP(mean): --  RR: 8 (15 Yahir 2022 04:00) (8 - 36)  SpO2: 80% (15 Yahir 2022 04:00) (57% - 98%)    Mode: AC/ CMV (Assist Control/ Continuous Mandatory Ventilation), RR (machine): 18, TV (machine): 350, FiO2: 50, PEEP: 8, MAP: 15, PIP: 49     @ : @ 07:00  --------------------------------------------------------  IN: 1049 mL / OUT: 1800 mL / NET: -751 mL     @ 07:15 @ 06:31  --------------------------------------------------------  IN: 1857.6 mL / OUT: 1415 mL / NET: 442.6 mL      CAPILLARY BLOOD GLUCOSE      POCT Blood Glucose.: 168 mg/dL (2022 06:30)      PHYSICAL EXAM:    General: NAD  HEENT: NC/AT; PERRL, clear conjunctiva  Neck: supple  Respiratory: CTA b/l  Cardiovascular: +S1/S2; RRR  Abdomen: soft, NT/ND; +BS x4  Extremities: WWP, 2+ peripheral pulses b/l; no LE edema  Skin: normal color and turgor; no rash  Neurological:    MEDICATIONS:  MEDICATIONS  (STANDING):  dexMEDEtomidine Infusion 0.2 MICROgram(s)/kG/Hr (3.84 mL/Hr) IV Continuous <Continuous>  fentaNYL   Infusion 0.5 MICROgram(s)/kG/Hr (3.84 mL/Hr) IV Continuous <Continuous>  piperacillin/tazobactam IVPB.. 4.5 Gram(s) IV Intermittent every 8 hours  trimethoprim  160 mG/sulfamethoxazole 800 mG 2 Tablet(s) Oral three times a day    MEDICATIONS  (PRN):  LORazepam   Injectable 2 milliGRAM(s) IV Push every 15 minutes PRN Anxiety  morphine  - Injectable 2 milliGRAM(s) IV Push every 15 minutes PRN Severe Pain (7 - 10)      ALLERGIES:  Allergies    No Known Allergies    Intolerances        LABS:                        9.7    10.55 )-----------( 177      ( 2022 02:20 )             31.3     06-14    147<H>  |  106  |  30<H>  ----------------------------<  188<H>  4.6   |  31  |  0.60    Ca    9.9      2022 02:20  Phos  2.9     06-14  Mg     2.20     06-14    TPro  5.9<L>  /  Alb  2.5<L>  /  TBili  0.2  /  DBili  x   /  AST  15  /  ALT  13  /  AlkPhos  60  06-14      Urinalysis Basic - ( 2022 08:26 )    Color: Light Yellow / Appearance: Clear / S.020 / pH: x  Gluc: x / Ketone: Negative  / Bili: Negative / Urobili: <2 mg/dL   Blood: x / Protein: Negative / Nitrite: Negative   Leuk Esterase: Negative / RBC: x / WBC x   Sq Epi: x / Non Sq Epi: x / Bacteria: x        RADIOLOGY & ADDITIONAL TESTS: Reviewed. INTERVAL HPI/OVERNIGHT EVENTS:    SUBJECTIVE: Patient seen and examined at bedside.     ROS: Unable to assess as patient obtunded/encephalopathic.     OBJECTIVE:    VITAL SIGNS:  ICU Vital Signs Last 24 Hrs  T(C): 37.4 (15 Yahir 2022 04:00), Max: 37.4 (2022 12:00)  T(F): 99.3 (15 Yahir 2022 04:00), Max: 99.4 (2022 12:00)  HR: 87 (15 Yahir 2022 04:) (63 - 87)  BP: 74/43 (15 Yahir 2022 04:00) (74/43 - 140/107)  BP(mean): 49 (15 Yahir 2022 04:) (48 - 114)  ABP: --  ABP(mean): --  RR: 8 (15 Yahir 2022 04:00) (8 - 36)  SpO2: 80% (15 Yahir 2022 04:) (57% - 98%)    Mode: AC/ CMV (Assist Control/ Continuous Mandatory Ventilation), RR (machine): 18, TV (machine): 350, FiO2: 50, PEEP: 8, MAP: 15, PIP: 49    06-13 @ 07:  -  14 @ 07:00  --------------------------------------------------------  IN: 1049 mL / OUT: 1800 mL / NET: -751 mL    14 @ 07:  -  15 @ 06:31  --------------------------------------------------------  IN: 1857.6 mL / OUT: 1415 mL / NET: 442.6 mL      CAPILLARY BLOOD GLUCOSE      POCT Blood Glucose.: 168 mg/dL (2022 06:30)      PHYSICAL EXAM:    GENERAL: Extubated  HEAD:  Atraumatic, Normocephalic  EYES: EOMI, conjunctiva and sclera clear  NECK: Supple   CHEST/LUNG: Clear to auscultation bilaterally - guppy breathing   HEART: Regular rate and rhythm. No murmurs, rubs, or gallops  ABDOMEN: Soft, Nondistended; Bowel sounds present  EXTREMITIES:  2+ Peripheral Pulses, No clubbing, cyanosis, or edema  NEUROLOGY: unable to assess  SKIN: No rashes or lesions    MEDICATIONS:  MEDICATIONS  (STANDING):  dexMEDEtomidine Infusion 0.2 MICROgram(s)/kG/Hr (3.84 mL/Hr) IV Continuous <Continuous>  fentaNYL   Infusion 0.5 MICROgram(s)/kG/Hr (3.84 mL/Hr) IV Continuous <Continuous>  piperacillin/tazobactam IVPB.. 4.5 Gram(s) IV Intermittent every 8 hours  trimethoprim  160 mG/sulfamethoxazole 800 mG 2 Tablet(s) Oral three times a day    MEDICATIONS  (PRN):  LORazepam   Injectable 2 milliGRAM(s) IV Push every 15 minutes PRN Anxiety  morphine  - Injectable 2 milliGRAM(s) IV Push every 15 minutes PRN Severe Pain (7 - 10)      ALLERGIES:  Allergies    No Known Allergies    Intolerances        LABS:                        9.7    10.55 )-----------( 177      ( 2022 02:20 )             31.3     06-14    147<H>  |  106  |  30<H>  ----------------------------<  188<H>  4.6   |  31  |  0.60    Ca    9.9      2022 02:20  Phos  2.9     06-14  Mg     2.20     06-14    TPro  5.9<L>  /  Alb  2.5<L>  /  TBili  0.2  /  DBili  x   /  AST  15  /  ALT  13  /  AlkPhos  60  06-14      Urinalysis Basic - ( 2022 08:26 )    Color: Light Yellow / Appearance: Clear / S.020 / pH: x  Gluc: x / Ketone: Negative  / Bili: Negative / Urobili: <2 mg/dL   Blood: x / Protein: Negative / Nitrite: Negative   Leuk Esterase: Negative / RBC: x / WBC x   Sq Epi: x / Non Sq Epi: x / Bacteria: x        RADIOLOGY & ADDITIONAL TESTS: Reviewed.

## 2022-06-15 NOTE — PROGRESS NOTE ADULT - PROVIDER SPECIALTY LIST ADULT
Heme/Onc
Heme/Onc
MICU
Pulmonology
Infectious Disease
Internal Medicine
MICU
Pulmonology
Hospitalist
Intervent Pulmonology
Internal Medicine

## 2022-06-15 NOTE — PROVIDER CONTACT NOTE (OTHER) - ACTION/TREATMENT ORDERED:
Provider made aware, will assess patient at bedside, Tylenol po to be ordered. Will continue to monitor.
provider aware. Advised to change order in order for tube feeds to be started
Provider made aware. No further interventions at this time

## 2022-06-15 NOTE — PROGRESS NOTE ADULT - ASSESSMENT
77F with PMHx former smoker with non-resectable lung SCC on chronic home O2 supplementation (on Carboplatin/Taxol last given 5/20) c/w mass effect with dysphagia s/p PEG and airway narrowing c/b acute on chronic dyspnea. Pt initially underwent rigid bronchoscopy with balloon dilatation of 90% bronchial stenosis of bronchus intermedius, tumor debulking, and bronchial stent placement in the bronchus intermedius on 2/2/22 followed by repeat flex bronchoscopy with tumor debulking (argon plasma coagulation and balloon dilation) and BI stent revision presents to hospital with cough, wheezing, shortness of breath, and yeung that has been worsening since discharge after recent procedure on 6/1/2022 admitted to MICU for intubation and bronch for attempt to clear out mucous plugs and evaluate for worsening airway stenosis or lung collapse s/p repeat bronch at bedside 6/15 [facility] addressed, she had expressed that she would not want to be on prolonged ventilation and would want to be electively extubated now s/p palliative extubation 6/14.      #Neuro  -palliatively extubated 6/14  - continue precedex/ fentanyl titrate to comfort     Cards  Hx a-fib  -TTE EF 50%, low normal  -Afib w/ RVR 6/9-> amio bolus with amio IV gtt w/ spontaneous conversion to sinus  -s/p PO amio now off on comfort measures only     #Respiratory  Squamous cell lung carcinoma   -Repeat rigid bronchoscopy 6/7 for mucous clearing        Acute hypoxemic respiratory failure, previous BAL showing Pseudomonas and providencia  -Intubated 6/7- palliatively extubated 6/14 not on NRB  -6/4/22 found interval progression of disease with increased right hilar and subcarinal mass resulting in right upper lobe occlusion and right upper lobe complete collapse. Mild left-to-right cardiomediastinal shift.  -Eval by thoracic surgery, no intervention at this time  -PCP+, started on bactrim on 6/11 and steroids increased to prednisone 40mg BID  - now s/p palliative extubation on precedex/ fentanyl with PRN morphine/ ativan     #GI/Nutrition  -Acute diarrhea (2/2 amoxicillin vs c diff), last episode 6/4  -PEG replacement per IR 6/10, on tube feeds     Heme-onc  -Eval by heme-onc, no acute interventions or systemic tx at this time     #/Renal/Fluids  -palliatively extubated 6/14 no more lab draws     #ID  Multifocal PNA  Pseudomonas sensitivities in sputum and bronchial culture  UA negative, COVID/flu negative, blood cxs negative  Off vanco; negative mrsa swab  Postobstructive atelectasis  Currently on zosyn   PCP+, started on bactrim 6/11   Serum fungitell 6/11 negative Bactrim D/Cd 6/15      #Endocrine  - comfort measures only no FS    # Dispo  MOLST form: DNR/DNI s/p palliatively extubated 6/14   77F with PMHx former smoker with non-resectable lung SCC on chronic home O2 supplementation (on Carboplatin/Taxol last given 5/20) c/w mass effect with dysphagia s/p PEG and airway narrowing c/b acute on chronic dyspnea. Pt initially underwent rigid bronchoscopy with balloon dilatation of 90% bronchial stenosis of bronchus intermedius, tumor debulking, and bronchial stent placement in the bronchus intermedius on 2/2/22 followed by repeat flex bronchoscopy with tumor debulking (argon plasma coagulation and balloon dilation) and BI stent revision presents to hospital with cough, wheezing, shortness of breath, and yeung that has been worsening since discharge after recent procedure on 6/1/2022 admitted to MICU for intubation and bronch for attempt to clear out mucous plugs and evaluate for worsening airway stenosis or lung collapse s/p repeat bronch at bedside 6/15 [facility] addressed, she had expressed that she would not want to be on prolonged ventilation and would want to be electively extubated now s/p palliative extubation 6/14.      #Neuro  -palliatively extubated 6/14  - continue precedex/ fentanyl titrate to comfort     Cards  Hx a-fib  -TTE EF 50%, low normal  -Afib w/ RVR 6/9-> amio bolus with amio IV gtt w/ spontaneous conversion to sinus  -s/p PO amio now off on comfort measures only     #Respiratory  Squamous cell lung carcinoma   -Repeat rigid bronchoscopy 6/7 for mucous clearing        Acute hypoxemic respiratory failure, previous BAL showing Pseudomonas and providencia  -Intubated 6/7- palliatively extubated 6/14 not on NRB  -6/4/22 found interval progression of disease with increased right hilar and subcarinal mass resulting in right upper lobe occlusion and right upper lobe complete collapse. Mild left-to-right cardiomediastinal shift.  -Eval by thoracic surgery, no intervention at this time  -PCP+, started on bactrim on 6/11 and steroids increased to prednisone 40mg BID  - now s/p palliative extubation on precedex/ fentanyl with PRN morphine/ ativan     #GI/Nutrition  -Acute diarrhea (2/2 amoxicillin vs c diff), last episode 6/4  -PEG replacement per IR 6/10, on tube feeds     Heme-onc  -Eval by heme-onc, no acute interventions or systemic tx at this time     #/Renal/Fluids  -palliatively extubated 6/14 no more lab draws     #ID  Multifocal PNA  Pseudomonas sensitivities in sputum and bronchial culture  UA negative, COVID/flu negative, blood cxs negative  Off vanco; negative mrsa swab  Postobstructive atelectasis  Currently on zosyn - stop today as patient comfort care   PCP+, started on bactrim 6/11   Serum fungitell 6/11 negative Bactrim D/Cd 6/15      #Endocrine  - comfort measures only no FS    # Dispo  MOLST form: DNR/DNI s/p palliatively extubated 6/14

## 2022-06-15 NOTE — CHART NOTE - NSCHARTNOTEFT_GEN_A_CORE
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 Pt initially underwent rigid bronchoscopy with balloon dilatation of 90% bronchial stenosis of bronchus intermedius, tumor debulking, and bronchial stent placement in the bronchus intermedius on 2/2/22 followed by repeat flex bronchoscopy with tumor debulking (argon plasma coagulation and balloon dilation) and BI stent revision presents to hospital with cough, wheezing, shortness of breath, and yeung that has been worsening since discharge after recent procedure on 6/1/2022 admitted to MICU for intubation and bronch for attempt to clear out mucous plugs and evaluate for worsening airway stenosis or lung collapse. S/p repeat bronch at bedside 6/15- significant copious thick yellow secretions noted in distal airways, L > R. BAL performed of LLL. Stent noted in place in BI with patent distal airways. The tumor treated last week necrotic, removed/debulking performed.. Unable to visualize RUL bronchus, likely stenotic and now located posterior to prox end of stent. GOC addressed, she had expressed that she would not want to be on prolonged ventilation and would want to be electively extubated now s/p palliative extubation 6/14.       ASSESSMENT & PLAN:      #Neuro  -palliatively extubated 6/14  - continue ativan PRN q 1 hr agitation/ WOB  -dialudid gtt    Cards  Hx a-fib  -TTE EF 50%, low normal  -Afib w/ RVR 6/9-> amio bolus with amio IV gtt w/ spontaneous conversion to sinus  -s/p PO amio now off on comfort measures only     #Respiratory  Squamous cell lung carcinoma   -Repeat rigid bronchoscopy 6/7 for mucous clearing      #Acute hypoxemic respiratory failure, previous BAL showing Pseudomonas and providencia  -Intubated 6/7- palliatively extubated 6/14 not on NRB  -6/4/22 found interval progression of disease with increased right hilar and subcarinal mass resulting in right upper lobe occlusion and right upper lobe complete collapse. Mild left-to-right cardiomediastinal shift.  -Eval by thoracic surgery, no intervention at this time  -PCP+, started on bactrim on 6/11 and steroids increased to prednisone 40mg BID  - now s/p palliative extubation on dilaudid gtt, ativan     #GI/Nutrition  -Acute diarrhea (2/2 amoxicillin vs c diff), last episode 6/4  -PEG replacement per IR 6/10, on tube feeds     Heme-onc  -Eval by heme-onc, no acute interventions or systemic tx at this time     #/Renal/Fluids  -palliatively extubated 6/14 no more lab draws     #ID  Multifocal PNA  Pseudomonas sensitivities in sputum and bronchial culture  UA negative, COVID/flu negative, blood cxs negative  Off vanco; negative mrsa swab  Postobstructive atelectasis  Currently on zosyn   PCP+, started on bactrim 6/11   Serum fungitell 6/11 negative Bactrim D/Cd 6/15      #Endocrine  - comfort measures only no FS    # Dispo  MOLST form: DNR/DNI s/p palliatively extubated 6/14       FOR FOLLOW UP:  -pain control   -comfort measures MICU Transfer Note    Transfer from: MICU    Transfer to: ( x ) Medicine    (  ) Telemetry     (   ) RCU        (    ) Palliative         (   ) Stroke Unit          (   ) __________________    Accepting Physician:  Signout given to:     MICU COURSE:   77F with PMHx former smoker with non-resectable lung SCC on chronic home O2 supplementation (on Carboplatin/Taxol last given 5/20) c/w mass effect with dysphagia s/p PEG and airway narrowing c/b acute on chronic dyspnea. Pt initially underwent rigid bronchoscopy with balloon dilatation of 90% bronchial stenosis of bronchus intermedius, tumor debulking, and bronchial stent placement in the bronchus intermedius on 2/2/22 followed by repeat flex bronchoscopy with tumor debulking (argon plasma coagulation and balloon dilation) and BI stent revision presents to hospital with cough, wheezing, shortness of breath, and yeung that has been worsening since discharge after recent procedure on 6/1/2022 admitted to MICU for intubation and bronch for attempt to clear out mucous plugs and evaluate for worsening airway stenosis or lung collapse. S/p repeat bronch at bedside 6/15- significant copious thick yellow secretions noted in distal airways, L > R. BAL performed of LLL. Stent noted in place in BI with patent distal airways. The tumor treated last week necrotic, removed/debulking performed.. Unable to visualize RUL bronchus, likely stenotic and now located posterior to prox end of stent. GOC addressed, she had expressed that she would not want to be on prolonged ventilation and would want to be electively extubated now s/p palliative extubation 6/14.       ASSESSMENT & PLAN:      #Neuro  -palliatively extubated 6/14  - continue ativan PRN q 1 hr agitation/ WOB  -dialudid gtt  -robinil PRN    Cards  Hx a-fib  -TTE EF 50%, low normal  -Afib w/ RVR 6/9-> amio bolus with amio IV gtt w/ spontaneous conversion to sinus  -s/p PO amio now off on comfort measures only     #Respiratory  Squamous cell lung carcinoma   -Repeat rigid bronchoscopy 6/7 for mucous clearing      #Acute hypoxemic respiratory failure, previous BAL showing Pseudomonas and providencia  -Intubated 6/7- palliatively extubated 6/14 not on NRB  -6/4/22 found interval progression of disease with increased right hilar and subcarinal mass resulting in right upper lobe occlusion and right upper lobe complete collapse. Mild left-to-right cardiomediastinal shift.  -Eval by thoracic surgery, no intervention at this time  -PCP+, started on bactrim on 6/11 and steroids increased to prednisone 40mg BID  - now s/p palliative extubation on dilaudid gtt, ativan     #GI/Nutrition  -Acute diarrhea (2/2 amoxicillin vs c diff), last episode 6/4  -PEG replacement per IR 6/10, on tube feeds     Heme-onc  -Eval by heme-onc, no acute interventions or systemic tx at this time     #/Renal/Fluids  -palliatively extubated 6/14 no more lab draws     #ID  Multifocal PNA  Pseudomonas sensitivities in sputum and bronchial culture  UA negative, COVID/flu negative, blood cxs negative  Off vanco; negative mrsa swab  Postobstructive atelectasis  Currently on zosyn - stopped as comfort care only   PCP+, started on bactrim 6/11   Serum fungitell 6/11 negative Bactrim D/Cd 6/15      #Endocrine  - comfort measures only no FS    # Dispo  MOLST form: DNR/DNI s/p palliatively extubated 6/14       FOR FOLLOW UP:  -pain control   -comfort measures

## 2022-06-15 NOTE — PROGRESS NOTE ADULT - NUTRITIONAL ASSESSMENT
This patient has been assessed with a concern for Malnutrition and has been determined to have a diagnosis/diagnoses of Moderate protein-calorie malnutrition.    This patient is being managed with:   Diet NPO after Midnight-     NPO Start Date: 06-Jun-2022   NPO Start Time: 23:59  Except Medications  Entered: Jun 6 2022  9:27AM    Diet NPO with Tube Feed-  Tube Feeding Modality: Gastrostomy  Glucerna 1.5 Keith (GLUCERNA1.5RTH)  Total Volume for 24 Hours (mL): 1296  Continuous  Starting Tube Feed Rate {mL per Hour}: 20  Increase Tube Feed Rate by (mL): 10     Every 4 hours  Until Goal Tube Feed Rate (mL per Hour): 54  Tube Feed Duration (in Hours): 24  Tube Feed Start Time: 11:00  Entered: Jun 5 2022  4:22PM    
This patient has been assessed with a concern for Malnutrition and has been determined to have a diagnosis/diagnoses of Moderate protein-calorie malnutrition.    This patient is being managed with:   Diet NPO with Tube Feed-  Tube Feeding Modality: Gastrostomy  Glucerna 1.2 Keith (GLUCERNARTH)  Total Volume for 24 Hours (mL): 1296  Continuous  Starting Tube Feed Rate {mL per Hour}: 20  Increase Tube Feed Rate by (mL): 10     Every 4 hours  Until Goal Tube Feed Rate (mL per Hour): 54  Tube Feed Duration (in Hours): 24  Tube Feed Start Time: 18:00  Entered: Yahir 10 2022  5:15PM    
This patient has been assessed with a concern for Malnutrition and has been determined to have a diagnosis/diagnoses of Moderate protein-calorie malnutrition.    This patient is being managed with:   Diet NPO with Tube Feed-  Tube Feeding Modality: Gastrostomy  Glucerna 1.5 Keith (GLUCERNA1.5RTH)  Total Volume for 24 Hours (mL): 960  Continuous  Starting Tube Feed Rate {mL per Hour}: 20  Increase Tube Feed Rate by (mL): 10     Every 4 hours  Until Goal Tube Feed Rate (mL per Hour): 40  Tube Feed Duration (in Hours): 24  Tube Feed Start Time: 11:00  Claudio TF     Qty per Day:  1 packet 3x/day  Entered: Jun 13 2022  3:36PM    
This patient has been assessed with a concern for Malnutrition and has been determined to have a diagnosis/diagnoses of Moderate protein-calorie malnutrition.    This patient is being managed with:   Diet NPO with Tube Feed-  Tube Feeding Modality: Gastrostomy  Glucerna 1.2 Keith (GLUCERNARTH)  Total Volume for 24 Hours (mL): 1296  Continuous  Starting Tube Feed Rate {mL per Hour}: 20  Increase Tube Feed Rate by (mL): 10     Every 4 hours  Until Goal Tube Feed Rate (mL per Hour): 54  Tube Feed Duration (in Hours): 24  Tube Feed Start Time: 18:00  Entered: Yahir 10 2022  5:15PM    
This patient has been assessed with a concern for Malnutrition and has been determined to have a diagnosis/diagnoses of Moderate protein-calorie malnutrition.    This patient is being managed with:   Diet NPO with Tube Feed-  Tube Feeding Modality: Gastrostomy  Glucerna 1.5 Keith (GLUCERNA1.5RTH)  Total Volume for 24 Hours (mL): 960  Continuous  Starting Tube Feed Rate {mL per Hour}: 20  Increase Tube Feed Rate by (mL): 10     Every 4 hours  Until Goal Tube Feed Rate (mL per Hour): 40  Tube Feed Duration (in Hours): 24  Tube Feed Start Time: 11:00  Claudio TF     Qty per Day:  1 packet 3x/day  Entered: Jun 13 2022  3:36PM    
This patient has been assessed with a concern for Malnutrition and has been determined to have a diagnosis/diagnoses of Moderate protein-calorie malnutrition.    This patient is being managed with:   Diet NPO with Tube Feed-  Tube Feeding Modality: Gastrostomy  Glucerna 1.2 Keith (GLUCERNARTH)  Total Volume for 24 Hours (mL): 1296  Continuous  Starting Tube Feed Rate {mL per Hour}: 20  Increase Tube Feed Rate by (mL): 10     Every 4 hours  Until Goal Tube Feed Rate (mL per Hour): 54  Tube Feed Duration (in Hours): 24  Tube Feed Start Time: 18:00  Entered: Yahir 10 2022  5:15PM    
This patient has been assessed with a concern for Malnutrition and has been determined to have a diagnosis/diagnoses of Moderate protein-calorie malnutrition.    This patient is being managed with:   Diet NPO with Tube Feed-  Tube Feeding Modality: Gastrostomy  Glucerna 1.5 Keith (GLUCERNA1.5RTH)  Total Volume for 24 Hours (mL): 960  Continuous  Starting Tube Feed Rate {mL per Hour}: 20  Increase Tube Feed Rate by (mL): 10     Every 4 hours  Until Goal Tube Feed Rate (mL per Hour): 40  Tube Feed Duration (in Hours): 24  Tube Feed Start Time: 11:00  Claudio TF     Qty per Day:  1 packet 3x/day  Entered: Jun 13 2022  3:36PM    
This patient has been assessed with a concern for Malnutrition and has been determined to have a diagnosis/diagnoses of Moderate protein-calorie malnutrition.    This patient is being managed with:   Diet NPO-  Except Medications  Entered: Jun 7 2022  7:30PM

## 2022-06-15 NOTE — PROGRESS NOTE ADULT - SUBJECTIVE AND OBJECTIVE BOX
[facility] Division of Hospital Medicine  Lexi Pabon MD  Pager (M-F, 8A-5P):  In-house pager [phone]; Long-range pager [phone]  Other Times:  Please page Hospitalist in Charge -  In-house pager [phone]    Patient is a 77y old  Female who presents with a chief complaint of shortness of breath and diarrhea (15 Yahir 2022 06:31)    SUBJECTIVE / OVERNIGHT EVENTS:  Extubated yesterday. Pt transferred from the MICU on dilaudid gtt.   Pt unresponsive, agonal breathing.    and son at bedside, understands she is actively dying.  ADDITIONAL REVIEW OF SYSTEMS:    MEDICATIONS  (STANDING):  HYDROmorphone Infusion 0.5 mG/Hr (0.5 mL/Hr) IV Continuous <Continuous>    MEDICATIONS  (PRN):  glycopyrrolate Injectable 0.4 milliGRAM(s) IV Push every 6 hours PRN secretions  LORazepam   Injectable 1 milliGRAM(s) IV Push every 1 hour PRN agitation/ WOB    I&O's Summary    2022 07:01  -  15 Yahir 2022 07:00  --------------------------------------------------------  IN: 1857.6 mL / OUT: 1415 mL / NET: 442.6 mL    PHYSICAL EXAM:  Vital Signs Last 24 Hrs  T(C): 36.6 (15 Yahir 2022 11:38), Max: 37.4 (2022 12:00)  T(F): 97.8 (15 Yahir 2022 11:38), Max: 99.4 (2022 12:00)  HR: 87 (15 Yahir 2022 11:38) (63 - 87)  BP: 79/39 (15 Yahir 2022 11:38) (72/41 - 140/107)  BP(mean): 46 (15 Yahir 2022 09:26) (46 - 114)  RR: 10 (15 Yahir 2022 11:38) (8 - 28)  SpO2: 86% (15 Yahir 2022 11:38) (57% - 96%)    CONSTITUTIONAL: unresponsive, agonal breathing  RESPIRATORY: agonal breathing, rhonci  CARDIOVASCULAR: S1, S2  ABDOMEN: Nontender to palpation  PSYCH: unresponsive  NEUROLOGY: unresponsive    LABS:                        9.7    10.55 )-----------( 177      ( 2022 02:20 )             31.3     06-14    147<H>  |  106  |  30<H>  ----------------------------<  188<H>  4.6   |  31  |  0.60    Ca    9.9      2022 02:20  Phos  2.9     06-14  Mg     2.20     06-14    TPro  5.9<L>  /  Alb  2.5<L>  /  TBili  0.2  /  DBili  x   /  AST  15  /  ALT  13  /  AlkPhos  60  06-14    Urinalysis Basic - ( 2022 08:26 )  Color: Light Yellow / Appearance: Clear / S.020 / pH: x  Gluc: x / Ketone: Negative  / Bili: Negative / Urobili: <2 mg/dL   Blood: x / Protein: Negative / Nitrite: Negative   Leuk Esterase: Negative / RBC: x / WBC x   Sq Epi: x / Non Sq Epi: x / Bacteria: x    Culture - Sputum (collected 2022 09:11)  Source: ET Tube ET Tube  Gram Stain (2022 22:42):    Few polymorphonuclear leukocytes per low power field    No Squamous epithelial cells per low power field    Rare Gram Negative Rods seen per oil power field  Preliminary Report (15 Yahir 2022 09:30):    Moderate Pseudomonas aeruginosa    RADIOLOGY & ADDITIONAL TESTS:  Results Reviewed:   Imaging Personally Reviewed:  Electrocardiogram Personally Reviewed:    COORDINATION OF CARE:  Care Discussed with Consultants/Other Providers [Y/N]: RN, SW, CM, ACP re overall care   Prior or Outpatient Records Reviewed [Y/N]:

## 2022-06-15 NOTE — PROVIDER CONTACT NOTE (OTHER) - REASON
patient on nonrebreather mask spo2 81%
O2 maintaining in 81-85% on 6L NC
Incorrect tube feeding order as per dietary

## 2022-06-16 NOTE — CHART NOTE - NSCHARTNOTESELECT_GEN_ALL_CORE
Death Note/Event Note
Follow Up/Nutrition Services
IR pre-procedure/Event Note
MICU Accept/Transfer Note
POCUS
Thoracic surgery
Transfer/Transfer Note
Event Note
Event Note
POCUS/Event Note

## 2022-06-16 NOTE — DISCHARGE NOTE FOR THE EXPIRED PATIENT - HOSPITAL COURSE
77F former smoker with non-resectable lung SCC on chronic home O2 supplementation (on Carboplatin/Taxol last given 5/20) c/w mass effect with dysphagia s/p PEG and airway narrowing c/b acute on chronic dyspnea. Pt initially underwent rigid bronchoscopy with balloon dilatation of 90% bronchial stenosis of bronchus intermedius, tumor debulking, and bronchial stent placement in the bronchus intermedius on 2/2/22 followed by repeat flex bronchoscopy with tumor debulking (argon plasma coagulation and balloon dilation) and BI stent revision presents to hospital with cough, wheezing, shortness of breath, and yeung that has been worsening since discharge after recent procedure on 6/1/2022.   Admitted to MICU for intubation and bronch for attempt to clear out mucous plugs and evaluate for worsening airway stenosis or lung collapse s/p repeat bronch at bedside. Acute hypoxemic respiratory failure, previous BAL showing Pseudomonas and providencia    Intubated 6/7-  -6/4/22 found interval progression of disease with increased right hilar and subcarinal mass resulting in right upper lobe occlusion and right upper lobe complete collapse. Mild left-to-right cardiomediastinal shift.  -Eval by thoracic surgery, no intervention  -PCP+, started on bactrim on 6/11 and steroids increased to prednisone 40mg BID    GOC addressed - she had expressed that she would not want to be on prolonged ventilation. Terminally extubated 6/14    ****dyspnea - s/p palliative extubation - titrate dilaudid for comfort****  As of 6/15/22: Pt actively dying. D/w pt's  and son at bedside.  Time of Death 6:24AM on 6/16/22. Pt's  notified.

## 2022-06-16 NOTE — CHART NOTE - NSCHARTNOTEFT_GEN_A_CORE
Called to bedside by RN that pt is unresponsive. Patient DNR/DNI on comfort measures. Patient seen and evaluated at bedside. On physical exam, patient did not respond to verbal or noxious stimuli.  No spontaneous respirations.  Absent heart and breath sounds.  Absent radial and carotid pulses.   Pupils are fixed and dilated, no corneal reflex. Patient pronounced dead at 6:24AM.  Attending (to be) notified. Family (to be) notified. Called to bedside by RN that pt is unresponsive. Patient DNR/DNI on comfort measures. Patient seen and evaluated at bedside. On physical exam, patient did not respond to verbal or noxious stimuli. No spontaneous respirations. Absent heart and breath sounds. Absent radial and carotid pulses. Pupils are fixed and dilated, no corneal reflex. Patient pronounced dead at 6:24AM.  Attending notified. Notified pt's  Buddy Heaton @ [phone].

## 2022-06-17 ENCOUNTER — APPOINTMENT (OUTPATIENT)
Dept: HEMATOLOGY ONCOLOGY | Facility: CLINIC | Age: 77
End: 2022-06-17

## 2022-06-17 ENCOUNTER — APPOINTMENT (OUTPATIENT)
Dept: INFUSION THERAPY | Facility: HOSPITAL | Age: 77
End: 2022-06-17

## 2022-07-02 LAB
CULTURE RESULTS: SIGNIFICANT CHANGE UP
SPECIMEN SOURCE: SIGNIFICANT CHANGE UP

## 2022-07-06 LAB
CULTURE RESULTS: SIGNIFICANT CHANGE UP
SPECIMEN SOURCE: SIGNIFICANT CHANGE UP

## 2022-07-07 ENCOUNTER — APPOINTMENT (OUTPATIENT)
Dept: HEMATOLOGY ONCOLOGY | Facility: CLINIC | Age: 77
End: 2022-07-07

## 2022-07-23 LAB
CULTURE RESULTS: SIGNIFICANT CHANGE UP
SPECIMEN SOURCE: SIGNIFICANT CHANGE UP

## 2022-07-27 LAB
CULTURE RESULTS: SIGNIFICANT CHANGE UP
SPECIMEN SOURCE: SIGNIFICANT CHANGE UP

## 2022-12-08 NOTE — SWALLOW BEDSIDE ASSESSMENT ADULT - MUCOSAL QUALITY
The patient is Watcher - Medium risk of patient condition declining or worsening    Shift Goals  Clinical Goals: Comfort  Patient Goals: Comfort  Family Goals: Comfort    Progress made toward(s) clinical / shift goals: Nazario came and talked with patient the best he could.    Problem: Psychosocial  Goal: Spiritual and cultural needs incorporated into hospitalization  Outcome: Progressing     Assumed care of patient at 0700. Received bedside report from night shift RN. Bed is locked and lowest position, call light within reach. Treaded socks in place. Patient updated on plan of care, no complaints or pain at this time. White board updated. Pt AxOx1 to self, baseline. Patient breathing pattern is unlabored when pt keeps O2 in nose. . Pt is comfort care measures only. All needs met at this time. Giving meds as ordered. Will continue care and monitoring as ordered.      1430 Theo Son called looking for an update. RN myself couldn't answer some questions on treatment plan. Code Status at question. Message resident Irais Erickson about this for 2nd time today to give Theo a call at [phone].       
moist; pink
Oral cavity clear and adequately hydrated

## 2023-06-16 NOTE — DIETITIAN NUTRITION RISK NOTIFICATION - TREATMENT: THE FOLLOWING DIET HAS BEEN RECOMMENDED
Diet, NPO after Midnight:      NPO Start Date: 06-Jun-2022,   NPO Start Time: 23:59  Except Medications (06-06-22 @ 09:27) [Active]  Diet, NPO with Tube Feed:   Tube Feeding Modality: Gastrostomy  Glucerna 1.5 Keith (GLUCERNA1.5RTH)  Total Volume for 24 Hours (mL): 1296  Continuous  Starting Tube Feed Rate {mL per Hour}: 20  Increase Tube Feed Rate by (mL): 10     Every 4 hours  Until Goal Tube Feed Rate (mL per Hour): 54  Tube Feed Duration (in Hours): 24  Tube Feed Start Time: 11:00 (06-05-22 @ 16:22) [Active]      
I have discussed with the Attending the patient's status, as well as the H&P and the fetal status. The Attending agreed with the suggested management.

## 2024-03-12 NOTE — ED ADULT TRIAGE NOTE - BP NONINVASIVE DIASTOLIC (MM HG)
Zofran refill request reviewed    More info please    Last prescribed on April 2022. Is she doing ok? Please clarify this further    Thanks     69

## 2024-04-10 NOTE — ED ADULT TRIAGE NOTE - DOMESTIC TRAVEL HIGH RISK QUESTION
RHEUMATOLOGY OUTPATIENT NOTE - Follow up   Date of Service:  4/12/2024    Referring provider:  Vannessa Corrales DO  Primary care provider:  Vannessa Corrales DO  Medical Records Reviewed: outpatient records    CHIEF COMPLAINT  Patient is a 63 year old White  female with a history of osteoporosis, inflammatory arthritis, psoriatic arthritis who comes to Rheumatology for management.    Last visit 11/16/23    HISTORY OF PRESENT ILLNESS  Since her last visit, initiated on Skyrizi and is doing well. Feels that it is helping. Joints to not feel as inflamed. Back is also improving. On Reclast with her endocrinologist.         Admits to L CMC pain especially after use, L>R, but also seems to be improving.       No new rashes.           RHEUMATOLOGY HISTORY:  The patient reports that she initially was diagnosed with an inflammatory arthritis after she had an erlichia infection that led to significant joint pain in 2012. She was treated with doxycycline with improvement in her joint symptoms.   She was seen at [facility], diagnosed with psoriatic arthritis due to evidence of psoriasis (ears and scalp) and inflammatory arthritis. MRI of her hands from 2015 showed some erosive disease.     She has been on Enbrel 50mg SC weekly with good control of her disease. Denies any plaque psoriasis.      Family history is significant for autoimmunity- mother had psoriasis,brother- UC, daughter- sarcoidosis.      She has been following with endocrinology for osteoporosis and was found to have a fracture from pars defect. She is undergoing work up and may be placed on Forteo.     Her MRI of her back has not shown any inflammatory changes, but does show degenerative changes.      She denies any history of dactylitis, enthesitis, uveitis.      Current Therapy:  Skyrizi   Serologies:  -ROSA M  -[id number]     REVIEW OF SYSTEMS: (negative unless in BOLD)  Constitutional: fevers, chills, fatigue, weight loss  HEENT: vision changes, eye pain,  hearing loss, dry eyes, dry mouth, oral ulcers, nasal ulcers.  Lungs: SOB, cough, HOLMAN, sinus pressure, epistaxis, nasal crusting/discharge  Cardiac: chest pain, palpitations, LE swelling  Gastrointestinal: reflux, dysphagia, nausea, vomiting, abd pain, diarrhea, constipation  GI/: hematuria, dysuria, frequency, urgency  Skin: rashes, photosensitivity, alopecia  MSK: muscle weakness, joint pain, swelling, morning stiffness  NEURO: headache, numbness, tingling  Vascular: raynauds symptoms, easy bruising  Psych: depression, anxiety      HISTORY:     Past Medical History:    Skin cancer                                                   Thyroid disease                                               Arthritis                                                     Osteoporosis                                                  Chronic pain                                                  Psoriatic arthritis (CMD)                                     Notalgia paresthetica                           5/3/2023      Past Surgical History:    LAPAROSCOPY,ASPIRATION                          1981          HYSTERECTOMY - CERVIX REMOVED                                 TUBAL LIGATION                                                ------------OTHER-------------                                  Comment: Endometriosid     ROTATOR CUFF REPAIR                                           LIVER RESECTION                                                 Comment: hemangioma    FOOT SURGERY                                                  BUNIONECTOMY                                                  OPEN ACCESS COLONOSCOPY                         2022            Comment: normal    DEXA BONE DENSITY AXIAL SKELETON                05/19/2023      Comment: osteoporosis    Immunizations:  Immunization History   Administered Date(s) Administered    COVID Moderna 0.5 mL 12Y+ 01/14/2021, 02/11/2021    Influenza, high dose seasonal, preservative-free  11/01/2022, 10/10/2023    Influenza, quadrivalent, multi-dose 10/01/2020, 10/01/2021    Influenza, quadrivalent, preserve-free 10/27/2022, 10/10/2023    Influenza, seasonal, injectable, trivalent 09/05/2014, 10/05/2015, 11/08/2017    Pneumococcal Conjugate 13 Valent Vacc (Prevnar 13) 01/01/2017    Shingrix (Shingles Zoster) 10/04/2018    Tdap 09/13/2012, 09/22/2023       Family History:  Family History   Problem Relation Age of Onset    Heart disease Mother     Hypertension Mother     Diabetes Mother     Cancer, Skin Mother     Cancer, Esophageal Father     Heart disease Father     Diabetes Father     Hypertension Father     Ulcerative Colitis Brother     Ulcerative Colitis Daughter        ALLERGIES:   Allergen Reactions    Ambien SHORTNESS OF BREATH and Other (See Comments)    Neomycin ANAPHYLAXIS    Pain Relieving Patch Other (See Comments)     Can only use a certain brand**[employer]**other manufactures causes itching, irritation and burning    Sulfa Antibiotics ANAPHYLAXIS and Other (See Comments)     Throat closes.      Zolpidem ANAPHYLAXIS and Other (See Comments)     Closes throat  Cerner Allergy Text Annotation: Ambien, Cerner Reaction: lip swelling      Adhesive   (Environmental) Other (See Comments)    Bacitracin Other (See Comments)    Ciprofloxacin RASH    Hydromorphone PRURITUS    Neomycin-Polymyxin-Gramicidin HIVES    Penicillins Other (See Comments)    Polymyxin B RASH    Suture Other (See Comments)     Cerner Allergy Text Annotation: Suture Material; PROLENE       Current Outpatient Medications   Medication Sig Dispense Refill    albuterol 108 (90 Base) MCG/ACT inhaler Inhale 2 puffs into the lungs every 4 hours as needed. 255 g 0    atorvastatin (LIPITOR) 10 MG tablet Take 1 tablet by mouth daily. 90 tablet 3    zoledronic acid (Reclast) 5 MG/100ML injectable solution                   Inject 5 unit intravenously once a year as directed                                                             riSANKIzumab-rzaa (Skyrizi Pen) 150 MG/ML injection Inject 1 pen into the skin at weeks 0, 4, and then every 12 weeks thereafter. 3 mL 4    desonide (DESOWEN) 0.05 % cream Apply 1 application on the skin twice a day; up to 5 days a week when flaring 180 g 1    tacrolimus (PROTOPIC) 0.1 % ointment Apply 1 application on the skin twice a day; as needed for itching 90 g 1    naproxen (NAPROSYN) 500 MG tablet Take 1 tablet by mouth in the morning and 1 tablet in the evening. Take with meals. 60 tablet 0    rifAXIMin (Xifaxan) 550 MG Tab Take 1 tablet by mouth 3 times daily for 14 days. 42 tablet 3    ergocalciferol (DRISDOL) 1.25 mg (50,000 units) capsule Take 1 capsule by mouth 1 day a week. 12 capsule 3    calcitRIOL (ROCALTROL) 0.25 MCG capsule Take 1 capsule by mouth daily. 90 capsule 3    diclofenac (VOLTAREN) 1 % gel Apply 2 g to each hand( divided between small joints) 3 times daily as needed 350 g 2    clobetasol (TEMOVATE) 0.05 % cream Apply 1 application on the skin twice a day; up to 2 weeks to affected areas on body 45 g 0    folic acid (FOLATE) 1 MG tablet Take 1 tablet by mouth daily. 90 tablet 3    lidocaine (LIDODERM) 5 % patch Apply up to 3 patches daily. Remove patch 12 hours after applying and avoid new patch for 12 hours. 270 patch 3    Icosapent Ethyl (Vascepa) 1 g Cap Take 2 g by mouth 2 times daily. 120 capsule 11    pantoprazole (PROTONIX) 40 MG tablet Take 1 tablet by mouth daily before a meal. 90 tablet 1    hydroCORTisone (Proctosol HC) 2.5 % rectal cream Apply 1 application topically 2 times daily as needed. 180 g 1    metoCLOPramide (REGLAN) 10 MG tablet Take 1 tablet by mouth 4 times daily as needed for Nausea. 12 tablet 0    ondansetron (ZOFRAN ODT) 8 MG disintegrating tablet Place 1 tablet onto the tongue every 8 hours as needed for Nausea. 12 tablet 0    Multiple Minerals-Vitamins (CITRACAL PLUS PO) Take 1 tablet by mouth at bedtime. 600 mgs      Alcohol Swabs (B-D SINGLE USE SWABS  REGULAR) Pads Use as directed 100 each 0    solifenacin (VESICARE) 5 MG tablet Take 1 tablet by mouth daily. 60 tablet 5    levothyroxine 75 MCG tablet TAKE 1 TABLET BY MOUTH ONE HOUR BEFORE BREAKFAST ON AN EMPTY STOMACH EVERY DAY      PROAIR  (90 Base) MCG/ACT inhaler Inhale 2 puffs into the lungs every 4 hours as needed.      ALPRAZolam (XANAX) 0.25 MG tablet Take 0.25 mg by mouth daily as needed (anxiety when flying).   0    PROCTOSOL HC 2.5 % rectal cream Place 1 application rectally as needed.      triamcinolone (ARISTOCORT) 0.1 % cream APPLY ON THE SKIN TWICE A DAY FOR 2 WEEKS  1    Polyethylene Glycol 3350 (MIRALAX PO) Take 1 packet by mouth as needed.       No current facility-administered medications for this visit.       Social History:  Social History     Tobacco Use    Smoking status: Former     Types: Cigarettes     Passive exposure: Never    Smokeless tobacco: Never   Vaping Use    Vaping status: never used   Substance Use Topics    Alcohol use: Not Currently    Drug use: Never       PHYSICAL EXAM:   Vitals:  Vitals:    04/12/24 0916   BP: 110/68   Pulse: 80       BMI: Body mass index is 23.42 kg/m².    Wt Readings from Last 5 Encounters:   04/12/24 61.9 kg (136 lb 7.4 oz)   11/28/23 62.6 kg (138 lb)   11/20/23 62.5 kg (137 lb 10.8 oz)   11/16/23 62.3 kg (137 lb 5.6 oz)   10/27/23 62.6 kg (138 lb 0.1 oz)       PHYSICAL EXAM  Gen: no acute distress, sitting comfortably, appears stated age  HEENT: NCAT, normal conjunctivae   Neck: supple   Heart: RRR, normal S1/S2, no murmurs or rubs, no LE edema  Lungs: CTA B/L, no wheezing or crackles   Abd: Soft  Skin: No rashes visualized  Neuro: AOx3, no focal deficits appreciated, no gross sensory deficit, CN 2-12 GI, motor 5/5 x 4 prox/dist     MSK:   Full ROM of B/L shoulders, Full ROM of B/L elbows, wrists with no synovitis swelling or tenderness.    Full fist B/L     B/L MCPs PIPs and DIPs with no swelling synovitis, +R 2nd DIP with Heberden's node  +CMC  nodularity   B/L knees full ROM, no crepitus effusion or swelling.    B/L ankles full ROM     REVIEW OF LABORATORY DATA     Lab Results   Component Value Date    WBC 5.2 11/28/2023    WBC 4.4 09/23/2023    WBC 4.9 02/10/2023     Lab Results   Component Value Date    HGB 13.1 11/28/2023    HGB 13.6 09/23/2023    HGB 12.2 02/10/2023     Lab Results   Component Value Date     11/28/2023     09/23/2023     02/10/2023       Lab Results   Component Value Date    GPT 23 11/28/2023    GPT 30 09/23/2023    GPT 31 05/20/2023     Lab Results   Component Value Date    AST 14 11/28/2023    AST 18 09/23/2023    AST 18 05/20/2023       Lab Results   Component Value Date    ALKPT 106 11/28/2023    ALKPT 117 09/23/2023    ALKPT 86 05/20/2023     Lab Results   Component Value Date    ALBUMIN 4.1 11/28/2023    ALBUMIN 4.1 09/23/2023    ALBUMIN 3.7 05/20/2023       Lab Results   Component Value Date    CALCIUM 8.9 11/28/2023    CALCIUM 9.1 09/23/2023    CALCIUM 9.0 05/20/2023       Lab Results   Component Value Date    VITD25 104.6 (H) 09/23/2023    VITD25 81.8 05/20/2023    VITD25 115.3 (H) 04/29/2023       Lab Results   Component Value Date    CREATININE 0.55 11/28/2023    CREATININE 0.62 09/23/2023    CREATININE 0.43 (L) 05/20/2023        No results found for: \"PRCR\"     Lab Results   Component Value Date    RESR 13 11/28/2023    RESR 18 05/20/2023    RESR 8 06/01/2016    RESR 7 04/15/2015    RESR 9 11/25/2014    CRP <0.3 11/28/2023    CRP <0.3 05/20/2023    CRP <0.3 06/07/2017    CRP <0.3 06/01/2016    CRP <0.3 04/15/2015       Lab Results   Component Value Date    TSH 2.300 11/28/2023       Lab Results   Component Value Date     03/24/2014     No results found for: \"ALDSE\"    No results found for: \"URIC\"    Lab Results   Component Value Date    IRON 97 07/19/2018    PST 22 07/19/2018    TIBC 439 07/19/2018    FERR 68 11/28/2023    GISELA 8.9 11/28/2023    VB12 426 04/29/2023       Hepatitis and TB  status:    Lab Results   Component Value Date    HBAG NEGATIVE 11/14/2017    HSAB Positive 10/28/2022    HCAB Negative 10/28/2022    HBINT  11/14/2017     HEPATITIS B SURFACE ANTIBODY PRESENT, CONSISTENT WITH IMMUNITY    QUANPI Negative 10/28/2022       Lab Results   Component Value Date    QUANPI Negative 10/28/2022       IMMUNOSEROLOGY:    Lab Results   Component Value Date    RA <10 10/28/2022    CCPA 4 10/28/2022     Lab Results   Component Value Date    ANABL NEGATIVE 06/01/2016     No results found for: \"CROMAB\", \"DBSDNA\", \"SSAABS\", \"SSBABS\", \"RPPAB\", \"RNPABS\", \"SMABS\", \"JO1G\", \"SCLR\", \"ACENT\"  No results found for: \"C3\", \"C4\"   No results found for: \"DBSDNA\"   No results found for: \"LINTE\", \"CARDGA\", \"CARDGG\", \"CARDGM\", \"B2IGG\", \"B2IGA\", \"B2IGM\"  Lab Results   Component Value Date    AGTCE 43 06/01/2016     No results found for: \"CNCA\", \"PNCA7\", \"MYAB\", \"SP3\"       Lab Results   Component Value Date    HLAB27 NEGATIVE 10/28/2022     No results found for: \"EVELIO\"   No results found for: \"IGA\", \"IGG\", \"IGM\"   No results found for: \"TPMTPP\"  Lab Results   Component Value Date    HLAB27 NEGATIVE 10/28/2022       LYME IGG/M AB SCREEN  Lab Results   Component Value Date    LYMT NEGATIVE 06/01/2016       URINE STUDIES:    Lab Results   Component Value Date    UAPP Clear 02/07/2023    UGLU Negative 02/07/2023    UBILI Negative 02/07/2023    UKET Trace (A) 02/07/2023    USPG >1.030 (H) 02/07/2023    URBC Negative 02/07/2023    UPH 7.0 02/07/2023    UPROT Negative 02/07/2023    UROB 0.2 02/07/2023    UNITR Negative 02/07/2023    UWBC Negative 02/07/2023    SEPT 1 to 5 01/05/2023    ERYT 1 to 3 05/01/2017    LEUK NONE SEEN 05/01/2017    UBACTR NONE SEEN 05/01/2017    HCAST NONE SEEN 05/01/2017    SPTYU URINE, CLEAN CATCH/MIDSTREAM 05/01/2017       REVIEW OF RADIOLOGY AND OTHER STUDIES OF NOTE   MRI hand 5/16/15:    FINDINGS: The second MCP joint demonstrates subtle bony edema and enhancement involving the radial   margin  of the distal second metacarpal.There is early subchondral cyst formation in this region.   Subtle erosion of the adjacent overlying cartilage is seen, but without large defect. These   findings are best visualized on coronal series 11 of the postcontrast images. There is abnormal   enhancement of the second MCP joint space without significant synovial thickening or pannus   formation. Milder inflammatory changes are seen along the margin of the third MCP joint without   underlying abnormal osseous signal. The remaining MCP joints are preserved. The visualized IP   joints are also within normal limits. The visualized extensor and flexor tendons are relatively   preserved.     IMPRESSION:   1.  Localized inflammatory arthropathy within the 2nd and 3rd MCP joints.          MRI lumbar spine 10/19/21:  IMPRESSION:     1. Markedly heterogeneous marrow signal.Low T1 signal lesion within the L4  spinous processes noted.  Appearance is nonspecific.  Metastatic disease  cannot be excluded.  Correlation with bone scan is also advised.     2. Multilevel disc and facet degenerative changes.  No significant central  stenosis.  Improvement in the stress related marrow edema seen previously  within the right L5 pedicle.     OTHER:   DEXA 4/29/22:  IMPRESSION:     1.   Osteoporosis.  2.   Mildly elevated FRAX fracture risk as above.     Foot xray 8/10/23:  EXAM:  XR FOOT 3 OR MORE VIEWS BILATERAL  PROVIDED CLINICAL INFORMATION/INDICATION FOR EXAM: .  Psoriatic arthritis, annual ccirflxiufC51.50 Arthropathic psoriasis,  unspecified (CMD)     TECHNIQUE:  XR FOOT 3 OR MORE VIEWS BILATERAL   COMPARISON: None available     FINDINGS:  COMBINED REPORT BILATERAL FEET  RIGHT FOOT DEMONSTRATES:  Bones/Joints:   No identifiable acute fracture, dislocation or geographic bony abnormality.   No bony destructive process/periosteal reaction to suggest osteomyelitis.  Joint spaces are adequately maintained.  No erosions suggest  erosive  arthropathy/psoriatic arthropathy  Hallux valgus deformity the great toe.  Postoperative change great toe  likely representing previous bunion surgery with evident bone loss from the  1st metatarsal head and wire transfixing the base of the proximal phalange  of the great toe.  There is degenerative joint space narrowing and spurring of the 1st  metatarsal-phalangeal joint  Irregularity of the distal aspect of the proximal phalange of the 2nd toe  likely postsurgical  Hammertoe deformities  Plantar calcaneal spur  Tarsometatarsal joints/Lisfranc joint is intact  Soft Tissues: No focal soft tissue swelling   Foreign Body: No foreign body     LEFT FOOT DEMONSTRATES:  Bones/Joints:   No identifiable acute fracture, dislocation or geographic bony abnormality.   No bony destructive process/periosteal reaction to suggest osteomyelitis  Hallux valgus deformity the great toe.  Postoperative change great toe with  apparent bone loss from the 1st metatarsal head and wire projects at the  base of the proximal phalange of the great toe may represent previous  bunion repair  Irregularity of the distal aspect of the proximal phalange of the 2nd digit  suggest previous cervical change perhaps related to hammertoe repair  Small plantar calcaneal spur  Tarsometatarsal joints/Lisfranc joint is intact  Soft Tissues: No focal soft tissue swelling   Foreign Body: No foreign body     IMPRESSION:  COMBINED REPORT BILATERAL FEET  1.   RIGHT FOOT: Postsurgical change great toe likely previous bunion  repair.  No evidence of erosive arthropathy /psoriatic arthritis  2.   LEFT FOOT: Postsurgical change great toe likely previous bunion  repair.  No evidence of erosive arthropathy/psoriatic arthritis  3.   Please see additional comments/observations as outlined in the body of  the report    Hand xray 8/10/23  EXAM:  XR HAND 3 OR MORE VIEWS BILATERAL  PROVIDED CLINICAL INFORMATION/INDICATION FOR EXAM: .  Psoriatic arthritis follow up,  pain in both hands. Pt states left thumb is  the worst L40.50 Arthropathic psoriasis, unspecified (CMD)     TECHNIQUE:  XR HAND 3 OR MORE VIEWS BILATERAL   COMPARISON: Bilateral hands August 18, 2022     FINDINGS:  COMBINED REPORT BILATERAL HANDS     RIGHT HAND DEMONSTRATES:  Bones/Joints:   No identifiable acute fracture, dislocation or geographic bony abnormality.     Small spurs project distal interphalangeal joint of the index finger and  3rd digit likely degenerative in etiology  Joint space narrowing of the 2nd metatarsophalangeal joint.  No erosions.   Consider degenerative arthropathy  Severe joint space narrowing of the 1st carpometacarpal articular joint  with endplate sclerosis and large spur, cough degenerative arthropathy  Small calcification in the soft tissues of the wrist radial side  No erosions/erosive arthropathy.     Carpal bones intact with normal arcs.   Soft Tissues: No focal soft tissue swelling .  No juxta-articular soft  tissue swelling to suggest inflammatory arthropathy.  No joint or soft  tissue calcifications  Foreign Body: No foreign body  Similarly observed prior examination     LEFT HAND DEMONSTRATES:  Bones/Joints:   No identifiable acute fracture, dislocation or geographic bony abnormality.      Mild distal interphalangeal joint space degenerative narrowing.   Benign-appearing cortical sclerosis of the distal aspect of the diaphysis  of the 3rd digit  .  Severe degenerative osteoarthrosis of the 1st carpometacarpal  articulation with marked joint space narrowing, endplate sclerosis and spur  formation  No erosions/erosive arthropathy.  No radiographic evidence to suggest  psoriatic arthritis  Slight lucency of the juxta-articular regions of the 5th digit left side  may represent regional osteopenia  Carpal bones intact with normal arcs.   Soft Tissues: No focal soft tissue swelling .  No juxta-articular soft  tissue swelling to suggest inflammatory arthropathy.  No joint or  soft  tissue calcifications  Foreign Body: No foreign body     IMPRESSION:  COMBINED REPORT BILATERAL HANDS  1.    No erosive arthropathy or findings to suggest psoriatic arthritis  bilateral hands  2.    Severe bilateral 1st carpometacarpal degenerative arthrosis bilateral  hands.  Similar to prior examination          PROCEDURES PERFORMED:  none     ASSESSMENT & RECOMMENDATIONS   Samantha Baca is a 63 year old  White  female with a history of osteoporosis, inflammatory arthritis, psoriatic arthritis who comes to Rheumatology for management.  .     Problem List  #Psoriatic arthritis (CMS/[facility])  (primary encounter diagnosis)  High risk medication use   - initially presented with joint pain after erlichia infection s/p doxycycline treatment, then diagnosed with psoriatic arthritis at [facility] with evidence of plaque psoriasis and inflammatory arthritis, MRI of hands from 2015 with evidence of some erosive disease   - no concern for axial involvement- recent MRIs and imaging of spine have not shown any evidence of inflammatory arthritis- although she does have degenerative disease and pars fracture from underlying osteoporosis  - was previously on Enbrel for several years, but then  no longer controlled joint pain  - initiated on Skyrizi, 150 mg at weeks 0, 4, and then every 12 weeks with improvement in symptoms    - routine labs ordered for later this year  Plan: CBC with Automated Differential, Comprehensive         Metabolic Panel       R shoulder cyst s/p drainage  -Has had improvement in pain status post drainage of right shoulder cyst    Osteoarthritis  CMC arthritis  Shoulder pain  - pain in hands, especially along CMC with evidence of degenerative disease  - Voltaren gel  - can consider CMC injection in the future     #osteoporosis  Pars fracture  - following with endo at [place]  - on Reclast              Follow-up:  - 3 months    _________________________________________________________________    Total time I  No spent today on this appointment is 30 minutes.    This time includes reviewing all recent test results, obtaining and/or reviewing the history from other providers on care everywhere, performing a medically appropriate physical exam and evaluation, educating/counseling the patient, ordering medications and lab test, communicating with other healthcare professionals and documenting clinical information in the EMR.    Patient education completed on disease process, etiology, prognosis and management.   Pro and cons for treatment discussed.   Medications were discussed in detail (indications, benefits, side effects, alternatives).    Medical compliance with plan discussed and risks of non-compliance reviewed.  Recommended prompt follow-up if symptoms remain uncontrolled or worsened.  Return to the clinic as clinically indicated as discussed with patient who verbalized understanding of and agreement with the plan.      CC: Vannessa Corrales DO    Signed:   Name: Noel Olivarez DO   Date: 4/12/2024

## 2025-07-29 NOTE — PRE-OP CHECKLIST - PATIENT PROBLEMS/NEEDS
Copied from CRM #[id number]. Topic: General Inquiry - Patient Advice  >> Jul 29, 2025  4:10 PM Veronica wrote:  Type:  Needs Medical Advice    Who Called: Keegan     Devendra Call Back Number: [phone]   Additional Information: pt received call but no VM was left, requesting  a call back  
Reviewed upcoming appointments with Ms. Caba and her son. Labs added for 8/6 appointment. All questions answered.   
Patient expressed no known problems or needs

## (undated) DEVICE — NDL ASPIRATION VIZISHOT2 22G

## (undated) DEVICE — TUBING CANNULA SALTER LABS NASAL ADULT 7FT

## (undated) DEVICE — DRSG CURITY GAUZE SPONGE 4 X 4" 12-PLY

## (undated) DEVICE — MASK SURGICAL WITH EYESHIELD ANTIFOG (ORANGE)

## (undated) DEVICE — BIOPSY FORCEP OLYMPUS ALLIGATOR 2.0MM

## (undated) DEVICE — GLV 7 PROTEXIS (WHITE)

## (undated) DEVICE — LABELS BLANK W PEN

## (undated) DEVICE — VALVE BIOPSY BRONCHOVIDEOSCOPE

## (undated) DEVICE — SYR LUER LOK 10CC

## (undated) DEVICE — VALVE SUCTION EVIS 160/200/240

## (undated) DEVICE — SYR LUER SLIP TIP 30CC

## (undated) DEVICE — PLATE NESSY 170

## (undated) DEVICE — INFLATION DEVICE BIG 60

## (undated) DEVICE — SOL IRR POUR NS 0.9% 500ML

## (undated) DEVICE — ADAPTER FIBEROPTIC BRONCHOSCOPE DUAL AXIS SWIVEL

## (undated) DEVICE — PACK BRONCHOSCOPY

## (undated) DEVICE — SOL IRR POUR H2O 500ML

## (undated) DEVICE — FORCEP BIOPSY BRONCHOSCOPE DISP

## (undated) DEVICE — TRAP SPECIMEN SPUTUM 40CC

## (undated) DEVICE — SOL INJ NS 0.9% 100ML

## (undated) DEVICE — WARMING BLANKET LOWER ADULT

## (undated) DEVICE — SYR LUER LOK 20CC

## (undated) DEVICE — SUTURE REMOVAL KIT

## (undated) DEVICE — DRSG TAPE TRANSPORE 1"

## (undated) DEVICE — VENODYNE/SCD SLEEVE CALF MEDIUM

## (undated) DEVICE — DRAPE TOWEL BLUE 17" X 24"

## (undated) DEVICE — BALLOON SINGLE FOR BF-UC160F

## (undated) DEVICE — PROBE FIAPC DIA 2.3MM/7FR LNTH 220CM/7.2FT

## (undated) DEVICE — SOL IRR NS 0.9% 250ML

## (undated) DEVICE — BRUSH CYTO DISP

## (undated) DEVICE — DRAPE 3/4 SHEET 52X76"

## (undated) DEVICE — STOPCOCK 4-WAY (BLUE) DISCOFIX SPIN-LOCK CONNECTOR

## (undated) DEVICE — NDL ENDOBRONCHIAL ULTRASOUND FINE BIOPSY DEVICE 22GA

## (undated) DEVICE — FORCEP BIOPSY 1.8MM JAW X 100CM DISP

## (undated) DEVICE — SOL ANTI FOG